# Patient Record
Sex: FEMALE | Race: WHITE | HISPANIC OR LATINO | Employment: FULL TIME | URBAN - METROPOLITAN AREA
[De-identification: names, ages, dates, MRNs, and addresses within clinical notes are randomized per-mention and may not be internally consistent; named-entity substitution may affect disease eponyms.]

---

## 2017-01-06 ENCOUNTER — HOSPITAL ENCOUNTER (EMERGENCY)
Facility: HOSPITAL | Age: 37
Discharge: HOME | End: 2017-01-06
Admitting: EMERGENCY MEDICINE
Payer: COMMERCIAL

## 2017-01-06 VITALS
WEIGHT: 129.41 LBS | DIASTOLIC BLOOD PRESSURE: 74 MMHG | SYSTOLIC BLOOD PRESSURE: 130 MMHG | TEMPERATURE: 98.5 F | OXYGEN SATURATION: 100 % | HEART RATE: 92 BPM | RESPIRATION RATE: 16 BRPM

## 2017-01-06 DIAGNOSIS — B34.9 VIRAL SYNDROME: Primary | ICD-10-CM

## 2017-01-06 LAB
FLUAV AG SPEC QL IA: NEGATIVE
FLUBV AG SPEC QL IA: NEGATIVE
S PYO AG THROAT QL: NEGATIVE

## 2017-01-06 PROCEDURE — 87147 CULTURE TYPE IMMUNOLOGIC: CPT | Performed by: PHYSICIAN ASSISTANT

## 2017-01-06 PROCEDURE — 87798 DETECT AGENT NOS DNA AMP: CPT | Performed by: PHYSICIAN ASSISTANT

## 2017-01-06 PROCEDURE — 99283 EMERGENCY DEPT VISIT LOW MDM: CPT

## 2017-01-06 PROCEDURE — 87430 STREP A AG IA: CPT | Performed by: PHYSICIAN ASSISTANT

## 2017-01-06 PROCEDURE — 87400 INFLUENZA A/B EACH AG IA: CPT | Performed by: PHYSICIAN ASSISTANT

## 2017-01-06 PROCEDURE — 87070 CULTURE OTHR SPECIMN AEROBIC: CPT | Performed by: PHYSICIAN ASSISTANT

## 2017-01-07 LAB
FLUAV AG SPEC QL: NORMAL
FLUBV AG SPEC QL: NORMAL
RSV B RNA SPEC QL NAA+PROBE: NORMAL

## 2017-01-09 LAB — BACTERIA THROAT CULT: NORMAL

## 2017-01-12 ENCOUNTER — HOSPITAL ENCOUNTER (EMERGENCY)
Facility: HOSPITAL | Age: 37
Discharge: HOME | End: 2017-01-12
Attending: EMERGENCY MEDICINE | Admitting: EMERGENCY MEDICINE
Payer: COMMERCIAL

## 2017-01-12 VITALS
DIASTOLIC BLOOD PRESSURE: 77 MMHG | OXYGEN SATURATION: 100 % | RESPIRATION RATE: 18 BRPM | TEMPERATURE: 97.7 F | WEIGHT: 133.2 LBS | HEART RATE: 79 BPM | SYSTOLIC BLOOD PRESSURE: 129 MMHG

## 2017-01-12 DIAGNOSIS — R22.0 SWELLING OF RIGHT SIDE OF FACE: Primary | ICD-10-CM

## 2017-01-12 PROCEDURE — 99283 EMERGENCY DEPT VISIT LOW MDM: CPT

## 2017-03-29 ENCOUNTER — ALLSCRIPTS OFFICE VISIT (OUTPATIENT)
Dept: OTHER | Facility: OTHER | Age: 37
End: 2017-03-29

## 2017-03-29 ENCOUNTER — TRANSCRIBE ORDERS (OUTPATIENT)
Dept: ADMINISTRATIVE | Facility: HOSPITAL | Age: 37
End: 2017-03-29

## 2017-03-29 DIAGNOSIS — M48.061 SPINAL STENOSIS OF LUMBAR REGION: ICD-10-CM

## 2017-03-29 DIAGNOSIS — M48.061 SPINAL STENOSIS, LUMBAR REGION, WITHOUT NEUROGENIC CLAUDICATION: Primary | ICD-10-CM

## 2017-03-30 ENCOUNTER — ALLSCRIPTS OFFICE VISIT (OUTPATIENT)
Dept: OTHER | Facility: OTHER | Age: 37
End: 2017-03-30

## 2017-04-08 ENCOUNTER — HOSPITAL ENCOUNTER (OUTPATIENT)
Dept: MRI IMAGING | Facility: HOSPITAL | Age: 37
Discharge: HOME | End: 2017-04-08
Payer: COMMERCIAL

## 2017-04-08 DIAGNOSIS — M48.061 SPINAL STENOSIS OF LUMBAR REGION: ICD-10-CM

## 2017-04-08 PROCEDURE — 72148 MRI LUMBAR SPINE W/O DYE: CPT

## 2017-04-12 ENCOUNTER — ALLSCRIPTS OFFICE VISIT (OUTPATIENT)
Dept: OTHER | Facility: OTHER | Age: 37
End: 2017-04-12

## 2017-04-30 ENCOUNTER — ANESTHESIA EVENT (OUTPATIENT)
Dept: GASTROENTEROLOGY | Facility: MEDICAL CENTER | Age: 37
End: 2017-04-30
Payer: COMMERCIAL

## 2017-05-01 ENCOUNTER — GENERIC CONVERSION - ENCOUNTER (OUTPATIENT)
Dept: GASTROENTEROLOGY | Facility: MEDICAL CENTER | Age: 37
End: 2017-05-01

## 2017-05-01 ENCOUNTER — ANESTHESIA (OUTPATIENT)
Dept: GASTROENTEROLOGY | Facility: MEDICAL CENTER | Age: 37
End: 2017-05-01
Payer: COMMERCIAL

## 2017-05-01 ENCOUNTER — HOSPITAL ENCOUNTER (OUTPATIENT)
Facility: MEDICAL CENTER | Age: 37
Setting detail: OUTPATIENT SURGERY
Discharge: HOME | End: 2017-05-01
Attending: INTERNAL MEDICINE | Admitting: INTERNAL MEDICINE
Payer: COMMERCIAL

## 2017-05-01 VITALS
HEART RATE: 81 BPM | BODY MASS INDEX: 25.91 KG/M2 | OXYGEN SATURATION: 99 % | SYSTOLIC BLOOD PRESSURE: 107 MMHG | WEIGHT: 132 LBS | RESPIRATION RATE: 16 BRPM | HEIGHT: 60 IN | TEMPERATURE: 97.4 F | DIASTOLIC BLOOD PRESSURE: 59 MMHG

## 2017-05-01 DIAGNOSIS — R10.9 ABDOMINAL PAIN: ICD-10-CM

## 2017-05-01 PROCEDURE — 88342 IMHCHEM/IMCYTCHM 1ST ANTB: CPT | Performed by: INTERNAL MEDICINE

## 2017-05-01 PROCEDURE — 88305 TISSUE EXAM BY PATHOLOGIST: CPT | Performed by: INTERNAL MEDICINE

## 2017-05-01 RX ORDER — SODIUM CHLORIDE 9 MG/ML
125 INJECTION, SOLUTION INTRAVENOUS CONTINUOUS
Status: DISCONTINUED | OUTPATIENT
Start: 2017-05-01 | End: 2017-05-01 | Stop reason: HOSPADM

## 2017-05-01 RX ORDER — TRAMADOL HYDROCHLORIDE 50 MG/1
50 TABLET ORAL EVERY 6 HOURS PRN
COMMUNITY
End: 2017-07-09 | Stop reason: ALTCHOICE

## 2017-05-01 RX ORDER — PROPOFOL 10 MG/ML
INJECTION, EMULSION INTRAVENOUS AS NEEDED
Status: DISCONTINUED | OUTPATIENT
Start: 2017-05-01 | End: 2017-05-01 | Stop reason: SURG

## 2017-05-01 RX ADMIN — PROPOFOL 50 MG: 10 INJECTION, EMULSION INTRAVENOUS at 08:14

## 2017-05-01 RX ADMIN — PROPOFOL 50 MG: 10 INJECTION, EMULSION INTRAVENOUS at 08:15

## 2017-05-01 RX ADMIN — PROPOFOL 50 MG: 10 INJECTION, EMULSION INTRAVENOUS at 08:11

## 2017-05-01 RX ADMIN — PROPOFOL 50 MG: 10 INJECTION, EMULSION INTRAVENOUS at 08:05

## 2017-05-01 RX ADMIN — SODIUM CHLORIDE 125 ML/HR: 0.9 INJECTION, SOLUTION INTRAVENOUS at 07:35

## 2017-05-11 ENCOUNTER — GENERIC CONVERSION - ENCOUNTER (OUTPATIENT)
Dept: OTHER | Facility: OTHER | Age: 37
End: 2017-05-11

## 2017-05-12 ENCOUNTER — ALLSCRIPTS OFFICE VISIT (OUTPATIENT)
Dept: OTHER | Facility: OTHER | Age: 37
End: 2017-05-12

## 2017-05-12 DIAGNOSIS — R10.11 RIGHT UPPER QUADRANT PAIN: ICD-10-CM

## 2017-05-12 DIAGNOSIS — R07.89 OTHER CHEST PAIN: ICD-10-CM

## 2017-05-18 ENCOUNTER — TRANSCRIBE ORDERS (OUTPATIENT)
Dept: ADMINISTRATIVE | Facility: HOSPITAL | Age: 37
End: 2017-05-18

## 2017-05-18 DIAGNOSIS — R10.9 ABDOMINAL PAIN, UNSPECIFIED SITE: Primary | ICD-10-CM

## 2017-05-24 ENCOUNTER — HOSPITAL ENCOUNTER (OUTPATIENT)
Dept: CT IMAGING | Facility: HOSPITAL | Age: 37
Discharge: HOME | End: 2017-05-24
Attending: INTERNAL MEDICINE
Payer: COMMERCIAL

## 2017-05-24 DIAGNOSIS — R10.9 ABDOMINAL PAIN, UNSPECIFIED SITE: ICD-10-CM

## 2017-05-24 PROCEDURE — 74177 CT ABD & PELVIS W/CONTRAST: CPT

## 2017-05-24 RX ADMIN — IOHEXOL 100 ML: 350 INJECTION, SOLUTION INTRAVENOUS at 17:19

## 2017-05-30 ENCOUNTER — GENERIC CONVERSION - ENCOUNTER (OUTPATIENT)
Dept: OTHER | Facility: OTHER | Age: 37
End: 2017-05-30

## 2017-06-06 ENCOUNTER — APPOINTMENT (OUTPATIENT)
Dept: LAB | Facility: HOSPITAL | Age: 37
End: 2017-06-06
Payer: COMMERCIAL

## 2017-06-06 ENCOUNTER — TRANSCRIBE ORDERS (OUTPATIENT)
Dept: ADMINISTRATIVE | Facility: HOSPITAL | Age: 37
End: 2017-06-06

## 2017-06-06 DIAGNOSIS — Z11.3 SCREENING EXAMINATION FOR VENEREAL DISEASE: Primary | ICD-10-CM

## 2017-06-06 DIAGNOSIS — Z11.3 SCREENING EXAMINATION FOR VENEREAL DISEASE: ICD-10-CM

## 2017-06-06 DIAGNOSIS — Z72.89 OTHER PROBLEMS RELATED TO LIFESTYLE: ICD-10-CM

## 2017-06-06 PROCEDURE — 86592 SYPHILIS TEST NON-TREP QUAL: CPT

## 2017-06-06 PROCEDURE — 36415 COLL VENOUS BLD VENIPUNCTURE: CPT

## 2017-06-06 PROCEDURE — 87389 HIV-1 AG W/HIV-1&-2 AB AG IA: CPT

## 2017-06-07 LAB
HIV 1+2 AB+HIV1 P24 AG SERPL QL IA: NORMAL
RPR SER QL: NORMAL

## 2017-07-09 ENCOUNTER — HOSPITAL ENCOUNTER (EMERGENCY)
Facility: HOSPITAL | Age: 37
Discharge: HOME | End: 2017-07-09
Attending: EMERGENCY MEDICINE | Admitting: EMERGENCY MEDICINE
Payer: COMMERCIAL

## 2017-07-09 ENCOUNTER — APPOINTMENT (EMERGENCY)
Dept: RADIOLOGY | Facility: HOSPITAL | Age: 37
End: 2017-07-09
Payer: COMMERCIAL

## 2017-07-09 VITALS
TEMPERATURE: 98.3 F | HEART RATE: 86 BPM | DIASTOLIC BLOOD PRESSURE: 80 MMHG | SYSTOLIC BLOOD PRESSURE: 140 MMHG | RESPIRATION RATE: 16 BRPM | OXYGEN SATURATION: 99 %

## 2017-07-09 DIAGNOSIS — M79.672 FOOT PAIN, LEFT: Primary | ICD-10-CM

## 2017-07-09 LAB
BILIRUB UR QL STRIP: NEGATIVE
CLARITY UR: CLEAR
CLARITY, POC: CLEAR
COLOR UR: YELLOW
COLOR, POC: YELLOW
GLUCOSE UR STRIP-MCNC: NEGATIVE MG/DL
HCG UR QL: NEGATIVE
HGB UR QL STRIP.AUTO: NEGATIVE
KETONES UR STRIP-MCNC: NEGATIVE MG/DL
LEUKOCYTE ESTERASE UR QL STRIP: NEGATIVE
NITRITE UR QL STRIP: NEGATIVE
PH UR STRIP.AUTO: 6.5 [PH] (ref 4.5–8)
PROT UR STRIP-MCNC: NEGATIVE MG/DL
SP GR UR STRIP.AUTO: 1.01 (ref 1–1.03)
UROBILINOGEN UR QL STRIP.AUTO: 0.2 E.U./DL

## 2017-07-09 PROCEDURE — 81025 URINE PREGNANCY TEST: CPT | Performed by: PHYSICIAN ASSISTANT

## 2017-07-09 PROCEDURE — 73630 X-RAY EXAM OF FOOT: CPT

## 2017-07-09 PROCEDURE — 81003 URINALYSIS AUTO W/O SCOPE: CPT

## 2017-07-09 PROCEDURE — 99283 EMERGENCY DEPT VISIT LOW MDM: CPT

## 2017-07-09 PROCEDURE — 81002 URINALYSIS NONAUTO W/O SCOPE: CPT | Performed by: PHYSICIAN ASSISTANT

## 2017-07-09 RX ORDER — SENNOSIDES 8.6 MG
650 CAPSULE ORAL EVERY 8 HOURS PRN
Qty: 30 TABLET | Refills: 0 | Status: SHIPPED | OUTPATIENT
Start: 2017-07-09 | End: 2017-10-06

## 2017-07-13 ENCOUNTER — ALLSCRIPTS OFFICE VISIT (OUTPATIENT)
Dept: OTHER | Facility: OTHER | Age: 37
End: 2017-07-13

## 2017-07-13 ENCOUNTER — APPOINTMENT (OUTPATIENT)
Dept: LAB | Facility: HOSPITAL | Age: 37
End: 2017-07-13
Attending: INTERNAL MEDICINE
Payer: COMMERCIAL

## 2017-07-13 DIAGNOSIS — R10.9 ABDOMINAL PAIN: ICD-10-CM

## 2017-07-13 LAB
ALBUMIN SERPL BCP-MCNC: 3.8 G/DL (ref 3.5–5)
ALP SERPL-CCNC: 60 U/L (ref 46–116)
ALT SERPL W P-5'-P-CCNC: 28 U/L (ref 12–78)
ANION GAP SERPL CALCULATED.3IONS-SCNC: 10 MMOL/L (ref 4–13)
AST SERPL W P-5'-P-CCNC: 16 U/L (ref 5–45)
BASOPHILS # BLD AUTO: 0.04 THOUSANDS/ΜL (ref 0–0.1)
BASOPHILS NFR BLD AUTO: 0 % (ref 0–1)
BILIRUB DIRECT SERPL-MCNC: 0.06 MG/DL (ref 0–0.2)
BILIRUB SERPL-MCNC: 0.24 MG/DL (ref 0.2–1)
BUN SERPL-MCNC: 6 MG/DL (ref 5–25)
CALCIUM SERPL-MCNC: 8.9 MG/DL (ref 8.3–10.1)
CHLORIDE SERPL-SCNC: 101 MMOL/L (ref 100–108)
CO2 SERPL-SCNC: 27 MMOL/L (ref 21–32)
CREAT SERPL-MCNC: 0.9 MG/DL (ref 0.6–1.3)
EOSINOPHIL # BLD AUTO: 0.1 THOUSAND/ΜL (ref 0–0.61)
EOSINOPHIL NFR BLD AUTO: 1 % (ref 0–6)
ERYTHROCYTE [DISTWIDTH] IN BLOOD BY AUTOMATED COUNT: 12.8 % (ref 11.6–15.1)
GFR SERPL CREATININE-BSD FRML MDRD: >60 ML/MIN/1.73SQ M
GLUCOSE SERPL-MCNC: 107 MG/DL (ref 65–140)
HCT VFR BLD AUTO: 36.5 % (ref 34.8–46.1)
HGB BLD-MCNC: 13 G/DL (ref 11.5–15.4)
LYMPHOCYTES # BLD AUTO: 1.96 THOUSANDS/ΜL (ref 0.6–4.47)
LYMPHOCYTES NFR BLD AUTO: 18 % (ref 14–44)
MCH RBC QN AUTO: 30 PG (ref 26.8–34.3)
MCHC RBC AUTO-ENTMCNC: 35.6 G/DL (ref 31.4–37.4)
MCV RBC AUTO: 84 FL (ref 82–98)
MONOCYTES # BLD AUTO: 0.44 THOUSAND/ΜL (ref 0.17–1.22)
MONOCYTES NFR BLD AUTO: 4 % (ref 4–12)
NEUTROPHILS # BLD AUTO: 8.66 THOUSANDS/ΜL (ref 1.85–7.62)
NEUTS SEG NFR BLD AUTO: 77 % (ref 43–75)
NRBC BLD AUTO-RTO: 0 /100 WBCS
PLATELET # BLD AUTO: 305 THOUSANDS/UL (ref 149–390)
PMV BLD AUTO: 10.4 FL (ref 8.9–12.7)
POTASSIUM SERPL-SCNC: 3.5 MMOL/L (ref 3.5–5.3)
PROT SERPL-MCNC: 7.5 G/DL (ref 6.4–8.2)
RBC # BLD AUTO: 4.34 MILLION/UL (ref 3.81–5.12)
SODIUM SERPL-SCNC: 138 MMOL/L (ref 136–145)
WBC # BLD AUTO: 11.2 THOUSAND/UL (ref 4.31–10.16)

## 2017-07-13 PROCEDURE — 80048 BASIC METABOLIC PNL TOTAL CA: CPT

## 2017-07-13 PROCEDURE — 36415 COLL VENOUS BLD VENIPUNCTURE: CPT

## 2017-07-13 PROCEDURE — 85025 COMPLETE CBC W/AUTO DIFF WBC: CPT

## 2017-07-13 PROCEDURE — 80076 HEPATIC FUNCTION PANEL: CPT

## 2017-07-17 ENCOUNTER — GENERIC CONVERSION - ENCOUNTER (OUTPATIENT)
Dept: OTHER | Facility: OTHER | Age: 37
End: 2017-07-17

## 2017-07-28 ENCOUNTER — HOSPITAL ENCOUNTER (OUTPATIENT)
Dept: NON INVASIVE DIAGNOSTICS | Facility: HOSPITAL | Age: 37
Discharge: HOME | End: 2017-07-28
Attending: INTERNAL MEDICINE
Payer: COMMERCIAL

## 2017-07-28 ENCOUNTER — TRANSCRIBE ORDERS (OUTPATIENT)
Dept: ADMINISTRATIVE | Facility: HOSPITAL | Age: 37
End: 2017-07-28

## 2017-07-28 DIAGNOSIS — R10.9 ABDOMINAL PAIN, UNSPECIFIED SITE: Primary | ICD-10-CM

## 2017-07-28 DIAGNOSIS — R10.9 ABDOMINAL PAIN, UNSPECIFIED SITE: ICD-10-CM

## 2017-07-28 PROCEDURE — 93005 ELECTROCARDIOGRAM TRACING: CPT

## 2017-07-31 LAB
ATRIAL RATE: 84 BPM
P AXIS: 3 DEGREES
PR INTERVAL: 124 MS
QRS AXIS: 26 DEGREES
QRSD INTERVAL: 86 MS
QT INTERVAL: 370 MS
QTC INTERVAL: 437 MS
T WAVE AXIS: 62 DEGREES
VENTRICULAR RATE: 84 BPM

## 2017-08-03 ENCOUNTER — GENERIC CONVERSION - ENCOUNTER (OUTPATIENT)
Dept: OTHER | Facility: OTHER | Age: 37
End: 2017-08-03

## 2017-08-23 ENCOUNTER — GENERIC CONVERSION - ENCOUNTER (OUTPATIENT)
Dept: OTHER | Facility: OTHER | Age: 37
End: 2017-08-23

## 2017-08-27 ENCOUNTER — APPOINTMENT (EMERGENCY)
Dept: CT IMAGING | Facility: HOSPITAL | Age: 37
End: 2017-08-27
Payer: COMMERCIAL

## 2017-08-27 ENCOUNTER — HOSPITAL ENCOUNTER (EMERGENCY)
Facility: HOSPITAL | Age: 37
Discharge: HOME | End: 2017-08-27
Attending: EMERGENCY MEDICINE | Admitting: EMERGENCY MEDICINE
Payer: COMMERCIAL

## 2017-08-27 VITALS
DIASTOLIC BLOOD PRESSURE: 79 MMHG | HEART RATE: 79 BPM | OXYGEN SATURATION: 100 % | SYSTOLIC BLOOD PRESSURE: 128 MMHG | RESPIRATION RATE: 14 BRPM | TEMPERATURE: 98.5 F

## 2017-08-27 DIAGNOSIS — N83.11 CORPUS LUTEUM CYST OF RIGHT OVARY: Primary | ICD-10-CM

## 2017-08-27 LAB
ANION GAP SERPL CALCULATED.3IONS-SCNC: 7 MMOL/L (ref 4–13)
BASOPHILS # BLD AUTO: 0.03 THOUSANDS/ΜL (ref 0–0.1)
BASOPHILS NFR BLD AUTO: 0 % (ref 0–1)
BILIRUB UR QL STRIP: NEGATIVE
BUN SERPL-MCNC: 9 MG/DL (ref 5–25)
CALCIUM SERPL-MCNC: 9.1 MG/DL (ref 8.3–10.1)
CHLORIDE SERPL-SCNC: 104 MMOL/L (ref 100–108)
CLARITY UR: CLEAR
CO2 SERPL-SCNC: 28 MMOL/L (ref 21–32)
COLOR UR: YELLOW
CREAT SERPL-MCNC: 0.67 MG/DL (ref 0.6–1.3)
EOSINOPHIL # BLD AUTO: 0.09 THOUSAND/ΜL (ref 0–0.61)
EOSINOPHIL NFR BLD AUTO: 1 % (ref 0–6)
ERYTHROCYTE [DISTWIDTH] IN BLOOD BY AUTOMATED COUNT: 13 % (ref 11.6–15.1)
GFR SERPL CREATININE-BSD FRML MDRD: 115 ML/MIN/1.73SQ M
GLUCOSE SERPL-MCNC: 81 MG/DL (ref 65–140)
GLUCOSE UR STRIP-MCNC: NEGATIVE MG/DL
HCG UR QL: NEGATIVE
HCT VFR BLD AUTO: 37.1 % (ref 34.8–46.1)
HGB BLD-MCNC: 13.1 G/DL (ref 11.5–15.4)
HGB UR QL STRIP.AUTO: NEGATIVE
KETONES UR STRIP-MCNC: NEGATIVE MG/DL
LEUKOCYTE ESTERASE UR QL STRIP: NEGATIVE
LYMPHOCYTES # BLD AUTO: 2.06 THOUSANDS/ΜL (ref 0.6–4.47)
LYMPHOCYTES NFR BLD AUTO: 25 % (ref 14–44)
MCH RBC QN AUTO: 29.4 PG (ref 26.8–34.3)
MCHC RBC AUTO-ENTMCNC: 35.3 G/DL (ref 31.4–37.4)
MCV RBC AUTO: 83 FL (ref 82–98)
MONOCYTES # BLD AUTO: 0.48 THOUSAND/ΜL (ref 0.17–1.22)
MONOCYTES NFR BLD AUTO: 6 % (ref 4–12)
NEUTROPHILS # BLD AUTO: 5.49 THOUSANDS/ΜL (ref 1.85–7.62)
NEUTS SEG NFR BLD AUTO: 68 % (ref 43–75)
NITRITE UR QL STRIP: NEGATIVE
NRBC BLD AUTO-RTO: 0 /100 WBCS
PH UR STRIP.AUTO: 6.5 [PH] (ref 4.5–8)
PLATELET # BLD AUTO: 243 THOUSANDS/UL (ref 149–390)
PMV BLD AUTO: 9.8 FL (ref 8.9–12.7)
POTASSIUM SERPL-SCNC: 3.5 MMOL/L (ref 3.5–5.3)
PROT UR STRIP-MCNC: NEGATIVE MG/DL
RBC # BLD AUTO: 4.45 MILLION/UL (ref 3.81–5.12)
SODIUM SERPL-SCNC: 139 MMOL/L (ref 136–145)
SP GR UR STRIP.AUTO: 1.01 (ref 1–1.03)
UROBILINOGEN UR QL STRIP.AUTO: 1 E.U./DL
WBC # BLD AUTO: 8.15 THOUSAND/UL (ref 4.31–10.16)

## 2017-08-27 PROCEDURE — 81003 URINALYSIS AUTO W/O SCOPE: CPT

## 2017-08-27 PROCEDURE — 99284 EMERGENCY DEPT VISIT MOD MDM: CPT

## 2017-08-27 PROCEDURE — 80048 BASIC METABOLIC PNL TOTAL CA: CPT | Performed by: EMERGENCY MEDICINE

## 2017-08-27 PROCEDURE — 96374 THER/PROPH/DIAG INJ IV PUSH: CPT

## 2017-08-27 PROCEDURE — 85025 COMPLETE CBC W/AUTO DIFF WBC: CPT | Performed by: EMERGENCY MEDICINE

## 2017-08-27 PROCEDURE — 74177 CT ABD & PELVIS W/CONTRAST: CPT

## 2017-08-27 PROCEDURE — 36415 COLL VENOUS BLD VENIPUNCTURE: CPT | Performed by: EMERGENCY MEDICINE

## 2017-08-27 PROCEDURE — 81025 URINE PREGNANCY TEST: CPT | Performed by: EMERGENCY MEDICINE

## 2017-08-27 RX ORDER — TRAMADOL HYDROCHLORIDE 50 MG/1
50 TABLET ORAL EVERY 6 HOURS PRN
Qty: 8 TABLET | Refills: 0 | Status: SHIPPED | OUTPATIENT
Start: 2017-08-27 | End: 2017-09-06

## 2017-08-27 RX ORDER — IBUPROFEN 800 MG/1
800 TABLET ORAL EVERY 6 HOURS PRN
Qty: 30 TABLET | Refills: 0 | Status: SHIPPED | OUTPATIENT
Start: 2017-08-27 | End: 2017-10-06

## 2017-08-27 RX ORDER — KETOROLAC TROMETHAMINE 30 MG/ML
15 INJECTION, SOLUTION INTRAMUSCULAR; INTRAVENOUS ONCE
Status: COMPLETED | OUTPATIENT
Start: 2017-08-27 | End: 2017-08-27

## 2017-08-27 RX ADMIN — KETOROLAC TROMETHAMINE 15 MG: 30 INJECTION, SOLUTION INTRAMUSCULAR at 19:47

## 2017-08-27 RX ADMIN — IOHEXOL 100 ML: 350 INJECTION, SOLUTION INTRAVENOUS at 20:14

## 2017-10-06 ENCOUNTER — HOSPITAL ENCOUNTER (EMERGENCY)
Facility: HOSPITAL | Age: 37
Discharge: HOME | End: 2017-10-06
Attending: EMERGENCY MEDICINE | Admitting: EMERGENCY MEDICINE
Payer: COMMERCIAL

## 2017-10-06 VITALS
OXYGEN SATURATION: 99 % | BODY MASS INDEX: 25.97 KG/M2 | SYSTOLIC BLOOD PRESSURE: 115 MMHG | WEIGHT: 133 LBS | TEMPERATURE: 98.3 F | DIASTOLIC BLOOD PRESSURE: 72 MMHG | RESPIRATION RATE: 14 BRPM | HEART RATE: 84 BPM

## 2017-10-06 DIAGNOSIS — G43.909 MIGRAINE HEADACHE: Primary | ICD-10-CM

## 2017-10-06 LAB — EXT PREG TEST URINE: NEGATIVE

## 2017-10-06 PROCEDURE — 96375 TX/PRO/DX INJ NEW DRUG ADDON: CPT

## 2017-10-06 PROCEDURE — 81025 URINE PREGNANCY TEST: CPT | Performed by: EMERGENCY MEDICINE

## 2017-10-06 PROCEDURE — 96374 THER/PROPH/DIAG INJ IV PUSH: CPT

## 2017-10-06 PROCEDURE — 99283 EMERGENCY DEPT VISIT LOW MDM: CPT

## 2017-10-06 PROCEDURE — 96361 HYDRATE IV INFUSION ADD-ON: CPT

## 2017-10-06 RX ORDER — METOCLOPRAMIDE HYDROCHLORIDE 5 MG/ML
10 INJECTION INTRAMUSCULAR; INTRAVENOUS ONCE
Status: COMPLETED | OUTPATIENT
Start: 2017-10-06 | End: 2017-10-06

## 2017-10-06 RX ORDER — KETOROLAC TROMETHAMINE 30 MG/ML
15 INJECTION, SOLUTION INTRAMUSCULAR; INTRAVENOUS ONCE
Status: COMPLETED | OUTPATIENT
Start: 2017-10-06 | End: 2017-10-06

## 2017-10-06 RX ADMIN — KETOROLAC TROMETHAMINE 15 MG: 30 INJECTION, SOLUTION INTRAMUSCULAR at 19:07

## 2017-10-06 RX ADMIN — METOCLOPRAMIDE 10 MG: 5 INJECTION, SOLUTION INTRAMUSCULAR; INTRAVENOUS at 18:37

## 2017-10-06 RX ADMIN — SODIUM CHLORIDE 1000 ML: 0.9 INJECTION, SOLUTION INTRAVENOUS at 18:37

## 2017-10-06 NOTE — ED ATTENDING ATTESTATION
Jon Valentin DO, saw and evaluated the patient  I have discussed the patient with the resident/non-physician practitioner and agree with the resident's/non-physician practitioner's findings, Plan of Care, and MDM as documented in the resident's/non-physician practitioner's note, except where noted  All available labs and Radiology studies were reviewed  At this point I agree with the current assessment done in the Emergency Department  I have conducted an independent evaluation of this patient a history and physical is as follows:    26-year-old female presents for evaluation of migraine headache  The patient has a history of migraines states that this 1 is similar to previous ones in the stabbing type of intermittent headache that she has  The patient denies any new focal neurologic changes associated with this headache  On examination:  The patient is awake, alert and oriented  HEENT: Normocephalic/atraumatic  External examination of the ears is unremarkable  Pupils are equal round and reactive to light, there is no conjunctival injection or scleral icterus noted  Nares are patent without rhinorrhea  The oropharynx is moist without injection  The neck is supple  Lungs: Clear to auscultation bilaterally  Heart: Regular without murmurs rubs or gallops  Abdomen: Soft and nontender  There are positive bowel sounds  there is no rebound or guarding  Musculoskeletal: Normal range of motion with grossly normal strength  Neuro: Cranial nerves II through XII grossly intact  Nonfocal exam  Skin: No rash noted  Psych: Mood and affect normal      At the time my evaluation, after the administration of medications  The patient states that she has significant improvement of her pain is in no distress  The patient is agreeable with discharge at this time and outpatient follow-up  I advised the patient to increase her fluid intake and restrict her caffeine intake      The patient (and any family present) verbalized understanding of the discharge instructions and warnings that would necessitate return to the Emergency Department  All questions were answered prior to discharge      Diagnosis:  Migraine headache    Critical Care Time  CritCare Time

## 2017-10-06 NOTE — DISCHARGE INSTRUCTIONS
Migraine Headache   WHAT YOU NEED TO KNOW:   A migraine is a severe headache  The pain can be so severe that it interferes with your daily activities  A migraine can last a few hours up to several days  The exact cause of migraines is not known  DISCHARGE INSTRUCTIONS:   Return to the emergency department if:   · You have a headache that seems different or much worse than your usual migraine headache  · You have a severe headache with a fever or a stiff neck  · You have new problems with speech, vision, balance, or movement  · You feel like you are going to faint, you become confused, or you have a seizure  Contact your healthcare provider or neurologist if:   · Your migraines interfere with your daily activities  · Your medicines or treatments stop working  · You have questions or concerns about your condition or care  Medicines: You may need any of the following  Take medicine as soon as you feel a migraine begin  · Prescription pain medicine  may be given  Do not wait until the pain is severe before you take your medicine  · Migraine medicines  are used to help prevent a migraine or stop it once it starts  · Antinausea medicine  may be given to calm your stomach and to help prevent vomiting  This medicine can also help relieve pain  · Take your medicine as directed  Contact your healthcare provider if you think your medicine is not helping or if you have side effects  Tell him or her if you are allergic to any medicine  Keep a list of the medicines, vitamins, and herbs you take  Include the amounts, and when and why you take them  Bring the list or the pill bottles to follow-up visits  Carry your medicine list with you in case of an emergency  Manage your symptoms:   · Rest in a dark, quiet room  This will help decrease your pain  Sleep may also help relieve the pain  · Apply ice to decrease pain  Use an ice pack, or put crushed ice in a plastic bag   Cover the ice pack with a towel and place it on your head  Apply ice for 15 to 20 minutes every hour  · Apply heat to decrease pain and muscle spasms  Use a small towel dampened with warm water or a heating pad, or sit in a warm bath  Apply heat on the area for 20 to 30 minutes every 2 hours  You may alternate heat and ice  · Keep a migraine record  Write down when your migraines start and stop  Include your symptoms and what you were doing when a migraine began  Record what you ate or drank for 24 hours before the migraine started  Keep track of what you did to treat your migraine and if it worked  Bring the migraine record with you to visits with your healthcare provider  Follow up with your healthcare provider or neurologist as directed:  Bring your migraine record with you  Write down your questions so you remember to ask them during your visits  Prevent another migraine:   · Do not smoke  Nicotine and other chemicals in cigarettes and cigars can trigger a migraine or make it worse  Ask your healthcare provider for information if you currently smoke and need help to quit  E-cigarettes or smokeless tobacco still contain nicotine  Talk to your healthcare provider before you use these products  · Do not drink alcohol  Alcohol can trigger a migraine  It can also keep medicines used to treat your migraines from working  · Get regular exercise  Exercise may help prevent migraines  Talk to your healthcare provider about the best exercise plan for you  Try to get at least 30 minutes of exercise on most days  · Manage stress  Stress may trigger a migraine  Learn new ways to relax, such as deep breathing  · Create a sleep schedule  Go to bed and get up at the same times each day  Do not watch television before bed  · Eat regular meals  Include healthy foods such as include fruit, vegetables, whole-grain breads, low-fat dairy products, beans, lean meat, and fish   Do not have food or drinks that trigger your migraines  © 2017 [facility] Information is for End User's use only and may not be sold, redistributed or otherwise used for commercial purposes  All illustrations and images included in CareNotes® are the copyrighted property of A D A M [employer] Inc  or [employer]  The above information is an  only  It is not intended as medical advice for individual conditions or treatments  Talk to your doctor, nurse or pharmacist before following any medical regimen to see if it is safe and effective for you

## 2017-10-06 NOTE — ED PROVIDER NOTES
History  Chief Complaint   Patient presents with    Migraine     Patient has had left side headache for past week  She states the pain comes and goes  Has hx of migraines but describes this headeache being different from previous  HPI  79-year-old female past history migraines presents with complaint of right-sided frontal headache that has been ongoing for last week  Patient says she has had intermittent right-sided headache that is sharp, stabbing pain that waxes and wanes in intensity and is exacerbated by loud noise  Patient says at its worst pain is a 7/10 does not radiate  Associated with intermittent photophobia as well as blurring of vision in the right eye  Patient has tried taking Motrin at home with only mild relief of her symptoms  Says headache presents every 6 several hours or so  She is concerned because she has never had a headache that has lasted for a week before  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 and has been affecting her ability to work  She otherwise denies weakness, numbness, tingling, speech difficulties, chest pain, shortness breath, nausea, vomiting, diarrhea, fever, chills  Patient is having normal periods her most recent 1 was in the beginning of September  Impression plan 79-year-old female presents with headache has been ongoing for the last week  It is right-sided and intermittent in nature waxes and wanes in intensity  She has with overall well-appearing likely migraine headache  We will treat with IV fluids, Reglan, Toradol  Will also check a pregnancy test, reassess  None       Past Medical History:   Diagnosis Date    Acid reflux     Frequent headaches     Liver cyst     Migraine     Stomach ulcer        Past Surgical History:   Procedure Laterality Date    [place] ESOPHAGOGASTRODUODENOSCOPY TRANSORAL DIAGNOSTIC N/A 5/1/2017    Procedure: ESOPHAGOGASTRODUODENOSCOPY (EGD); Surgeon: Gita Trammell MD;  Location: [facility] GI LAB;   Service: Gastroenterology    TUBAL LIGATION         History reviewed  No pertinent family history  I have reviewed and agree with the history as documented  Social History   Substance Use Topics    Smoking status: Never Smoker    Smokeless tobacco: Never Used    Alcohol use No        Review of Systems   Constitutional: Negative for activity change, appetite change, chills and fever  HENT: Negative for sore throat, trouble swallowing and voice change  Eyes: Negative for photophobia, discharge and visual disturbance  Respiratory: Negative for cough and shortness of breath  Cardiovascular: Negative for chest pain, palpitations and leg swelling  Gastrointestinal: Negative for abdominal pain, diarrhea, nausea and vomiting  Endocrine: Negative for polyuria  Genitourinary: Negative for dysuria, vaginal discharge and vaginal pain  Musculoskeletal: Negative for back pain and gait problem  Skin: Negative for rash  Allergic/Immunologic: Negative  Neurological: Positive for headaches  Negative for dizziness, tremors, seizures, syncope, facial asymmetry, speech difficulty, weakness, light-headedness and numbness  Hematological: Negative for adenopathy  Psychiatric/Behavioral: Negative for agitation  Physical Exam  ED Triage Vitals   Temperature Pulse Respirations Blood Pressure SpO2   10/06/17 1812 10/06/17 1812 10/06/17 1812 10/06/17 1812 10/06/17 1812   98 3 °F (36 8 °C) 100 16 134/80 100 %      Temp Source Heart Rate Source Patient Position - Orthostatic VS BP Location FiO2 (%)   10/06/17 1812 10/06/17 1812 10/06/17 1943 10/06/17 1812 --   Temporal Monitor Lying Right arm       Pain Score       10/06/17 1812       6           Physical Exam   Constitutional: She is oriented to person, place, and time  She appears well-developed and well-nourished  No distress  HENT:   Head: Normocephalic and atraumatic  Right Ear: No hemotympanum  Left Ear: No hemotympanum     Nose: Nose normal  No nasal septal hematoma  Mouth/Throat: Uvula is midline, oropharynx is clear and moist and mucous membranes are normal  She does not have dentures  No oropharyngeal exudate  Eyes: Conjunctivae are normal  Right eye exhibits no nystagmus  Left eye exhibits no nystagmus  Normal fundoscopic exam  Normal eom  carlos manuel 3 to 2 mm bl  No nystagmus   Neck: Trachea normal, normal range of motion, full passive range of motion without pain and phonation normal  Neck supple  No tracheal tenderness present  No tracheal deviation present  No c-spine tenderness   Cardiovascular: Normal rate, regular rhythm, normal heart sounds and intact distal pulses  Exam reveals no friction rub  No murmur heard  Pulmonary/Chest: Effort normal and breath sounds normal  She has no wheezes  She exhibits no tenderness  Abdominal: Soft  Bowel sounds are normal  There is no tenderness  There is no rebound and no guarding  Musculoskeletal: Normal range of motion  She exhibits no edema, tenderness or deformity  Neurological: She is alert and oriented to person, place, and time  She has normal strength  No cranial nerve deficit or sensory deficit  GCS eye subscore is 4  GCS verbal subscore is 5  GCS motor subscore is 6  Reflex Scores:       Patellar reflexes are 2+ on the right side and 2+ on the left side  Achilles reflexes are 2+ on the right side and 2+ on the left side  Cn 2-12 intact  Normal strength/sensation  Normal heel to shin, finger to nose  Normal gait   Skin: Skin is warm and dry  She is not diaphoretic  Psychiatric: She has a normal mood and affect  Nursing note and vitals reviewed        ED Medications  Medications   sodium chloride 0 9 % bolus 1,000 mL (0 mL Intravenous Stopped 10/6/17 2008)   metoclopramide (REGLAN) injection 10 mg (10 mg Intravenous Given 10/6/17 1837)   ketorolac (TORADOL) 30 mg/mL injection 15 mg (15 mg Intravenous Given 10/6/17 1907)       Diagnostic Studies  Labs Reviewed   POCT PREGNANCY, URINE - Normal       Result Value Ref Range Status    EXT PREG TEST UR (Ref: Negative) negative   Final       No orders to display       Procedures  Procedures      Phone Consults  ED Phone Contact    ED Course  ED Course    pt  Says she feels better  We will d/c  Return precuations discussed  MDM  CritCare Time    Disposition  Final diagnoses:   Migraine headache     ED Disposition     ED Disposition Condition Comment    Discharge  Jonelle Del Real discharge to home/self care  Condition at discharge: Good        Follow-up Information     Follow up With Specialties Details Why Contact Info    Edith Acosta PA-C Family Medicine Schedule an appointment as soon as possible for a visit As needed THE [facility] [address] [place] [address],[address]          There are no discharge medications for this patient  No discharge procedures on file  ED Provider  Attending physically available and evaluated Jonelle Del Real  I managed the patient along with the ED Attending      Electronically Signed by       Stefan Reyes MD  Resident  10/07/17 2802

## 2017-11-12 ENCOUNTER — HOSPITAL ENCOUNTER (EMERGENCY)
Facility: HOSPITAL | Age: 37
Discharge: HOME | End: 2017-11-12
Attending: EMERGENCY MEDICINE
Payer: COMMERCIAL

## 2017-11-12 ENCOUNTER — APPOINTMENT (EMERGENCY)
Dept: ULTRASOUND IMAGING | Facility: HOSPITAL | Age: 37
End: 2017-11-12
Payer: COMMERCIAL

## 2017-11-12 VITALS
SYSTOLIC BLOOD PRESSURE: 121 MMHG | TEMPERATURE: 97.4 F | OXYGEN SATURATION: 100 % | HEART RATE: 83 BPM | DIASTOLIC BLOOD PRESSURE: 76 MMHG | RESPIRATION RATE: 16 BRPM

## 2017-11-12 DIAGNOSIS — R10.2 PELVIC PAIN: Primary | ICD-10-CM

## 2017-11-12 LAB
BILIRUB UR QL STRIP: NEGATIVE
CLARITY UR: NORMAL
COLOR UR: YELLOW
COLOR, POC: NORMAL
EXT PREG TEST URINE: NORMAL
GLUCOSE UR STRIP-MCNC: NEGATIVE MG/DL
HGB UR QL STRIP.AUTO: NEGATIVE
KETONES UR STRIP-MCNC: NEGATIVE MG/DL
LEUKOCYTE ESTERASE UR QL STRIP: NEGATIVE
NITRITE UR QL STRIP: NEGATIVE
PH UR STRIP.AUTO: 5.5 [PH] (ref 4.5–8)
PROT UR STRIP-MCNC: NEGATIVE MG/DL
SP GR UR STRIP.AUTO: 1.02 (ref 1–1.03)
UROBILINOGEN UR QL STRIP.AUTO: 0.2 E.U./DL

## 2017-11-12 PROCEDURE — 76830 TRANSVAGINAL US NON-OB: CPT

## 2017-11-12 PROCEDURE — 81003 URINALYSIS AUTO W/O SCOPE: CPT

## 2017-11-12 PROCEDURE — 81025 URINE PREGNANCY TEST: CPT | Performed by: EMERGENCY MEDICINE

## 2017-11-12 PROCEDURE — 81002 URINALYSIS NONAUTO W/O SCOPE: CPT | Performed by: EMERGENCY MEDICINE

## 2017-11-12 PROCEDURE — 99284 EMERGENCY DEPT VISIT MOD MDM: CPT

## 2017-11-12 PROCEDURE — 93976 VASCULAR STUDY: CPT

## 2017-11-12 PROCEDURE — 76856 US EXAM PELVIC COMPLETE: CPT

## 2017-11-12 RX ORDER — TRAMADOL HYDROCHLORIDE 50 MG/1
50 TABLET ORAL
COMMUNITY
End: 2018-05-16

## 2017-11-12 NOTE — ED ATTENDING ATTESTATION
Juan Carlos Retana DO, saw and evaluated the patient  I have discussed the patient with the resident/non-physician practitioner and agree with the resident's/non-physician practitioner's findings, Plan of Care, and MDM as documented in the resident's/non-physician practitioner's note, except where noted  All available labs and Radiology studies were reviewed  At this point I agree with the current assessment done in the Emergency Department  I have conducted an independent evaluation of this patient a history and physical is as follows:      Critical Care Time  CritCare Time  62-year-old female presents to the emergency department with intermittent throbbing left lower quadrant pain for the last 6 days  No dysuria, vaginal bleeding or discharge  No fevers or chills, nausea or vomiting  No diarrhea  Patient was seen at [facility] 4 days ago and states she had a pelvic exam, cultures done along with a urinalysis  She was given a prescription for tramadol and told to follow up with her OBGYN this upcoming week when she has her appointment  Patient does have a history of ovarian cysts usually on the right  On exam she is awake and alert and in no distress  Heart is regular without murmur  Lungs are clear  Abdomen is soft with minimal tenderness to the left lower quadrant without rebound or guarding  No hernia or distension  Bowel sounds are normal   Skin is warm and dry, normal color no rash  Will obtain urine to rule out UTI/pregnancy    Will obtain ultrasound to rule out cyst   Highly doubt torsion given how comfortable the patient looks and benign abdominal exam   Doubt diverticulitis again given the very benign abdominal exam

## 2017-11-12 NOTE — ED PROVIDER NOTES
History  Chief Complaint   Patient presents with    Abdominal Pain     pt c/o left lower abdominal "throbbing" since monday, seen [facility] on Tues and given tramadol after urine dip was negative, reports appt Weds with her OB/GYN  pt is eating a subway sub in triage room  A 45-year-old female with past history of acid reflux, herniated discs, liver cyst and right-sided ovarian cyst; presents with left pelvic pain for the past 6 days  Pain was initially throbbing, however now has become sharp in nature  Pain is intermittent  Pain does seem to be worse with movement  Pain does radiate toward the left flank  Patient denies associated fever, chills, chest pain, shortness of breath, abdominal pain, nausea, vomiting, diarrhea, dysuria, urinary frequency/urgency, vaginal bleeding, vaginal discharge, peripheral edema and rashes  Patient states symptoms are similar to when she had the right-sided ovarian cyst   Patient was initially at [facility] 5 days ago, where she underwent a pelvic exam and a urine test   Patient was told everything was normal, and to follow with her OB  Patient was given tramadol at that time, which she has been taking however states does not control her symptoms  A/P: Left-sided pelvic pain, benign exam   Patient overall well-appearing  Benign exam   Will check urine for pregnancy and infection  Will obtain pelvic ultrasound to evaluate for ovarian cyst versus torsion  History provided by:  Patient and medical records      Prior to Admission Medications   Prescriptions Last Dose Informant Patient Reported?  Taking?   traMADol (ULTRAM) 50 mg tablet   Yes No   Sig: Take 50 mg by mouth      Facility-Administered Medications: None       Past Medical History:   Diagnosis Date    Acid reflux     Frequent headaches     Herniated disc, cervical     Liver cyst     Migraine     Stomach ulcer        Past Surgical History:   Procedure Laterality Date    [place] ESOPHAGOGASTRODUODENOSCOPY TRANSORAL DIAGNOSTIC N/A 5/1/2017    Procedure: ESOPHAGOGASTRODUODENOSCOPY (EGD); Surgeon: Aurora Rodriguez MD;  Location: [facility] GI LAB; Service: Gastroenterology    TUBAL LIGATION         No family history on file  I have reviewed and agree with the history as documented  Social History   Substance Use Topics    Smoking status: Never Smoker    Smokeless tobacco: Never Used    Alcohol use No        Review of Systems   Genitourinary: Positive for pelvic pain ( left sided)  All other systems reviewed and are negative        Physical Exam  ED Triage Vitals   Temperature Pulse Respirations Blood Pressure SpO2   11/12/17 1618 11/12/17 1618 11/12/17 1618 11/12/17 1618 11/12/17 1841   (!) 97 4 °F (36 3 °C) 105 18 124/83 100 %      Temp Source Heart Rate Source Patient Position - Orthostatic VS BP Location FiO2 (%)   11/12/17 1618 11/12/17 1618 11/12/17 1618 11/12/17 1618 --   Temporal Monitor Sitting Right arm       Pain Score       11/12/17 1618       7           Orthostatic Vital Signs  Vitals:    11/12/17 1618 11/12/17 1841 11/12/17 1944   BP: 124/83 122/79 121/76   Pulse: 105 85 83   Patient Position - Orthostatic VS: Sitting Sitting Sitting       Physical Exam  General Appearance: alert and oriented, nad, non toxic appearing  Skin:  Warm, dry, intact  HEENT: atraumatic, normocephalic  Neck: Supple, trachea midline  Cardiac: RRR; no murmurs, rub, gallops  Pulmonary: lungs CTAB; no wheezes, rales, rhonchi  Gastrointestinal: abdomen soft, nontender, nondistended; no guarding or rebound tenderness; good bowel sounds, no mass or bruits; no CVA tenderness  Extremities:  no pedal edema, 2+ pulses; no calf tenderness, no clubbing, no cyanosis  Neuro:  no focal motor or sensory deficits, CN 2-12 grossly intact  Psych:  Normal mood and affect, normal judgement and insight      ED Medications  Medications - No data to display    Diagnostic Studies  Results Reviewed     Procedure Component Value Units Date/Time    POCT pregnancy, urine [[id number]]  (Normal) Resulted:  11/12/17 1658    Lab Status:  Final result Updated:  11/12/17 1658     EXT PREG TEST UR (Ref: Negative) (-) Negative    POCT urinalysis dipstick [[id number]]  (Normal) Resulted:  11/12/17 1658    Lab Status:  Final result Specimen:  Urine Updated:  11/12/17 1658     Color, UA yellow, cloudy    ED Urine Macroscopic [[id number]]  (Normal) Collected:  11/12/17 1805    Lab Status:  Final result Specimen:  Urine Updated:  11/12/17 1650     Color, UA Yellow     Clarity, UA Cloudy     pH, UA 5 5     Leukocytes, UA Negative     Nitrite, UA Negative     Protein, UA Negative mg/dl      Glucose, UA Negative mg/dl      Ketones, UA Negative mg/dl      Urobilinogen, UA 0 2 E U /dl      Bilirubin, UA Negative     Blood, UA Negative     Specific Gravity, UA 1 025    Narrative:       CLINITEK RESULT                 US pelvis complete w transvaginal   Final Result by Nati Stanton MD (11/12 1943)       No signs of torsion  No acute findings  Workstation performed: [id number]         US duplex ar/vn abd,pelvis limited    (Results Pending)         Procedures  Procedures      Phone Consults  ED Phone Contact    ED Course  ED Course as of Nov 12 2242   Sun Nov 12, 2017 1702 EXT PREG TEST UR (Ref: Negative): (-) Negative   1702 Urine not consistent with acute UTI Leukocytes, UA: Negative   1945 Negative for acute findings  Will discharge home with GYN follow up US pelvis complete w transvaginal                               MDM  CritCare Time    Disposition  Final diagnoses:   Pelvic pain     Time reflects when diagnosis was documented in both MDM as applicable and the Disposition within this note     Time User Action Codes Description Comment    11/12/2017  7:46 PM [address] [[address]] Pelvic pain       ED Disposition     ED Disposition Condition Comment    Discharge  Augie Friday discharge to home/self care      Condition at discharge: Good        Follow-up Information     Follow up With Specialties Details Why Contact Info    Robert Lopez PA-C Family Medicine Schedule an appointment as soon as possible for a visit in 2 days For re-evaluation THE [address] [place] [address] [address]  [phone]      OBGYN  Schedule an appointment as soon as possible for a visit in 2 days For re-evaluation         Discharge Medication List as of 11/12/2017  7:47 PM      CONTINUE these medications which have NOT CHANGED    Details   traMADol (ULTRAM) 50 mg tablet Take 50 mg by mouth, Historical Med           No discharge procedures on file  ED Provider  Attending physically available and evaluated Humbertohollie Ojeda  JESSI managed the patient along with the ED Attending      Electronically Signed by         Lily Polk DO  Resident  11/12/17 1582

## 2017-11-13 NOTE — DISCHARGE INSTRUCTIONS
Pelvic Pain in Women   WHAT YOU NEED TO KNOW:   You may have pain on one or both sides of your pelvis  Pelvic pain may occur with certain body positions or activities, such as when you have sex or a bowel movement  It may worsen during your monthly period or after you sit or stand for a long time  Chronic pelvic pain is pain that continues for longer than 6 months  DISCHARGE INSTRUCTIONS:   Call 911 for any of the following:   · You have severe chest pain and sudden trouble breathing  Return to the emergency department if:   · You have heavy or unusual vaginal bleeding, and you feel lightheaded or faint  Contact your healthcare provider if:   · You have pelvic pain that does not go away after you take pain medicine  · You develop new symptoms or your symptoms are worse than before  · You have questions or concerns about your condition or care  Medicines: You may need any of the following:  · Pain medicine  may be given in pills or creams to relieve your pain  · Hormones  may be given if your pain gets worse with your menstrual cycle  · Antibiotics  may be given if your pain is caused by infection  · Take your medicine as directed  Contact your healthcare provider if you think your medicine is not helping or if you have side effects  Tell him or her if you are allergic to any medicine  Keep a list of the medicines, vitamins, and herbs you take  Include the amounts, and when and why you take them  Bring the list or the pill bottles to follow-up visits  Carry your medicine list with you in case of an emergency  Self-care:   · Keep a pain diary  Write down when your pain happens, how severe it is, and any other symptoms you have with your pain  A diary will help you keep track of pain cycles  It may also help your healthcare provider find out what is causing your pain  · Learn ways to relax  Deep breathing, meditation, and relaxation techniques can help decrease your pain   When you are tense, your pain may increase  · Change the foods you eat if you have irritable bowel syndrome  Ask your healthcare provider about the best foods for you  Follow up with your healthcare provider as directed:  Write down your questions so you remember to ask them during your visits  © 2017 [address] [employer] Information is for End User's use only and may not be sold, redistributed or otherwise used for commercial purposes  All illustrations and images included in CareNotes® are the copyrighted property of [employer]  or [employer]  The above information is an  only  It is not intended as medical advice for individual conditions or treatments  Talk to your doctor, nurse or pharmacist before following any medical regimen to see if it is safe and effective for you

## 2018-01-09 NOTE — RESULT NOTES
Verified Results  (1) CBC/PLT/DIFF 77LVP5015 03:44PM Jorje Bui Order Number: [id number]     Test Name Result Flag Reference   WBC COUNT 11 20 Thousand/uL H 4 31-10 16   RBC COUNT 4 34 Million/uL  3 81-5 12   HEMOGLOBIN 13 0 g/dL  11 5-15 4   HEMATOCRIT 36 5 %  34 8-46  1   MCV 84 fL  82-98   MCH 30 0 pg  26 8-34 3   MCHC 35 6 g/dL  31 4-37 4   RDW 12 8 %  11 6-15 1   MPV 10 4 fL  8 9-12 7   PLATELET COUNT 421 Thousands/uL  149-390   nRBC AUTOMATED 0 /100 WBCs     NEUTROPHILS RELATIVE PERCENT 77 % H 43-75   LYMPHOCYTES RELATIVE PERCENT 18 %  14-44   MONOCYTES RELATIVE PERCENT 4 %  4-12   EOSINOPHILS RELATIVE PERCENT 1 %  0-6   BASOPHILS RELATIVE PERCENT 0 %  0-1   NEUTROPHILS ABSOLUTE COUNT 8 66 Thousands/? ??L H 1 85-7 62   LYMPHOCYTES ABSOLUTE COUNT 1 96 Thousands/? ??L  0 60-4 47   MONOCYTES ABSOLUTE COUNT 0 44 Thousand/? ??L  0 17-1 22   EOSINOPHILS ABSOLUTE COUNT 0 10 Thousand/? ??L  0 00-0 61   BASOPHILS ABSOLUTE COUNT 0 04 Thousands/? ??L  0 00-0 10     (1) BASIC METABOLIC PROFILE 23PFV1486 03:44PM Jenny Mace    Order Number: [id number]     Test Name Result Flag Reference   GLUCOSE,RANDM 107 mg/dL     If the patient is fasting, the ADA then defines impaired fasting glucose as > 100 mg/dL and diabetes as > or equal to 123 mg/dL  SODIUM 138 mmol/L  136-145   POTASSIUM 3 5 mmol/L  3 5-5 3   CHLORIDE 101 mmol/L  100-108   CARBON DIOXIDE 27 mmol/L  21-32   ANION GAP (CALC) 10 mmol/L  4-13   BLOOD UREA NITROGEN 6 mg/dL  5-25   CREATININE 0 90 mg/dL  0 60-1 30   Standardized to IDMS reference method   CALCIUM 8 9 mg/dL  8 3-10 1   eGFR Non-African American      >60 0 ml/min/1 73sq m   [facility] Disease Education Program recommendations are as follows:  GFR calculation is accurate only with a steady state creatinine  Chronic Kidney disease less than 60 ml/min/1 73 sq  meters  Kidney failure less than 15 ml/min/1 73 sq  meters       (1) HEPATIC FUNCTION PANEL 93Vdu2502 03:44PM Silas Kruger, Seda Nina Order Number: [id number]     Test Name Result Flag Reference   ALBUMIN 3 8 g/dL  3 5-5 0   ALK PHOSPHATAS 60 U/L     ALT (SGPT) 28 U/L  12-78   AST(SGOT) 16 U/L  5-45   BILI, DIRECT 0 06 mg/dL  0 00-0 20   BILI, TOTAL 0 24 mg/dL  0 20-1 00   TOTAL PROTEIN 7 5 g/dL  6 4-8 2

## 2018-01-10 NOTE — MISCELLANEOUS
Message  We attempted to get in contact with the patient regarding their most recent lab results and have been unsuccessful  We sent the patient a letter to contact us at their earliest convenience  [id number]      Active Problems    1  Abdominal bloating (787 3) (R14 0)   2  Abdominal pain (789 00) (R10 9)   3  Bursitis of left hip (726 5) (M70 72)   4  Costochondritis, acute (733 6) (M94 0)   5  Fatigue (780 79) (R53 83)   6  Flatus (787 3) (R14 3)   7  Knee pain (719 46) (M25 569)   8  Liver cyst (573 8) (K76 89)   9  Lower back pain (724 2) (M54 5)   10  Pes anserinus bursitis of left knee (726 61) (M70 52)   11  Soreness Or Pain In A Salivary Gland (527 2)   12  Umbilical hernia (104 1) (K42 9)    Current Meds   1  Clindamycin HCl - 300 MG Oral Capsule; TAKE 1 CAPSULE Every 6 hours; Therapy: 61ZZK5700 to ([facility])  Requested for: 15UMF9036; Last   Rx:18Nov2013 Ordered   2  [employer] Peroxide Sore Mouth Cleans 1 5 % Mouth/Throat Solution; USE AS DIRECTED; Therapy: 87DJA7077 to ([facility])  Requested for: 09BJZ5362; Last   Rx:18Nov2013 Ordered   3  DrRx Medrol Dose Pack 4 MG #21; take as directed; Therapy: 63YSR4594 to (Last Rx:23Jun2014) Ordered   4  Meloxicam 15 MG Oral Tablet; TAKE 1 TABLET DAILY; Therapy: 13MIS9092 to (Evaluate:93Vii0029); Last Rx:12Mlf3990 Ordered   5  Omeprazole 40 MG Oral Capsule Delayed Release; take one capsule by mouth daily; Therapy: 84RBV3940 to (Evaluate:20Vsq1942)  Requested for: 23XIT4510; Last   Rx:65Two9769 Ordered   6  PredniSONE 20 MG Oral Tablet; TAKE 3 TABLETS DAILY FOR 3 DAYS, THEN 2   TABLETS DAILY FOR 3 DAYS, THEN 1 TABLET DAILY FOR 3 DAYS; Therapy: 18Rgl5408 to (Last Rx:31Tco4693)  Requested for: 29Swt3526 Ordered   7  Simethicone 125 MG Oral Tablet Chewable; CHEW AND SWALLOW 1 TABLET 3 TIMES   DAILY AFTER MEALS AND AT BEDTIME  MDD:480 mg/da; Therapy: 10FEZ2969 to (Last Rx:25Mar2014) Ordered   8  Sucralfate 1 GM Oral Tablet;  Take 1 tablet twice daily; Therapy: 01IBU8617 to (Evaluate:30Oct2016)  Requested for: 40VJC0756; Last   Rx:70Yha8492 Ordered   9  TraMADol HCl - 50 MG Oral Tablet; TAKE 1 TABLET 3 TIMES DAILY AS NEEDED; Therapy: 46RIV6090 to (Micheal Dover)  Requested for: 86EYV6099; Last   Rx:19Zma6527 Ordered   10  TraMADol HCl - 50 MG Oral Tablet; TAKE 1 TABLET 3 TIMES DAILY AS NEEDED; Therapy: 84YZO4821 to (Anjum Logan)  Requested for: 44QTP4275; Last    Rx:28Mar2014 Ordered   11  TraMADol HCl - 50 MG Oral Tablet; TAKE 1 TABLET EVERY 6 HOURS AS NEEDED; Therapy: 70LLI1724 to (Evaluate:79Tva9438); Last Rx:25Syo5512 Ordered   12  TraMADol HCl - 50 MG Oral Tablet; take 1 tablet every 8 hours as needed for pain; Therapy: 42ZWI9886 to (Last Rx:75Lyr6793) Ordered    Allergies    1  Penicillin V Potassium TABS   2  Aspir-81 TBEC   3   Meloxicam TABS    Signatures   Electronically signed by : LETY Levine; Oct 13 2016  2:39PM EST                       (Author)

## 2018-01-10 NOTE — RESULT NOTES
Discussion/Summary   Normal EKG      Verified Results  ECG 12-LEAD 03Rak8688 01:45PM Roman Wiggins     Test Name Result Flag Reference   ECG 12-LEAD      Normal sinus rhythm   Low voltage QRS   Otherwise normal ECG   No previous ECGs available   Confirmed by Masha Cespedes ([id number]) on 7/31/2017 8:43:57 AM

## 2018-01-11 NOTE — RESULT NOTES
Verified Results  (1) COMPREHENSIVE METABOLIC PANEL 53SCI5841 33:71AD [employer] Order Number: [id number]     Test Name Result Flag Reference   GLUCOSE,RANDM 105 mg/dL     If the patient is fasting, the ADA then defines impaired fasting glucose as > 100 mg/dL and diabetes as > or equal to 123 mg/dL  SODIUM 142 mmol/L  136-145   POTASSIUM 3 2 mmol/L L 3 5-5 3   CHLORIDE 103 mmol/L  100-108   CARBON DIOXIDE 31 mmol/L  21-32   ANION GAP (CALC) 8 mmol/L  4-13   BLOOD UREA NITROGEN 5 mg/dL  5-25   CREATININE 0 84 mg/dL  0 60-1 30   Standardized to IDMS reference method   CALCIUM 8 4 mg/dL  8 3-10 1   BILI, TOTAL 0 20 mg/dL  0 20-1 00   ALK PHOSPHATAS 47 U/L     ALT (SGPT) 22 U/L  12-78   AST(SGOT) 11 U/L  5-45   ALBUMIN 3 6 g/dL  3 5-5 0   TOTAL PROTEIN 7 0 g/dL  6 4-8 2   eGFR Non-African American      >60 0 ml/min/1 73sq m   - Patient Instructions: This bloodwork is non-fasting  Please drink two glasses of water morning of bloodwork  National Kidney Disease Education Program recommendations are as follows:  GFR calculation is accurate only with a steady state creatinine  Chronic Kidney disease less than 60 ml/min/1 73 sq  meters  Kidney failure less than 15 ml/min/1 73 sq  meters  (1) CBC/ PLT (NO DIFF) 09ZKA2615 11:58AM [employer] Order Number: [id number]     Test Name Result Flag Reference   HEMATOCRIT 39 7 %  34 8-46 1   HEMOGLOBIN 13 4 g/dL  11 5-15 4   MCHC 33 8 g/dL  31 4-37 4   MCH 28 4 pg  26 8-34 3   MCV 84 fL  82-98   PLATELET COUNT 347 Thousands/uL  149-390   RBC COUNT 4 72 Million/uL  3 81-5 12   RDW 13 1 %  11 6-15 1   WBC COUNT 11 42 Thousand/uL H 4 31-10 16   MPV 10 2 fL  8 9-12 7     (1) TSH 37FAC8526 11:58AM [employer] Order Number: [id number]     Test Name Result Flag Reference   TSH 1 472 uIU/mL  0 358-3 740   - Patient Instructions: This bloodwork is non-fasting    Please drink two glasses of water morning of bloodwork  - Patient Instructions: This bloodwork is non-fasting  Please drink two glasses of water morning of bloodwork  Patients undergoing fluorescein dye angiography may retain small amounts of fluorescein in the body for 48-72 hours post procedure  Samples containing fluorescein can produce falsely depressed TSH values  If the patient had this procedure,a specimen should be resubmitted post fluorescein clearance            The recommended reference ranges for TSH during pregnancy are as follows:  First trimester 0 1 to 2 5 uIU/mL  Second trimester  0 2 to 3 0 uIU/mL  Third trimester 0 3 to 3 0 uIU/m     (1) T4, FREE 40Ahe8770 11:58AM Mindy Hurry Order Number: [id number]     Test Name Result Flag Reference   T4,FREE 1 06 ng/dL  0 76-1 46

## 2018-01-11 NOTE — PROGRESS NOTES
Assessment    1  Abdominal pain (789 00) (R10 9)    Plan  Abdominal pain    · Famotidine 40 MG Oral Tablet; TAKE 1 TABLET AT BEDTIME   Rx By: Fady Sahu; Dispense: 30 Days ; #:30 Tablet; Refill: 2; For: Abdominal pain; NORMA = N; Verified Transmission to [facility] [place]; Last Updated By: [employer]; 5/12/2017 10:53:11 AM   · Follow-up visit in 2 months Evaluation and Treatment  Follow-up  Status: Hold For -  Scheduling  Requested for: 79IYF6373   Ordered; For: Abdominal pain; Ordered By: Fady Sahu Performed:  Due: 31ISP6060   · CT CHEST AND ABDOMEN W CONTRAST; Status:Need Information - Financial  Authorization; Requested for:80Zre0644;    Perform:[facility]; Order Comments:persistent right rib pain, not responding to any treatment ; Due:57Hia2587; Ordered; For:Abdominal pain; Ordered By:Jim Patterson;  Continuous RUQ abdominal pain    · (1) BASIC METABOLIC PROFILE; Status:Active; Requested for:12May2017;    Perform:[facility]; IGZ:09IUI5738; Ordered; For:Continuous RUQ abdominal pain; Ordered By:Jim Patterson;    Discussion/Summary  Discussion Summary:   Tenderness of the right rib: patient has no pain when I palpated on the RUQ of abdomen but pain on the rib  I would recommend a CT with iv contrast of chest to evaluate for the etiology of her pain  Acid reflux: will start PPI +H2 blocker for better acid control  follow up in 2 months      Counseling Documentation With Imm: The patient was counseled regarding  Chief Complaint  Chief Complaint Free Text Note Form: pt follow up abdominal pain      History of Present Illness  HPI: 31 you F presented for follow up on right rib tenderness  patient continue to have pain  She has tried steroids and muscle relaxant and tramadol and vicodin with no relief of the pain  Pain is intermittent  NOt associated with food intake  Her abdominal U/S, EGD were all negative  She reported acid reflux, taking PPI daily along with food  Review of Systems  Complete-Female GI Adult:   Constitutional: No fever, no chills, feels well, no tiredness, no recent weight gain or weight loss  Eyes: No complaints of eye pain, no red eyes, no eyesight problems, no discharge, no dry eyes, no itching of eyes  ENT: no complaints of earache, no loss of hearing, no nose bleeds, no nasal discharge, no sore throat, no hoarseness  Cardiovascular: No complaints of slow heart rate, no fast heart rate, no chest pain, no palpitations, no leg claudication, no lower extremity edema  Respiratory: No complaints of shortness of breath, no wheezing, no cough, no SOB on exertion, no orthopnea, no PND  Gastrointestinal: abdominal pain, heartburn and bloating, but no nausea, no vomiting, no constipation, no diarrhea, no blood in stools, no abdominal swelling, no dysphagia, no pain on swallowing and no belching  Genitourinary: No complaints of dysuria, no incontinence, no pelvic pain, no dysmenorrhea, no vaginal discharge or bleeding  Musculoskeletal: No complaints of arthralgias, no myalgias, no joint swelling or stiffness, no limb pain or swelling  Integumentary: No complaints of skin rash or lesions, no itching, no skin wounds, no breast pain or lump  Neurological: No complaints of headache, no confusion, no convulsions, no numbness, no dizziness or fainting, no tingling, no limb weakness, no difficulty walking  Psychiatric: Not suicidal, no sleep disturbance, no anxiety or depression, no change in personality, no emotional problems  Endocrine: No complaints of proptosis, no hot flashes, no muscle weakness, no deepening of the voice, no feelings of weakness  Hematologic/Lymphatic: No complaints of swollen glands, no swollen glands in the neck, does not bleed easily, does not bruise easily  Active Problems    1  Abdominal bloating (787 3) (R14 0)   2  Abdominal pain (789 00) (R10 9)   3  Bilateral hip pain (719 45) (M25 551,M25 552)   4   Bilateral low back pain with left-sided sciatica, unspecified chronicity (724 3) (M54 42)   5  Bursitis of left hip (726 5) (M70 72)   6  Continuous RUQ abdominal pain (789 01) (R10 11)   7  Costochondritis, acute (733 6) (M94 0)   8  Fatigue (780 79) (R53 83)   9  Flatus (787 3) (R14 3)   10  Knee pain (719 46) (M25 569)   11  Left hip pain (719 45) (M25 552)   12  Left knee pain (719 46) (M25 562)   13  Liver cyst (573 8) (K76 89)   14  Lower back pain (724 2) (M54 5)   15  Lumbar disc herniation (722 10) (M51 26)   16  Lumbar spinal stenosis (724 02) (M48 06)   17  Pes anserinus bursitis of left knee (726 61) (M70 52)   18  Right hip pain (719 45) (M25 551)   19  Soreness Or Pain In A Salivary Gland (527 2)   20  Umbilical hernia (989 3) (K42 9)    Past Medical History    1  History of Gastric ulcer (531 90) (K25 9)   2  History of constipation (V12 79) (Z87 19)   3  Denied: History of drug abuse   4  Denied: History of mental disorder   5  History of Pap smear for cervical cancer screening (V76 2) (Z12 4)    Surgical History    1  History of Tubal Ligation    Family History  Mother    1  Family history of Depression   2  Family history of Hyperlipidemia   3  Family history of Hypertension (V17 49)  Father    4  Family history of Coronary Artery Disease (V17 49)  Maternal Grandmother    5  Family history of Osteoporosis (V17 81)  Paternal Grandfather    10  Family history of Coronary Artery Disease (V17 49)   7  Family history of Stroke Syndrome (V17 1)  Family History    8  Family history of Coronary Artery Disease (V17 49)    Social History    · Denied: History of Alcohol Use (History)   · Denied: History of Drug Use   · Never A Smoker   · Anabaptist   · Primary language is Dutch    Current Meds   1  Omeprazole 40 MG Oral Capsule Delayed Release; take one capsule by mouth daily; Therapy: 92MII9039 to (Evaluate:28Jun2017)  Requested for: 54DWD7489; Last   Rx:30Mar2017 Ordered   2   PredniSONE 20 MG Oral Tablet; TAKE 3 TABLETS DAILY FOR 3 DAYS, THEN 2 TABLETS   DAILY FOR 3 DAYS, THEN 1 TABLET DAILY FOR 3 DAYS; Therapy: 27Xiz4582 to (Last Rx:56Tux4110)  Requested for: 41Vqk4022 Ordered    Allergies    1  Penicillin V Potassium TABS   2  Aspir-81 TBEC   3  Meloxicam TABS    Vitals  Vital Signs    Recorded: 24WQW5009 10:04AM   Temperature 98 F, Tympanic   Heart Rate 82   Systolic 96, LUE, Sitting   Diastolic 60, LUE, Sitting   Height 4 ft 11 in   Weight 133 lb    BMI Calculated 26 86   BSA Calculated 1 55   O2 Saturation 98, RA     Physical Exam    Eyes   Conjunctiva and lids: No swelling, erythema or discharge  Ears, Nose, Mouth, and Throat   Oropharynx: Normal with no erythema, edema, exudate or lesions  Pulmonary   Respiratory effort: No increased work of breathing or signs of respiratory distress  Cardiovascular   Examination of extremities for edema and/or varicosities: Normal     Abdomen   Abdomen: Non-tender, no masses  Musculoskeletal   Gait and station: Normal     Psychiatric   Orientation to person, place, and time: Normal     Additional Exam:  right lower rib tenderness          Future Appointments    Date/Time Provider Specialty Site   07/13/2017 10:40 AM Homer Wiley MD Gastroenterology Adult [facility] 9     Signatures   Electronically signed by : Devin Quintero MD; May 12 2017 10:58AM EST                       (Author)

## 2018-01-12 VITALS — SYSTOLIC BLOOD PRESSURE: 128 MMHG | DIASTOLIC BLOOD PRESSURE: 82 MMHG | HEART RATE: 82 BPM | WEIGHT: 130.25 LBS

## 2018-01-14 VITALS
WEIGHT: 130.25 LBS | OXYGEN SATURATION: 98 % | TEMPERATURE: 98.1 F | BODY MASS INDEX: 26.26 KG/M2 | DIASTOLIC BLOOD PRESSURE: 66 MMHG | HEIGHT: 59 IN | HEART RATE: 76 BPM | SYSTOLIC BLOOD PRESSURE: 112 MMHG

## 2018-01-14 VITALS
HEART RATE: 82 BPM | BODY MASS INDEX: 26.81 KG/M2 | WEIGHT: 133 LBS | OXYGEN SATURATION: 98 % | DIASTOLIC BLOOD PRESSURE: 60 MMHG | TEMPERATURE: 98 F | HEIGHT: 59 IN | SYSTOLIC BLOOD PRESSURE: 96 MMHG

## 2018-01-14 VITALS
HEART RATE: 96 BPM | HEIGHT: 59 IN | OXYGEN SATURATION: 98 % | BODY MASS INDEX: 26.28 KG/M2 | TEMPERATURE: 99.1 F | DIASTOLIC BLOOD PRESSURE: 74 MMHG | WEIGHT: 130.38 LBS | SYSTOLIC BLOOD PRESSURE: 108 MMHG

## 2018-01-14 VITALS — SYSTOLIC BLOOD PRESSURE: 121 MMHG | HEART RATE: 83 BPM | DIASTOLIC BLOOD PRESSURE: 71 MMHG

## 2018-01-15 NOTE — RESULT NOTES
Verified Results  * CT ABDOMEN PELVIS W CONTRAST 22BQU7410 04:36PM Vince Stevens     Test Name Result Flag Reference   CT ABDOMEN PELVIS W CONTRAST (Report)     CT ABDOMEN AND PELVIS WITH IV CONTRAST     INDICATION: Right-sided flank pain     COMPARISON: 10/25/2016     TECHNIQUE: CT examination of the abdomen and pelvis was performed  Reformatted images were created in axial, sagittal, and coronal planes  Radiation dose length product (DLP) for this visit: 290 mGy-cm   This examination, like all CT scans performed in the [facility], was performed utilizing techniques to minimize radiation dose exposure, including the use of iterative    reconstruction and automated exposure control  IV Contrast: 100 mL of iohexol (OMNIPAQUE) 350   Enteric Contrast: Enteric contrast was administered  FINDINGS:     ABDOMEN     LOWER CHEST: No significant abnormalities identified in the lower chest      LIVER/BILIARY TREE: Stable hepatic cysts  No acute finding  GALLBLADDER: No calcified gallstones  No pericholecystic inflammatory change  SPLEEN: Unremarkable  PANCREAS: Unremarkable  ADRENAL GLANDS: Unremarkable  KIDNEYS/URETERS: Right kidney lower pole cyst  No hydronephrosis, no acute inflammation  STOMACH AND BOWEL: Unremarkable  APPENDIX: No findings to suggest appendicitis  ABDOMINOPELVIC CAVITY: No ascites or free intraperitoneal air  No lymphadenopathy  VESSELS: Unremarkable for patient's age  PELVIS     REPRODUCTIVE ORGANS: Multiple small follicles within each ovary  Bulky appearance of the uterus but unchanged from prior studies  URINARY BLADDER: Unremarkable  ABDOMINAL WALL/INGUINAL REGIONS: There is a small umbilical adipose-containing hernia without bowel involvement or inflammation  OSSEOUS STRUCTURES: No acute fracture or destructive osseous lesion  IMPRESSION:       1   No acute inflammatory changes are seen within the abdomen or pelvis  2  Small adipose containing umbilical hernia   3  Stable hepatic and right renal cysts  4  No bowel obstruction, no hydronephrosis  Although IV contrast was given, within the limits of renal parenchymal opacification, no obvious calculi are seen         Workstation performed: [id number]     Signed by:   Cory Penn MD   5/28/17

## 2018-01-15 NOTE — MISCELLANEOUS
Message  GI Reminder Recall Carmella Bundy:   Date: 08/23/2017   Dear Jamie Haque:     Review of our records shows you are due for the following: Follow Up Visit  Please call the following office to schedule your appointment:   [address], [address], [place], [place], [address] [facility] [phone]  We look forward to hearing from you!      Sincerely,     [facility]      Signatures   Electronically signed by : Dale Hedrick, ; Aug 23 2017  4:00PM EST                       (Author)

## 2018-01-16 NOTE — RESULT NOTES
Verified Results  * US ABDOMEN COMPLETE [id number] [address] Order Number: [id number]    - Patient Instructions: To schedule this appointment, please contact Central Scheduling at [phone]  Test Name Result Flag Reference   US ABDOMEN COMPLETE (Report)     ABDOMEN ULTRASOUND, COMPLETE     INDICATION: Right upper quadrant pain for 3 weeks with vomiting  COMPARISON: Ultrasound 6/9/2011 and CT scan 3/17/2014     TECHNIQUE:  Real-time ultrasound of the abdomen was performed with a curvilinear transducer with both volumetric sweeps and still imaging techniques  FINDINGS:     PANCREAS: Visualized portions of the pancreas are within normal limits  AORTA AND IVC: Visualized portions are normal for patient age  LIVER:   Size: Within normal range  The liver measures 14 1 cm in the midclavicular line  Contour: Surface contour is smooth  Parenchyma: Echogenicity and echotexture are within normal limits  No evidence of suspicious mass  Simple right lobe cyst measuring 1 4 cm unchanged  The main portal vein is patent and hepatopetal       BILIARY:   The gallbladder is normal in caliber  No wall thickening or pericholecystic fluid  No stones or sludge identified  Sonographic Rulon Cheek sign is negative  No intrahepatic biliary dilatation  CBD measures 2 mm  No choledocholithiasis  KIDNEY:    Right kidney measures 10 1 x 3 6 cm  Within normal limits  Left kidney measures 10 5 x 5 0 cm  Within normal limits  SPLEEN:    Measures 10 6 cm  Within normal limits  ASCITES: None  IMPRESSION:     Stable right lobe simple hepatic cyst        Workstation performed: [id number]     Signed by:    Ray Carlton MD   10/5/16

## 2018-01-16 NOTE — MISCELLANEOUS
Message  If you have any questions please call our office  Thank You   Return to work or school:   Bismark Yousufreza is under my professional care   She was seen in my office on 05/12/2017             Signatures   Electronically signed by : Niki Johnson, ; May 12 2017 10:48AM EST                       (Author)

## 2018-01-16 NOTE — RESULT NOTES
Verified Results  (1) TISSUE EXAM [id number] 08:14AM Marsha Baumgarten     Test Name Result Flag Reference   LAB AP CASE REPORT (Report)     Surgical Pathology Report             Case: [id number]                   Authorizing Provider: Mikael New MD       Collected:      05/01/2017 5918        Ordering Location:   [facility]    Received:      05/02/2017 [facility]  Endoscopy                            Pathologist:      Tori Hodgson MD                             Specimens:  A) - Duodenum, Duodenum biopsy r/o duodenitis                             B) - Stomach, Gastric biopsy r/o H pylori   LAB AP FINAL DIAGNOSIS (Report)     A  Duodenum, biopsy:  - Benign duodenal mucosa  - No villous atrophy, intraepithelial lymphocytosis or crypt hyperplasia;   negative for features of malabsorptive enteropathy   - Negative for chronic or active duodenitis, dysplasia or malignancy  B  Stomach, biopsy:  - Chronic inactive oxyntic and antral gastritis  - Negative for Helicobacter pylori, confirmed by immunohistochemical   stain, evaluated with appropriate positive controls  - Negative for atrophy, intestinal metaplasia, dysplasia or carcinoma  Electronically signed by Tori Hodgson MD on 5/3/2017 at 2:27 PM   LAB AP SURGICAL ADDITIONAL INFORMATION (Report)     These tests were developed and their performance characteristics   determined by [facility] or [employer]  They may not be cleared or approved by the [place]  Food and   Drug Administration  The FDA has determined that such clearance or   approval is not necessary  These tests are used for clinical purposes  They should not be regarded as investigational or for research  This   laboratory has been approved by CLIA 88, designated as a high-complexity   laboratory and is qualified to perform these tests    Interpretation performed at [facility], [place] [address]   [id number]   LAB AP GROSS DESCRIPTION (Report)     A  The specimen is received in formalin, labeled with the patient's name   and hospital number, and is designated duodenum biopsy rule out   duodenitis  The specimen consists of a tan soft tissue fragments   measuring 0 2 cm  Entirely submitted  One cassette  B  The specimen is received in formalin, labeled with the patient's name   and hospital number, and is designated gastric biopsy rule out H    pylori  The specimen consists of 2 tan soft tissue fragments each   measuring 0 3 cm  Entirely submitted  One cassette  Note: The estimated total formalin fixation time based upon information   provided by the submitting clinician and the standard processing schedule   is 29 75 hours      [facility] [place] AP CLINICAL INFORMATION duodenitis     duodenitis

## 2018-02-25 ENCOUNTER — APPOINTMENT (EMERGENCY)
Dept: RADIOLOGY | Facility: HOSPITAL | Age: 38
End: 2018-02-25
Payer: COMMERCIAL

## 2018-02-25 ENCOUNTER — HOSPITAL ENCOUNTER (EMERGENCY)
Facility: HOSPITAL | Age: 38
Discharge: HOME | End: 2018-02-25
Attending: EMERGENCY MEDICINE | Admitting: EMERGENCY MEDICINE
Payer: COMMERCIAL

## 2018-02-25 VITALS
OXYGEN SATURATION: 98 % | TEMPERATURE: 97.6 F | SYSTOLIC BLOOD PRESSURE: 124 MMHG | HEART RATE: 85 BPM | WEIGHT: 138 LBS | BODY MASS INDEX: 27.87 KG/M2 | DIASTOLIC BLOOD PRESSURE: 79 MMHG | RESPIRATION RATE: 16 BRPM

## 2018-02-25 DIAGNOSIS — M25.512 LEFT SHOULDER PAIN: Primary | ICD-10-CM

## 2018-02-25 DIAGNOSIS — T14.8XXA MUSCLE STRAIN: ICD-10-CM

## 2018-02-25 LAB — EXT PREG TEST URINE: NEGATIVE

## 2018-02-25 PROCEDURE — 71046 X-RAY EXAM CHEST 2 VIEWS: CPT

## 2018-02-25 PROCEDURE — 73030 X-RAY EXAM OF SHOULDER: CPT

## 2018-02-25 PROCEDURE — 99283 EMERGENCY DEPT VISIT LOW MDM: CPT

## 2018-02-25 PROCEDURE — 96372 THER/PROPH/DIAG INJ SC/IM: CPT

## 2018-02-25 PROCEDURE — 81025 URINE PREGNANCY TEST: CPT | Performed by: EMERGENCY MEDICINE

## 2018-02-25 RX ORDER — OMEPRAZOLE 40 MG/1
40 CAPSULE, DELAYED RELEASE ORAL DAILY
COMMUNITY
End: 2018-09-25 | Stop reason: SDUPTHER

## 2018-02-25 RX ORDER — METHOCARBAMOL 750 MG/1
TABLET, FILM COATED ORAL
COMMUNITY
Start: 2017-05-02 | End: 2018-05-16

## 2018-02-25 RX ORDER — TRAMADOL HYDROCHLORIDE 50 MG/1
50 TABLET ORAL EVERY 6 HOURS PRN
Qty: 12 TABLET | Refills: 0 | Status: SHIPPED | OUTPATIENT
Start: 2018-02-25 | End: 2018-05-16 | Stop reason: SDUPTHER

## 2018-02-25 RX ORDER — KETOROLAC TROMETHAMINE 30 MG/ML
15 INJECTION, SOLUTION INTRAMUSCULAR; INTRAVENOUS ONCE
Status: COMPLETED | OUTPATIENT
Start: 2018-02-25 | End: 2018-02-25

## 2018-02-25 RX ORDER — NAPROXEN 500 MG/1
500 TABLET ORAL 2 TIMES DAILY WITH MEALS
Qty: 12 TABLET | Refills: 0 | Status: SHIPPED | OUTPATIENT
Start: 2018-02-25 | End: 2018-03-01

## 2018-02-25 RX ORDER — IBUPROFEN 800 MG/1
800 TABLET ORAL EVERY 8 HOURS
COMMUNITY
End: 2018-04-12

## 2018-02-25 RX ADMIN — KETOROLAC TROMETHAMINE 15 MG: 30 INJECTION, SOLUTION INTRAMUSCULAR at 14:43

## 2018-02-25 NOTE — DISCHARGE INSTRUCTIONS
Arm Pain   WHAT YOU NEED TO KNOW:   Your arm pain may be caused by a number of conditions  Examples include arthritis, nerve problems, or an awkward position while you sleep  X-rays did not show a broken bone in your arm or wrist  Arm pain may be a sign of a serious condition that needs immediate care, such as a heart attack  DISCHARGE INSTRUCTIONS:   Call 911 for any of the following: You have any of the following signs of a heart attack:   · Squeezing, pressure, or pain in your chest that lasts longer than 5 minutes or returns    · Discomfort or pain in your back, neck, jaw, stomach, or arm     · Trouble breathing or a fast, fluttery heartbeat    · Nausea or vomiting    · Lightheadedness or a sudden cold sweat, especially with chest pain or trouble breathing  Return to the emergency department if:   · You have severe pain, or pain that spreads from your arm to other areas  · You have swelling, tingling, or numbness in your hand or fingers, or the skin turns blue  · You cannot move your arm  Contact your healthcare provider if:   · You have questions or concerns about your condition or care  Medicines: You may need any of the following:  · Prescription pain medicine  may be given  Ask how to take this medicine safely  · NSAIDs , such as ibuprofen, help decrease swelling, pain, and fever  This medicine is available with or without a doctor's order  NSAIDs can cause stomach bleeding or kidney problems in certain people  If you take blood thinner medicine, always ask your healthcare provider if NSAIDs are safe for you  Always read the medicine label and follow directions  · Take your medicine as directed  Contact your healthcare provider if you think your medicine is not helping or if you have side effects  Tell him or her if you are allergic to any medicine  Keep a list of the medicines, vitamins, and herbs you take  Include the amounts, and when and why you take them   Bring the list or the pill bottles to follow-up visits  Carry your medicine list with you in case of an emergency  Self-care:   · Rest your arm as directed  A sling may be used to keep your arm from moving while it heals  · Apply ice as directed  Ice helps decrease pain and swelling  Ice may also help prevent tissue damage  Use an ice pack, or put crushed ice in a plastic bag  Cover it with a towel  Apply it to your arm for 20 minutes every few hours, or as directed  Ask how many times to apply ice each day, and for how many days  · Elevate your arm above the level of your heart as often as you can  This will help decrease swelling and pain  Prop your arm on pillows or blankets to keep the area elevated comfortably  · Adjust your position if you work in front of a computer  You may need arm or wrist supports or change the height of your chair  · Keep a pain record  Write down when your pain happens and how severe it is  Include any other symptoms you have with your pain  A record will help you keep track of pain cycles  Bring the record with you to your follow-up visits  It may also help your healthcare provider find out what is causing your pain  Follow up with your healthcare provider as directed: You may need physical therapy  You may need to see an orthopedic specialist  Write down your questions so you remember to ask them during your visits  © 2017 [address] [employer] Information is for End User's use only and may not be sold, redistributed or otherwise used for commercial purposes  All illustrations and images included in CareNotes® are the copyrighted property of A D A M [employer] Inc  or [employer]  The above information is an  only  It is not intended as medical advice for individual conditions or treatments  Talk to your doctor, nurse or pharmacist before following any medical regimen to see if it is safe and effective for you        Shoulder Pain   WHAT YOU NEED TO KNOW:   Shoulder pain is a common problem and can affect your daily activities  Pain can be caused by a problem within your shoulder  Shoulder pain may also be caused by pain that spreads to your shoulder from another part of your body  DISCHARGE INSTRUCTIONS:   Return to the emergency department if:   · You have severe pain  · You cannot move your arm or shoulder  · You have numbness or tingling in your shoulder or arm  Contact your healthcare provider if:   · Your pain gets worse or does not go away with treatment  · You have trouble moving your arm or shoulder  · You have questions or concerns about your condition or care  Medicines: You may need any of the following:  · Acetaminophen  decreases pain and fever  It is available without a doctor's order  Ask how much to take and how often to take it  Follow directions  Acetaminophen can cause liver damage if not taken correctly  · NSAIDs , such as ibuprofen, help decrease swelling, pain, and fever  This medicine is available with or without a doctor's order  NSAIDs can cause stomach bleeding or kidney problems in certain people  If you take blood thinner medicine, always ask your healthcare provider if NSAIDs are safe for you  Always read the medicine label and follow directions  · Take your medicine as directed  Contact your healthcare provider if you think your medicine is not helping or if you have side effects  Tell him of her if you are allergic to any medicine  Keep a list of the medicines, vitamins, and herbs you take  Include the amounts, and when and why you take them  Bring the list or the pill bottles to follow-up visits  Carry your medicine list with you in case of an emergency  Follow up with your healthcare provider or orthopedist as directed:  Write down your questions so you remember to ask them during your visits  Manage your symptoms:   · Apply ice  on your shoulder for 20 to 30 minutes every 2 hours or as directed   Use an ice pack, or put crushed ice in a plastic bag  Cover it with a towel  Ice helps prevent tissue damage and decreases swelling and pain  · Apply heat if ice does not help your symptoms  Apply heat on your shoulder for 20 to 30 minutes every 2 hours for as many days as directed  Heat helps decrease pain and muscle spasms  · Go to physical or occupational therapy as directed  A physical therapist teaches you exercises to help improve movement and strength, and to decrease pain  An occupational therapist teaches you skills to help with your daily activities  Prevent shoulder pain:   · Stretch and strengthen your shoulder  Use proper technique during exercises and sports  · Limit activities as directed  Try to avoid repeated overhead movements  © 2017 [facility] [facility] Information is for End User's use only and may not be sold, redistributed or otherwise used for commercial purposes  All illustrations and images included in CareNotes® are the copyrighted property of A D A [employer] , [employer]  or [employer]  The above information is an  only  It is not intended as medical advice for individual conditions or treatments  Talk to your doctor, nurse or pharmacist before following any medical regimen to see if it is safe and effective for you

## 2018-02-25 NOTE — ED PROVIDER NOTES
History  Chief Complaint   Patient presents with    Arm Pain     x2 months L side  Pt reports pain originates in L chest and radiates into L shoulder  Pt reports was evaluated for this previously and prescribed Prednisone and cyclobenazaprine which helped her relax and sleep  She recently fell and now pain has increased  Patient is a 37-year-old female past medical history of bursitis, GERD, herniated discs, liver cyst who presents for evaluation of left shoulder pain  She states she has had left shoulder pain for 2 months  Pain has been constant  She states she was evaluated for this initially in the ER and was given a prescription for muscle relaxer  She states she was seen again in the ER 01/18/2018 was given a prescription for prednisone as well as Flexeril  She states while she was taking this this did help her sleep  She states that in the beginning of February she slipped and tried to catch herself  She states she fell and since then she feels the pain has been worse  Pain can radiate into her neck and proximal left chest   She states that she is left handed and does a lot of repetitive motions at work  She states she works with [employer]  She states pain is worse with laying down or movement or palpation of the shoulder  She tried ibuprofen 800 mg, warm compresses, bend it without significant relief  She has not followed up with her family doctor orthopedics  She denies fever chills, nausea vomiting diarrhea, chest pain, shortness breath, abdominal pain, numbness, weakness, dizziness, syncope, diaphoresis, leg swelling or calf pain, birth control usage, DVT or PE history, clotting disorders, recent surgeries or immobilizations  No history of cancer or IV drug use  Prior to Admission Medications   Prescriptions Last Dose Informant Patient Reported?  Taking?   ibuprofen (MOTRIN) 800 mg tablet   Yes No   Sig: Take 800 mg by mouth every 8 (eight) hours   methocarbamol (ROBAXIN) 750 mg tablet   Yes Yes   Sig: TK 1 T PO TID   omeprazole (PriLOSEC) 40 MG capsule   Yes No   Sig: Take 40 mg by mouth   traMADol (ULTRAM) 50 mg tablet   Yes No   Sig: Take 50 mg by mouth      Facility-Administered Medications: None       Past Medical History:   Diagnosis Date    Acid reflux     Bursitis     Frequent headaches     Herniated disc, cervical     Liver cyst     Migraine     Stomach ulcer        Past Surgical History:   Procedure Laterality Date    VA ESOPHAGOGASTRODUODENOSCOPY TRANSORAL DIAGNOSTIC N/A 5/1/2017    Procedure: ESOPHAGOGASTRODUODENOSCOPY (EGD); Surgeon: Lisa Michelle MD;  Location: [facility] GI LAB; Service: Gastroenterology    TUBAL LIGATION         History reviewed  No pertinent family history  I have reviewed and agree with the history as documented  Social History   Substance Use Topics    Smoking status: Never Smoker    Smokeless tobacco: Never Used    Alcohol use No        Review of Systems   Constitutional: Negative for chills, diaphoresis and fever  Eyes: Negative for visual disturbance  Respiratory: Negative for shortness of breath  Cardiovascular: Negative for chest pain  Gastrointestinal: Negative for abdominal pain, diarrhea, nausea and vomiting  Musculoskeletal: Positive for arthralgias, myalgias and neck pain  Skin: Negative for rash  Neurological: Negative for dizziness, syncope, weakness, light-headedness and numbness  All other systems reviewed and are negative        Physical Exam  ED Triage Vitals [02/25/18 1333]   Temperature Pulse Respirations Blood Pressure SpO2   97 6 °F (36 4 °C) 85 16 124/79 98 %      Temp Source Heart Rate Source Patient Position - Orthostatic VS BP Location FiO2 (%)   Temporal Monitor Sitting Right arm --      Pain Score       8           Orthostatic Vital Signs  Vitals:    02/25/18 1333   BP: 124/79   Pulse: 85   Patient Position - Orthostatic VS: Sitting       Physical Exam   Constitutional: She is oriented to person, place, and time  Vital signs are normal  She appears well-developed and well-nourished  She is active  Non-toxic appearance  No distress  HENT:   Head: Normocephalic and atraumatic  Right Ear: External ear normal    Left Ear: External ear normal    Nose: Nose normal    Eyes: Conjunctivae and EOM are normal    Neck: Normal range of motion  Neck supple  No spinous process tenderness present  No neck rigidity  No edema, no erythema and normal range of motion present  Mild left trapezius muscle tenderness to palpation  No cervical spinous process tenderness to palpation  No deformity or step off  Cardiovascular: Normal rate, regular rhythm and normal heart sounds  Exam reveals no gallop and no friction rub  No murmur heard  Pulmonary/Chest: Effort normal and breath sounds normal  No respiratory distress  She has no wheezes  She has no rales  Abdominal: Soft  Bowel sounds are normal  She exhibits no distension  There is no tenderness  There is no guarding  Musculoskeletal:        Left shoulder: She exhibits decreased range of motion, tenderness, bony tenderness and pain  She exhibits no swelling, no effusion, no crepitus, no deformity, no laceration, normal pulse and normal strength  Left anterior shoulder with tenderness to palpation over the [facility] joint  Decreased ROM due to pain  No deformity, swelling, erythema, warmth, crepitus, abrasion or ecchymosis  Mild reproducible tenderness into the left anterior upper chest wall  No deformity, abnormal chest wall movement, erythema, swelling, crepitus, ecchymosis, or abrasion  NVI distally  Radial pulse intact  Lymphadenopathy:     She has no cervical adenopathy  Neurological: She is alert and oriented to person, place, and time  Skin: Skin is warm and dry  Capillary refill takes less than 2 seconds  She is not diaphoretic  Psychiatric: She has a normal mood and affect  Her behavior is normal    Nursing note and vitals reviewed        ED Medications  Medications   ketorolac (TORADOL) injection 15 mg (15 mg Intramuscular Given 2/25/18 1443)       Diagnostic Studies  Results Reviewed     Procedure Component Value Units Date/Time    POCT pregnancy, urine [[id number]]  (Normal) Resulted:  02/25/18 1433    Lab Status:  Final result Updated:  02/25/18 1434     EXT PREG TEST UR (Ref: Negative) NEGATIVE                 XR shoulder 2+ views LEFT   Final Result by Bolivar Zayas MD (02/25 1557)      No acute osseous abnormality  Workstation performed: [id number]         XR chest 2 views   Final Result by Bolivar Zayas MD (02/25 1557)      No acute cardiopulmonary disease  Workstation performed: [id number]                    Procedures  Procedures       Phone Contacts  ED Phone Contact    ED Course  ED Course                                MDM  Number of Diagnoses or Management Options  Left shoulder pain:   Muscle strain:   Diagnosis management comments: Patient with constant left shoulder pain x 2 months that has been gradually worsening  She has been seen and evaluated for this in the past  Admits she is left handed and does a lot of repetitive movements for work  Plan- will xray left shoulder and cxr  Xrays reviewed by me and interpreted as no active/acute disease  Discussed results with patient  Explained that xrays will be further read by radiologist and if any discrepancies arise she will be contacted  Will give shoulder sling- advised on proper use and to remove multiple times a day to perform shoulder ROM exercises to prevent frozen shoulder  Will give short course of naproxen and tramadol for pain  Discussed follow up with PCP and orthopedics  Return to the ED if symptoms worsen or new symptoms arise as discussed  Patient states understanding and agrees with plan          Amount and/or Complexity of Data Reviewed  Tests in the radiology section of CPT®: ordered and reviewed  Independent visualization of images, tracings, or specimens: yes    Risk of Complications, Morbidity, and/or Mortality  Presenting problems: low  Diagnostic procedures: low  Management options: low    Patient Progress  Patient progress: stable    CritCare Time    Disposition  Final diagnoses:   Left shoulder pain   Muscle strain     Time reflects when diagnosis was documented in both MDM as applicable and the Disposition within this note     Time User Action Codes Description Comment    2/25/2018  3:14 PM Daniel Joyce Add [Q79 [id number]] Left shoulder pain     2/25/2018  3:14 PM Pau Gregory  8XXA] Muscle strain       ED Disposition     ED Disposition Condition Comment    Discharge  Tiffany Draft discharge to home/self care  Condition at discharge: Good        Follow-up Information     Follow up With Specialties Details Why Contact Info Additional Information    Micah Baca PA-C Family Medicine Schedule an appointment as soon as possible for a visit in 1 day  THE [place] [address] [address] [place] [address],[address] [facility]  [address] [place] [address],[address] Orthopedic Surgery Schedule an appointment as soon as possible for a visit  [facility] [id number]  [address] [place] [address] Emergency Department Emergency Medicine  If symptoms worsen or new symptoms arise as discussed  [address] [facility]  [phone] [address] [place] [address] ED, [address] [place], [place], [address]        Discharge Medication List as of 2/25/2018  3:29 PM      START taking these medications    Details   naproxen (EC NAPROSYN) 500 MG EC tablet Take 1 tablet (500 mg total) by mouth 2 (two) times a day with meals, Starting Sun 2/25/2018, Normal      !! traMADol (ULTRAM) 50 mg tablet Take 1 tablet (50 mg total) by mouth every 6 (six) hours as needed for moderate pain, Starting Sun 2/25/2018, Print       !! - Potential duplicate medications found   Please discuss with provider  CONTINUE these medications which have NOT CHANGED    Details   methocarbamol (ROBAXIN) 750 mg tablet TK 1 T PO TID, Historical Med      ibuprofen (MOTRIN) 800 mg tablet Take 800 mg by mouth every 8 (eight) hours, Historical Med      omeprazole (PriLOSEC) 40 MG capsule Take 40 mg by mouth, Historical Med      !! traMADol (ULTRAM) 50 mg tablet Take 50 mg by mouth, Historical Med       !! - Potential duplicate medications found  Please discuss with provider  No discharge procedures on file      ED Provider  Electronically Signed by           Roslyn Goyal PA-C  02/25/18 2010

## 2018-02-25 NOTE — ED NOTES
Patient states that she has had Toradol in the past and did not have issues from this medication   OK to Nelly Contreras, RN  02/25/18 1604

## 2018-03-01 ENCOUNTER — APPOINTMENT (OUTPATIENT)
Dept: RADIOLOGY | Facility: CLINIC | Age: 38
End: 2018-03-01
Payer: COMMERCIAL

## 2018-03-01 ENCOUNTER — OFFICE VISIT (OUTPATIENT)
Dept: OBGYN CLINIC | Facility: MEDICAL CENTER | Age: 38
End: 2018-03-01
Payer: COMMERCIAL

## 2018-03-01 VITALS
SYSTOLIC BLOOD PRESSURE: 130 MMHG | DIASTOLIC BLOOD PRESSURE: 78 MMHG | BODY MASS INDEX: 26.54 KG/M2 | WEIGHT: 131.4 LBS | HEART RATE: 84 BPM

## 2018-03-01 DIAGNOSIS — M89.8X1 COLLAR BONE PAIN: ICD-10-CM

## 2018-03-01 DIAGNOSIS — M89.8X1 PAIN OF LEFT CLAVICLE: ICD-10-CM

## 2018-03-01 DIAGNOSIS — M89.8X1 PAIN OF LEFT CLAVICLE: Primary | ICD-10-CM

## 2018-03-01 PROCEDURE — 99204 OFFICE O/P NEW MOD 45 MIN: CPT | Performed by: FAMILY MEDICINE

## 2018-03-01 PROCEDURE — 73000 X-RAY EXAM OF COLLAR BONE: CPT

## 2018-03-01 RX ORDER — NAPROXEN 500 MG/1
500 TABLET ORAL 2 TIMES DAILY WITH MEALS
Qty: 40 TABLET | Refills: 0 | Status: SHIPPED | OUTPATIENT
Start: 2018-03-01 | End: 2018-04-12

## 2018-03-01 NOTE — PATIENT INSTRUCTIONS
Patient have CT scan performed for her left clavicle    Educated risks of mixing NSAIDS (also known as non-steroidal anti-inflammatory pills) including advil, ibuprofen, motrin, aleve, naproxen, aspirin, and ibuprofen containing products with each other or with steroids (such as prednisone, medrol)  Explained risks of mixing these medications including stomach ulcer, severe internal bleeding, and kidney failure  Instructed not to take NSAIDS if have history of stomach ulcers, kidney issues, or hypertension  Instructed patient to use only one brand as prescribed  For naproxen, a maximum of 500 mg per dose every 12 hours and no more than two doses or 1,000mg per day  For Ibuprofen, a maximum of 800 mg per dose every 6 hours but no more than 3 doses or 2,400 mg per day  Never take these medications together  Never take these medications the same day  For severe pain and only if you have no liver problems, you may add Tylenol (also known as acetaminophen) maximum of 1,000  Mg per dose every 6 hours but no more 3 doses or 3,000 mg per day  Patient expressed understanding and agreed to plan

## 2018-03-01 NOTE — PROGRESS NOTES
1  Pain of left clavicle  XR chest 1 view   2  Collar bone pain  XR clavicle left     Orders Placed This Encounter   Procedures    XR clavicle left    XR chest 1 view        Imaging Studies (I personally reviewed results in PACS):  X-ray left clavicle: Multiple views 03/01/2018 including serendipity, Zanca, clavicle:  No acute osseous abnormality  No sign of subluxation    IMPRESSION:   Fall left shoulder point injury  Clavicle tenderness proximal and mid  X-rays negative for fracture    Differential diagnosis:  SC subluxation  Clavicle Contusion      Return for Follow-up after CT scan         Patient Instructions   Patient have CT scan performed for her left clavicle    Educated risks of mixing NSAIDS (also known as non-steroidal anti-inflammatory pills) including advil, ibuprofen, motrin, aleve, naproxen, aspirin, and ibuprofen containing products with each other or with steroids (such as prednisone, medrol)  Explained risks of mixing these medications including stomach ulcer, severe internal bleeding, and kidney failure  Instructed not to take NSAIDS if have history of stomach ulcers, kidney issues, or hypertension  Instructed patient to use only one brand as prescribed  For naproxen, a maximum of 500 mg per dose every 12 hours and no more than two doses or 1,000mg per day  For Ibuprofen, a maximum of 800 mg per dose every 6 hours but no more than 3 doses or 2,400 mg per day  Never take these medications together  Never take these medications the same day  For severe pain and only if you have no liver problems, you may add Tylenol (also known as acetaminophen) maximum of 1,000  Mg per dose every 6 hours but no more 3 doses or 3,000 mg per day  Patient expressed understanding and agreed to plan  CHIEF COMPLAINT:  Left shoulder pain    HPI:  Malik Sweeney is a 30 y o  female  who presents for Evaluation of left shoulder pain status post falling injury on ice that occurred 02/02/2018 4 weeks ago  Patient points to anterior and lateral aspect the shoulder source of pain  And a clarification and examination she points to the clavicle in the [facility] joint as source of greatest pain  She describes the pain as constant ache worse with movement as well as wearing sling  She denies any numbness or tingling  She has been seen in the emergency department on 02/25/2018 due to persistent pain  She had an x-ray which showed no acute osseous abnormality at that time  She also was given naproxen as well as tramadol for pain  She has not performed any physical therapy yet  She has past social history significant for repetitive work at her job  She demonstrates that she raised her elbows to 90° and pushes down repetitively throughout the day  Review of Systems   Constitutional: Negative for chills, fever and unexpected weight change  HENT: Negative for hearing loss, nosebleeds and sore throat  Eyes: Negative for pain, redness and visual disturbance  Respiratory: Negative for cough, shortness of breath and wheezing  Cardiovascular: Negative for chest pain, palpitations and leg swelling  Gastrointestinal: Negative for abdominal distention, nausea and vomiting  Endocrine: Negative for polydipsia and polyuria  Genitourinary: Negative for dysuria and hematuria  Skin: Negative for rash and wound  Neurological: Negative for dizziness, numbness and headaches  Psychiatric/Behavioral: Negative for decreased concentration and suicidal ideas  Denies chest pain other than mentioned in HPI for clavicle pain      Following history reviewed and update:    Past Medical History:   Diagnosis Date    Acid reflux     Bursitis     Frequent headaches     Herniated disc, cervical     Liver cyst     Migraine     Stomach ulcer      Past Surgical History:   Procedure Laterality Date    MA ESOPHAGOGASTRODUODENOSCOPY TRANSORAL DIAGNOSTIC N/A 5/1/2017    Procedure: ESOPHAGOGASTRODUODENOSCOPY (EGD);   Surgeon: Mavis Crockett Benji Sood MD;  Location: [facility] GI LAB; Service: Gastroenterology    TUBAL LIGATION       Social History   History   Alcohol Use No     History   Drug Use No     History   Smoking Status    Never Smoker   Smokeless Tobacco    Never Used     No family history on file  Allergies   Allergen Reactions    Asa [Aspirin] Other (See Comments)    Latex     Meloxicam     Penicillin V Hives    Penicillins Other (See Comments)     rash,hives          Physical Exam  Constitutional: She is oriented to person, place, and time  She appears well-developed and well-nourished  HENT:   Head: Normocephalic and atraumatic  Right Ear: External ear normal    Left Ear: External ear normal    Nose: Nose normal    Eyes: Conjunctivae are normal  Right eye exhibits no discharge  Left eye exhibits no discharge  No scleral icterus  Neck: Neck supple  Pulmonary/Chest: Effort normal  No respiratory distress  Neurological: She is alert and oriented to person, place, and time  Psychiatric: She has a normal mood and affect   Her behavior is normal  Judgment and thought content normal      Ortho Exam  LEFT SHOULDER:  Erythema: no  Swelling: no  Increased Warmth: no    Tenderness:  Proximal clavicle and sternoclavicular joint; mild pain lateral shoulder    ROM  Touchdown sign: intact  Appley Scratch Test: symmetric  Passive: symmetric    Strength  Abduction: 5/5  ER: 5/5  IR: 5/5    Drop-Arm: negative  Emptycan:  Mild pain  Belly Press: negative  Lift-off Test:    Eller: negative; positive adduction pain at clavicle  Neer: negative  Cross-Arm:  Positive reproduction of pain  Speeds:     Internal Impingement:  (crank position pain)    Labral Crank Test :  (abducted 90, axial load, guided IR & Er)    Modified Labral Shift:  (seated, ER, abduction, axial load, guided abd/add)    Aberdeen's Test:   (FF 90, abd 15, resist thumbs up-, resist thumbs down+)    Apprehension:  Nasra's Relocation Maneuver:    RIGHT SHOULDER:  Strength  Abduction: 5/5  ER: 5/5  IR: 5/5    ROM  Touchdown sign: intact  Appley Scratch Test: symmetric  Passive: symmetric    Empty can: negative       Procedures

## 2018-03-01 NOTE — LETTER
March 1, 2018     Patient: Radha Bellamy   YOB: 1980   Date of Visit: 3/1/2018       To Whom it May Concern: Radha Bellamy is under my professional care  She was seen in my office on 3/1/2018  She return to work 03/02/2018  I recommend light duty work restrictions to include limited activity left upper extremity  She should not lift anything with the left upper extremity over 5 lb  If you have any questions or concerns, please don't hesitate to call           Sincerely,          Mindi Gann III, DO        CC: Radha Bellamy

## 2018-03-09 ENCOUNTER — HOSPITAL ENCOUNTER (OUTPATIENT)
Dept: CT IMAGING | Facility: HOSPITAL | Age: 38
Discharge: HOME | End: 2018-03-09
Attending: FAMILY MEDICINE
Payer: COMMERCIAL

## 2018-03-09 DIAGNOSIS — M89.8X1 PAIN OF LEFT CLAVICLE: ICD-10-CM

## 2018-03-09 PROCEDURE — 73200 CT UPPER EXTREMITY W/O DYE: CPT

## 2018-03-19 ENCOUNTER — OFFICE VISIT (OUTPATIENT)
Dept: OBGYN CLINIC | Facility: MEDICAL CENTER | Age: 38
End: 2018-03-19
Payer: COMMERCIAL

## 2018-03-19 VITALS
SYSTOLIC BLOOD PRESSURE: 132 MMHG | DIASTOLIC BLOOD PRESSURE: 81 MMHG | WEIGHT: 131 LBS | BODY MASS INDEX: 25.72 KG/M2 | HEIGHT: 60 IN | HEART RATE: 81 BPM

## 2018-03-19 DIAGNOSIS — M25.462 EFFUSION OF LEFT KNEE: ICD-10-CM

## 2018-03-19 DIAGNOSIS — S89.92XA KNEE INJURY, LEFT, INITIAL ENCOUNTER: Primary | ICD-10-CM

## 2018-03-19 PROCEDURE — 99214 OFFICE O/P EST MOD 30 MIN: CPT | Performed by: FAMILY MEDICINE

## 2018-03-19 RX ORDER — PREDNISONE 20 MG/1
TABLET ORAL
Refills: 0 | COMMUNITY
Start: 2018-01-18 | End: 2018-05-16

## 2018-03-19 NOTE — LETTER
March 19, 2018     Patient: Mahogany Womack   YOB: 1980   Date of Visit: 3/19/2018       To Whom it May Concern: Mahogany Womack is under my professional care  She was seen in my office on 3/19/2018  She may return to work on 03/20/2018  I recommend restrictions to include seated only work due to significant right knee injury       If you have any questions or concerns, please don't hesitate to call           Sincerely,          Dalton Figueroa III, DO        CC: Mahogany Womack

## 2018-03-19 NOTE — PATIENT INSTRUCTIONS
Patient have MRI performed to evaluate for evidence of patellar dislocation as well as other internal derangement the knee due to traumatic effusion  Start patellar stabilizing brace

## 2018-03-19 NOTE — PROGRESS NOTES
1  Knee injury, left, initial encounter     2  Effusion of left knee       No orders of the defined types were placed in this encounter  Imaging Studies (I personally reviewed results in PACS):    CT scan left shoulder and clavicle 03/09/2018:  No acute osseous abnormality  Normal       The past diagnostics:  X-ray left clavicle: Multiple views 03/01/2018 including serendipity, Zanca, clavicle:  No acute osseous abnormality  No sign of subluxation    IMPRESSION:   Left shoulder pain resolved  New complaint of left knee pain with effusion status post traumatic injury    Differential diagnosis:  Patellar dislocation  Mcl sprain      Return for Follow-up after MRI right knee  Patient Instructions   Patient have MRI performed to evaluate for evidence of patellar dislocation as well as other internal derangement the knee due to traumatic effusion  Start patellar stabilizing brace            CHIEF COMPLAINT:  Left shoulder pain  Follow-up and also complains of right knee pain    HPI:  Bridgett Lowe is a 30 y o  female  who presents for Evaluation of left shoulder pain status post falling injury on ice that occurred 02/02/2018 6 weeks ago  Today, patient states that her shoulder pain and collarbone pain is significantly improved  She states that she now only rarely has pain in that area  She states she had great response naproxen therapy  She did have CT scan ordered last visit which was normal     patient today complains of new complaint right knee pain status post fall approximately 1 week ago last Monday  Patient did present to the emergency room and was told that she had a sprain of her knee  She was at [facility] states she had an x-ray which was normal except for fluid her knee  She points to diffuse anterior knee as source of pain  She states she had immediate swelling after her fall as well as now has stiffness and has difficulty completely bending her knee    Her pain is intermittent moderate achy to sharp exacerbated when walking  Patient states she does have difficulty ambulating with a limp now  Patient states she was rushing down steps as she was locked out of her room in her building and fell down 3 steps landing on the front of her knee  She presents to the ER the next day after symptoms did not improve  She was given pain medication in the ER which she has almost finished  She is currently out of her naproxen  Patient states she had popping sensation when she injured her knee and feels that it pops occasionally since then  Review of Systems   Constitutional: Negative for chills, fever and unexpected weight change  HENT: Negative for hearing loss, nosebleeds and sore throat  Eyes: Negative for pain, redness and visual disturbance  Respiratory: Negative for cough, shortness of breath and wheezing  Cardiovascular: Negative for chest pain, palpitations and leg swelling  Gastrointestinal: Negative for abdominal distention, nausea and vomiting  Endocrine: Negative for polydipsia and polyuria  Genitourinary: Negative for dysuria and hematuria  Skin: Negative for rash and wound  Neurological: Negative for dizziness, numbness and headaches  Psychiatric/Behavioral: Negative for decreased concentration and suicidal ideas  Denies chest pain other than mentioned in HPI for clavicle pain      Following history reviewed and update:    Past Medical History:   Diagnosis Date    Acid reflux     Bursitis     Frequent headaches     Herniated disc, cervical     Liver cyst     Migraine     Stomach ulcer      Past Surgical History:   Procedure Laterality Date    ME ESOPHAGOGASTRODUODENOSCOPY TRANSORAL DIAGNOSTIC N/A 5/1/2017    Procedure: ESOPHAGOGASTRODUODENOSCOPY (EGD); Surgeon: Benito Suggs MD;  Location: [facility] GI [facility];   Service: Gastroenterology    TUBAL LIGATION       Social History   History   Alcohol Use No     History   Drug Use No     History   Smoking Status    Never Smoker   Smokeless Tobacco    Never Used     History reviewed  No pertinent family history  Allergies   Allergen Reactions    Asa [Aspirin] Other (See Comments)    Latex     Meloxicam     Penicillin V Hives    Penicillins Other (See Comments)     rash,hives          Physical Exam  Constitutional: She is oriented to person, place, and time  She appears well-developed and well-nourished  HENT:   Head: Normocephalic and atraumatic  Right Ear: External ear normal    Left Ear: External ear normal    Nose: Nose normal    Eyes: Conjunctivae are normal  Right eye exhibits no discharge  Left eye exhibits no discharge  No scleral icterus  Neck: Neck supple  Pulmonary/Chest: Effort normal  No respiratory distress  Neurological: She is alert and oriented to person, place, and time  Psychiatric: She has a normal mood and affect  Her behavior is normal  Judgment and thought content normal      Ortho Exam  RIGHT KNEE:  Antalgic right gait  Erythema: no  Swellin+  Increased Warmth: no  Tenderness: + medial joint line or MCL  Flexion:  Active 90; flexion 100 with severe significant pain  Extension:  -5  Patellar Displacement:  Patellar Tilt:  Patellar Apprehension: +  Patellar Grind Chen's: negative  Lachman's: negative  Drawer: negative  Varus laxity: negative  Valgus laxity: + with pain at [facility]  Jerzy:  Unable to perform due to severe pain with flexion  Thessaly Test:  Dial Test:     LEFT SHOULDER:  Erythema: no  Swelling: no  Increased Warmth: no    Tenderness:  None  No clavicle tenderness      ROM  Touchdown sign: intact  Appley Scratch Test: symmetric  Passive: symmetric    Strength  Abduction: 5/5  ER: 5/5  IR: 5/5    Drop-Arm: negative  Emptycan: negative    Eller: negative  Neer: negative  Speeds: negative              Procedures

## 2018-03-25 ENCOUNTER — HOSPITAL ENCOUNTER (OUTPATIENT)
Dept: MRI IMAGING | Facility: HOSPITAL | Age: 38
Discharge: HOME | End: 2018-03-25
Attending: FAMILY MEDICINE
Payer: COMMERCIAL

## 2018-03-25 DIAGNOSIS — S89.92XA KNEE INJURY, LEFT, INITIAL ENCOUNTER: ICD-10-CM

## 2018-03-25 DIAGNOSIS — M25.462 EFFUSION OF LEFT KNEE: ICD-10-CM

## 2018-03-25 PROCEDURE — 73721 MRI JNT OF LWR EXTRE W/O DYE: CPT

## 2018-03-30 ENCOUNTER — APPOINTMENT (OUTPATIENT)
Dept: RADIOLOGY | Facility: CLINIC | Age: 38
End: 2018-03-30
Payer: COMMERCIAL

## 2018-03-30 ENCOUNTER — OFFICE VISIT (OUTPATIENT)
Dept: OBGYN CLINIC | Facility: MEDICAL CENTER | Age: 38
End: 2018-03-30
Payer: COMMERCIAL

## 2018-03-30 VITALS
SYSTOLIC BLOOD PRESSURE: 130 MMHG | DIASTOLIC BLOOD PRESSURE: 88 MMHG | BODY MASS INDEX: 25.91 KG/M2 | WEIGHT: 132 LBS | HEIGHT: 60 IN | HEART RATE: 84 BPM

## 2018-03-30 DIAGNOSIS — T14.8XXA CONTUSION OF BONE: ICD-10-CM

## 2018-03-30 DIAGNOSIS — M25.561 ACUTE PAIN OF RIGHT KNEE: ICD-10-CM

## 2018-03-30 DIAGNOSIS — M25.561 ACUTE PAIN OF RIGHT KNEE: Primary | ICD-10-CM

## 2018-03-30 DIAGNOSIS — S83.511A RUPTURE OF ANTERIOR CRUCIATE LIGAMENT OF RIGHT KNEE, INITIAL ENCOUNTER: ICD-10-CM

## 2018-03-30 DIAGNOSIS — M89.8X1 COLLAR BONE PAIN: ICD-10-CM

## 2018-03-30 DIAGNOSIS — S82.141A CLOSED FRACTURE OF RIGHT TIBIAL PLATEAU, INITIAL ENCOUNTER: ICD-10-CM

## 2018-03-30 PROCEDURE — 99214 OFFICE O/P EST MOD 30 MIN: CPT | Performed by: FAMILY MEDICINE

## 2018-03-30 PROCEDURE — 73562 X-RAY EXAM OF KNEE 3: CPT

## 2018-03-30 NOTE — PROGRESS NOTES
1  Acute pain of right knee  XR knee 3 vw right non injury   2  Closed fracture of right tibial plateau, initial encounter     3  Rupture of anterior cruciate ligament of right knee, initial encounter     4  Contusion of bone       Orders Placed This Encounter   Procedures    XR knee 3 vw right non injury        Imaging Studies (I personally reviewed results in PACS):      MRI 03/25/2018:  Right knee:  Nondisplaced laterall tibial plateau fracture, medial tibial plateau contusion, ruptured ACL mid substance  X-ray right knee 03/30/2018:  Maintenance of alignment  No visible fracture line  Past diagnostics:  CT scan left shoulder and clavicle 03/09/2018:  No acute osseous abnormality  Normal   X-ray left clavicle: Multiple views 03/01/2018 including serendipity, Zanca, clavicle:  No acute osseous abnormality  No sign of subluxation      IMPRESSION:   Nondisplaced lateral tibial plateau fracture  Medial tibial plateau contusion  ACL rupture  Clavicle pain resolved       Return for follow-up with Dr Katherine Wang  Patient Instructions   Please start hinged knee brace for stability of knee joint  Start nonweightbearing for lateral nondisplaced tibial plateau fracture and medial tibial plateau contusion of bone  He will see surgeon to discuss operative  Intervention for your ACL          CHIEF COMPLAINT:  Left shoulder pain clavicle bone pain Follow-up and also follow-up right knee pain  HPI:  Jordy Redmond is a 30 y o  female  who presents for Evaluation of left shoulder pain status post falling injury on ice that occurred 02/02/2018 6 weeks ago  Visit 03/19/2018: Today, patient states that her shoulder pain and collarbone pain is significantly improved  She states that she now only rarely has pain in that area  She states she had great response naproxen therapy    She did have CT scan ordered last visit which was normal     patient today complains of new complaint right knee pain status post fall approximately 1 week ago last Monday  Patient did present to the emergency room and was told that she had a sprain of her knee  She was at [facility] states she had an x-ray which was normal except for fluid her knee  She points to diffuse anterior knee as source of pain  She states she had immediate swelling after her fall as well as now has stiffness and has difficulty completely bending her knee  Her pain is intermittent moderate achy to sharp exacerbated when walking  Patient states she does have difficulty ambulating with a limp now  Patient states she was rushing down steps as she was locked out of her room in her building and fell down 3 steps landing on the front of her knee  She presents to the ER the next day after symptoms did not improve  She was given pain medication in the ER which she has almost finished  She is currently out of her naproxen  Patient states she had popping sensation when she injured her knee and feels that it pops occasionally since then  Visit 03/30/2018: Today, patient states that her clavicle pain is completely resolved  For right knee pain:  Patient still has persistent intermittent achy to sharp moderate pain exacerbated by walking  She is having difficulty walking due to pain  Patient does not drive  Patient is mildly active  She did attempt to return to running with her children recently before injury  Review of Systems   Constitutional: Negative for chills and fever  Musculoskeletal: Positive for joint swelling  Neurological: Negative for weakness  Following history reviewed and update:    Past Medical History:   Diagnosis Date    Acid reflux     Bursitis     Frequent headaches     Herniated disc, cervical     Liver cyst     Migraine     Stomach ulcer      Past Surgical History:   Procedure Laterality Date    MD ESOPHAGOGASTRODUODENOSCOPY TRANSORAL DIAGNOSTIC N/A 5/1/2017    Procedure: ESOPHAGOGASTRODUODENOSCOPY (EGD);   Surgeon: Errol Holder MD;  Location: [facility] GI LAB; Service: Gastroenterology    TUBAL LIGATION       Social History   History   Alcohol Use No     History   Drug Use No     History   Smoking Status    Never Smoker   Smokeless Tobacco    Never Used     No family history on file  Allergies   Allergen Reactions    Asa [Aspirin] Other (See Comments)    Latex     Meloxicam     Penicillin V Hives    Penicillins Other (See Comments)     rash,hives          Physical Exam  Constitutional: She is oriented to person, place, and time  She appears well-developed and well-nourished  HENT:   Head: Normocephalic and atraumatic  Right Ear: External ear normal    Left Ear: External ear normal    Nose: Nose normal    Eyes: Conjunctivae are normal  Right eye exhibits no discharge  Left eye exhibits no discharge  No scleral icterus  Neck: Neck supple  Pulmonary/Chest: Effort normal  No respiratory distress  Neurological: She is alert and oriented to person, place, and time  Psychiatric: She has a normal mood and affect   Her behavior is normal  Judgment and thought content normal      Ortho Exam  RIGHT KNEE:  Antalgic right gait  Erythema: no  Swellin+  Increased Warmth: no  Tenderness: + diffuse tenderness;++ medial joint line  Flexion:  Active 90; flexion 100 with  pain but improved from last visit for pain  Extension:  -5  Patellar Displacement:  Patellar Tilt:  Patellar Apprehension:  Negative  Patellar Grind Chen's: negative  Lachman's: negative  Drawer: negative  Varus laxity: negative  Valgus laxity: + with pain at [facility]  Jerzy:  Limited exam due to pain  Thessaly Test:  Dial Test:              Procedures

## 2018-03-30 NOTE — PATIENT INSTRUCTIONS
Please start hinged knee brace for stability of knee joint     Start nonweightbearing for lateral nondisplaced tibial plateau fracture and medial tibial plateau contusion of bone  He will see surgeon to discuss operative  Intervention for your ACL

## 2018-04-10 ENCOUNTER — OFFICE VISIT (OUTPATIENT)
Dept: OBGYN CLINIC | Facility: MEDICAL CENTER | Age: 38
End: 2018-04-10
Payer: COMMERCIAL

## 2018-04-10 ENCOUNTER — TELEPHONE (OUTPATIENT)
Dept: OBGYN CLINIC | Facility: HOSPITAL | Age: 38
End: 2018-04-10

## 2018-04-10 VITALS
SYSTOLIC BLOOD PRESSURE: 130 MMHG | WEIGHT: 130 LBS | HEART RATE: 76 BPM | DIASTOLIC BLOOD PRESSURE: 86 MMHG | BODY MASS INDEX: 25.52 KG/M2 | HEIGHT: 60 IN

## 2018-04-10 DIAGNOSIS — S83.511A RUPTURE OF ANTERIOR CRUCIATE LIGAMENT OF RIGHT KNEE, INITIAL ENCOUNTER: Primary | ICD-10-CM

## 2018-04-10 PROCEDURE — 99214 OFFICE O/P EST MOD 30 MIN: CPT | Performed by: ORTHOPAEDIC SURGERY

## 2018-04-10 RX ORDER — FAMOTIDINE 40 MG/1
TABLET, FILM COATED ORAL
Refills: 2 | COMMUNITY
Start: 2018-04-07 | End: 2018-09-25 | Stop reason: SDUPTHER

## 2018-04-10 NOTE — TELEPHONE ENCOUNTER
Caller: isaias from [facility] pharmacy  Ph: [phone]  Caller reports that the naproxen is not available at their pharmacy and want to know if they can give her the delay release form or the regular form of it?

## 2018-04-10 NOTE — PROGRESS NOTES
Orthopaedic Surgery - Office Note  Nighat Foy (29 y o  female)   : 1980   MRN: [id number]  Encounter Date: 4/10/2018    Chief Complaint   Patient presents with    Right Knee - Pain       Assessment / Plan  R knee ACL tear     · The diagnosis and treatment options were reviewed  · The patient wishes to proceed with R knee arthroscopy with ACL reconstruction   · The risks, benefits, and alternatives were discussed  · Written consent was obtained  · Operative and non operative options were discussed with the patient today   · Physical therapy for pre-op therapy to increase R knee ROM and strengthening exercises  · Patient will be fitted with a long hinged knee brace today which she was instructed to bring with her to the hospital the day of therapy  · If the patient is having issues after sx due to her herniated disc in her back we will refer her to pain management   Return in about 3 weeks (around 2018) for Recheck  History of Present Illness  Nighat Foy is a 30 y o  female who presents as a referral from Dr Booker Monsalve regarding her R knee pain that started in the middle of March after falling down the steps at her house  Pt states that her pain is a mild constant achy pain however when she puts pressure on her knee she will experience a sharp shooting pain in her knee  Pt states that she is not taking any pain medication at this point in time  Pt states that the pain is waking her from sleep at night  She is currently on crutches and wearing a short hinged knee brace without relief  Pt denies numbness or tingling  Pt does have a history of a herniated disc which will on occasion radiate down her R leg  Review of Systems  Pertinent items are noted in HPI  All other systems were reviewed and are negative  Physical Exam  /86   Pulse 76   Ht 5' (1 524 m)   Wt 59 kg (130 lb)   BMI 25 39 kg/m²   Cons: Appears well  No apparent distress  Psych: Alert  Oriented x3    Mood and affect normal   Eyes: PERRLA, EOMI  Resp: Normal effort  No audible wheezing or stridor  CV: Palpable pulse  No discernable arrhythmia  No LE edema  Lymph:  No palpable cervical, axillary, or inguinal lymphadenopathy  Skin: Warm  No palpable masses  No visible lesions  Neuro: Normal muscle tone  Normal and symmetric DTR's  Right Knee Exam  Alignment:  Normal knee alignment  Inspection:  mild anterior knee swelling  No ecchymosis  No deformity  Palpation:  moderate tenderness at medial jt line, proximal midial tibia plateu  ROM:  Knee Extension 0  Knee Flexion 90  Strength:  Not tested  Stability:  (+) Lachman (Grade 1B)  Tests:  No pertinent positive or negative tests  Patella:  Patella tracks centrally with crepitus  Neurovascular:  Sensation intact in DP/SP/Moore/Sa/T nerve distributions  2+ DP & PT pulses  Gait:  steady with crutches    Studies Reviewed  MRI of right knee - ACL tear    Procedures  No procedures today  Medical, Surgical, Family, and Social History  The patient's medical history, family history, and social history, were reviewed and updated as appropriate  Past Medical History:   Diagnosis Date    Acid reflux     Bursitis     Frequent headaches     Herniated disc, cervical     Liver cyst     Migraine     Stomach ulcer        Past Surgical History:   Procedure Laterality Date    [place] ESOPHAGOGASTRODUODENOSCOPY TRANSORAL DIAGNOSTIC N/A 5/1/2017    Procedure: ESOPHAGOGASTRODUODENOSCOPY (EGD); Surgeon: Orlando Cole MD;  Location: [facility];   Service: Gastroenterology    TUBAL LIGATION         Family History   Problem Relation Age of Onset    Depression Mother     Anxiety disorder Mother     Sleep disorder Mother     Hyperlipidemia Father     Hypertension Father     Heart disease Father     Heart disease Paternal Aunt     Heart disease Paternal Uncle     Osteoporosis Maternal Grandmother     Alzheimer's disease Paternal Grandmother     Heart disease Paternal Grandmother     Heart disease Paternal Grandfather        Social History     Occupational History    Not on file       Social History Main Topics    Smoking status: Never Smoker    Smokeless tobacco: Never Used    Alcohol use No    Drug use: No    Sexual activity: Not on file       Allergies   Allergen Reactions    Asa [Aspirin] Other (See Comments)    Latex     Meloxicam     Penicillin V Hives    Penicillins Other (See Comments)     rash,hives         Current Outpatient Prescriptions:     famotidine (PEPCID) 40 MG tablet, TK 1 T PO HS, Disp: , Rfl: 2    omeprazole (PriLOSEC) 40 MG capsule, Take 40 mg by mouth, Disp: , Rfl:     ibuprofen (MOTRIN) 800 mg tablet, Take 800 mg by mouth every 8 (eight) hours, Disp: , Rfl:     methocarbamol (ROBAXIN) 750 mg tablet, TK 1 T PO TID, Disp: , Rfl:     naproxen (EC NAPROSYN) 500 MG EC tablet, Take 1 tablet (500 mg total) by mouth 2 (two) times a day with meals, Disp: 40 tablet, Rfl: 0    predniSONE 20 mg tablet, TAKE 3 TABLETS EVERY DAY BY MOUTH WITH FOOD, Disp: , Rfl: 0    traMADol (ULTRAM) 50 mg tablet, Take 50 mg by mouth, Disp: , Rfl:     traMADol (ULTRAM) 50 mg tablet, Take 1 tablet (50 mg total) by mouth every 6 (six) hours as needed for moderate pain, Disp: 12 tablet, Rfl: 0      Anderson Armendariz    Scribkiara Attestation    I,:   Anderson Armendariz am acting as a scribe while in the presence of the attending physician :        I,:   Jhonny Perez MD personally performed the services described in this documentation    as scribed in my presence :

## 2018-04-10 NOTE — LETTER
April 10, 2018     Patient: Christopher Salazarday   YOB: 1980   Date of Visit: 4/10/2018       To Whom it May Concern: Christopher Friday is under my professional care  She was seen in my office on 4/10/2018  She may return to work tomorrow 4/11/18  If you have any questions or concerns, please don't hesitate to call           Sincerely,          Barney De Leon MD        CC: No Recipients

## 2018-04-12 ENCOUNTER — TELEPHONE (OUTPATIENT)
Dept: OBGYN CLINIC | Facility: MEDICAL CENTER | Age: 38
End: 2018-04-12

## 2018-04-12 DIAGNOSIS — M25.569 KNEE PAIN, UNSPECIFIED CHRONICITY, UNSPECIFIED LATERALITY: Primary | ICD-10-CM

## 2018-04-12 RX ORDER — IBUPROFEN 800 MG/1
800 TABLET ORAL EVERY 8 HOURS PRN
Qty: 40 TABLET | Refills: 0 | Status: SHIPPED | OUTPATIENT
Start: 2018-04-12 | End: 2018-05-24 | Stop reason: HOSPADM

## 2018-04-17 ENCOUNTER — EVALUATION (OUTPATIENT)
Dept: PHYSICAL THERAPY | Facility: MEDICAL CENTER | Age: 38
End: 2018-04-17
Payer: COMMERCIAL

## 2018-04-17 DIAGNOSIS — S83.511A RUPTURE OF ANTERIOR CRUCIATE LIGAMENT OF RIGHT KNEE, INITIAL ENCOUNTER: Primary | ICD-10-CM

## 2018-04-17 DIAGNOSIS — M25.561 RIGHT KNEE PAIN, UNSPECIFIED CHRONICITY: ICD-10-CM

## 2018-04-17 PROCEDURE — 97110 THERAPEUTIC EXERCISES: CPT | Performed by: PHYSICAL THERAPIST

## 2018-04-17 PROCEDURE — G8990 OTHER PT/OT CURRENT STATUS: HCPCS | Performed by: PHYSICAL THERAPIST

## 2018-04-17 PROCEDURE — 97161 PT EVAL LOW COMPLEX 20 MIN: CPT | Performed by: PHYSICAL THERAPIST

## 2018-04-17 PROCEDURE — G8991 OTHER PT/OT GOAL STATUS: HCPCS | Performed by: PHYSICAL THERAPIST

## 2018-04-17 NOTE — PROGRESS NOTES
PT Evaluation     Today's date: 2018  Patient name: Tre Wylie  : 1980  MRN: [id number]  Referring provider: Josephine Hinson MD  Dx:   Encounter Diagnosis     ICD-10-CM    1  Right knee pain, unspecified chronicity M25 561    2  Rupture of anterior cruciate ligament of right knee, initial encounter S83 511A Ambulatory referral to Physical Therapy                  Assessment  Impairments: abnormal muscle firing, abnormal or restricted ROM, activity intolerance, lacks appropriate home exercise program, pain with function and weight-bearing intolerance    Assessment details: Patient is a very pleasant 30 y o  Female who presents to physical therapy for pre-operative assessment for right ACL rupture with pending surgery for ACL reconstruction 18  Patient presents with decreased ROM, poor quadriceps contraction, strength deficits, and decreased tolerance to activity  Patient will benefit from outpatient physical therapy services to maximize right knee ROM and strength prior to ACL reconstruction surgery to limit adverse outcomes following surgery  Patient instructed in HEP this session including quad sets, hamstring stretch, calf, stretch, and knee flexion ROM  Will progress as appropriate  Patient advised to use CP for swelling as needed  Understanding of Dx/Px/POC: good   Prognosis: good  Prognosis details: Prognosis is good given compliance with HEP and PT 2x/week for 4 weeks  Positive prognostic factors: positive attitude towards recovery, age, active lifestyle  Negative prognostic factors: anxiety    Goals  1  Patient will be independent in pre-operative HEP to maximize right knee ROM and strength for upcoming surgery  2  Patient will demonstrate good quad set comparable to contralateral side in 1 weeks  3 Patient will demonstrate ability to perform 10 SLR without quad lag in 4 weeks  4  Patient will demonstrate right knee AROM 0-120 degrees in 4 weeks       Plan  Planned modality interventions: cryotherapy  Planned therapy interventions: neuromuscular re-education, patient education, therapeutic exercise, home exercise program, functional ROM exercises and flexibility  Frequency: 2x week  Duration in weeks: 4  Treatment plan discussed with: patient  Plan details: Please contact me with any questions  Thank you for the referral          Subjective Evaluation    History of Present Illness  Mechanism of injury: Patient is a 30 y o  Female who presents to physical with right knee pain secondary to an ACL rupture following a fall down stairs when she was rushing in March  She heard a pop in the knee and had immediate swelling  When she followed up with Dr Petra Nichols she received and MRI revealing a rupture of the right ACL with a contusion of the medial plateau  Patient is scheduled to receive ACL reconstruction surgery with an autograft on May 24th  She is currently ambulating in a soft knee brace with medial and lateral supports  Patient was referred to physical therapy at this time for pre-operative treatment for right knee ROM and strength  Patient expresses concern for restoring her ability to walk and play with her kids following surgery       Occupation: garment   Pain  No pain reported  Current pain ratin  At best pain ratin (rest, epsom salt bath, ibuprofen)  At worst pain rating: 10 (walking, stairs, bending)  Location: anterior knee, posterior medial knee  Quality: throbbing    Patient Goals  Patient goals for therapy: increased motion, decreased pain, decreased edema, increased strength, independence with ADLs/IADLs, return to sport/leisure activities and return to work          Objective     Tenderness     Additional Tenderness Details  Anterior knee joint and posterior medial knee     Active Range of Motion   Left Knee   Flexion: 125 degrees   Extension: 0 degrees     Right Knee   Flexion: 95 degrees with pain  Extension: -2 degrees with pain    Strength/Myotome Testing     Left Knee   Flexion: 5  Prone flexion: 5  Quadriceps contraction: good    Right Knee   Quadriceps contraction: poor    Additional Strength Details  Right knee strength NT secondary to pain  Unable to complete SLR without quad lag    Hip abduction left = 3+/5, right = 4/5            Precautions: Anxiety, latex allergy, neck pain, GERD    Daily Treatment Diary     Manual  4/17                                                                                 Exercise Diary              Heel slides 10" x 15            Hamstring stretch 20" x 4            Standing calf stretch 20" x 4            Quad sets (towel at knee) 5" x 10            Quad sets (towel at ankle)             SLR:  4 way             bridges             Clam shells             Prone hamstring curls                                                                                                                                                                Modalities              CP prn

## 2018-04-24 ENCOUNTER — APPOINTMENT (OUTPATIENT)
Dept: PHYSICAL THERAPY | Facility: MEDICAL CENTER | Age: 38
End: 2018-04-24
Payer: COMMERCIAL

## 2018-05-01 ENCOUNTER — OFFICE VISIT (OUTPATIENT)
Dept: PHYSICAL THERAPY | Facility: MEDICAL CENTER | Age: 38
End: 2018-05-01
Payer: COMMERCIAL

## 2018-05-01 DIAGNOSIS — M25.561 RIGHT KNEE PAIN, UNSPECIFIED CHRONICITY: ICD-10-CM

## 2018-05-01 DIAGNOSIS — S83.511A RUPTURE OF ANTERIOR CRUCIATE LIGAMENT OF RIGHT KNEE, INITIAL ENCOUNTER: Primary | ICD-10-CM

## 2018-05-01 PROCEDURE — 97110 THERAPEUTIC EXERCISES: CPT | Performed by: PHYSICAL THERAPIST

## 2018-05-01 PROCEDURE — 97010 HOT OR COLD PACKS THERAPY: CPT | Performed by: PHYSICAL THERAPIST

## 2018-05-01 NOTE — PROGRESS NOTES
Daily Note     Today's date: 2018  Patient name: Kishore Wagner  : 1980  MRN: [id number]  Referring provider: Issa De La Vega MD  Dx:   Encounter Diagnosis     ICD-10-CM    1  Rupture of anterior cruciate ligament of right knee, initial encounter S83 511A    2  Right knee pain, unspecified chronicity M25 561                   Subjective: Patient states she has been tolerating walking much better at work  She continues to note swelling in the knee at the end of the day and has pain with bending it  Objective: See treatment diary below      Precautions: Anxiety, latex allergy, neck pain, GERD    Daily Treatment Diary     Manual                                                                                  Exercise Diary              Heel slides 10" x 15 10" x 15           Hamstring stretch 20" x 4 20" x 4           Standing calf stretch 20" x 4 20" x 4           Quad sets (towel at knee) 5" x 10 5" x 30           Quad sets (towel at ankle)  5" x 30           SLR:  4 way  2 x 10 ea           bridges  X 30           Clam shells             Prone hamstring curls  3 x 10           bike  X 10'           Leg press             Step ups                                                                                                                         Modalities              CP prn  X 10'                                       Right knee:  AROM  = 0-105 degrees  PROM = 0-110 degrees  Fair/good quad set      Assessment: Tolerated treatment well  Improved quad set and knee flexion noted this session  She was able to perform 2 sets of 10 reps of SLR without quad lag  Plan: Continue per plan of care  Progress strengthening and ROM as tolerated  DOS pending 18

## 2018-05-03 ENCOUNTER — APPOINTMENT (OUTPATIENT)
Dept: PHYSICAL THERAPY | Facility: MEDICAL CENTER | Age: 38
End: 2018-05-03
Payer: COMMERCIAL

## 2018-05-08 ENCOUNTER — APPOINTMENT (OUTPATIENT)
Dept: PHYSICAL THERAPY | Facility: MEDICAL CENTER | Age: 38
End: 2018-05-08
Payer: COMMERCIAL

## 2018-05-10 ENCOUNTER — APPOINTMENT (OUTPATIENT)
Dept: PHYSICAL THERAPY | Facility: MEDICAL CENTER | Age: 38
End: 2018-05-10
Payer: COMMERCIAL

## 2018-05-15 ENCOUNTER — OFFICE VISIT (OUTPATIENT)
Dept: PHYSICAL THERAPY | Facility: MEDICAL CENTER | Age: 38
End: 2018-05-15
Payer: COMMERCIAL

## 2018-05-15 ENCOUNTER — ANESTHESIA EVENT (OUTPATIENT)
Dept: PERIOP | Facility: HOSPITAL | Age: 38
End: 2018-05-15
Payer: COMMERCIAL

## 2018-05-15 DIAGNOSIS — S83.511A RUPTURE OF ANTERIOR CRUCIATE LIGAMENT OF RIGHT KNEE, INITIAL ENCOUNTER: Primary | ICD-10-CM

## 2018-05-15 DIAGNOSIS — M25.561 RIGHT KNEE PAIN, UNSPECIFIED CHRONICITY: ICD-10-CM

## 2018-05-15 PROCEDURE — 97110 THERAPEUTIC EXERCISES: CPT | Performed by: PHYSICAL THERAPIST

## 2018-05-15 PROCEDURE — 97010 HOT OR COLD PACKS THERAPY: CPT | Performed by: PHYSICAL THERAPIST

## 2018-05-15 NOTE — PROGRESS NOTES
Daily Note     Today's date: 5/15/2018  Patient name: Julieth Ann  : 1980  MRN: [id number]  Referring provider: Lul Ford MD  Dx:   Encounter Diagnosis     ICD-10-CM    1  Rupture of anterior cruciate ligament of right knee, initial encounter S83 511A    2  Right knee pain, unspecified chronicity M25 561                   Subjective: Patient states her right knee is feeling better  She has been having difficulty getting to her scheduled appointments here due to car issues and family schedules  Objective: See treatment diary below      Precautions: Anxiety, latex allergy, neck pain, GERD    Daily Treatment Diary     Manual                                                                                 Exercise Diary              Heel slides 10" x 15 10" x 15 10" x 15          Hamstring stretch 20" x 4 20" x 4 20" x 4          Standing calf stretch 20" x 4 20" x 4 20" x 4          Quad sets (towel at knee) 5" x 10 5" x 30 5" X 30          Quad sets (towel at ankle)  5" x 30 5" X 30          SLR:  4 way  2 x 10 ea 3 X 10 ea          bridges  X 30 X 30          Clam shells   3 x 10          Prone hamstring curls  3 x 10 3 x 10          bike  X 10' X 10'          Single leg RDL   X 10          Step ups                                                                                                                         Modalities              CP prn  X 10' X 10'                                      Right knee:  AROM  = 0-125 degrees  Fair/good quad set      Assessment: Improved quad set noted this session  Patient completed 3 x 10 of SLR without quad lag  Reviewed SLR x 4 and provided patient with instructions to include in HEP  Full ROM of the right knee achieved, minimal discomfort at end range  Plan: Progress strengthening and ROM as tolerated  DOS pending 18  Patient will transfer to [facility] starting  secondary to accessibility from home

## 2018-05-16 ENCOUNTER — APPOINTMENT (OUTPATIENT)
Dept: LAB | Facility: HOSPITAL | Age: 38
End: 2018-05-16
Attending: ORTHOPAEDIC SURGERY
Payer: COMMERCIAL

## 2018-05-16 ENCOUNTER — OFFICE VISIT (OUTPATIENT)
Dept: OBGYN CLINIC | Facility: OTHER | Age: 38
End: 2018-05-16
Payer: COMMERCIAL

## 2018-05-16 VITALS
HEIGHT: 60 IN | WEIGHT: 130 LBS | SYSTOLIC BLOOD PRESSURE: 121 MMHG | BODY MASS INDEX: 25.52 KG/M2 | DIASTOLIC BLOOD PRESSURE: 82 MMHG | HEART RATE: 68 BPM

## 2018-05-16 DIAGNOSIS — S83.511D RUPTURE OF ANTERIOR CRUCIATE LIGAMENT OF RIGHT KNEE, SUBSEQUENT ENCOUNTER: Primary | ICD-10-CM

## 2018-05-16 DIAGNOSIS — Z01.818 PRE-OP TESTING: ICD-10-CM

## 2018-05-16 LAB
ALBUMIN SERPL BCP-MCNC: 4 G/DL (ref 3.5–5)
ALP SERPL-CCNC: 57 U/L (ref 46–116)
ALT SERPL W P-5'-P-CCNC: 16 U/L (ref 12–78)
ANION GAP SERPL CALCULATED.3IONS-SCNC: 7 MMOL/L (ref 4–13)
AST SERPL W P-5'-P-CCNC: 14 U/L (ref 5–45)
BASOPHILS # BLD AUTO: 0.02 THOUSANDS/ΜL (ref 0–0.1)
BASOPHILS NFR BLD AUTO: 0 % (ref 0–1)
BILIRUB SERPL-MCNC: 0.26 MG/DL (ref 0.2–1)
BUN SERPL-MCNC: 6 MG/DL (ref 5–25)
CALCIUM SERPL-MCNC: 9.3 MG/DL (ref 8.3–10.1)
CHLORIDE SERPL-SCNC: 104 MMOL/L (ref 100–108)
CO2 SERPL-SCNC: 30 MMOL/L (ref 21–32)
CREAT SERPL-MCNC: 0.67 MG/DL (ref 0.6–1.3)
EOSINOPHIL # BLD AUTO: 0.11 THOUSAND/ΜL (ref 0–0.61)
EOSINOPHIL NFR BLD AUTO: 2 % (ref 0–6)
ERYTHROCYTE [DISTWIDTH] IN BLOOD BY AUTOMATED COUNT: 12.9 % (ref 11.6–15.1)
GFR SERPL CREATININE-BSD FRML MDRD: 114 ML/MIN/1.73SQ M
GLUCOSE SERPL-MCNC: 86 MG/DL (ref 65–140)
HCT VFR BLD AUTO: 37.8 % (ref 34.8–46.1)
HGB BLD-MCNC: 12.9 G/DL (ref 11.5–15.4)
IMM GRANULOCYTES # BLD AUTO: 0.02 THOUSAND/UL (ref 0–0.2)
IMM GRANULOCYTES NFR BLD AUTO: 0 % (ref 0–2)
LYMPHOCYTES # BLD AUTO: 1.85 THOUSANDS/ΜL (ref 0.6–4.47)
LYMPHOCYTES NFR BLD AUTO: 25 % (ref 14–44)
MCH RBC QN AUTO: 29.1 PG (ref 26.8–34.3)
MCHC RBC AUTO-ENTMCNC: 34.1 G/DL (ref 31.4–37.4)
MCV RBC AUTO: 85 FL (ref 82–98)
MONOCYTES # BLD AUTO: 0.4 THOUSAND/ΜL (ref 0.17–1.22)
MONOCYTES NFR BLD AUTO: 5 % (ref 4–12)
NEUTROPHILS # BLD AUTO: 5.01 THOUSANDS/ΜL (ref 1.85–7.62)
NEUTS SEG NFR BLD AUTO: 68 % (ref 43–75)
NRBC BLD AUTO-RTO: 0 /100 WBCS
PLATELET # BLD AUTO: 250 THOUSANDS/UL (ref 149–390)
PMV BLD AUTO: 10.4 FL (ref 8.9–12.7)
POTASSIUM SERPL-SCNC: 3.6 MMOL/L (ref 3.5–5.3)
PROT SERPL-MCNC: 7.6 G/DL (ref 6.4–8.2)
RBC # BLD AUTO: 4.44 MILLION/UL (ref 3.81–5.12)
SODIUM SERPL-SCNC: 141 MMOL/L (ref 136–145)
WBC # BLD AUTO: 7.39 THOUSAND/UL (ref 4.31–10.16)

## 2018-05-16 PROCEDURE — 80053 COMPREHEN METABOLIC PANEL: CPT

## 2018-05-16 PROCEDURE — 36415 COLL VENOUS BLD VENIPUNCTURE: CPT

## 2018-05-16 PROCEDURE — 99213 OFFICE O/P EST LOW 20 MIN: CPT | Performed by: ORTHOPAEDIC SURGERY

## 2018-05-16 PROCEDURE — 85025 COMPLETE CBC W/AUTO DIFF WBC: CPT

## 2018-05-16 NOTE — ANESTHESIA PREPROCEDURE EVALUATION
Review of Systems/Medical History  Patient summary reviewed  Chart reviewed  No history of anesthetic complications     Cardiovascular  Negative cardio ROS    Pulmonary  Negative pulmonary ROS        GI/Hepatic    GERD well controlled, Liver disease ,   Comment: Hepatic cyst  Stable      Negative  ROS        Endo/Other  Negative endo/other ROS      GYN       Hematology  Negative hematology ROS      Musculoskeletal  Sciatica,   Comment: Torn right ACL       Neurology    Headaches,    Psychology           Physical Exam    Airway    Mallampati score: II  TM Distance: >3 FB  Neck ROM: full     Dental   No notable dental hx     Cardiovascular  Comment: Negative ROS, Rhythm: regular, Rate: normal, Cardiovascular exam normal    Pulmonary  Pulmonary exam normal Breath sounds clear to auscultation,     Other Findings        Anesthesia Plan  ASA Score- 2     Anesthesia Type- regional and general with ASA Monitors  Additional Monitors:   Airway Plan: LMA  Comment: Fem/sciatic block   Plan Factors- Patient instructed to abstain from smoking on day of procedure  Patient did not smoke on day of surgery  Induction- intravenous  Postoperative Plan-   Planned trial extubation    Informed Consent- Anesthetic plan and risks discussed with patient

## 2018-05-16 NOTE — PROGRESS NOTES
Orthopaedic Surgery - Office Note  Madelyn Mason (22 y o  female)   : 1980   MRN: [id number]  Encounter Date: 2018    Chief Complaint   Patient presents with    Right Knee - Follow-up       Assessment / Plan  R knee ACL tear     · The diagnosis and treatment options were again reviewed  · The patient wishes to proceed with R knee arthroscopy with ACL reconstruction which was scheduled previously  · Written consent was obtained previously  · Patient was provided with a physical therapy script set up postoperative therapy following her 1st postop appointment at today's appointment  · Patient does have a postoperative knee brace which again she was reminded to bring to surgery with her  Return for Follow-up 4- 5 days postop as scheduled  History of Present Illness  Madelyn Mason is a 30 y o  female who presents as a referral from Dr Bam Baker regarding her R knee pain that started in the middle of March after falling down the steps at her house  Pt states that her pain is a mild constant achy pain however when she puts pressure on her knee she will experience a sharp shooting pain in her knee  Pt states that she is not taking any pain medication at this point in time  Pt states that the pain is waking her from sleep at night  She is currently on crutches and wearing a short hinged knee brace without relief  Pt denies numbness or tingling  Pt does have a history of a herniated disc which will on occasion radiate down her R leg  The patient is here today for a preop appointment and states there is no changes in her health since last visit  Review of Systems  Pertinent items are noted in HPI  All other systems were reviewed and are negative  Physical Exam  /82   Pulse 68   Ht 5' (1 524 m)   Wt 59 kg (130 lb)   BMI 25 39 kg/m²   Cons: Appears well  No apparent distress  Psych: Alert  Oriented x3  Mood and affect normal   Eyes: PERRLA, EOMI  Resp: Normal effort    No audible wheezing or stridor  CV: Palpable pulse  No discernable arrhythmia  No LE edema  Lymph:  No palpable cervical, axillary, or inguinal lymphadenopathy  Skin: Warm  No palpable masses  No visible lesions  Neuro: Normal muscle tone  Normal and symmetric DTR's  Right Knee Exam  Alignment:  Normal knee alignment  Inspection:  mild anterior knee swelling  No ecchymosis  No deformity  Palpation:  moderate tenderness at medial jt line, proximal midial tibia plateu  ROM:  Knee Extension 0  Knee Flexion 90  Strength:  Not tested  Stability:  (+) Lachman (Grade 1B)  Tests:  No pertinent positive or negative tests  Patella:  Patella tracks centrally with crepitus  Neurovascular:  Sensation intact in DP/SP/Moore/Sa/T nerve distributions  2+ DP & PT pulses  Gait:  steady with crutches    Studies Reviewed  MRI of right knee - ACL tear    Procedures  No procedures today  Medical, Surgical, Family, and Social History  The patient's medical history, family history, and social history, were reviewed and updated as appropriate  Past Medical History:   Diagnosis Date    Acid reflux     Bursitis     Frequent headaches     Herniated disc, cervical     Liver cyst     Migraine     Stomach ulcer        Past Surgical History:   Procedure Laterality Date    [place] ESOPHAGOGASTRODUODENOSCOPY TRANSORAL DIAGNOSTIC N/A 5/1/2017    Procedure: ESOPHAGOGASTRODUODENOSCOPY (EGD); Surgeon: Angus Taylor MD;  Location: [facility];   Service: Gastroenterology    TUBAL LIGATION         Family History   Problem Relation Age of Onset    Depression Mother     Anxiety disorder Mother     Sleep disorder Mother     Hyperlipidemia Father     Hypertension Father     Heart disease Father     Heart disease Paternal Aunt     Heart disease Paternal Uncle     Osteoporosis Maternal Grandmother     Alzheimer's disease Paternal Grandmother     Heart disease Paternal Grandmother     Heart disease Paternal Grandfather        Social History     Occupational History    Not on file       Social History Main Topics    Smoking status: Never Smoker    Smokeless tobacco: Never Used    Alcohol use No    Drug use: No    Sexual activity: Not on file       Allergies   Allergen Reactions    Asa [Aspirin] Other (See Comments)    Latex     Meloxicam     Penicillin V Hives    Penicillins Other (See Comments)     rash,hives         Current Outpatient Prescriptions:     ibuprofen (MOTRIN) 800 mg tablet, Take 1 tablet (800 mg total) by mouth every 8 (eight) hours as needed for mild pain, Disp: 40 tablet, Rfl: 0    omeprazole (PriLOSEC) 40 MG capsule, Take 40 mg by mouth, Disp: , Rfl:     predniSONE 20 mg tablet, TAKE 3 TABLETS EVERY DAY BY MOUTH WITH FOOD, Disp: , Rfl: 0    famotidine (PEPCID) 40 MG tablet, TK 1 T PO HS, Disp: , Rfl: 2    methocarbamol (ROBAXIN) 750 mg tablet, TK 1 T PO TID, Disp: , Rfl:     traMADol (ULTRAM) 50 mg tablet, Take 50 mg by mouth, Disp: , Rfl:       Miguel Angel Walter PA-C    Scribe Attestation    I,:    am acting as a scribe while in the presence of the attending physician :        I,:    personally performed the services described in this documentation    as scribed in my presence :

## 2018-05-16 NOTE — PRE-PROCEDURE INSTRUCTIONS
Pre-Surgery Instructions:   Medication Instructions    famotidine (PEPCID) 40 MG tablet Patient was instructed by Physician and understands   ibuprofen (MOTRIN) 800 mg tablet Patient was instructed by Physician and understands   omeprazole (PriLOSEC) 40 MG capsule Patient was instructed by Physician and understands  Patient was seen by Dr Vera Grider and was instructed to take omeprazole am of surgery with a sip of water  Patient was instructed to avoid NSAID, Aspirin, Vitamins, and supplements 7 days before surgery  [facility] pre-op instructions reviewed  Pre-op bathing reviewed with patient and was given chlorhexidine soap

## 2018-05-17 ENCOUNTER — OFFICE VISIT (OUTPATIENT)
Dept: PHYSICAL THERAPY | Facility: CLINIC | Age: 38
End: 2018-05-17
Payer: COMMERCIAL

## 2018-05-17 ENCOUNTER — APPOINTMENT (OUTPATIENT)
Dept: PHYSICAL THERAPY | Facility: MEDICAL CENTER | Age: 38
End: 2018-05-17
Payer: COMMERCIAL

## 2018-05-17 DIAGNOSIS — M25.561 RIGHT KNEE PAIN, UNSPECIFIED CHRONICITY: ICD-10-CM

## 2018-05-17 DIAGNOSIS — S83.511A RUPTURE OF ANTERIOR CRUCIATE LIGAMENT OF RIGHT KNEE, INITIAL ENCOUNTER: Primary | ICD-10-CM

## 2018-05-17 PROCEDURE — 97112 NEUROMUSCULAR REEDUCATION: CPT

## 2018-05-17 PROCEDURE — G8991 OTHER PT/OT GOAL STATUS: HCPCS | Performed by: PHYSICAL THERAPIST

## 2018-05-17 PROCEDURE — G8990 OTHER PT/OT CURRENT STATUS: HCPCS | Performed by: PHYSICAL THERAPIST

## 2018-05-17 PROCEDURE — 97110 THERAPEUTIC EXERCISES: CPT

## 2018-05-17 NOTE — PROGRESS NOTES
Daily Note     Today's date: 2018  Patient name: Amanda Rogers  : 1980  MRN: [id number]  Referring provider: Keyana Chandler MD  Dx:   Encounter Diagnosis     ICD-10-CM    1  Rupture of anterior cruciate ligament of right knee, initial encounter S83 511A    2  Right knee pain, unspecified chronicity M25 561                   Subjective: Patient reports walking 15 blocks to therapy c/o a 3/10 pain level in the posterior area of her right knee  Objective: See treatment diary below  Manual                                                                                 Exercise Diary              Heel slides 10" x 15 10" x 15 10" x 15 10"x  15         Hamstring stretch 20" x 4 20" x 4 20" x 4 20"x4         Standing calf stretch 20" x 4 20" x 4 20" x 4 20"x4         Quad sets (towel at knee) 5" x 10 5" x 30 5" X 30 5""x30         Quad sets (towel at ankle)  5" x 30 5" X 30 5"x30         SLR:  4 way  2 x 10 ea 3 X 10 ea 3x  10 ea         bridges  X 30 X 30 x30         Clam shells   3 x 10 3x10         Prone hamstring curls  3 x 10 3 x 10 3x10         bike  X 10' X 10' 10'         Single leg RDL   X 10 PAIN         Step ups                                                                                                                         Modalities              CP prn  X 10' X 10' x10'                                       Assessment: Tolerated treatment well  Unable to perform the single leg RDL due to increased knee pain,    Plan: Continue therapy as tolerated per plan of care

## 2018-05-22 ENCOUNTER — APPOINTMENT (OUTPATIENT)
Dept: PHYSICAL THERAPY | Facility: MEDICAL CENTER | Age: 38
End: 2018-05-22
Payer: COMMERCIAL

## 2018-05-24 ENCOUNTER — ANESTHESIA (OUTPATIENT)
Dept: PERIOP | Facility: HOSPITAL | Age: 38
End: 2018-05-24
Payer: COMMERCIAL

## 2018-05-24 ENCOUNTER — HOSPITAL ENCOUNTER (OUTPATIENT)
Facility: HOSPITAL | Age: 38
Setting detail: OUTPATIENT SURGERY
Discharge: HOME | End: 2018-05-24
Attending: ORTHOPAEDIC SURGERY | Admitting: ORTHOPAEDIC SURGERY
Payer: COMMERCIAL

## 2018-05-24 ENCOUNTER — TELEPHONE (OUTPATIENT)
Dept: OBGYN CLINIC | Facility: HOSPITAL | Age: 38
End: 2018-05-24

## 2018-05-24 VITALS
HEIGHT: 60 IN | HEART RATE: 91 BPM | SYSTOLIC BLOOD PRESSURE: 106 MMHG | WEIGHT: 133 LBS | OXYGEN SATURATION: 100 % | TEMPERATURE: 97.5 F | BODY MASS INDEX: 26.11 KG/M2 | DIASTOLIC BLOOD PRESSURE: 59 MMHG | RESPIRATION RATE: 18 BRPM

## 2018-05-24 DIAGNOSIS — S83.511D RUPTURE OF ANTERIOR CRUCIATE LIGAMENT OF RIGHT KNEE, SUBSEQUENT ENCOUNTER: Primary | ICD-10-CM

## 2018-05-24 LAB — EXT PREGNANCY TEST URINE: NEGATIVE

## 2018-05-24 PROCEDURE — 29888 ARTHRS AID ACL RPR/AGMNTJ: CPT | Performed by: PHYSICIAN ASSISTANT

## 2018-05-24 PROCEDURE — C1713 ANCHOR/SCREW BN/BN,TIS/BN: HCPCS | Performed by: ORTHOPAEDIC SURGERY

## 2018-05-24 PROCEDURE — 29888 ARTHRS AID ACL RPR/AGMNTJ: CPT | Performed by: ORTHOPAEDIC SURGERY

## 2018-05-24 PROCEDURE — 81025 URINE PREGNANCY TEST: CPT | Performed by: ANESTHESIOLOGY

## 2018-05-24 PROCEDURE — C1762 CONN TISS, HUMAN(INC FASCIA): HCPCS | Performed by: ORTHOPAEDIC SURGERY

## 2018-05-24 DEVICE — IMPLANT FIXATION TIGHTROPE ABS W/ UHMWPE: Type: IMPLANTABLE DEVICE | Site: KNEE | Status: FUNCTIONAL

## 2018-05-24 DEVICE — GRAFT FIXATION ACL TIGHTROPE RT W/TI UHMWPE: Type: IMPLANTABLE DEVICE | Site: KNEE | Status: FUNCTIONAL

## 2018-05-24 DEVICE — TIGHTROPE ABS BUTTON RND CONCAVE 11MM: Type: IMPLANTABLE DEVICE | Site: KNEE | Status: FUNCTIONAL

## 2018-05-24 DEVICE — GRAFT SEMITENDINOSIS TENDON 26CM>: Type: IMPLANTABLE DEVICE | Site: KNEE | Status: FUNCTIONAL

## 2018-05-24 RX ORDER — ROPIVACAINE HYDROCHLORIDE 5 MG/ML
INJECTION, SOLUTION EPIDURAL; INFILTRATION; PERINEURAL AS NEEDED
Status: DISCONTINUED | OUTPATIENT
Start: 2018-05-24 | End: 2018-05-24 | Stop reason: SURG

## 2018-05-24 RX ORDER — MEPERIDINE HYDROCHLORIDE 50 MG/ML
12.5 INJECTION INTRAMUSCULAR; INTRAVENOUS; SUBCUTANEOUS AS NEEDED
Status: DISCONTINUED | OUTPATIENT
Start: 2018-05-24 | End: 2018-05-24 | Stop reason: HOSPADM

## 2018-05-24 RX ORDER — MORPHINE SULFATE 10 MG/ML
2 INJECTION, SOLUTION INTRAMUSCULAR; INTRAVENOUS EVERY 4 HOURS PRN
Status: DISCONTINUED | OUTPATIENT
Start: 2018-05-24 | End: 2018-05-24 | Stop reason: HOSPADM

## 2018-05-24 RX ORDER — ONDANSETRON 2 MG/ML
INJECTION INTRAMUSCULAR; INTRAVENOUS AS NEEDED
Status: DISCONTINUED | OUTPATIENT
Start: 2018-05-24 | End: 2018-05-24 | Stop reason: SURG

## 2018-05-24 RX ORDER — OXYCODONE HYDROCHLORIDE AND ACETAMINOPHEN 5; 325 MG/1; MG/1
2 TABLET ORAL EVERY 4 HOURS PRN
Status: DISCONTINUED | OUTPATIENT
Start: 2018-05-24 | End: 2018-05-24 | Stop reason: HOSPADM

## 2018-05-24 RX ORDER — DEXAMETHASONE SODIUM PHOSPHATE 4 MG/ML
INJECTION, SOLUTION INTRA-ARTICULAR; INTRALESIONAL; INTRAMUSCULAR; INTRAVENOUS; SOFT TISSUE AS NEEDED
Status: DISCONTINUED | OUTPATIENT
Start: 2018-05-24 | End: 2018-05-24 | Stop reason: SURG

## 2018-05-24 RX ORDER — PROPOFOL 10 MG/ML
INJECTION, EMULSION INTRAVENOUS AS NEEDED
Status: DISCONTINUED | OUTPATIENT
Start: 2018-05-24 | End: 2018-05-24

## 2018-05-24 RX ORDER — PROPOFOL 10 MG/ML
INJECTION, EMULSION INTRAVENOUS AS NEEDED
Status: DISCONTINUED | OUTPATIENT
Start: 2018-05-24 | End: 2018-05-24 | Stop reason: SURG

## 2018-05-24 RX ORDER — OXYCODONE HYDROCHLORIDE AND ACETAMINOPHEN 5; 325 MG/1; MG/1
1 TABLET ORAL EVERY 4 HOURS PRN
Status: DISCONTINUED | OUTPATIENT
Start: 2018-05-24 | End: 2018-05-24 | Stop reason: HOSPADM

## 2018-05-24 RX ORDER — SODIUM CHLORIDE 9 MG/ML
125 INJECTION, SOLUTION INTRAVENOUS CONTINUOUS
Status: DISCONTINUED | OUTPATIENT
Start: 2018-05-24 | End: 2018-05-24 | Stop reason: HOSPADM

## 2018-05-24 RX ORDER — FENTANYL CITRATE/PF 50 MCG/ML
50 SYRINGE (ML) INJECTION
Status: COMPLETED | OUTPATIENT
Start: 2018-05-24 | End: 2018-05-24

## 2018-05-24 RX ORDER — PROMETHAZINE HYDROCHLORIDE 12.5 MG/1
12.5 TABLET ORAL EVERY 6 HOURS PRN
Qty: 30 TABLET | Refills: 0 | Status: SHIPPED | OUTPATIENT
Start: 2018-05-24 | End: 2018-10-26

## 2018-05-24 RX ORDER — EPHEDRINE SULFATE 50 MG/ML
INJECTION, SOLUTION INTRAVENOUS AS NEEDED
Status: DISCONTINUED | OUTPATIENT
Start: 2018-05-24 | End: 2018-05-24 | Stop reason: SURG

## 2018-05-24 RX ORDER — MIDAZOLAM HYDROCHLORIDE 1 MG/ML
INJECTION INTRAMUSCULAR; INTRAVENOUS AS NEEDED
Status: DISCONTINUED | OUTPATIENT
Start: 2018-05-24 | End: 2018-05-24 | Stop reason: SURG

## 2018-05-24 RX ORDER — ALBUTEROL SULFATE 2.5 MG/3ML
2.5 SOLUTION RESPIRATORY (INHALATION) ONCE AS NEEDED
Status: DISCONTINUED | OUTPATIENT
Start: 2018-05-24 | End: 2018-05-24 | Stop reason: HOSPADM

## 2018-05-24 RX ORDER — FENTANYL CITRATE 50 UG/ML
INJECTION, SOLUTION INTRAMUSCULAR; INTRAVENOUS AS NEEDED
Status: DISCONTINUED | OUTPATIENT
Start: 2018-05-24 | End: 2018-05-24 | Stop reason: SURG

## 2018-05-24 RX ORDER — NAPROXEN 500 MG/1
500 TABLET ORAL 2 TIMES DAILY WITH MEALS
Qty: 60 TABLET | Refills: 0 | Status: SHIPPED | OUTPATIENT
Start: 2018-05-24 | End: 2018-10-26

## 2018-05-24 RX ORDER — OXYCODONE HYDROCHLORIDE 5 MG/1
5 TABLET ORAL EVERY 4 HOURS PRN
Qty: 45 TABLET | Refills: 0 | Status: SHIPPED | OUTPATIENT
Start: 2018-05-24 | End: 2018-06-03

## 2018-05-24 RX ADMIN — MIDAZOLAM 4 MG: 1 INJECTION INTRAMUSCULAR; INTRAVENOUS at 06:59

## 2018-05-24 RX ADMIN — FENTANYL CITRATE 50 MCG: 50 INJECTION INTRAMUSCULAR; INTRAVENOUS at 09:42

## 2018-05-24 RX ADMIN — CEFAZOLIN SODIUM 100 MG: 1 SOLUTION INTRAVENOUS at 07:21

## 2018-05-24 RX ADMIN — LIDOCAINE HYDROCHLORIDE 70 MG: 20 INJECTION, SOLUTION INTRAVENOUS at 07:29

## 2018-05-24 RX ADMIN — FENTANYL CITRATE 50 MCG: 50 INJECTION INTRAMUSCULAR; INTRAVENOUS at 09:30

## 2018-05-24 RX ADMIN — FENTANYL CITRATE 50 MCG: 50 INJECTION, SOLUTION INTRAMUSCULAR; INTRAVENOUS at 08:07

## 2018-05-24 RX ADMIN — FENTANYL CITRATE 100 MCG: 50 INJECTION, SOLUTION INTRAMUSCULAR; INTRAVENOUS at 06:59

## 2018-05-24 RX ADMIN — PROPOFOL 200 MG: 10 INJECTION, EMULSION INTRAVENOUS at 07:29

## 2018-05-24 RX ADMIN — SODIUM CHLORIDE 125 ML/HR: 0.9 INJECTION, SOLUTION INTRAVENOUS at 06:08

## 2018-05-24 RX ADMIN — ROPIVACAINE HYDROCHLORIDE 20 ML: 5 INJECTION, SOLUTION EPIDURAL; INFILTRATION; PERINEURAL at 06:59

## 2018-05-24 RX ADMIN — ROPIVACAINE HYDROCHLORIDE 20 ML: 5 INJECTION, SOLUTION EPIDURAL; INFILTRATION; PERINEURAL at 07:04

## 2018-05-24 RX ADMIN — DEXAMETHASONE SODIUM PHOSPHATE 6 MG: 4 INJECTION, SOLUTION INTRAMUSCULAR; INTRAVENOUS at 07:40

## 2018-05-24 RX ADMIN — CEFAZOLIN SODIUM 900 MG: 1 SOLUTION INTRAVENOUS at 07:37

## 2018-05-24 RX ADMIN — FENTANYL CITRATE 50 MCG: 50 INJECTION INTRAMUSCULAR; INTRAVENOUS at 09:47

## 2018-05-24 RX ADMIN — ONDANSETRON HYDROCHLORIDE 4 MG: 2 INJECTION, SOLUTION INTRAVENOUS at 08:51

## 2018-05-24 RX ADMIN — SODIUM CHLORIDE: 0.9 INJECTION, SOLUTION INTRAVENOUS at 08:16

## 2018-05-24 RX ADMIN — FENTANYL CITRATE 50 MCG: 50 INJECTION INTRAMUSCULAR; INTRAVENOUS at 09:24

## 2018-05-24 RX ADMIN — EPHEDRINE SULFATE 10 MG: 50 INJECTION, SOLUTION INTRAMUSCULAR; INTRAVENOUS; SUBCUTANEOUS at 08:59

## 2018-05-24 RX ADMIN — FENTANYL CITRATE 50 MCG: 50 INJECTION, SOLUTION INTRAMUSCULAR; INTRAVENOUS at 08:51

## 2018-05-24 RX ADMIN — OXYCODONE HYDROCHLORIDE AND ACETAMINOPHEN 1 TABLET: 5; 325 TABLET ORAL at 11:00

## 2018-05-24 NOTE — TELEPHONE ENCOUNTER
Dr [facility] patient - ACL surgery today    Nurse Jabari Ford calling from [address] to get a verbal order for a Rolling Walker for this patient as patient prefers walker over the crutches  Verbal order given for [employer]

## 2018-05-24 NOTE — H&P (VIEW-ONLY)
Orthopaedic Surgery - Office Note  Madelyn Mason (37 y o  female)   : 1980   MRN: [id number]  Encounter Date: 2018    Chief Complaint   Patient presents with    Right Knee - Follow-up       Assessment / Plan  R knee ACL tear     · The diagnosis and treatment options were again reviewed  · The patient wishes to proceed with R knee arthroscopy with ACL reconstruction which was scheduled previously  · Written consent was obtained previously  · Patient was provided with a physical therapy script set up postoperative therapy following her 1st postop appointment at today's appointment  · Patient does have a postoperative knee brace which again she was reminded to bring to surgery with her  Return for Follow-up 4- 5 days postop as scheduled  History of Present Illness  Madelyn Mason is a 30 y o  female who presents as a referral from Dr Bam Baker regarding her R knee pain that started in the middle of March after falling down the steps at her house  Pt states that her pain is a mild constant achy pain however when she puts pressure on her knee she will experience a sharp shooting pain in her knee  Pt states that she is not taking any pain medication at this point in time  Pt states that the pain is waking her from sleep at night  She is currently on crutches and wearing a short hinged knee brace without relief  Pt denies numbness or tingling  Pt does have a history of a herniated disc which will on occasion radiate down her R leg  The patient is here today for a preop appointment and states there is no changes in her health since last visit  Review of Systems  Pertinent items are noted in HPI  All other systems were reviewed and are negative  Physical Exam  /82   Pulse 68   Ht 5' (1 524 m)   Wt 59 kg (130 lb)   BMI 25 39 kg/m²   Cons: Appears well  No apparent distress  Psych: Alert  Oriented x3  Mood and affect normal   Eyes: PERRLA, EOMI  Resp: Normal effort    No audible wheezing or stridor  CV: Palpable pulse  No discernable arrhythmia  No LE edema  Lymph:  No palpable cervical, axillary, or inguinal lymphadenopathy  Skin: Warm  No palpable masses  No visible lesions  Neuro: Normal muscle tone  Normal and symmetric DTR's  Right Knee Exam  Alignment:  Normal knee alignment  Inspection:  mild anterior knee swelling  No ecchymosis  No deformity  Palpation:  moderate tenderness at medial jt line, proximal midial tibia plateu  ROM:  Knee Extension 0  Knee Flexion 90  Strength:  Not tested  Stability:  (+) Lachman (Grade 1B)  Tests:  No pertinent positive or negative tests  Patella:  Patella tracks centrally with crepitus  Neurovascular:  Sensation intact in DP/SP/Moore/Sa/T nerve distributions  2+ DP & PT pulses  Gait:  steady with crutches    Studies Reviewed  MRI of right knee - ACL tear    Procedures  No procedures today  Medical, Surgical, Family, and Social History  The patient's medical history, family history, and social history, were reviewed and updated as appropriate  Past Medical History:   Diagnosis Date    Acid reflux     Bursitis     Frequent headaches     Herniated disc, cervical     Liver cyst     Migraine     Stomach ulcer        Past Surgical History:   Procedure Laterality Date    UT ESOPHAGOGASTRODUODENOSCOPY TRANSORAL DIAGNOSTIC N/A 5/1/2017    Procedure: ESOPHAGOGASTRODUODENOSCOPY (EGD); Surgeon: Mikael New MD;  Location[facility];   Service: Gastroenterology    TUBAL LIGATION         Family History   Problem Relation Age of Onset    Depression Mother     Anxiety disorder Mother     Sleep disorder Mother     Hyperlipidemia Father     Hypertension Father     Heart disease Father     Heart disease Paternal Aunt     Heart disease Paternal Uncle     Osteoporosis Maternal Grandmother     Alzheimer's disease Paternal Grandmother     Heart disease Paternal Grandmother     Heart disease Paternal Grandfather        Social History     Occupational History    Not on file       Social History Main Topics    Smoking status: Never Smoker    Smokeless tobacco: Never Used    Alcohol use No    Drug use: No    Sexual activity: Not on file       Allergies   Allergen Reactions    Asa [Aspirin] Other (See Comments)    Latex     Meloxicam     Penicillin V Hives    Penicillins Other (See Comments)     rash,hives         Current Outpatient Prescriptions:     ibuprofen (MOTRIN) 800 mg tablet, Take 1 tablet (800 mg total) by mouth every 8 (eight) hours as needed for mild pain, Disp: 40 tablet, Rfl: 0    omeprazole (PriLOSEC) 40 MG capsule, Take 40 mg by mouth, Disp: , Rfl:     predniSONE 20 mg tablet, TAKE 3 TABLETS EVERY DAY BY MOUTH WITH FOOD, Disp: , Rfl: 0    famotidine (PEPCID) 40 MG tablet, TK 1 T PO HS, Disp: , Rfl: 2    methocarbamol (ROBAXIN) 750 mg tablet, TK 1 T PO TID, Disp: , Rfl:     traMADol (ULTRAM) 50 mg tablet, Take 50 mg by mouth, Disp: , Rfl:       Jonnathan Rao PA-C    Scribe Attestation    I,:    am acting as a scribe while in the presence of the attending physician :        I,:    personally performed the services described in this documentation    as scribed in my presence :

## 2018-05-24 NOTE — DISCHARGE INSTRUCTIONS
POSTOPERATIVE INSTRUCTIONS following KNEE SURGERY    MEDICATIONS:  · Resume all home medications unless otherwise instructed by your surgeon  · Pain Medication:  Oxycodone 5 mg, 1-3 tablets every 3 hours as needed  · If you were given a regional anesthetic (nerve block), please begin taking the pain medication as soon as you get home, even if you have minimal or no pain  DO NOT WAIT FOR THE NERVE BLOCK TO WEAR OFF  · Possible side effects include nausea, constipation, and urinary retention  If you experience these side effects, please call our office for assistance  · Pain med refills are authorized only during office hours (8am-4pm, Mon-Fri)  · Anti-Inflammatory:  Naproxen 500 mg, 1 tablet every 12 hours for 4 weeks  · Take with food  Stop if you experience nausea, reflux, or stomach pain  · Nausea Medication:  Promethazine 12 5 mg, 1 tablet every 6 hours as needed  · Fill prescription ONLY if you expericnce severe nausea  · Blood Clot Prevention:  Not Necessary  · Pump your foot up and down 20 times per hour while you are less mobile  WOUND CARE:  · Keep the dressing clean and dry  Light drainage may occur the first 2 days postop  · You may remove the dressings and get the incision wet in the shower 72 hours after surgery  DO NOT remove steri-strips or sutures  DO NOT immerse the incision under water  Carefully pat the incision dry  If there is wound drainage, re-apply a fresh dry gauze dressing  · Please call our office ([phone]) if you experience either of the following:  · Sudden increase in swelling, redness, or warmth at the surgical site  · Excessive incisional drainage that persists beyond the 3rd day after surgery  · Oral temperature greater than 101 degrees, not relieved with Tylenol  · Shortness of breath, chest pain, nausea, or any other concerning symptoms    SWELLING CONTROL:  · Cold Therapy:   The cold therapy device may be used either continuously or only as needed, according to your preference  Do not let the pad directly touch your skin  Alternatively, apply ice (20 min on, 20 min off) as often as you feel is necessary  · Elevation:  Elevate the entire leg above heart level  Place pillows under your ankle to keep your knee straight  · Compression:  Apply ACE wraps or a thigh-length compression stocking as needed  RANGE OF MOTION:  · You are NOT ALLOWED RANGE OF MOTION until you are given permission from your surgeon  IMMOBILIZATION:  · Your knee brace should be WORN AND LOCKED AT ALL TIMES, including sleep  You may remove the brace only for showering  ACTIVITY:   · BEAR 50% PARTIAL WEIGHT on your operative leg  Always use crutches to assist   · Using Crutches on Stairs:  Going up, lead with your "good" (nonoperative) leg  Going down, lead with your "bad" (operative) leg  Use a hand rail when available  · Knee Extension:  Place a rolled towel or pillow under your ankle for 20-30 minutes 3-5 times per day  This will help to maintain full knee extension  · Quad Sets:  Sit or lie with your knee straight  Tighten your quadriceps (front thigh) muscle  Hold for 3 seconds, then relax  Repeat 20 times per hour while awake  PHYSICAL THERAPY:  · Begin therapy 5 TO 7 DAYS AFTER SURGERY  You were given a prescription for therapy at your preoperative office visit  If you do not have physical therapy scheduled yet, please call our office for assistance  FOLLOW-UP APPOINTMENT:  · 4-5 days after surgery with:    Dr Jessica Gallegos, [address] [facility]  [address] [place] [address], [address] [place] [address], [address] [facility]  [phone] ([facility])                                                                                                            [phone] (After-Hours)

## 2018-05-24 NOTE — OP NOTE
OPERATIVE REPORT    PATIENT NAME: Erick Barrow   :  1980  MRN: [id number]  Pt Location: [facility] OR ROOM 01    SURGERY DATE: 2018    SURGEON(S) and ROLE:  Primary: Torey Urena MD  Assisting: Rubi Rankin PA-C    NOTE:  The presence of a physician assistant was necessary to help with patient positioning, surgical exposure, wound retraction, wound closure, and other key portions of the procedure  No qualified resident was available for this case  PREOPERATIVE DIAGNOSES:  Right Knee  ACL Tear  Medial Meniscus Tear    POSTOPERATIVE DIAGNOSES:  Right Knee  ACL Tear    PROCEDURES:  Right Knee Arthroscopy with:  ACL Reconstruction      ANESTHESIA TYPE:  General LMA, Femoral block and Sciatic block    ANESTHESIA STAFF:   Anesthesiologist: Katerin Silva DO  CRNA: Maria Esther Grullon CRNA; Yesica Stockton CRNA    ESTIMATED BLOOD LOSS:  9 mL    TOURNIQUET TIME:  Not used    PERIOPERATIVE ANTIBIOTICS:  cefazolin, 1 gram    IMPLANTS: ACL RECONSTRUCTION:    Graft:  four-strand semitendinosis allograft; 9 5 mm diameter    Femoral tunnel:  9 5 mm    Tibial tunnel:  9 5 mm    Femoral fixation: Arthrex ACL Tightrope RT    Tibial fixation: Arthrex ACL Tightrope ABS      Implant Name Type Inv  Item Serial No   Lot No  LRB No  Used Action   IMPLANT FIXATION NO BUTTON ACL TIGHTROPE - [id number]  IMPLANT FIXATION NO BUTTON ACL TIGHTROPE  [employer] [id number] Right 1 Implanted   BUTTON SUT ACL TIGHTROPE RT - [id number]  BUTTON SUT ACL TIGHTROPE RT  [employer] [id number] Right 1 Implanted   [id number]  GRAFT SEMITENDINOSIS TENDON 26CM> [id number] MUSCULOSKELETAL TRANSPLAN  Right 1 Implanted   BUTTON SUT TIGHTROPE 11MM RND CONCAVE - [id number]   BUTTON SUT TIGHTROPE 11MM RND CONCAVE   [employer] [id number] Right 1 Implanted       SPECIMENS:  * No specimens in log *    DRAINS:  None    OPERATIVE INDICATIONS:  The patient is a 30 y o  female with right knee pain and instability due to ACL tear    Surgical treatment was indicated due to persistent symptoms despite non-surgical treatment and instability  After a thorough discussion of the potential risks, benefits, and alternative treatments, the patient agreed to proceed with surgery  The patient understands that the risks of surgery include, but are not limited to: failure of repair, infection, neurovascular injury, wound healing complications, venous thromboembolism, persistent pain, stiffness, instability, recurrence of symptoms, potential need for additional surgeries, and loss of limb or life  Oral and written consent for surgery was obtained from the patient preoperatively  EXAM UNDER ANESTHESIA:  Neutral alignment  ROM:  0-135 degrees  Grade 2A Lachman  Grade 2 pivot-shift  Stable to varus stress, valgus stress, and posterior drawer  Patella tracking normal  without crepitus  PROCEDURE AND TECHNIQUE:  On the day of surgery, the patient was identified in the preoperative holding area  The operative site was marked by the surgeon  The patient was taken into the operating room  A time-out was conducted to confirm the patient's identity, the operative site, and the proposed procedure  The patient was anesthetized, and perioperative antibiotics were administered  The patient was positioned supine on the OR table  All bony prominences were padded  A tourniquet was not used  The operative site was prepped and draped using standard sterile technique  Hamstring tendon autograft (semitendinosis) was harvested through an anteromedial incision  Muscle tissue was removed  Grasping sutures and fixation devices were placed  The graft measured 6 5 mm diameter, which was felt to be insufficient  The gracillis tendon was not of sufficient quality to used to augment the graft, so we proceeded with allograft reconstruction  Semitendinosis tendon allograft was thawed in warm sterile saline  Grasping sutures and fixation devices were placed    Of note the composite graft measured >12 mm in diameter, while the allograft alone measured 9 5 mm diameter, so we decided to proceed with a purely allograft reconstruction  The graft was placed in moist gauze on the back table  An anterolateral portal was established, and the arthroscope was inserted into the knee joint  An anteromedial portal was established under direct visualization, and diagnostic arthroscopy was performed  The joint demonstrated mild synovitis  In the patellofemoral compartment, the trochlea demonstrated pristine articular cartilage  The patella demonstrated pristine articular cartilage of the entire patella  The patella tracking was normal        In the medial compartment, the medial femoral condyle demonstrated pristine articular cartilage  The medial tibial plateau demonstrated pristine articular cartilage  The medial meniscus was intact       In the lateral compartment, the lateral femoral condyle demonstrated pristine articular cartilage  The lateral tibial plateau demonstrated pristine articular cartilage  The lateral meniscus was intact       In the intercondylar notch, the PCL was intact  The ACL was completely torn  The ACL remnant was removed and the femoral and tibial ACL footprints were identified  The femoral tunnel was created retrograde using the Arthrex Flip Cutter  The tibial tunnel was created retrograde using the Arthrex Flip Cutter  The graft was passed into the tunnels using shuttle sutures  The graft was fixed on the femoral side and cycled to remove creep  The knee was placed at 20-30 degrees of flexion, a posterior drawer was applied, and the graft was fixed on the tibial side  After graft fixation, knee exam demonstrated a Grade 1A Lachman and a negative pivot-shift  At the conclusion of the procedure, the instruments were withdrawn  The portals and incisions were closed with absorbable sutures and steri-strips  A sterile dressing was applied  The surgical drapes were removed  A locked knee brace was applied to the operative knee  The patient was awakened from anesthesia and transported to the PACU in stable condition        COMPLICATIONS:  None    PATIENT DISPOSITION:  PACU       SIGNATURE:  Osito Weston MD  DATE:  May 24, 2018  TIME:  8:58 AM

## 2018-05-24 NOTE — ANESTHESIA POSTPROCEDURE EVALUATION
Post-Op Assessment Note      CV Status:  Stable    Mental Status:  Alert and awake    Hydration Status:  Euvolemic    PONV Controlled:  Controlled    Airway Patency:  Patent    Post Op Vitals Reviewed: Yes          Staff: Anesthesiologist           BP      Temp     Pulse 94 (05/24/18 0924)   Resp 12 (05/24/18 0924)    SpO2 98 % (05/24/18 0924)

## 2018-05-24 NOTE — ANESTHESIA PROCEDURE NOTES
Peripheral Block    Patient location during procedure: holding area  Start time: 5/24/2018 6:59 AM  Staffing  Anesthesiologist: Reji Pabon  Performed: anesthesiologist   Preanesthetic Checklist  Completed: patient identified, site marked, surgical consent, pre-op evaluation, timeout performed, IV checked, risks and benefits discussed and monitors and equipment checked  Peripheral Block  Patient position: supine  Prep: ChloraPrep  Patient monitoring: heart rate and frequent blood pressure checks  Block type: femoral and sciatic  Laterality: right  Injection technique: single-shot  Procedures: ultrasound guided and nerve stimulator  Ultrasound permanent image saved  Local infiltration: ropivacaine  Infiltration strength: 0 5 %  Dose: 40 mL  Needle  Needle type: Stimuplex   Needle gauge: 21 G  Needle length: 10 cm  Needle localization: anatomical landmarks, nerve stimulator and ultrasound guidance  Assessment  Injection assessment: incremental injection  Paresthesia pain: none  Heart rate change: no  Slow fractionated injection: yes  Post-procedure:  site cleaned  patient tolerated the procedure well with no immediate complications

## 2018-05-29 ENCOUNTER — OFFICE VISIT (OUTPATIENT)
Dept: OBGYN CLINIC | Facility: MEDICAL CENTER | Age: 38
End: 2018-05-29

## 2018-05-29 ENCOUNTER — TRANSCRIBE ORDERS (OUTPATIENT)
Dept: OBGYN CLINIC | Facility: MEDICAL CENTER | Age: 38
End: 2018-05-29

## 2018-05-29 VITALS — SYSTOLIC BLOOD PRESSURE: 122 MMHG | HEART RATE: 76 BPM | DIASTOLIC BLOOD PRESSURE: 87 MMHG

## 2018-05-29 DIAGNOSIS — S83.511D RUPTURE OF ANTERIOR CRUCIATE LIGAMENT OF RIGHT KNEE, SUBSEQUENT ENCOUNTER: Primary | ICD-10-CM

## 2018-05-29 PROCEDURE — 99024 POSTOP FOLLOW-UP VISIT: CPT | Performed by: ORTHOPAEDIC SURGERY

## 2018-05-29 RX ORDER — HYDROCODONE BITARTRATE AND ACETAMINOPHEN 5; 325 MG/1; MG/1
1 TABLET ORAL EVERY 6 HOURS PRN
Qty: 45 TABLET | Refills: 0 | Status: SHIPPED | OUTPATIENT
Start: 2018-05-29 | End: 2018-09-25 | Stop reason: ALTCHOICE

## 2018-05-29 NOTE — PROGRESS NOTES
Orthopaedic Surgery - Office Note  Humberto Ojeda (55 y o  female)   : 1980   MRN: [id number]  Encounter Date: 2018    Chief Complaint   Patient presents with    Right Knee - Post-op       Assessment / Plan  Status post right knee arthroscopy with ACL reconstruction using semitendinosus allograft on 2018    · Patient is to start physical therapy following ACL reconstruction protocol which was provided to the patient today  · Continue with brace and walker as instructed  · Continue with ice and analgesics as needed  Patient was given a prescription for Norco 5-325 today  Return in about 4 weeks (around 2018)  History of Present Illness  Humberto Ojeda is a 30 y o  female who presents status post right knee arthroscopy with ACL reconstruction using semitendinosus allograft on 2018  Patient is doing well at this time has been compliant with her brace and using the walker up to this point  She feels that her pain is controlled with oxycodone and she has been icing regularly  Review of Systems  Pertinent items are noted in HPI  All other systems were reviewed and are negative  Physical Exam  /87   Pulse 76   LMP  (LMP Unknown)   Cons: Appears well  No apparent distress  Psych: Alert  Oriented x3  Mood and affect normal   Eyes: PERRLA, EOMI  Resp: Normal effort  No audible wheezing or stridor  CV: Palpable pulse  No discernable arrhythmia  No LE edema  Lymph:  No palpable cervical, axillary, or inguinal lymphadenopathy  Skin: Warm  No palpable masses  No visible lesions  Neuro: Normal muscle tone  Normal and symmetric DTR's  Right Knee Exam  Alignment:  Normal knee alignment  Inspection:  Mild as expected swelling  No erythema  No deformity  Incisions clean and dry  Palpation:  Mild tenderness at Incision sites  ROM:  Knee Extension 2°  Knee Flexion 60°  Strength:  Not tested  Stability:  Not tested    Tests:  No pertinent positive or negative tests  Patella:  Patella tracks centrally without crepitus  Neurovascular:  Sensation intact in DP/SP/Moore/Sa/T nerve distributions  Sensation intact in all digital nerve distributions  Toes warm and perfused  Gait:  Not tested  Studies Reviewed  No studies to review    Procedures  No procedures today  Medical, Surgical, Family, and Social History  The patient's medical history, family history, and social history, were reviewed and updated as appropriate  Past Medical History:   Diagnosis Date    Acid reflux     Anxiety     Bursitis     Frequent headaches     Gastroparesis     Liver cyst     Lumbar herniated disc     Migraine     Seasonal allergies     Torn ACL     Wears glasses        Past Surgical History:   Procedure Laterality Date    [place] ESOPHAGOGASTRODUODENOSCOPY TRANSORAL DIAGNOSTIC N/A 5/1/2017    Procedure: ESOPHAGOGASTRODUODENOSCOPY (EGD); Surgeon: Angus Taylor MD;  Location: [facility] GI LAB; Service: Gastroenterology    NH KNEE SCOPE,AID ANT CRUCIATE REPAIR Right 5/24/2018    Procedure: ARTHROSCOPIC RECONSTRUCTION ANTERIOR CRUCIATE LIGAMENT (ACL) WITH AUTOGRAFT AND ALLOGRAFT;  Surgeon: Batool Anthony MD;  Location: [facility] OR;  Service: Orthopedics    TUBAL LIGATION      WISDOM TOOTH EXTRACTION         Family History   Problem Relation Age of Onset    Depression Mother     Anxiety disorder Mother     Sleep disorder Mother     Hyperlipidemia Father     Hypertension Father     Heart disease Father     Heart disease Paternal Aunt     Heart disease Paternal Uncle     Osteoporosis Maternal Grandmother     Alzheimer's disease Paternal Grandmother     Heart disease Paternal Grandmother     Heart disease Paternal Grandfather        Social History     Occupational History    Not on file       Social History Main Topics    Smoking status: Never Smoker    Smokeless tobacco: Never Used    Alcohol use No    Drug use: No    Sexual activity: Not on file       Allergies Allergen Reactions    Asa [Aspirin] Shortness Of Breath            Latex Hives    Meloxicam Other (See Comments)     bruising    Penicillin V Hives and Rash    Penicillins Hives and Rash         Current Outpatient Prescriptions:     famotidine (PEPCID) 40 MG tablet, TK 1 T PO HS, Disp: , Rfl: 2    naproxen (NAPROSYN) 500 mg tablet, Take 1 tablet (500 mg total) by mouth 2 (two) times a day with meals, Disp: 60 tablet, Rfl: 0    omeprazole (PriLOSEC) 40 MG capsule, Take 40 mg by mouth daily  , Disp: , Rfl:     oxyCODONE (ROXICODONE) 5 mg immediate release tablet, Take 1 tablet (5 mg total) by mouth every 4 (four) hours as needed for moderate pain for up to 10 days Max Daily Amount: 30 mg, Disp: 45 tablet, Rfl: 0    HYDROcodone-acetaminophen (NORCO) 5-325 mg per tablet, Take 1 tablet by mouth every 6 (six) hours as needed for pain Max Daily Amount: 4 tablets, Disp: 45 tablet, Rfl: 0    promethazine (PHENERGAN) 12 5 MG tablet, Take 1 tablet (12 5 mg total) by mouth every 6 (six) hours as needed for nausea or vomiting, Disp: 30 tablet, Rfl: 0      Carroll Alexander PA-C    Scribe Attestation    I,:    am acting as a scribe while in the presence of the attending physician :        I,:    personally performed the services described in this documentation    as scribed in my presence :

## 2018-05-29 NOTE — LETTER
May 29, 2018     Patient: Christopher Friday   YOB: 1980   Date of Visit: 5/29/2018       To Whom it May Concern: Christopher Friday is under my professional care  She was seen in my office on 5/29/2018  She may return to work on 05/30/2018 with the restrictions of having to use the brace at all times in the walker as needed  She should also be allowed to elevate her leg and ice as needed  She will be given updated restrictions at her next appointment in 1 month  If you have any questions or concerns, please don't hesitate to call           Sincerely,          Barney De Leon MD        CC: Augie Friday

## 2018-06-04 ENCOUNTER — APPOINTMENT (OUTPATIENT)
Dept: PHYSICAL THERAPY | Facility: CLINIC | Age: 38
End: 2018-06-04
Payer: COMMERCIAL

## 2018-06-07 ENCOUNTER — OFFICE VISIT (OUTPATIENT)
Dept: PHYSICAL THERAPY | Facility: CLINIC | Age: 38
End: 2018-06-07
Payer: COMMERCIAL

## 2018-06-07 DIAGNOSIS — M25.561 RIGHT KNEE PAIN, UNSPECIFIED CHRONICITY: ICD-10-CM

## 2018-06-07 DIAGNOSIS — S83.511A RUPTURE OF ANTERIOR CRUCIATE LIGAMENT OF RIGHT KNEE, INITIAL ENCOUNTER: Primary | ICD-10-CM

## 2018-06-07 PROCEDURE — G8982 BODY POS GOAL STATUS: HCPCS | Performed by: PHYSICAL MEDICINE & REHABILITATION

## 2018-06-07 PROCEDURE — 97140 MANUAL THERAPY 1/> REGIONS: CPT | Performed by: PHYSICAL MEDICINE & REHABILITATION

## 2018-06-07 PROCEDURE — G8981 BODY POS CURRENT STATUS: HCPCS | Performed by: PHYSICAL MEDICINE & REHABILITATION

## 2018-06-07 PROCEDURE — 97161 PT EVAL LOW COMPLEX 20 MIN: CPT | Performed by: PHYSICAL MEDICINE & REHABILITATION

## 2018-06-07 NOTE — PROGRESS NOTES
PT Evaluation     Today's date: 2018  Patient name: Arnav Mendez  : 1980  MRN: [id number]  Referring provider: Shirley Suarez MD  Dx:   Encounter Diagnosis     ICD-10-CM    1  Rupture of anterior cruciate ligament of right knee, initial encounter S83 511A PT plan of care cert/re-cert   2  Right knee pain, unspecified chronicity M25 561 PT plan of care cert/re-cert       Start Time: 6859  Stop Time: 1830  Total time in clinic (min): 60 minutes    Assessment  Impairments: abnormal muscle firing, abnormal or restricted ROM, activity intolerance, lacks appropriate home exercise program, pain with function and weight-bearing intolerance    Assessment details: Pt is a 30 y o  female presenting to outpatient physical therapy s/p R ACL reconstruction, utilizing allograft  Pt presents with pain, decreased range of motion, decreased strength, and decreased tolerance to activity  Pt would benefit from skilled physical therapy to address limitations and to achieve goals  Thank you for this referral      Patient instructed in HEP this session   Will progress as appropriate  Patient advised to use CP for swelling as needed  Understanding of Dx/Px/POC: good   Prognosis: good  Prognosis details: Prognosis is good given compliance with HEP and PT 2x/week for 4 weeks  Positive prognostic factors: positive attitude towards recovery, age, active lifestyle  Negative prognostic factors: anxiety    Goals  ST  Patient will report 25% decrease in pain in 4 weeks  2  Patient will demonstrate 25% improvement in ROM in 4 weeks  3  Patient will demonstrate 1/2 grade improvement in strength in 4 weeks  LT  Patient will demonstrate good quad set comparable to contralateral side in 1 weeks  2 Patient will demonstrate ability to perform 10 SLR without quad lag in 4 weeks  3  Patient will demonstrate right knee AROM 0-120 degrees in 4 weeks       Plan  Patient would benefit from: PT eval and skilled physical therapy  Planned modality interventions: cryotherapy and TENS  Planned therapy interventions: neuromuscular re-education, patient education, therapeutic exercise, home exercise program, functional ROM exercises, flexibility, stretching, strengthening, manual therapy, joint mobilization, balance and gait training  Frequency: 2x week  Duration in weeks: 4  Treatment plan discussed with: patient        Subjective Evaluation    History of Present Illness  Date of surgery: 2018  Mechanism of injury: Patient is a 30 y o  Female who presents to physical with right knee pain secondary to an ACL rupture following a fall down stairs when she was rushing in March  She heard a pop in the knee and had immediate swelling  When she followed up with Dr Breana Cazares she received and MRI revealing a rupture of the right ACL with a contusion of the medial plateau  Patient is scheduled to receive ACL reconstruction surgery with an autograft on May 24th  She is currently ambulating in a soft knee brace with medial and lateral supports  Patient was referred to physical therapy at this time for pre-operative treatment for right knee ROM and strength  Patient expresses concern for restoring her ability to walk and play with her kids following surgery    (18) Pt reports to therapy wearing brace unlocked while seated, as we went back to the evaluation I noticed she was ambulating with the brace unlocked for approximately 3 steps  Pt educated on importance of compliance with HEP to protect repair  Pts chief complaint is pain with sleeping, she reports sleeping for approximately 3 hours with the use of medication  Attempt to use hamstring allograft was unsuccessful resulting in allograft repair       Occupation: garment   Pain  No pain reported  Current pain rating: 3  At best pain rating: 3 (rest, epsom salt bath, ibuprofen)  At worst pain ratin (walking, stairs, bending)  Location: anterior knee, posterior medial knee  Quality: throbbing    Patient Goals  Patient goals for therapy: increased motion, decreased pain, decreased edema, increased strength, independence with ADLs/IADLs, return to sport/leisure activities and return to work          Objective     Observations     Additional Observation Details  Incision is clean, dry, and negative for redness, drainage, or s/sx's of infection  Steristrips intact  Active Range of Motion   Left Knee   Flexion: 125 degrees   Extension: 0 degrees     Right Knee   Flexion: 90 degrees with pain  Extension: -12 degrees with pain    Strength/Myotome Testing     Left Knee   Flexion: 5  Prone flexion: 5  Quadriceps contraction: good    Right Knee   Quadriceps contraction: poor    Additional Strength Details  Right knee strength NT s      Hip abduction left = 3+/5, right = 4/5      Ambulation   Weight-Bearing Status   Weight-Bearing Status (Right): weight-bearing as tolerated    Assistive device used: front-wheeled walker    Additional Weight-Bearing Status Details  Ambulation with brace locked until able to perform SLR without lag        Flowsheet Rows      Most Recent Value   PT/OT G-Codes   Current Score  33   Projected Score  59   FOTO information reviewed  Yes   Assessment Type  Re-evaluation   G code set  Changing & Maintaining Body Position   Changing and Maintaining Body Position Current Status ()  CL   Changing and Maintaining Body Position Goal Status ()  CK          Precautions: Anxiety, latex allergy, neck pain, GERD    Daily Treatment Diary         Manual  4/17            Patellar mobs             PROM                                                        Exercise Diary              Heel slides 10" x 15            Hamstring stretch 20" x 4            Quad sets (towel at knee) 5" x 10            Quad sets (towel at ankle)             SLR:  4 way c brace             bridges             Clam shells             Prone hamstring curls Modalities              CP prn

## 2018-06-11 ENCOUNTER — OFFICE VISIT (OUTPATIENT)
Dept: PHYSICAL THERAPY | Facility: CLINIC | Age: 38
End: 2018-06-11
Payer: COMMERCIAL

## 2018-06-11 DIAGNOSIS — M25.561 RIGHT KNEE PAIN, UNSPECIFIED CHRONICITY: ICD-10-CM

## 2018-06-11 DIAGNOSIS — S83.511A RUPTURE OF ANTERIOR CRUCIATE LIGAMENT OF RIGHT KNEE, INITIAL ENCOUNTER: Primary | ICD-10-CM

## 2018-06-11 PROCEDURE — 97140 MANUAL THERAPY 1/> REGIONS: CPT

## 2018-06-11 PROCEDURE — 97110 THERAPEUTIC EXERCISES: CPT

## 2018-06-11 PROCEDURE — 97010 HOT OR COLD PACKS THERAPY: CPT

## 2018-06-14 ENCOUNTER — APPOINTMENT (OUTPATIENT)
Dept: PHYSICAL THERAPY | Facility: CLINIC | Age: 38
End: 2018-06-14
Payer: COMMERCIAL

## 2018-06-18 ENCOUNTER — OFFICE VISIT (OUTPATIENT)
Dept: PHYSICAL THERAPY | Facility: CLINIC | Age: 38
End: 2018-06-18
Payer: COMMERCIAL

## 2018-06-18 DIAGNOSIS — M25.561 RIGHT KNEE PAIN, UNSPECIFIED CHRONICITY: ICD-10-CM

## 2018-06-18 DIAGNOSIS — S83.511A RUPTURE OF ANTERIOR CRUCIATE LIGAMENT OF RIGHT KNEE, INITIAL ENCOUNTER: Primary | ICD-10-CM

## 2018-06-18 PROCEDURE — 97010 HOT OR COLD PACKS THERAPY: CPT

## 2018-06-18 PROCEDURE — 97110 THERAPEUTIC EXERCISES: CPT

## 2018-06-18 PROCEDURE — 97140 MANUAL THERAPY 1/> REGIONS: CPT

## 2018-06-18 NOTE — PROGRESS NOTES
Daily Note     Today's date: 2018  Patient name: Raymundo Mack  : 1980  MRN: [id number]  Referring provider: Mary Figueroa MD  Dx:   Encounter Diagnosis     ICD-10-CM    1  Rupture of anterior cruciate ligament of right knee, initial encounter S83 511A    2  Right knee pain, unspecified chronicity M25 561        Start Time: 1730  Stop Time: 1830  Total time in clinic (min): 60 minutes    Subjective: Pt reports she is feeling ok today, states she is having trouble with her brace fitting properly  Pt presents with poorly fitting brace with towel stuffed in back of brace  Objective: See treatment diary below    Precautions: Anxiety, latex allergy, neck pain, GERD    Daily Treatment Diary         Manual             Patellar mobs HY HY           PROM HY HY                                                      Exercise Diary             Heel slides 10" x 15 15x10"           Hamstring stretch 20" x 4 4x20"           Quad sets (towel at knee) 10x5" 10x10"           Quad sets (towel at ankle) 10x5" 10x10"           SLR:  4 way c brace 2x10 ea 2x10 ea           Clam shells 10x2" 10x3"           Prone hamstring curls 15x 15x                                                                                                                                                              Modalities             CP prn 10' 10'                                         Assessment: Tolerated treatment well  Patient demonstrated fatigue post treatment  Pt properly fitted with brace, and educated on proper fit  Pt continues to work towards goals of increased LE strength and flexibility  Pt shows good quad activations and no quad lag in brace  Pt will benefit from further skilled PT to increase strength, flexibility and function  Continue to progress as able  Plan: Continue per plan of care

## 2018-06-21 ENCOUNTER — OFFICE VISIT (OUTPATIENT)
Dept: PHYSICAL THERAPY | Facility: CLINIC | Age: 38
End: 2018-06-21
Payer: COMMERCIAL

## 2018-06-21 DIAGNOSIS — M25.561 RIGHT KNEE PAIN, UNSPECIFIED CHRONICITY: ICD-10-CM

## 2018-06-21 DIAGNOSIS — S83.511A RUPTURE OF ANTERIOR CRUCIATE LIGAMENT OF RIGHT KNEE, INITIAL ENCOUNTER: Primary | ICD-10-CM

## 2018-06-21 PROCEDURE — 97140 MANUAL THERAPY 1/> REGIONS: CPT

## 2018-06-21 PROCEDURE — 97110 THERAPEUTIC EXERCISES: CPT

## 2018-06-21 PROCEDURE — 97010 HOT OR COLD PACKS THERAPY: CPT

## 2018-06-21 NOTE — PROGRESS NOTES
Daily Note     Today's date: 2018  Patient name: Royer Smith  : 1980  MRN: [id number]  Referring provider: Mine Osman MD  Dx:   Encounter Diagnosis     ICD-10-CM    1  Rupture of anterior cruciate ligament of right knee, initial encounter S83 511A    2  Right knee pain, unspecified chronicity M25 561        Start Time: 1610  Stop Time: 1710  Total time in clinic (min): 60 minutes    Subjective: Pt reports she is feeling ok today, no reports of increased pain or symptoms  Pt presents with poorly fitting brace  Objective: See treatment diary below    Precautions: Anxiety, latex allergy, neck pain, GERD    Daily Treatment Diary         Manual            Patellar mobs HY HY HY          PROM HY HY HY                                    FOTO   45              Exercise Diary            Heel slides 10" x 15 15x10" 15x10"          Hamstring stretch 20" x 4 4x20" 4x20"          Quad sets (towel at knee) 10x5" 10x10" 10x10"          Quad sets (towel at ankle) 10x5" 10x10" 10x10"          SLR:  4 way c brace 2x10 ea 2x10 ea 2x10 ea          Clam shells 10x2" 10x3" 10x3"          Prone hamstring curls 15x 15x 15x          Bike with brace   4'                                                                                                                                                Modalities            CP prn 10' 10' 10'                                        Assessment: Tolerated treatment well  Patient demonstrated fatigue post treatment  Pt properly fitted with brace, and educated on proper fit  Pt shows minimal quad lag with SLR, continue use of brace until no signs of lag  Pt shows 103* of flexion  Pt educated on importance of HEP and improving quad strength  Plan: Continue per plan of care

## 2018-06-25 ENCOUNTER — OFFICE VISIT (OUTPATIENT)
Dept: PHYSICAL THERAPY | Facility: CLINIC | Age: 38
End: 2018-06-25
Payer: COMMERCIAL

## 2018-06-25 DIAGNOSIS — S83.511A RUPTURE OF ANTERIOR CRUCIATE LIGAMENT OF RIGHT KNEE, INITIAL ENCOUNTER: Primary | ICD-10-CM

## 2018-06-25 DIAGNOSIS — M25.561 RIGHT KNEE PAIN, UNSPECIFIED CHRONICITY: ICD-10-CM

## 2018-06-25 PROCEDURE — 97010 HOT OR COLD PACKS THERAPY: CPT

## 2018-06-25 PROCEDURE — 97140 MANUAL THERAPY 1/> REGIONS: CPT

## 2018-06-25 PROCEDURE — 97110 THERAPEUTIC EXERCISES: CPT

## 2018-06-25 NOTE — PROGRESS NOTES
Daily Note     Today's date: 2018  Patient name: Jordy Redmond  : 1980  MRN: [id number]  Referring provider: Subha Clemens MD  Dx:   Encounter Diagnosis     ICD-10-CM    1  Rupture of anterior cruciate ligament of right knee, initial encounter S83 511A    2  Right knee pain, unspecified chronicity M25 561        Start Time: 1650  Stop Time: 1750  Total time in clinic (min): 60 minutes    Subjective: Pt presents to PT with reports of increased pain today, states the medial distal aspect of her incision is sore and aggravated  Objective: See treatment diary below    Precautions: Anxiety, latex allergy, neck pain, GERD    Daily Treatment Diary         Manual           Patellar mobs HY HY HY NC         PROM HY HY HY NC                                   FOTO   45              Exercise Diary           Heel slides 10" x 15 15x10" 15x10" 15x10"         Hamstring stretch 20" x 4 4x20" 4x20" 3x30"         Quad sets (towel at knee) 10x5" 10x10" 10x10" 10x10"         Quad sets (towel at ankle) 10x5" 10x10" 10x10" 10x10"         SLR:  4 way c brace 2x10 ea 2x10 ea 2x10 ea Supine 10x         Clam shells 10x2" 10x3" 10x3" np         Prone hamstring curls 15x 15x 15x np         Bike with brace   4' 4'                                                                                                                                               Modalities           CP prn 10' 10' 10' 10'                                     18: Pt supervised by NC DPT from [id number]    Assessment: Tolerated treatment well  Patient demonstrated fatigue post treatment  Pt shows decreased pain and symptoms post PT performed manual therapy  Pt continues to work towards goals of increased quad strength and no quad lag  Pt will benefit from further skilled PT to increase strength, flexibility and function  Continue to progress as able           Plan: Continue per plan of care

## 2018-06-28 ENCOUNTER — OFFICE VISIT (OUTPATIENT)
Dept: PHYSICAL THERAPY | Facility: CLINIC | Age: 38
End: 2018-06-28
Payer: COMMERCIAL

## 2018-06-28 DIAGNOSIS — M25.561 RIGHT KNEE PAIN, UNSPECIFIED CHRONICITY: ICD-10-CM

## 2018-06-28 DIAGNOSIS — S83.511A RUPTURE OF ANTERIOR CRUCIATE LIGAMENT OF RIGHT KNEE, INITIAL ENCOUNTER: Primary | ICD-10-CM

## 2018-06-28 PROCEDURE — 97140 MANUAL THERAPY 1/> REGIONS: CPT

## 2018-06-28 PROCEDURE — 97010 HOT OR COLD PACKS THERAPY: CPT

## 2018-06-28 PROCEDURE — 97110 THERAPEUTIC EXERCISES: CPT

## 2018-06-28 NOTE — PROGRESS NOTES
Daily Note     Today's date: 2018  Patient name: Presley Barksdale  : 1980  MRN: [id number]  Referring provider: Dominick Greenfield MD  Dx:   Encounter Diagnosis     ICD-10-CM    1  Rupture of anterior cruciate ligament of right knee, initial encounter S83 511A    2  Right knee pain, unspecified chronicity M25 561        Start Time: 1700  Stop Time: 1755  Total time in clinic (min): 55 minutes    Subjective: Pt presents to PT with reports of decreased overall pain  Objective: See treatment diary below    Precautions: Anxiety, latex allergy, neck pain, GERD    Daily Treatment Diary       Manual          Patellar mobs HY HY HY NC HY        PROM HY HY HY NC HY                                  FOTO   45              Exercise Diary          Heel slides 10" x 15 15x10" 15x10" 15x10" 15x10"        Hamstring stretch 20" x 4 4x20" 4x20" 3x30" 3x30"        Quad sets (towel at knee) 10x5" 10x10" 10x10" 10x10" 10x10"        Quad sets (towel at ankle) 10x5" 10x10" 10x10" 10x10" 10x10"        SLR:  4 way c brace 2x10 ea 2x10 ea 2x10 ea Supine 10x 2x10 ea        Clam shells 10x2" 10x3" 10x3" np 20x3"        Prone hamstring curls 15x 15x 15x np np        Bike with brace   4' 4' 5'                                                                                                                                              Modalities          CP prn 10' 10' 10' 10' 10'                                      Assessment: Tolerated treatment well  Patient demonstrated fatigue post treatment  Pt continues to work towards goals of increased LE strength and flexibility  Pt continues to show antalgic gait with ambulation utilizing L sided unilateral Axillary crutch  Pt will benefit from further skilled PT to increase strength, flexibility and function  Continue to progress as able  Plan: Continue per plan of care

## 2018-07-02 ENCOUNTER — OFFICE VISIT (OUTPATIENT)
Dept: PHYSICAL THERAPY | Facility: CLINIC | Age: 38
End: 2018-07-02
Payer: COMMERCIAL

## 2018-07-02 DIAGNOSIS — S83.511A RUPTURE OF ANTERIOR CRUCIATE LIGAMENT OF RIGHT KNEE, INITIAL ENCOUNTER: Primary | ICD-10-CM

## 2018-07-02 DIAGNOSIS — M25.561 RIGHT KNEE PAIN, UNSPECIFIED CHRONICITY: ICD-10-CM

## 2018-07-02 PROCEDURE — 97116 GAIT TRAINING THERAPY: CPT

## 2018-07-02 PROCEDURE — 97110 THERAPEUTIC EXERCISES: CPT

## 2018-07-02 PROCEDURE — 97140 MANUAL THERAPY 1/> REGIONS: CPT

## 2018-07-02 NOTE — PROGRESS NOTES
Daily Note     Today's date: 2018  Patient name: Sarai Machado  : 1980  MRN: [id number]  Referring provider: Cedric Landry MD  Dx:   Encounter Diagnosis     ICD-10-CM    1  Rupture of anterior cruciate ligament of right knee, initial encounter S83 511A    2  Right knee pain, unspecified chronicity M25 561        Start Time: 1600  Stop Time: 1700  Total time in clinic (min): 60 minutes    Subjective: Pt presents to PT with no reports of pain today  Objective: See treatment diary below    Precautions: Anxiety, latex allergy, neck pain, GERD    Daily Treatment Diary       Manual  2       Patellar mobs HY HY HY NC HY HY       PROM HY HY HY NC HY HY                                 FOTO   45              Exercise Diary   7       Heel slides 10" x 15 15x10" 15x10" 15x10" 15x10" 15x10"       Hamstring stretch 20" x 4 4x20" 4x20" 3x30" 3x30" 3x30"       Quad sets (towel at knee) 10x5" 10x10" 10x10" 10x10" 10x10" 10x10"       Quad sets (towel at ankle) 10x5" 10x10" 10x10" 10x10" 10x10" 10x10"       SLR:  4 way c brace 2x10 ea 2x10 ea 2x10 ea Supine 10x 2x10 ea 2x10       Clam shells 10x2" 10x3" 10x3" np 20x3" 20x3"       Prone hamstring curls 15x 15x 15x np np 15x       Bike with brace   4' 4' 5' 5'                    Mini Squats      2x10       Step ups      1R 2x10                                                                                                      Modalities   7/2       CP prn 10' 10' 10' 10' 10' 10'                                     Assessment: Tolerated treatment well  Patient demonstrated fatigue post treatment  Pt performed new exercises with no signs of increased pain or adverse symptoms  Pt continues to work towards limitations with knee strength and flexibility  Pt will benefit from further skilled PT to increase strength, flexibility and function  Continue to progress as able         Plan: Continue per plan of care

## 2018-07-03 ENCOUNTER — OFFICE VISIT (OUTPATIENT)
Dept: OBGYN CLINIC | Facility: MEDICAL CENTER | Age: 38
End: 2018-07-03

## 2018-07-03 VITALS — HEART RATE: 90 BPM | SYSTOLIC BLOOD PRESSURE: 123 MMHG | DIASTOLIC BLOOD PRESSURE: 81 MMHG

## 2018-07-03 DIAGNOSIS — S83.511D RUPTURE OF ANTERIOR CRUCIATE LIGAMENT OF RIGHT KNEE, SUBSEQUENT ENCOUNTER: Primary | ICD-10-CM

## 2018-07-03 PROCEDURE — 99024 POSTOP FOLLOW-UP VISIT: CPT | Performed by: ORTHOPAEDIC SURGERY

## 2018-07-03 NOTE — PROGRESS NOTES
Orthopaedic Surgery - Office Note  Ramone Pelaez (30 y o  female)   : 1980   MRN: [id number]  Encounter Date: 7/3/2018    Chief Complaint   Patient presents with    Right Knee - Follow-up       Assessment / Plan  S/p right knee arthroscopy with ACL reconstruction using semitendinosus allograft on 2018    · patient was instructed to discontinue use of brace and crutches at this time  · Continue outpatient PT per protocol   Return in about 3 weeks (around 2018) for ROM recheck  History of Present Illness  Ramone Pelaez is a 30 y o  female who presents s/p right knee arthroscopy with ACL reconstruction using semitendinosus allograft on 2018  Pt is going to formal physical therapy 2x per week and is tolerating her exercises well  Pt states that her knee and ankle will swell on her when she is walking  Pt ambulates with one crutches and her straight leg brace at this point in time  Pt denies numbness and tingling  Review of Systems  Pertinent items are noted in HPI  All other systems were reviewed and are negative  Physical Exam  /81   Pulse 90   Cons: Appears well  No apparent distress  Psych: Alert  Oriented x3  Mood and affect normal   Eyes: PERRLA, EOMI  Resp: Normal effort  No audible wheezing or stridor  CV: Palpable pulse  No discernable arrhythmia  No LE edema  Lymph:  No palpable cervical, axillary, or inguinal lymphadenopathy  Skin: Warm  No palpable masses  No visible lesions  Neuro: Normal muscle tone  Normal and symmetric DTR's  Right Knee Exam  Alignment:  Normal knee alignment  Inspection:  mild knee swelling  Palpation:  No tenderness  ROM:  Knee Extension 12  Knee Flexion 115  Strength:  Not tested  Stability:  No objective knee instability  Stable Varus / Valgus stress, Lachman, and Posterior drawer  1a lachman  Tests:  No pertinent positive or negative tests  Patella:  Patella tracks centrally without crepitus    Neurovascular: Sensation intact in DP/SP/Moore/Sa/T nerve distributions  2+ DP & PT pulses  Gait:  Antalgic  Studies Reviewed  No studies to review    Procedures  No procedures today  Medical, Surgical, Family, and Social History  The patient's medical history, family history, and social history, were reviewed and updated as appropriate  Past Medical History:   Diagnosis Date    Acid reflux     Anxiety     Bursitis     Frequent headaches     Gastroparesis     Liver cyst     Lumbar herniated disc     Migraine     Seasonal allergies     Torn ACL     Wears glasses        Past Surgical History:   Procedure Laterality Date    HI ESOPHAGOGASTRODUODENOSCOPY TRANSORAL DIAGNOSTIC N/A 5/1/2017    Procedure: ESOPHAGOGASTRODUODENOSCOPY (EGD); Surgeon: Errol Holder MD;  Location: [facility] GI LAB; Service: Gastroenterology    HI KNEE SCOPE,AID ANT CRUCIATE REPAIR Right 5/24/2018    Procedure: ARTHROSCOPIC RECONSTRUCTION ANTERIOR CRUCIATE LIGAMENT (ACL) WITH AUTOGRAFT AND ALLOGRAFT;  Surgeon: Rocio Caban MD;  Location: [facility] OR;  Service: Orthopedics    TUBAL LIGATION      WISDOM TOOTH EXTRACTION         Family History   Problem Relation Age of Onset    Depression Mother     Anxiety disorder Mother     Sleep disorder Mother     Hyperlipidemia Father     Hypertension Father     Heart disease Father     Heart disease Paternal Aunt     Heart disease Paternal Uncle     Osteoporosis Maternal Grandmother     Alzheimer's disease Paternal Grandmother     Heart disease Paternal Grandmother     Heart disease Paternal Grandfather        Social History     Occupational History    Not on file       Social History Main Topics    Smoking status: Never Smoker    Smokeless tobacco: Never Used    Alcohol use No    Drug use: No    Sexual activity: Not on file       Allergies   Allergen Reactions    Asa [Aspirin] Shortness Of Breath            Latex Hives    Meloxicam Other (See Comments)     bruising    Penicillin V Hives and Rash    Penicillins Hives and Rash         Current Outpatient Prescriptions:     famotidine (PEPCID) 40 MG tablet, TK 1 T PO HS, Disp: , Rfl: 2    HYDROcodone-acetaminophen (NORCO) 5-325 mg per tablet, Take 1 tablet by mouth every 6 (six) hours as needed for pain Max Daily Amount: 4 tablets, Disp: 45 tablet, Rfl: 0    naproxen (NAPROSYN) 500 mg tablet, Take 1 tablet (500 mg total) by mouth 2 (two) times a day with meals, Disp: 60 tablet, Rfl: 0    omeprazole (PriLOSEC) 40 MG capsule, Take 40 mg by mouth daily  , Disp: , Rfl:     promethazine (PHENERGAN) 12 5 MG tablet, Take 1 tablet (12 5 mg total) by mouth every 6 (six) hours as needed for nausea or vomiting, Disp: 30 tablet, Rfl: 0      Gem Quick    Scribe Attestation    I,:   Diandra Ford am acting as a scribe while in the presence of the attending physician :        I,:   Flor Hatfield MD personally performed the services described in this documentation    as scribed in my presence :

## 2018-07-05 ENCOUNTER — OFFICE VISIT (OUTPATIENT)
Dept: PHYSICAL THERAPY | Facility: CLINIC | Age: 38
End: 2018-07-05
Payer: COMMERCIAL

## 2018-07-05 DIAGNOSIS — S83.511D RUPTURE OF ANTERIOR CRUCIATE LIGAMENT OF RIGHT KNEE, SUBSEQUENT ENCOUNTER: ICD-10-CM

## 2018-07-05 DIAGNOSIS — S83.511A RUPTURE OF ANTERIOR CRUCIATE LIGAMENT OF RIGHT KNEE, INITIAL ENCOUNTER: ICD-10-CM

## 2018-07-05 DIAGNOSIS — M25.561 RIGHT KNEE PAIN, UNSPECIFIED CHRONICITY: Primary | ICD-10-CM

## 2018-07-05 PROCEDURE — 97150 GROUP THERAPEUTIC PROCEDURES: CPT | Performed by: PHYSICAL THERAPIST

## 2018-07-05 PROCEDURE — 97110 THERAPEUTIC EXERCISES: CPT | Performed by: PHYSICAL THERAPIST

## 2018-07-05 PROCEDURE — 97140 MANUAL THERAPY 1/> REGIONS: CPT | Performed by: PHYSICAL THERAPIST

## 2018-07-05 NOTE — PROGRESS NOTES
Daily Note     Today's date: 2018  Patient name: Roger Peters  : 1980  MRN: [id number]  Referring provider: Julito Jaime MD  Dx:   Encounter Diagnosis     ICD-10-CM    1  Right knee pain, unspecified chronicity M25 561    2  Rupture of anterior cruciate ligament of right knee, initial encounter S83 510A                   Subjective: Pt presents to PT with no reports of pain today  Objective: See treatment diary below    Precautions: Anxiety, latex allergy, neck pain, GERD    Daily Treatment Diary       Manual        Patellar mobs HY HY HY NC HY HY MARIA L      PROM HY HY HY NC HY HY MARIA L                                FOTO   45              Exercise Diary        Heel slides 10" x 15 15x10" 15x10" 15x10" 15x10" 15x10" 15x10"      Hamstring stretch 20" x 4 4x20" 4x20" 3x30" 3x30" 3x30" 3x30"      Quad sets (towel at knee) 10x5" 10x10" 10x10" 10x10" 10x10" 10x10" 10x10"      Quad sets (towel at ankle) 10x5" 10x10" 10x10" 10x10" 10x10" 10x10" 10x10"      SLR:  4 way c brace 2x10 ea 2x10 ea 2x10 ea Supine 10x 2x10 ea 2x10 2x10      Clam shells 10x2" 10x3" 10x3" np 20x3" 20x3" 20x3"      Prone hamstring curls 15x 15x 15x np np 15x 2x      Prone hangs             Bike with brace   4' 4' 5' 5' 5'                   Mini Squats      2x10 2x10      Step ups      1R 2x10 1R 2x10                                                                                                     Modalities        CP prn 10' 10' 10' 10' 10' 10' Declin                                    Assessment: Tolerated treatment well  Patient demonstrated fatigue post treatment  Reports good challenge with SLR exercises  Tolerated newly added prone hangs without complaints  Reports she has tried self-prone hangs since seeing physician yesterday, with fair tolerance   Reported discomfort in posterior knee with passive knee flex in supine, however, appropriate stretch in anterior thigh with passive knee flex in prone  Pt declines ice today  Plan: Continue per plan of care

## 2018-07-09 ENCOUNTER — OFFICE VISIT (OUTPATIENT)
Dept: PHYSICAL THERAPY | Facility: CLINIC | Age: 38
End: 2018-07-09
Payer: COMMERCIAL

## 2018-07-09 DIAGNOSIS — S83.511D RUPTURE OF ANTERIOR CRUCIATE LIGAMENT OF RIGHT KNEE, SUBSEQUENT ENCOUNTER: ICD-10-CM

## 2018-07-09 DIAGNOSIS — M25.561 RIGHT KNEE PAIN, UNSPECIFIED CHRONICITY: Primary | ICD-10-CM

## 2018-07-09 PROCEDURE — 97110 THERAPEUTIC EXERCISES: CPT

## 2018-07-09 PROCEDURE — 97140 MANUAL THERAPY 1/> REGIONS: CPT

## 2018-07-09 NOTE — PROGRESS NOTES
Daily Note     Today's date: 2018  Patient name: Timoteo Douglas  : 1980  MRN: [id number]  Referring provider: Cari Garcia MD  Dx:   Encounter Diagnosis     ICD-10-CM    1  Right knee pain, unspecified chronicity M25 561    2  Rupture of anterior cruciate ligament of right knee, subsequent encounter S83 511D        Start Time:   Stop Time: 1830  Total time in clinic (min): 60 minutes    Subjective: Pt presents to PT with reports of increased pain, states she walked approx 2 miles yesterday, states she felt 2 "clicks" in her knee  Objective: See treatment diary below    Precautions: Anxiety, latex allergy, neck pain, GERD    Daily Treatment Diary       Manual       Patellar mobs HY HY HY NC HY HY MARIA L HY     PROM HY HY HY NC HY HY MARIA L HY                               FOTO   45              Exercise Diary       Heel slides 10" x 15 15x10" 15x10" 15x10" 15x10" 15x10" 15x10" 15x10"     Hamstring stretch 20" x 4 4x20" 4x20" 3x30" 3x30" 3x30" 3x30" 3x30"     Quad sets (towel at knee) 10x5" 10x10" 10x10" 10x10" 10x10" 10x10" 10x10" 10x10"     Quad sets (towel at ankle) 10x5" 10x10" 10x10" 10x10" 10x10" 10x10" 10x10" 10x10"     SLR:  4 way c brace 2x10 ea 2x10 ea 2x10 ea Supine 10x 2x10 ea 2x10 2x10 2x10     Clam shells 10x2" 10x3" 10x3" np 20x3" 20x3" 20x3" 20x3"     Prone hamstring curls 15x 15x 15x np np 15x 2x 2x10     Prone hangs             Bike with brace   4' 4' 5' 5' 5' 5'                  Mini Squats      2x10 2x10 2x10     Step ups      1R 2x10 1R 2x10 1R 2x10                                                                                                    Modalities       CP prn 10' 10' 10' 10' 10' 10' Declin 10'                                   Assessment: Tolerated treatment well  Patient demonstrated fatigue post treatment   Pt continues to work towards goals of increased knee ROM  Pt demonstrates good form and technique with current exercise program  Pt will benefit from further skilled PT to increase strength, flexibility and function  Continue to progress as able  Plan: Continue per plan of care

## 2018-07-12 ENCOUNTER — OFFICE VISIT (OUTPATIENT)
Dept: PHYSICAL THERAPY | Facility: CLINIC | Age: 38
End: 2018-07-12
Payer: COMMERCIAL

## 2018-07-12 DIAGNOSIS — S83.511D RUPTURE OF ANTERIOR CRUCIATE LIGAMENT OF RIGHT KNEE, SUBSEQUENT ENCOUNTER: ICD-10-CM

## 2018-07-12 DIAGNOSIS — M25.561 RIGHT KNEE PAIN, UNSPECIFIED CHRONICITY: ICD-10-CM

## 2018-07-12 DIAGNOSIS — S83.511A RUPTURE OF ANTERIOR CRUCIATE LIGAMENT OF RIGHT KNEE, INITIAL ENCOUNTER: Primary | ICD-10-CM

## 2018-07-12 PROCEDURE — 97110 THERAPEUTIC EXERCISES: CPT

## 2018-07-12 PROCEDURE — 97140 MANUAL THERAPY 1/> REGIONS: CPT

## 2018-07-12 NOTE — PROGRESS NOTES
Daily Note     Today's date: 2018  Patient name: Denae Argueta  : 1980  MRN: [id number]  Referring provider: Jose Enrique Connolly MD  Dx:   Encounter Diagnosis     ICD-10-CM    1  Rupture of anterior cruciate ligament of right knee, initial encounter S83 511A    2  Rupture of anterior cruciate ligament of right knee, subsequent encounter S83 511D    3  Right knee pain, unspecified chronicity M25 561             Subjective: Pt reports minimal R knee pain upon arrival as "3/10" and notes that it is still very painful when she walks, especially when she walks quickly  Objective: See treatment diary below    Precautions: Anxiety, latex allergy, neck pain, GERD    Daily Treatment Diary     Manual      Patellar mobs HY HY HY NC HY HY MARIA L HY SH    PROM HY HY HY NC HY HY MARIA L HY [facility]                 FOTO   45            Exercise Diary      Heel slides 10" x 15 15x10" 15x10" 15x10" 15x10" 15x10" 15x10" 15x10" 10"x15    Hamstring stretch 20" x 4 4x20" 4x20" 3x30" 3x30" 3x30" 3x30" 3x30" Longsit  30"x3    Quad sets (towel at knee) 10x5" 10x10" 10x10" 10x10" 10x10" 10x10" 10x10" 10x10" 10"x10    Quad sets (towel at ankle) 10x5" 10x10" 10x10" 10x10" 10x10" 10x10" 10x10" 10x10" 10"x10    SLR:  4 way c brace 2x10 ea 2x10 ea 2x10 ea Supine 10x 2x10 ea 2x10 2x10 2x10 2x10 ea d/c brace    Clam shells 10x2" 10x3" 10x3" np 20x3" 20x3" 20x3" 20x3" 3"x20    Prone hamstring curls 15x 15x 15x np np 15x 2x 2x10 2x10    Prone hangs         -    Bike with brace   4' 4' 5' 5' 5' 5' 6' d/c brace                 Mini Squats      2x10 2x10 2x10 2x10    Step ups      1R 2x10 1R 2x10 1R 2x10 1R  2x10                 Gait training         10 mins                   Modalities      CP prn 10' 10' 10' 10' 10' 10' Declin 10' 10'                   Assessment: Tolerated treatment well   Patient demonstrated fatigue post treatment, exhibited good technique with therapeutic exercises and would benefit from continued PT to improve gait dysfunction to normal  Quad lag noted with SLR despite cuing and initiating with quad set  Plan: Progress treament per protocol       Husam Heller, PTA

## 2018-07-14 ENCOUNTER — HOSPITAL ENCOUNTER (EMERGENCY)
Facility: HOSPITAL | Age: 38
Discharge: HOME | End: 2018-07-14
Attending: EMERGENCY MEDICINE
Payer: COMMERCIAL

## 2018-07-14 VITALS
BODY MASS INDEX: 25.58 KG/M2 | WEIGHT: 131 LBS | HEART RATE: 99 BPM | RESPIRATION RATE: 16 BRPM | DIASTOLIC BLOOD PRESSURE: 81 MMHG | TEMPERATURE: 97.2 F | SYSTOLIC BLOOD PRESSURE: 115 MMHG | OXYGEN SATURATION: 99 %

## 2018-07-14 DIAGNOSIS — L50.9 URTICARIA: ICD-10-CM

## 2018-07-14 DIAGNOSIS — R19.7 DIARRHEA, UNSPECIFIED TYPE: Primary | ICD-10-CM

## 2018-07-14 PROCEDURE — 99283 EMERGENCY DEPT VISIT LOW MDM: CPT

## 2018-07-14 RX ORDER — CETIRIZINE HYDROCHLORIDE 10 MG/1
10 TABLET ORAL 2 TIMES DAILY
Qty: 14 TABLET | Refills: 0 | Status: SHIPPED | OUTPATIENT
Start: 2018-07-14 | End: 2021-06-07 | Stop reason: ALTCHOICE

## 2018-07-15 NOTE — DISCHARGE INSTRUCTIONS
Acute Diarrhea   WHAT YOU NEED TO KNOW:   Acute diarrhea starts quickly and lasts a short time, usually 1 to 3 days  It can last up to 2 weeks  You may not be able to control your diarrhea  Acute diarrhea usually stops on its own  DISCHARGE INSTRUCTIONS:   Return to the emergency department if:   · You feel confused  · Your heartbeat is faster than normal      · Your eyes look deeply sunken, or you have no tears when you cry  · You urinate less than usual, or your urine is dark yellow  · You have blood or mucus in your stools  · You have severe abdominal pain  · You are unable to drink any liquids  Contact your healthcare provider if:   · Your symptoms do not get better with treatment  · You have a fever higher than 101 3°F (38 5°C)  · You have trouble eating and drinking because you are vomiting  · You are thirsty or have a dry mouth  · Your diarrhea does not get better in 7 days  · You have questions or concerns about your condition or care  Follow up with your healthcare provider as directed:  Write down your questions so you remember to ask them during your visits  Medicines:  · Diarrhea medicine  is an over-the-counter medicine that helps slow or stop your diarrhea  If you take other medicines, talk to your healthcare provider before you take diarrhea medicine  · Antibiotics  may be given to help treat an infection caused by bacteria  · Antiparasitics  may be given to treat an infection caused by parasites  · Take your medicine as directed  Contact your healthcare provider if you think your medicine is not helping or if you have side effects  Tell him of her if you are allergic to any medicine  Keep a list of the medicines, vitamins, and herbs you take  Include the amounts, and when and why you take them  Bring the list or the pill bottles to follow-up visits  Carry your medicine list with you in case of an emergency    Self-care:   · Drink liquids as directed  Liquids will help prevent dehydration caused by diarrhea  Ask your healthcare provider how much liquid to drink each day and which liquids are best for you  You may need to drink an oral rehydration solution (ORS)  An ORS has the right amounts of water, salts, and sugar you need to replace body fluids  You can buy an ORS at most grocery stores and pharmacies  · Eat foods that are easy to digest   Examples include rice, lentils, cereal, bananas, potatoes, and bread  It also includes some fruits (bananas, melon), well-cooked vegetables, and lean meats  Avoid foods high in fiber, fat, and sugar  Also avoid caffeine, alcohol, dairy, and red meat until your diarrhea is gone  Prevent acute diarrhea:   · Wash your hands often  Use soap and water  Wash your hands before you eat or prepare food  Also wash your hands after you use the bathroom  Use an alcohol-based hand gel when soap and water are not available  · Keep bathroom surfaces clean  This helps prevent the spread of germs that cause acute diarrhea  · Wash fruits and vegetables well before you eat them  This can help remove germs that cause diarrhea  If possible, remove the skin from fruits and vegetables, or cook them well before you eat them  · Cook meat as directed  ¨ Cook ground meat  to 160°F      ¨ Cook ground poultry, whole poultry, or cuts of poultry  to at least 165°F  Remove the meat from heat  Let it stand for 3 minutes before you eat it  ¨ Cook whole cuts of meat other than poultry  to at least 145°F  Remove the meat from heat  Let it stand for 3 minutes before you eat it  · Wash dishes that have touched raw meat with hot water and soap  This includes cutting boards, utensils, dishes, and serving containers  · Place raw or cooked meat in the refrigerator as soon as possible  Bacteria can grow in meat that is left at room temperature too long  · Do not eat raw or undercooked oysters, clams, or mussels  These foods may be contaminated and cause infection  · Drink filtered or treated water only when you travel  Do not put ice in your drinks  Drink bottled water whenever possible  © 2017 [address] Information is for End User's use only and may not be sold, redistributed or otherwise used for commercial purposes  All illustrations and images included in CareNotes® are the copyrighted property of A [employer]  or [employer]  The above information is an  only  It is not intended as medical advice for individual conditions or treatments  Talk to your doctor, nurse or pharmacist before following any medical regimen to see if it is safe and effective for you

## 2018-07-15 NOTE — ED PROVIDER NOTES
History  Chief Complaint   Patient presents with    Diarrhea     diarrhea for 3 days , today x 2   rash to legs, does not itch  47-year-old previously healthy female presents emergency department for evaluation of nonbloody diarrhea x3 days  Patient states she has had numerous episodes of watery diarrhea, decreasing in frequency since onset  Denies associated fever, chills, sweats nausea, vomiting, abdominal pain, lower urinary tract symptoms  She also states that she developed a non itchy rash to bilateral thighs today  Denies suspicious food intake, ill contacts, or fresh water exposure  Prior to Admission Medications   Prescriptions Last Dose Informant Patient Reported? Taking? HYDROcodone-acetaminophen (NORCO) 5-325 mg per tablet   No No   Sig: Take 1 tablet by mouth every 6 (six) hours as needed for pain Max Daily Amount: 4 tablets   famotidine (PEPCID) 40 MG tablet   Yes No   Sig: TK 1 T PO HS   naproxen (NAPROSYN) 500 mg tablet   No No   Sig: Take 1 tablet (500 mg total) by mouth 2 (two) times a day with meals   omeprazole (PriLOSEC) 40 MG capsule  Self Yes No   Sig: Take 40 mg by mouth daily     promethazine (PHENERGAN) 12 5 MG tablet   No No   Sig: Take 1 tablet (12 5 mg total) by mouth every 6 (six) hours as needed for nausea or vomiting      Facility-Administered Medications: None       Past Medical History:   Diagnosis Date    Acid reflux     Anxiety     Bursitis     Frequent headaches     Gastroparesis     Liver cyst     Lumbar herniated disc     Migraine     Seasonal allergies     Torn ACL     Wears glasses        Past Surgical History:   Procedure Laterality Date    KNEE SURGERY Right     AR ESOPHAGOGASTRODUODENOSCOPY TRANSORAL DIAGNOSTIC N/A 5/1/2017    Procedure: ESOPHAGOGASTRODUODENOSCOPY (EGD); Surgeon: Tamara Parson MD;  Location: [facility];   Service: Gastroenterology    AR KNEE SCOPE,AID ANT CRUCIATE REPAIR Right 5/24/2018    Procedure: ARTHROSCOPIC RECONSTRUCTION ANTERIOR CRUCIATE LIGAMENT (ACL) WITH AUTOGRAFT AND ALLOGRAFT;  Surgeon: Adolfo Zaragoza MD;  Location: [facility];  Service: Orthopedics    TUBAL LIGATION      WISDOM TOOTH EXTRACTION         Family History   Problem Relation Age of Onset    Depression Mother     Anxiety disorder Mother     Sleep disorder Mother     Hyperlipidemia Father     Hypertension Father     Heart disease Father     Heart disease Paternal Aunt     Heart disease Paternal Uncle     Osteoporosis Maternal Grandmother     Alzheimer's disease Paternal Grandmother     Heart disease Paternal Grandmother     Heart disease Paternal Grandfather      I have reviewed and agree with the history as documented  Social History   Substance Use Topics    Smoking status: Never Smoker    Smokeless tobacco: Never Used    Alcohol use No        Review of Systems   Constitutional: Negative for chills, diaphoresis and fever  Respiratory: Negative for chest tightness and shortness of breath  Cardiovascular: Negative for chest pain and palpitations  Gastrointestinal: Positive for diarrhea  Negative for abdominal pain, blood in stool, constipation, nausea and vomiting  Genitourinary: Negative for dysuria, flank pain, frequency and hematuria  Musculoskeletal: Negative for arthralgias, back pain and myalgias  Skin: Positive for rash  Negative for color change  Allergic/Immunologic: Negative for immunocompromised state  Neurological: Negative for dizziness and light-headedness  Hematological: Does not bruise/bleed easily  Psychiatric/Behavioral: Negative for confusion  Physical Exam  Physical Exam   Constitutional: She is oriented to person, place, and time  She appears well-developed and well-nourished  No distress  HENT:   Head: Normocephalic and atraumatic  Eyes: Pupils are equal, round, and reactive to light  No scleral icterus  Cardiovascular: Normal rate and regular rhythm    Exam reveals no gallop and no friction rub  No murmur heard  Pulmonary/Chest: No respiratory distress  She has no wheezes  She has no rales  Abdominal: Soft  Bowel sounds are normal  She exhibits no distension and no mass  There is no tenderness  There is no guarding  Neurological: She is alert and oriented to person, place, and time  Skin: Skin is dry  Capillary refill takes less than 2 seconds  She is not diaphoretic  Urticarial, raised, blanchable lesions to bilat medial thighs  No petechiae, purpura, vesicles or pustules   Psychiatric: She has a normal mood and affect  Her behavior is normal    Vitals reviewed  Vital Signs  ED Triage Vitals   Temperature Pulse Respirations Blood Pressure SpO2   07/14/18 2015 07/14/18 2015 07/14/18 2015 07/14/18 2015 07/14/18 2015   (!) 97 2 °F (36 2 °C) 99 16 115/81 99 %      Temp Source Heart Rate Source Patient Position - Orthostatic VS BP Location FiO2 (%)   07/14/18 2015 -- 07/14/18 2015 07/14/18 2015 --   Temporal  Sitting Left arm       Pain Score       07/14/18 2012       No Pain           Vitals:    07/14/18 2015   BP: 115/81   Pulse: 99   Patient Position - Orthostatic VS: Sitting       Visual Acuity      ED Medications  Medications - No data to display    Diagnostic Studies  Results Reviewed     None                 No orders to display              Procedures  Procedures       Phone Contacts  ED Phone Contact    ED Course                               MDM  Number of Diagnoses or Management Options  Diarrhea, unspecified type: new and does not require workup  Urticaria: new and does not require workup  Diagnosis management comments: 31 yo healthy female presents w/ acute self limited diarrhea  No red flag symptoms concerning for dehydration, electrolyte derangement, acute surgical abdomen, or significant infectious diarrhea  Rash is not petechial and I feel is unrelated to the diarrhea  She is advised to seek PCP f/u for diarrhea lasting >7 days          Amount and/or Complexity of Data Reviewed  Review and summarize past medical records: yes      CritCare Time    Disposition  Final diagnoses:   Diarrhea, unspecified type   Urticaria     Time reflects when diagnosis was documented in both MDM as applicable and the Disposition within this note     Time User Action Codes Description Comment    7/14/2018  8:28 PM Denae Hy Add [R19 7] Diarrhea, unspecified type     7/14/2018  8:29 PM Denae Hy Add [L50 9] Urticaria       ED Disposition     ED Disposition Condition Comment    Discharge  Amanda Rogers discharge to home/self care  Condition at discharge: Good        Follow-up Information     Follow up With Specialties Details Why Contact Info    Reema Bentley PA-C Family Medicine, Physician Assistant   [address] [place] [address],[address] [facility] [address] [address]  [phone]            Discharge Medication List as of 7/14/2018  8:30 PM      START taking these medications    Details   cetirizine (ZyrTEC) 10 mg tablet Take 1 tablet (10 mg total) by mouth 2 (two) times a day for 7 days, Starting Sat 7/14/2018, Until Sat 7/21/2018, Print         CONTINUE these medications which have NOT CHANGED    Details   famotidine (PEPCID) 40 MG tablet TK 1 T PO HS, Historical Med      HYDROcodone-acetaminophen (NORCO) 5-325 mg per tablet Take 1 tablet by mouth every 6 (six) hours as needed for pain Max Daily Amount: 4 tablets, Starting Tue 5/29/2018, Print      naproxen (NAPROSYN) 500 mg tablet Take 1 tablet (500 mg total) by mouth 2 (two) times a day with meals, Starting Thu 5/24/2018, Print      omeprazole (PriLOSEC) 40 MG capsule Take 40 mg by mouth daily  , Historical Med      promethazine (PHENERGAN) 12 5 MG tablet Take 1 tablet (12 5 mg total) by mouth every 6 (six) hours as needed for nausea or vomiting, Starting Thu 5/24/2018, Print           No discharge procedures on file      ED Provider  Electronically Signed by           Kelly Perez PA-C  07/14/18 4906

## 2018-07-16 ENCOUNTER — OFFICE VISIT (OUTPATIENT)
Dept: PHYSICAL THERAPY | Facility: CLINIC | Age: 38
End: 2018-07-16
Payer: COMMERCIAL

## 2018-07-16 DIAGNOSIS — S83.511A RUPTURE OF ANTERIOR CRUCIATE LIGAMENT OF RIGHT KNEE, INITIAL ENCOUNTER: Primary | ICD-10-CM

## 2018-07-16 PROCEDURE — 97140 MANUAL THERAPY 1/> REGIONS: CPT

## 2018-07-16 PROCEDURE — 97110 THERAPEUTIC EXERCISES: CPT

## 2018-07-16 NOTE — PROGRESS NOTES
Daily Note     Today's date: 2018  Patient name: Kishore Wagner  : 1980  MRN: [id number]  Referring provider: Issa De La Vega MD  Dx:   Encounter Diagnosis     ICD-10-CM    1  Rupture of anterior cruciate ligament of right knee, initial encounter S83 511A      Start Time: 1097  Stop Time: 1810  Total time in clinic (min): 40 minutes     Subjective: Pt presents to PT with no new c/o pain or symptoms  Objective: See treatment diary below    Precautions: Anxiety, latex allergy, neck pain, GERD    Daily Treatment Diary     Manual     Patellar mobs HY HY HY NC HY HY MARIA L HY SH HY   PROM HY HY HY NC HY HY MARIA L HY [facility] HY                FOTO   45            Exercise Diary     Heel slides 10" x 15 15x10" 15x10" 15x10" 15x10" 15x10" 15x10" 15x10" 10"x15 15x10"   Hamstring stretch 20" x 4 4x20" 4x20" 3x30" 3x30" 3x30" 3x30" 3x30" Longsit  30"x3 Longsit  30"x3   Quad sets (towel at knee) 10x5" 10x10" 10x10" 10x10" 10x10" 10x10" 10x10" 10x10" 10"x10 10x10"   Quad sets (towel at ankle) 10x5" 10x10" 10x10" 10x10" 10x10" 10x10" 10x10" 10x10" 10"x10 10x10"   SLR:  4 way c brace 2x10 ea 2x10 ea 2x10 ea Supine 10x 2x10 ea 2x10 2x10 2x10 2x10 ea d/c brace 2x10   Clam shells 10x2" 10x3" 10x3" np 20x3" 20x3" 20x3" 20x3" 3"x20 20x3"   Prone hamstring curls 15x 15x 15x np np 15x 2x 2x10 2x10 2x10   Prone hangs         -    Bike with brace   4' 4' 5' 5' 5' 5' 6' d/c brace 10'                Mini Squats      2x10 2x10 2x10 2x10 2x10   Step ups      1R 2x10 1R 2x10 1R 2x10 1R  2x10 1R 2x10                Gait training         10 mins                   Modalities     CP prn 10' 10' 10' 10' 10' 10' Declin 10' 10' np                  Assessment: Tolerated treatment well   Patient demonstrated fatigue post treatment, exhibited good technique with therapeutic exercises and would benefit from continued PT to improve gait dysfunction to normal  Pt continues to show quad lag with SLR  Pt continues to work towards goals of increased ROM and LE strength  Pt will benefit from further skilled PT to increase strength, flexibility and function  Continue to progress as able  Plan: Progress treament per protocol       Amanda Cole

## 2018-07-19 ENCOUNTER — OFFICE VISIT (OUTPATIENT)
Dept: PHYSICAL THERAPY | Facility: CLINIC | Age: 38
End: 2018-07-19
Payer: COMMERCIAL

## 2018-07-19 DIAGNOSIS — S83.511A RUPTURE OF ANTERIOR CRUCIATE LIGAMENT OF RIGHT KNEE, INITIAL ENCOUNTER: Primary | ICD-10-CM

## 2018-07-19 PROCEDURE — 97140 MANUAL THERAPY 1/> REGIONS: CPT

## 2018-07-19 PROCEDURE — 97110 THERAPEUTIC EXERCISES: CPT

## 2018-07-19 NOTE — PROGRESS NOTES
Daily Note     Today's date: 2018  Patient name: Anju Eubanks  : 1980  MRN: [id number]  Referring provider: Jennifer Sprague MD  Dx:   Encounter Diagnosis     ICD-10-CM    1  Rupture of anterior cruciate ligament of right knee, initial encounter S83 511A      Start Time:   Stop Time: 1700  Total time in clinic (min): 45 minutes     Subjective: Pt presents to PT with reports of increased quad pain, states she was at work and attempted to sit in a chair and lost her balance and fell into the chair  Pt states the pain comes when she turns in bed  Objective: See treatment diary below    Precautions: Anxiety, latex allergy, neck pain, GERD    Daily Treatment Diary     Manual     Patellar mobs HY        SH HY   PROM HY        SH HY                FOTO               Exercise Diary     Heel slides 15x10"        10"x15 15x10"   Hamstring stretch Longsit  30"x3        Longsit  30"x3 Longsit  30"x3   Quad sets (towel at knee) 10x10"        10"x10 10x10"   Quad sets (towel at ankle) 10x10"        10"x10 10x10"   SLR:  4 way  2x10 ea        2x10 ea d/c brace 2x10   Clam shells 20x3"        3"x20 20x3"   Prone hamstring curls 2x10        2x10 2x10   Bike with brace 10'        6' d/c brace 10'   Prone Quad stretch 3x30"            Mini Squats 2x10        2x10 2x10   Step ups 1R 2x10        1R  2x10 1R 2x10                Gait training np        10 mins                   Modalities     CP prn         10' np                  18: Pt supervised by JAIME JOHNSON, from 16:15-16:30, treated 1:1 remainder of session  Assessment: Tolerated treatment well  Patient demonstrated fatigue post treatment, exhibited good technique with therapeutic exercises and would benefit from continued PT to improve gait dysfunction to normal  Pt continues to show mild quad lag without VCing   Pt will benefit from further skilled PT to increase strength, flexibility and function  Continue to progress as able  Plan: Progress treament per protocol       Jean-Paul George

## 2018-07-23 ENCOUNTER — OFFICE VISIT (OUTPATIENT)
Dept: PHYSICAL THERAPY | Facility: CLINIC | Age: 38
End: 2018-07-23
Payer: COMMERCIAL

## 2018-07-23 DIAGNOSIS — M25.561 RIGHT KNEE PAIN, UNSPECIFIED CHRONICITY: ICD-10-CM

## 2018-07-23 DIAGNOSIS — S83.511D RUPTURE OF ANTERIOR CRUCIATE LIGAMENT OF RIGHT KNEE, SUBSEQUENT ENCOUNTER: ICD-10-CM

## 2018-07-23 DIAGNOSIS — S83.511A RUPTURE OF ANTERIOR CRUCIATE LIGAMENT OF RIGHT KNEE, INITIAL ENCOUNTER: Primary | ICD-10-CM

## 2018-07-23 PROCEDURE — 97150 GROUP THERAPEUTIC PROCEDURES: CPT | Performed by: PHYSICAL MEDICINE & REHABILITATION

## 2018-07-23 PROCEDURE — 97112 NEUROMUSCULAR REEDUCATION: CPT | Performed by: PHYSICAL MEDICINE & REHABILITATION

## 2018-07-23 PROCEDURE — 97110 THERAPEUTIC EXERCISES: CPT | Performed by: PHYSICAL MEDICINE & REHABILITATION

## 2018-07-23 NOTE — PROGRESS NOTES
Daily Note     Today's date: 2018  Patient name: Shyam Martinez  : 1980  MRN: [id number]  Referring provider: Starlin Sicard, MD  Dx:   Encounter Diagnosis     ICD-10-CM    1  Rupture of anterior cruciate ligament of right knee, initial encounter S83 511A    2  Rupture of anterior cruciate ligament of right knee, subsequent encounter S83 511D    3  Right knee pain, unspecified chronicity M25 561        Start Time: 1635  Stop Time: 1720  Total time in clinic (min): 45 minutes    Subjective: Pt reports increased sxs, specifically in the lateral hamstring  She states that she walked a lot over the weekend  Objective: See treatment diary below  Precautions: Anxiety, latex allergy, neck pain, GERD     Daily Treatment Diary      Manual     Patellar mobs HY               SH HY   PROM HY               SH HY                           FOTO                          Exercise Diary     Heel slides 15x10"  15 x 10"             10"x15 15x10"   Hamstring stretch Longsit  30"x3  longsit  3 x 30"             Longsit  30"x3 Longsit  30"x3   Quad sets (towel at knee) 10x10"               10"x10 10x10"   Quad sets (towel at ankle) 10x10"               10"x10 10x10"   SLR:  4 way  2x10 ea  20 ea             2x10 ea d/c brace 2x10   Clam shells 20x3"               3"x20 20x3"   Prone hamstring curls 2x10  30             2x10 2x10   Bike with brace 10' 10'              6' d/c brace 10'   Prone Quad stretch 3x30"                     Mini Squats 2x10  2 x 10             2x10 2x10   Step ups 1R 2x10               1R  2x10 1R 2x10    tandem on foam    3 x 30" ea                   Gait training np               10 mins                                Modalities     CP prn                 10' np                                    Assessment: Tolerated treatment well   Patient demonstrated fatigue post treatment and reports pain in the lateral hamstring  Plan: Progress treament per protocol

## 2018-07-26 ENCOUNTER — OFFICE VISIT (OUTPATIENT)
Dept: PHYSICAL THERAPY | Facility: CLINIC | Age: 38
End: 2018-07-26
Payer: COMMERCIAL

## 2018-07-26 DIAGNOSIS — S83.511D RUPTURE OF ANTERIOR CRUCIATE LIGAMENT OF RIGHT KNEE, SUBSEQUENT ENCOUNTER: ICD-10-CM

## 2018-07-26 DIAGNOSIS — M25.561 RIGHT KNEE PAIN, UNSPECIFIED CHRONICITY: ICD-10-CM

## 2018-07-26 DIAGNOSIS — S83.511A RUPTURE OF ANTERIOR CRUCIATE LIGAMENT OF RIGHT KNEE, INITIAL ENCOUNTER: Primary | ICD-10-CM

## 2018-07-26 PROCEDURE — G8978 MOBILITY CURRENT STATUS: HCPCS | Performed by: PHYSICAL MEDICINE & REHABILITATION

## 2018-07-26 PROCEDURE — G8979 MOBILITY GOAL STATUS: HCPCS | Performed by: PHYSICAL MEDICINE & REHABILITATION

## 2018-07-26 PROCEDURE — 97140 MANUAL THERAPY 1/> REGIONS: CPT | Performed by: PHYSICAL MEDICINE & REHABILITATION

## 2018-07-26 PROCEDURE — 97112 NEUROMUSCULAR REEDUCATION: CPT | Performed by: PHYSICAL MEDICINE & REHABILITATION

## 2018-07-26 PROCEDURE — 97110 THERAPEUTIC EXERCISES: CPT | Performed by: PHYSICAL MEDICINE & REHABILITATION

## 2018-07-26 NOTE — PROGRESS NOTES
Daily Note     Today's date: 2018  Patient name: Parminder Pabon  : 1980  MRN: [id number]  Referring provider: Pepito Almaguer MD  Dx:   Encounter Diagnosis     ICD-10-CM    1  Rupture of anterior cruciate ligament of right knee, initial encounter S83 511A    2  Rupture of anterior cruciate ligament of right knee, subsequent encounter S83 511D    3  Right knee pain, unspecified chronicity M25 561        Start Time: 1730  Stop Time: 1830  Total time in clinic (min): 60 minutes    Subjective: Pt reports 4/10 pain, and that she was busy at work and walked more than usual        Objective: See treatment diary below  Precautions: Anxiety, latex allergy, neck pain, GERD     Daily Treatment Diary      Manual     Patellar mobs HY    NC           SH HY   PROM HY    NC           SH HY                           FOTO      60                    Exercise Diary     Heel slides 15x10"  15 x 10"  15 x 10"           10"x15 15x10"   Hamstring stretch Longsit  30"x3  longsit  3 x 30"  3 x 30"           Longsit  30"x3 Longsit  30"x3   Quad sets (towel at knee) 10x10"    10 x 10"           10"x10 10x10"   Quad sets (towel at ankle) 10x10"    10 x 10"           10"x10 10x10"   SLR:  4 way  2x10 ea  20 ea  20 ea           2x10 ea d/c brace 2x10   Clam shells 20x3"   NV            3"x20 20x3"   Prone hamstring curls 2x10  30  30           2x10 2x10   Bike with brace 10' 10'   10'           6' d/c brace 10'   Prone Quad stretch 3x30"    3 x 30"                 Mini Squats 2x10  2 x 10  NV           2x10 2x10   Step ups 1R 2x10    NV           1R  2x10 1R 2x10    tandem on foam    3 x 30" ea  NV                 Gait training np               10 mins      TM      5' , 1 4 max                    Modalities     CP prn   10'              10' np                                    Assessment: Tolerated treatment well   Patient demonstrated fatigue post treatment, exhibited good technique with therapeutic exercises and continues to have increased sxs with hamstring strengthening  Plan: Progress treatment as tolerated

## 2018-07-27 ENCOUNTER — HOSPITAL ENCOUNTER (EMERGENCY)
Facility: HOSPITAL | Age: 38
Discharge: HOME | End: 2018-07-27
Attending: EMERGENCY MEDICINE | Admitting: EMERGENCY MEDICINE
Payer: COMMERCIAL

## 2018-07-27 VITALS
HEART RATE: 77 BPM | SYSTOLIC BLOOD PRESSURE: 119 MMHG | OXYGEN SATURATION: 100 % | TEMPERATURE: 97.7 F | RESPIRATION RATE: 18 BRPM | BODY MASS INDEX: 25.3 KG/M2 | DIASTOLIC BLOOD PRESSURE: 75 MMHG | WEIGHT: 129.56 LBS

## 2018-07-27 DIAGNOSIS — R19.7 DIARRHEA: ICD-10-CM

## 2018-07-27 DIAGNOSIS — F41.9 ANXIETY: Primary | ICD-10-CM

## 2018-07-27 PROCEDURE — 99284 EMERGENCY DEPT VISIT MOD MDM: CPT

## 2018-07-27 RX ORDER — HYDROXYZINE HYDROCHLORIDE 25 MG/1
25-50 TABLET, FILM COATED ORAL
Qty: 20 TABLET | Refills: 0 | Status: SHIPPED | OUTPATIENT
Start: 2018-07-27 | End: 2018-09-25 | Stop reason: ALTCHOICE

## 2018-07-27 NOTE — ED PROVIDER NOTES
History  Chief Complaint   Patient presents with    Diarrhea     I have had diarrhea off and on now for 3 weeks and periods of crying for no reason and then having trouble sleeping; I had my R ACL surgery 5/24 and it seems like the stress from not getting around and doing what I usually do has been building; I don't know it the stress is creating anxiety that then gives me diarrhea and trouble sleeping   Anxiety     see above comment     29 yo female presents to the ED with anxiety and diarrhea  Anxiety- she had ACL repair 2 mos prior and is currently in therapy  Feels that her mobility is limited and she is overwhelmed trying to care for her children  Having breakdowns and crying at nighttime daily for 1 week  Denies SI/HI, AH/VH  She denies illicit drug use/self medicating  She has a strong support system with her family  Diarrhea- treated here 2 weeks ago for brief onset of diarrhea  It has been intermittent since then, non bloody, 3-4 episodes per day maximum  Does have normal BMs occasionally  No abd pain or fever  Denies suspicious food intake  Prior to Admission Medications   Prescriptions Last Dose Informant Patient Reported? Taking?    HYDROcodone-acetaminophen (NORCO) 5-325 mg per tablet   No No   Sig: Take 1 tablet by mouth every 6 (six) hours as needed for pain Max Daily Amount: 4 tablets   cetirizine (ZyrTEC) 10 mg tablet   No No   Sig: Take 1 tablet (10 mg total) by mouth 2 (two) times a day for 7 days   famotidine (PEPCID) 40 MG tablet   Yes No   Sig: TK 1 T PO HS   naproxen (NAPROSYN) 500 mg tablet   No No   Sig: Take 1 tablet (500 mg total) by mouth 2 (two) times a day with meals   omeprazole (PriLOSEC) 40 MG capsule  Self Yes No   Sig: Take 40 mg by mouth daily     promethazine (PHENERGAN) 12 5 MG tablet   No No   Sig: Take 1 tablet (12 5 mg total) by mouth every 6 (six) hours as needed for nausea or vomiting      Facility-Administered Medications: None       Past Medical History:   Diagnosis Date    Acid reflux     Anxiety     Bursitis     Frequent headaches     Gastroparesis     Liver cyst     Lumbar herniated disc     Migraine     Seasonal allergies     Torn ACL     Wears glasses        Past Surgical History:   Procedure Laterality Date    KNEE SURGERY Right     ID ESOPHAGOGASTRODUODENOSCOPY TRANSORAL DIAGNOSTIC N/A 5/1/2017    Procedure: ESOPHAGOGASTRODUODENOSCOPY (EGD); Surgeon: Evelena Phalen, MD;  Location: [facility] GI LAB; Service: Gastroenterology    ID KNEE SCOPE,AID ANT CRUCIATE REPAIR Right 5/24/2018    Procedure: ARTHROSCOPIC RECONSTRUCTION ANTERIOR CRUCIATE LIGAMENT (ACL) WITH AUTOGRAFT AND ALLOGRAFT;  Surgeon: Flor Hatfield MD;  Location: [facility] OR;  Service: Orthopedics    TUBAL LIGATION      WISDOM TOOTH EXTRACTION         Family History   Problem Relation Age of Onset    Depression Mother     Anxiety disorder Mother     Sleep disorder Mother     Hyperlipidemia Father     Hypertension Father     Heart disease Father     Heart disease Paternal Aunt     Heart disease Paternal Uncle     Osteoporosis Maternal Grandmother     Alzheimer's disease Paternal Grandmother     Heart disease Paternal Grandmother     Heart disease Paternal Grandfather      I have reviewed and agree with the history as documented  Social History   Substance Use Topics    Smoking status: Never Smoker    Smokeless tobacco: Never Used    Alcohol use No        Review of Systems   Constitutional: Negative for chills, diaphoresis and fever  Respiratory: Negative for chest tightness and shortness of breath  Cardiovascular: Negative for chest pain and palpitations  Gastrointestinal: Positive for diarrhea  Negative for abdominal pain, blood in stool, constipation, nausea and vomiting  Genitourinary: Negative for dysuria, flank pain, frequency and hematuria  Musculoskeletal: Negative for arthralgias, back pain and myalgias     Skin: Negative for color change and rash    Hematological: Does not bruise/bleed easily  Psychiatric/Behavioral: Positive for sleep disturbance  Negative for confusion  The patient is nervous/anxious  Physical Exam  Physical Exam   Constitutional: She appears well-developed and well-nourished  No distress  HENT:   Head: Normocephalic and atraumatic  Eyes: Pupils are equal, round, and reactive to light  No scleral icterus  Cardiovascular: Normal rate and regular rhythm  Exam reveals no gallop and no friction rub  No murmur heard  Pulmonary/Chest: No respiratory distress  She has no wheezes  She has no rales  Abdominal: Soft  Bowel sounds are normal  She exhibits no distension  There is no tenderness  Skin: Skin is dry  Capillary refill takes less than 2 seconds  She is not diaphoretic  Psychiatric: She has a normal mood and affect  Her behavior is normal  Judgment and thought content normal    Vitals reviewed  Vital Signs  ED Triage Vitals [07/27/18 1242]   Temperature Pulse Respirations Blood Pressure SpO2   97 7 °F (36 5 °C) 77 18 119/75 100 %      Temp Source Heart Rate Source Patient Position - Orthostatic VS BP Location FiO2 (%)   Temporal Monitor Sitting Left arm --      Pain Score       No Pain           Vitals:    07/27/18 1242   BP: 119/75   Pulse: 77   Patient Position - Orthostatic VS: Sitting       Visual Acuity      ED Medications  Medications - No data to display    Diagnostic Studies  Results Reviewed     None                 No orders to display              Procedures  Procedures       Phone Contacts  ED Phone Contact    ED Course                               MDM  Number of Diagnoses or Management Options  Anxiety: new and does not require workup  Diarrhea: established and worsening  Diagnosis management comments: 31 yo female presents w/ anxiety related to recent surgical procedure and current rehab  She displays no suicidality or homicidality, and is open to discussion   I have provided her with local support groups and behavioral clinics for follow up  She is also provided Rx for hydroxyzine for qhs use  Diarrhea- given duration, will obtain stool culture to r/o enteric pathogen  I suspect it may be stress mediated       Amount and/or Complexity of Data Reviewed  Clinical lab tests: ordered  Tests in the medicine section of CPT®: ordered  Review and summarize past medical records: yes      CritCare Time    Disposition  Final diagnoses:   Anxiety   Diarrhea     Time reflects when diagnosis was documented in both MDM as applicable and the Disposition within this note     Time User Action Codes Description Comment    7/27/2018  1:12 PM Evcarlose Pipes [F41 9] Anxiety     7/27/2018  1:12 PM Jordan Close Add [R19 7] Diarrhea       ED Disposition     ED Disposition Condition Comment    Discharge  Bernarda Pickett discharge to home/self care      Condition at discharge: Good        Follow-up Information     Follow up With Specialties Details Why Contact Info    Irish Lang PA-C Family Medicine, Physician Assistant Schedule an appointment as soon as possible for a visit  [address]  [phone]            Discharge Medication List as of 7/27/2018  1:15 PM      START taking these medications    Details   hydrOXYzine HCL (ATARAX) 25 mg tablet Take 1-2 tablets (25-50 mg total) by mouth daily at bedtime as needed for itching, Starting Fri 7/27/2018, Print         CONTINUE these medications which have NOT CHANGED    Details   cetirizine (ZyrTEC) 10 mg tablet Take 1 tablet (10 mg total) by mouth 2 (two) times a day for 7 days, Starting Sat 7/14/2018, Until Sat 7/21/2018, Print      famotidine (PEPCID) 40 MG tablet TK 1 T PO HS, Historical Med      HYDROcodone-acetaminophen (NORCO) 5-325 mg per tablet Take 1 tablet by mouth every 6 (six) hours as needed for pain Max Daily Amount: 4 tablets, Starting Tue 5/29/2018, Print      naproxen (NAPROSYN) 500 mg tablet Take 1 tablet (500 mg total) by mouth 2 (two) times a day with meals, Starting u 5/24/2018, Print      omeprazole (PriLOSEC) 40 MG capsule Take 40 mg by mouth daily  , Historical Med      promethazine (PHENERGAN) 12 5 MG tablet Take 1 tablet (12 5 mg total) by mouth every 6 (six) hours as needed for nausea or vomiting, Starting u 5/24/2018, Print             Outpatient Discharge Orders  Stool Enteric Bacterial Panel by PCR   Standing Status: Future  Standing Exp   Date: 07/27/19         ED Provider  Electronically Signed by           Christofer Seay PA-C  07/27/18 7375

## 2018-07-27 NOTE — DISCHARGE INSTRUCTIONS
Please return to the [address] [place] [address] Laboratory as soon as possible with your stool sample  Anxiety   WHAT YOU SHOULD KNOW:   Anxiety is a condition that causes you to feel excessive worry, uneasiness, or fear  Family or work stress, smoking, caffeine, and alcohol can increase your risk for anxiety  Certain medicines or health conditions can also increase your risk  Anxiety may begin gradually, and can become a long-term condition if it is not managed or treated  AFTER YOU LEAVE:   Medicines:   · Medicines  can help you feel more calm and relaxed, and decrease your symptoms  · Take your medicine as directed  Contact your healthcare provider if you think your medicine is not helping or if you have side effects  Tell him if you are allergic to any medicine  Keep a list of the medicines, vitamins, and herbs you take  Include the amounts, and when and why you take them  Bring the list or the pill bottles to follow-up visits  Carry your medicine list with you in case of an emergency  Follow up with your healthcare provider within 2 weeks or as directed:  Write down your questions so you remember to ask them during your visits  Manage anxiety:   · Go to counseling as directed  Cognitive behavioral therapy can help you understand and change how you react to events that trigger your symptoms  · Find ways to manage your symptoms  Activities such as exercise, meditation, or listening to music can help you relax  · Practice deep breathing  Breathing can change how your body reacts to stress  Focus on taking slow, deep breaths several times a day, or during an anxiety attack  Breathe in through your nose, and out through your mouth  · Avoid caffeine  Caffeine can make your symptoms worse  Avoid foods or drinks that are meant to increase your energy level  · Limit or avoid alcohol  Ask your healthcare provider if alcohol is safe for you   You may not be able to drink alcohol if you take certain anxiety or depression medicines  Limit alcohol to 1 drink per day if you are a woman  Limit alcohol to 2 drinks per day if you are a man  A drink of alcohol is 12 ounces of beer, 5 ounces of wine, or 1½ ounces of liquor  Contact your healthcare provider if:   · Your symptoms get worse or do not get better with treatment  · You think your medicine may be causing side effects  · Your anxiety keeps you from doing your regular daily activities  · You have new symptoms since your last visit  · You have questions or concerns about your condition or care  Seek care immediately or call 911 if:   · You have chest pain, tightness, or heaviness that may spread to your shoulders, arms, jaw, neck, or back  · You feel like hurting yourself or someone else  · You feel dizzy, lightheaded, or faint  © 2014 [employer] is for End User's use only and may not be sold, redistributed or otherwise used for commercial purposes  All illustrations and images included in CareNotes® are the copyrighted property of [employer]  or [employer]  The above information is an  only  It is not intended as medical advice for individual conditions or treatments  Talk to your doctor, nurse or pharmacist before following any medical regimen to see if it is safe and effective for you

## 2018-07-30 ENCOUNTER — OFFICE VISIT (OUTPATIENT)
Dept: PHYSICAL THERAPY | Facility: CLINIC | Age: 38
End: 2018-07-30
Payer: COMMERCIAL

## 2018-07-30 DIAGNOSIS — S83.511A RUPTURE OF ANTERIOR CRUCIATE LIGAMENT OF RIGHT KNEE, INITIAL ENCOUNTER: Primary | ICD-10-CM

## 2018-07-30 PROCEDURE — 97110 THERAPEUTIC EXERCISES: CPT

## 2018-07-30 PROCEDURE — 97112 NEUROMUSCULAR REEDUCATION: CPT

## 2018-07-30 NOTE — PROGRESS NOTES
Daily Note     Today's date: 2018  Patient name: Khang Preston  : 1980  MRN: [id number]  Referring provider: Joesph Gong MD  Dx:   Encounter Diagnosis     ICD-10-CM    1  Rupture of anterior cruciate ligament of right knee, initial encounter S83 511A        Start Time: 1700  Stop Time: 1750  Total time in clinic (min): 50 minutes    Subjective: Pt reports she is feeling ok today, no C/O pain or symptoms today  Objective: See treatment diary below  Precautions: Anxiety, latex allergy, neck pain, GERD     Daily Treatment Diary      Manual               Patellar mobs HY    NC               PROM HY    NC HY                                    FOTO      60                  Exercise Diary               Heel slides 15x10"  15 x 10"  15 x 10" 15x10"             Hamstring stretch Longsit  30"x3  longsit  3 x 30"  3 x 30" 3x30"              Quad sets (towel at knee) 10x10"    10 x 10" 10x10"             Quad sets (towel at ankle) 10x10"    10 x 10" 10x10"             SLR:  4 way  2x10 ea  20 ea  20 ea 20x ea             Clam shells 20x3"   NV   20x3"             Prone hamstring curls 2x10  30  30  30x             Bike 10' 10'   10' 10'              Prone Quad stretch 3x30"    3 x 30" 3x30"              Mini Squats 2x10  2 x 10  NV  2x10             Step ups 1R 2x10    NV  2R 2x10              tandem on foam    3 x 30" ea  NV  3x30"              TM      5' , 1 4 max  7' 1 5 mph                Modalities                  CP prn   10'  declined                                          Assessment: Tolerated treatment well  Patient demonstrated fatigue post treatment, exhibited good technique with therapeutic exercises and continues to have increased sxs with hamstring strengthening  Pt will benefit from further skilled PT to increase strength, flexibility and function  Continue to progress as able  Plan: Progress treatment as tolerated

## 2018-07-31 ENCOUNTER — OFFICE VISIT (OUTPATIENT)
Dept: OBGYN CLINIC | Facility: MEDICAL CENTER | Age: 38
End: 2018-07-31

## 2018-07-31 VITALS
HEART RATE: 77 BPM | BODY MASS INDEX: 25.52 KG/M2 | DIASTOLIC BLOOD PRESSURE: 76 MMHG | HEIGHT: 60 IN | SYSTOLIC BLOOD PRESSURE: 120 MMHG | WEIGHT: 130 LBS

## 2018-07-31 DIAGNOSIS — S83.511D RUPTURE OF ANTERIOR CRUCIATE LIGAMENT OF RIGHT KNEE, SUBSEQUENT ENCOUNTER: Primary | ICD-10-CM

## 2018-07-31 PROCEDURE — 99024 POSTOP FOLLOW-UP VISIT: CPT | Performed by: ORTHOPAEDIC SURGERY

## 2018-07-31 NOTE — PROGRESS NOTES
Orthopaedic Surgery - Office Note  Giovanny Segura (66 y o  female)   : 1980   MRN: [id number]  Encounter Date: 2018    Chief Complaint   Patient presents with    Right Knee - Follow-up       Assessment / Plan  S/p right knee arthroscopy with ACL reconstruction using semitendinosus allograft on 2018    · Continue to progress activity at this time per ACL reconstruction protocol  · Continue outpatient PT per protocol with continued emphasis on range of motion and strengthening  · Continue with ice and analgesics as needed  Return in about 6 weeks (around 2018)  History of Present Illness  Giovanny Segura is a 30 y o  female who presents s/p right knee arthroscopy with ACL reconstruction using semitendinosus allograft on 2018  Pt has continued with physical therapy on a outpatient basis at [facility] and is tolerating her exercises well  She feels she is progressing well in since last visit has completely discontinued her crutch and brace  She still has days where she has pain and takes Tylenol for the pain when needed  She has not been icing frequently  Pt denies numbness and tingling  Review of Systems  Pertinent items are noted in HPI  All other systems were reviewed and are negative  Physical Exam  /76   Pulse 77   Ht 5' (1 524 m)   Wt 59 kg (130 lb)   LMP 2018 (Exact Date)   BMI 25 39 kg/m²   Cons: Appears well  No apparent distress  Psych: Alert  Oriented x3  Mood and affect normal   Eyes: PERRLA, EOMI  Resp: Normal effort  No audible wheezing or stridor  CV: Palpable pulse  No discernable arrhythmia  No LE edema  Lymph:  No palpable cervical, axillary, or inguinal lymphadenopathy  Skin: Warm  No palpable masses  No visible lesions  Neuro: Normal muscle tone  Normal and symmetric DTR's  Right Knee Exam  Alignment:  Normal knee alignment  Inspection:  mild knee swelling  Palpation:  No tenderness  ROM:  Knee Extension 5°   Knee Flexion 120°  Strength:  Not tested  Stability:  No objective knee instability  Stable Varus / Valgus stress, Lachman, and Posterior drawer  1a lachman  Tests:  No pertinent positive or negative tests  Patella:  Patella tracks centrally without crepitus  Neurovascular:  Sensation intact in DP/SP/Moore/Sa/T nerve distributions  2+ DP & PT pulses  Gait:  Antalgic  Studies Reviewed  No studies to review    Procedures  No procedures today  Medical, Surgical, Family, and Social History  The patient's medical history, family history, and social history, were reviewed and updated as appropriate  Past Medical History:   Diagnosis Date    Acid reflux     Anxiety     Bursitis     Frequent headaches     Gastroparesis     Liver cyst     Lumbar herniated disc     Migraine     Seasonal allergies     Torn ACL     Wears glasses        Past Surgical History:   Procedure Laterality Date    KNEE SURGERY Right     MD ESOPHAGOGASTRODUODENOSCOPY TRANSORAL DIAGNOSTIC N/A 5/1/2017    Procedure: ESOPHAGOGASTRODUODENOSCOPY (EGD); Surgeon: Diandra Puentes MD;  Location: [facility]; Service: Gastroenterology    MD KNEE SCOPE,AID ANT CRUCIATE REPAIR Right 5/24/2018    Procedure: ARTHROSCOPIC RECONSTRUCTION ANTERIOR CRUCIATE LIGAMENT (ACL) WITH AUTOGRAFT AND ALLOGRAFT;  Surgeon: Jesica Ortiz MD;  Location: [facility] OR;  Service: Orthopedics    TUBAL LIGATION      WISDOM TOOTH EXTRACTION         Family History   Problem Relation Age of Onset    Depression Mother     Anxiety disorder Mother     Sleep disorder Mother     Hyperlipidemia Father     Hypertension Father     Heart disease Father     Heart disease Paternal Aunt     Heart disease Paternal Uncle     Osteoporosis Maternal Grandmother     Alzheimer's disease Paternal Grandmother     Heart disease Paternal Grandmother     Heart disease Paternal Grandfather        Social History     Occupational History    Not on file       Social History Main Topics  Smoking status: Never Smoker    Smokeless tobacco: Never Used    Alcohol use No    Drug use: No    Sexual activity: Not on file       Allergies   Allergen Reactions    Asa [Aspirin] Shortness Of Breath            Latex Hives    Meloxicam Other (See Comments)     bruising    Penicillin V Hives and Rash    Penicillins Hives and Rash         Current Outpatient Prescriptions:     famotidine (PEPCID) 40 MG tablet, TK 1 T PO HS, Disp: , Rfl: 2    HYDROcodone-acetaminophen (NORCO) 5-325 mg per tablet, Take 1 tablet by mouth every 6 (six) hours as needed for pain Max Daily Amount: 4 tablets, Disp: 45 tablet, Rfl: 0    hydrOXYzine HCL (ATARAX) 25 mg tablet, Take 1-2 tablets (25-50 mg total) by mouth daily at bedtime as needed for itching, Disp: 20 tablet, Rfl: 0    naproxen (NAPROSYN) 500 mg tablet, Take 1 tablet (500 mg total) by mouth 2 (two) times a day with meals, Disp: 60 tablet, Rfl: 0    omeprazole (PriLOSEC) 40 MG capsule, Take 40 mg by mouth daily  , Disp: , Rfl:     cetirizine (ZyrTEC) 10 mg tablet, Take 1 tablet (10 mg total) by mouth 2 (two) times a day for 7 days, Disp: 14 tablet, Rfl: 0    promethazine (PHENERGAN) 12 5 MG tablet, Take 1 tablet (12 5 mg total) by mouth every 6 (six) hours as needed for nausea or vomiting, Disp: 30 tablet, Rfl: 0      Miguel Angel Walter PA-C    Scribe Attestation    I,:    am acting as a scribe while in the presence of the attending physician :        I,:    personally performed the services described in this documentation    as scribed in my presence :

## 2018-08-02 ENCOUNTER — OFFICE VISIT (OUTPATIENT)
Dept: PHYSICAL THERAPY | Facility: CLINIC | Age: 38
End: 2018-08-02
Payer: COMMERCIAL

## 2018-08-02 DIAGNOSIS — M25.561 RIGHT KNEE PAIN, UNSPECIFIED CHRONICITY: ICD-10-CM

## 2018-08-02 DIAGNOSIS — S83.511D RUPTURE OF ANTERIOR CRUCIATE LIGAMENT OF RIGHT KNEE, SUBSEQUENT ENCOUNTER: Primary | ICD-10-CM

## 2018-08-02 PROCEDURE — 97150 GROUP THERAPEUTIC PROCEDURES: CPT | Performed by: PHYSICAL MEDICINE & REHABILITATION

## 2018-08-02 PROCEDURE — 97110 THERAPEUTIC EXERCISES: CPT | Performed by: PHYSICAL MEDICINE & REHABILITATION

## 2018-08-02 NOTE — PROGRESS NOTES
PT Evaluation     Today's date: 2018  Patient name: Timoteo Douglas  : 1980  MRN: [id number]  Referring provider: Cari Garcia MD  Dx:   Encounter Diagnosis     ICD-10-CM    1  Rupture of anterior cruciate ligament of right knee, subsequent encounter S83 511D    2  Right knee pain, unspecified chronicity M25 561        Start Time: 1630  Stop Time: 1730  Total time in clinic (min): 60 minutes    Assessment  Impairments: abnormal muscle firing, abnormal or restricted ROM, activity intolerance, lacks appropriate home exercise program, pain with function and weight-bearing intolerance    Assessment details: Mindy Elizondo has been attending outpatient physical therapy post op ACL repair   Since the last assessment, patient demonstrates decreased pain levels, improved range of motion, improved strength, and increased tolerance to activity  Pt continues to present decreased strength, minimal quad lag and decreased tolerance to activity  Pt would benefit from continued skilled physical therapy to address limitations and to achieve goals  Thank you for this referral    Understanding of Dx/Px/POC: good   Prognosis: good    Goals  1  Patient will be independent in pre-operative HEP to maximize right knee ROM and strength for upcoming surgery  MET  2  Patient will demonstrate good quad set comparable to contralateral side in 1 weeks  MET  3  Patient will demonstrate ability to perform 10 SLR without quad lag in 4 weeks  Partially met  4  Patient will demonstrate right knee AROM 0-120 degrees in 4 weeks   MET    Plan  Patient would benefit from: skilled physical therapy  Planned modality interventions: cryotherapy  Planned therapy interventions: neuromuscular re-education, patient education, therapeutic exercise, home exercise program, functional ROM exercises, flexibility, balance, strengthening and stretching  Frequency: 2x week  Duration in weeks: 4  Treatment plan discussed with: patient  Plan details: Please contact me with any questions  Thank you for the referral          Subjective Evaluation    History of Present Illness  Mechanism of injury: Pt reports that she is not currently experiencing pain  She continues to demonstrate LE fatigue when she is done with her work day  Navigating stairs is difficult and requires UE support       Occupation: garment   Pain  No pain reported  Current pain ratin  At best pain ratin (rest, epsom salt bath, ibuprofen)  At worst pain ratin (walking, stairs, bending)  Location: anterior knee, posterior medial knee  Quality: throbbing  Aggravating factors: nothing    Patient Goals  Patient goals for therapy: increased motion, decreased pain, decreased edema, increased strength, independence with ADLs/IADLs, return to sport/leisure activities and return to work  Patient goal: Partially met        Objective     Tenderness     Additional Tenderness Details  Anterior knee joint and posterior medial knee     Active Range of Motion   Left Knee   Flexion: 125 degrees   Extension: 0 degrees     Right Knee   Flexion: 120 degrees   Extension: 0 degrees     Strength/Myotome Testing     Left Knee   Flexion: 5  Prone flexion: 5  Quadriceps contraction: good    Right Knee   Quadriceps contraction: fair    Additional Strength Details  Right knee strength NT secondary to pain  Unable to complete SLR without quad lag    Hip abduction left = 4/5, right = 4/5            Precautions: Anxiety, latex allergy, neck pain, GERD    Daily Treatment Diary     Precautions: Anxiety, latex allergy, neck pain, GERD     Daily Treatment Diary      Manual                 Patellar mobs HY    NC                 PROM HY    NC HY                                        FOTO      60                    Exercise Diary    8/2             Heel slides 15x10"  15 x 10"  15 x 10" 15x10"               Hamstring stretch Longsit  30"x3  longsit  3 x 30"  3 x 30" 3x30"  3 x 30"              Quad sets (towel at knee) 10x10"    10 x 10" 10x10" 10 x 10"              Quad sets (towel at ankle) 10x10"    10 x 10" 10x10"  10 x 10"             SLR:  4 way  2x10 ea  20 ea  20 ea 20x ea  30 ea             Clam shells 20x3"   NV   20x3"  20             Prone hamstring curls 2x10  30  30  30x               Bike 10' 10'   10' 10'  10'              Prone Quad stretch 3x30"    3 x 30" 3x30"                Mini Squats 2x10  2 x 10  NV  2x10  2 x 20             Step ups 1R 2x10    NV  2R 2x10  2R  20              tandem on foam    3 x 30" ea  NV  3x30"  3 x 30"              TM      5' , 1 4 max  7' 1 5 mph  8', 2 0 max                Modalities  7/19 7/26 7/30                  CP prn   10'  declined

## 2018-08-06 ENCOUNTER — OFFICE VISIT (OUTPATIENT)
Dept: PHYSICAL THERAPY | Facility: CLINIC | Age: 38
End: 2018-08-06
Payer: COMMERCIAL

## 2018-08-06 DIAGNOSIS — S83.511D RUPTURE OF ANTERIOR CRUCIATE LIGAMENT OF RIGHT KNEE, SUBSEQUENT ENCOUNTER: Primary | ICD-10-CM

## 2018-08-06 DIAGNOSIS — M25.561 RIGHT KNEE PAIN, UNSPECIFIED CHRONICITY: ICD-10-CM

## 2018-08-06 DIAGNOSIS — S83.511A RUPTURE OF ANTERIOR CRUCIATE LIGAMENT OF RIGHT KNEE, INITIAL ENCOUNTER: ICD-10-CM

## 2018-08-06 PROCEDURE — 97112 NEUROMUSCULAR REEDUCATION: CPT

## 2018-08-06 PROCEDURE — 97110 THERAPEUTIC EXERCISES: CPT

## 2018-08-06 NOTE — PROGRESS NOTES
Daily Note     Today's date: 2018  Patient name: Sinai Greenberg  : 1980  MRN: [id number]  Referring provider: Tomás Richard MD  Dx:   Encounter Diagnosis     ICD-10-CM    1  Rupture of anterior cruciate ligament of right knee, subsequent encounter S83 511D    2  Right knee pain, unspecified chronicity M25 561    3  Rupture of anterior cruciate ligament of right knee, initial encounter S83 511A        Start Time: 1710  Stop Time: 1800  Total time in clinic (min): 50 minutes    Subjective: Pt reports she is feeling ok today, no C/O pain or symptoms today         Objective: See treatment diary below  Precautions: Anxiety, latex allergy, neck pain, GERD     Daily Treatment Diary      Manual              Patellar mobs HY    NC                 PROM HY    NC HY                                        FOTO      60                    Exercise Diary              Heel slides 15x10"  15 x 10"  15 x 10" 15x10"    -           Hamstring stretch Longsit  30"x3  longsit  3 x 30"  3 x 30" 3x30"  3 x 30"  3x30"           Quad sets (towel at knee) 10x10"    10 x 10" 10x10" 10 x 10"  10x10"            Quad sets (towel at ankle) 10x10"    10 x 10" 10x10"  10 x 10" 10x10"           SLR:  4 way  2x10 ea  20 ea  20 ea 20x ea  30 ea 3x10            Clam shells 20x3"   NV   20x3"  20 20            Prone hamstring curls 2x10  30  30  30x    20           Bike 10' 10'   10' 10'  10'   10'           Prone Quad stretch 3x30"    3 x 30" 3x30"     -           Mini Squats 2x10  2 x 10  NV  2x10  2 x 20 2x20            Step ups 1R 2x10    NV  2R 2x10  2R  20 2R 20x             tandem on foam    3 x 30" ea  NV  3x30"  3 x 30"  3x30"  SLS            TM      5' , 1 4 max  7' 1 5 mph  8', 2 0 max 8' 2 0 mph               Modalities                    CP prn   10'  declined                                           18: Pt supervised by Kacey Wheeler DPT from 17:20-17:50, treated 1:1 remainder of session  Assessment: Tolerated treatment well  Patient demonstrated fatigue post treatment, exhibited good technique with therapeutic exercises and continues to have increased sxs with hamstring strengthening  Pt will benefit from further skilled PT to increase strength, flexibility and function  Continue to progress as able  Plan: Progress treatment as tolerated

## 2018-08-09 ENCOUNTER — OFFICE VISIT (OUTPATIENT)
Dept: PHYSICAL THERAPY | Facility: CLINIC | Age: 38
End: 2018-08-09
Payer: COMMERCIAL

## 2018-08-09 DIAGNOSIS — S83.511D RUPTURE OF ANTERIOR CRUCIATE LIGAMENT OF RIGHT KNEE, SUBSEQUENT ENCOUNTER: Primary | ICD-10-CM

## 2018-08-09 DIAGNOSIS — M25.561 RIGHT KNEE PAIN, UNSPECIFIED CHRONICITY: ICD-10-CM

## 2018-08-09 PROCEDURE — 97110 THERAPEUTIC EXERCISES: CPT

## 2018-08-09 PROCEDURE — 97112 NEUROMUSCULAR REEDUCATION: CPT

## 2018-08-09 NOTE — PROGRESS NOTES
Daily Note     Today's date: 2018  Patient name: Hanny Bridges  : 1980  MRN: [id number]  Referring provider: Amadeo Baum MD  Dx:   Encounter Diagnosis     ICD-10-CM    1  Rupture of anterior cruciate ligament of right knee, subsequent encounter S83 511D    2  Right knee pain, unspecified chronicity M25 561        Start Time: 1700  Stop Time: 1800  Total time in clinic (min): 60 minutes    Subjective: Pt reports she is feeling sore today, states she was walking up her stairs and felt a click/pop in her knee  Pt states she is just sore and tight now          Objective: See treatment diary below  Precautions: Anxiety, latex allergy, neck pain, GERD     Daily Treatment Diary      Manual              Patellar mobs HY    NC                 PROM HY    NC HY                                        FOTO      60                    Exercise Diary    8 8  8          Heel slides 15x10"  15 x 10"  15 x 10" 15x10"    -  -         Hamstring stretch Longsit  30"x3  longsit  3 x 30"  3 x 30" 3x30"  3 x 30"  3x30" 3x30"          Quad sets (towel at knee) 10x10"    10 x 10" 10x10" 10 x 10"  10x10"  10x10"         Quad sets (towel at ankle) 10x10"    10 x 10" 10x10"  10 x 10" 10x10" 10x10"         SLR:  4 way  2x10 ea  20 ea  20 ea 20x ea  30 ea 3x10   3x10         Clam shells 20x3"   NV   20x3"  20 20  20          Prone hamstring curls 2x10  30  30  30x    20  20         Bike 10' 10'   10' 10'  10'   10'  10'         Prone Quad stretch 3x30"    3 x 30" 3x30"     -  -         Mini Squats 2x10  2 x 10  NV  2x10  2 x 20 2x20  2x20          Step ups 1R 2x10    NV  2R 2x10  2R  20 2R 20x   2R 20x          tandem on foam    3 x 30" ea  NV  3x30"  3 x 30"  3x30"  SLS  SLS 3x30          TM      5' , 1 4 max  7' 1 5 mph  8', 2 0 max 8' 2 0 mph   8' 2 0 mph          Rocker board       20x      TKE @ tband       PTB 20x         Modalities                    CP prn   10'  declined                                           8/9/18: Pt supervised by [facility] PTA from 17:30-17:50, treated 1:1 remainder of session  Assessment: Tolerated treatment well  Patient demonstrated fatigue post treatment, exhibited good technique with therapeutic exercises and continues to have increased sxs with hamstring strengthening  Pt will benefit from further skilled PT to increase strength, flexibility and function  Continue to progress as able  Plan: Progress treatment as tolerated

## 2018-08-13 ENCOUNTER — OFFICE VISIT (OUTPATIENT)
Dept: PHYSICAL THERAPY | Facility: CLINIC | Age: 38
End: 2018-08-13
Payer: COMMERCIAL

## 2018-08-13 DIAGNOSIS — M25.561 RIGHT KNEE PAIN, UNSPECIFIED CHRONICITY: ICD-10-CM

## 2018-08-13 DIAGNOSIS — S83.511D RUPTURE OF ANTERIOR CRUCIATE LIGAMENT OF RIGHT KNEE, SUBSEQUENT ENCOUNTER: Primary | ICD-10-CM

## 2018-08-13 PROCEDURE — 97110 THERAPEUTIC EXERCISES: CPT

## 2018-08-13 PROCEDURE — 97112 NEUROMUSCULAR REEDUCATION: CPT

## 2018-08-13 NOTE — PROGRESS NOTES
Daily Note     Today's date: 2018  Patient name: Erick Barrow  : 1980  MRN: [id number]  Referring provider: Eliza Amos MD  Dx:   Encounter Diagnosis     ICD-10-CM    1  Rupture of anterior cruciate ligament of right knee, subsequent encounter S83 511D    2  Right knee pain, unspecified chronicity M25 561        Start Time: 1630  Stop Time: 1730  Total time in clinic (min): 60 minutes    Subjective: Pt reports she is feeling sore today, states the rain does not help her symptoms  Pt states her knee is swollen from the pop she felt/heard last week           Objective: See treatment diary below  Precautions: Anxiety, latex allergy, neck pain, GERD     Daily Treatment Diary      Manual              Patellar mobs HY    NC                 PROM HY    NC HY                                        FOTO      60                    Exercise Diary         Heel slides 15x10"  15 x 10"  15 x 10" 15x10"    -  -  -       Hamstring stretch Longsit  30"x3  longsit  3 x 30"  3 x 30" 3x30"  3 x 30"  3x30" 3x30"  3x30"        Quad sets (towel at knee) 10x10"    10 x 10" 10x10" 10 x 10"  10x10"  10x10" -        Quad sets (towel at ankle) 10x10"    10 x 10" 10x10"  10 x 10" 10x10" 10x10" -        SLR:  4 way  2x10 ea  20 ea  20 ea 20x ea  30 ea 3x10   3x10  3x10       Clam shells 20x3"   NV   20x3"  20 20  20   20x       Prone hamstring curls 2x10  30  30  30x    20  20 -        Bike 10' 10'   10' 10'  10'   10'  10'  10'       Prone Quad stretch 3x30"    3 x 30" 3x30"     -  -  -       Mini Squats 2x10  2 x 10  NV  2x10  2 x 20 2x20  2x20  2x20        Step ups 1R 2x10    NV  2R 2x10  2R  20 2R 20x   2R 20x 2R 20x         tandem on foam    3 x 30" ea  NV  3x30"  3 x 30"  3x30"  SLS  SLS 3x30 -         TM      5' , 1 4 max  7' 1 5 mph  8', 2 0 max 8' 2 0 mph   8' 2 0 mph   10' 2 0 mph       Rocker board       20x 20x gladys MOTT @ and       PTB 20x PTB 20x     SLS on foam 0* and 20*        3x30" ea     Ecc step downs        2" 2x10     Plate lunges        x5     Wall sit SLR         NV     S/l Squat wall support        10x                     Modalities  7/19 7/26 7/30                  CP prn   10'  declined                                            8/13/18: Pt supervised by [facility] DPT from 5:00-5:30, treated 1:1 remainder of session  Assessment: Tolerated treatment well  Patient demonstrated fatigue post treatment, exhibited good technique with therapeutic exercises and continues to have increased sxs with hamstring strengthening  Pt performed new exercises with minimal reports of increased pain in quad and patellar tendon  Pt will benefit from further skilled PT to increase strength, flexibility and function  Continue to progress as able  Plan: Progress treatment as tolerated

## 2018-08-16 ENCOUNTER — OFFICE VISIT (OUTPATIENT)
Dept: PHYSICAL THERAPY | Facility: CLINIC | Age: 38
End: 2018-08-16
Payer: COMMERCIAL

## 2018-08-16 DIAGNOSIS — S83.511A RUPTURE OF ANTERIOR CRUCIATE LIGAMENT OF RIGHT KNEE, INITIAL ENCOUNTER: ICD-10-CM

## 2018-08-16 DIAGNOSIS — M25.561 RIGHT KNEE PAIN, UNSPECIFIED CHRONICITY: ICD-10-CM

## 2018-08-16 DIAGNOSIS — S83.511D RUPTURE OF ANTERIOR CRUCIATE LIGAMENT OF RIGHT KNEE, SUBSEQUENT ENCOUNTER: Primary | ICD-10-CM

## 2018-08-16 PROCEDURE — 97110 THERAPEUTIC EXERCISES: CPT

## 2018-08-16 PROCEDURE — G8978 MOBILITY CURRENT STATUS: HCPCS

## 2018-08-16 PROCEDURE — G8979 MOBILITY GOAL STATUS: HCPCS

## 2018-08-16 PROCEDURE — 97140 MANUAL THERAPY 1/> REGIONS: CPT

## 2018-08-16 NOTE — PROGRESS NOTES
Daily Note     Today's date: 2018  Patient name: Parminder Pabon  : 1980  MRN: [id number]  Referring provider: Pepito Almaguer MD  Dx:   Encounter Diagnosis     ICD-10-CM    1  Rupture of anterior cruciate ligament of right knee, subsequent encounter S83 511D    2  Right knee pain, unspecified chronicity M25 561    3  Rupture of anterior cruciate ligament of right knee, initial encounter S83 511A        Start Time: 1620  Stop Time: 1720  Total time in clinic (min): 60 minutes    Subjective: Pt reports she is feeling sore today, states the new exercises caused her a lot of pain  Pt reports increased knee swelling and pain           Objective: See treatment diary below  Precautions: Anxiety, latex allergy, neck pain, GERD     Daily Treatment Diary      Manual        Patellar mobs HY    NC                 PROM HY    NC HY          HY                              FOTO      60            41        Exercise Diary    8      Heel slides 15x10"  15 x 10"  15 x 10" 15x10"    -  -  -  -     Hamstring stretch Longsit  30"x3  longsit  3 x 30"  3 x 30" 3x30"  3 x 30"  3x30" 3x30"  3x30"  3x30"      Quad sets (towel at knee) 10x10"    10 x 10" 10x10" 10 x 10"  10x10"  10x10" -   -     Quad sets (towel at ankle) 10x10"    10 x 10" 10x10"  10 x 10" 10x10" 10x10" -   -     SLR:  4 way  2x10 ea  20 ea  20 ea 20x ea  30 ea 3x10   3x10  3x10 3x10      Clam shells 20x3"   NV   20x3"  20 20  20   20x  20x     Prone hamstring curls 2x10  30  30  30x    20  20 -   -     Bike 10' 10'   10' 10'  10'   10'  10'  10'  10'     Prone Quad stretch 3x30"    3 x 30" 3x30"     -  -  -  -     Mini Squats 2x10  2 x 10  NV  2x10  2 x 20 2x20  2x20  2x20   np     Step ups 1R 2x10    NV  2R 2x10  2R  20 2R 20x   2R 20x 2R 20x   np      tandem on foam    3 x 30" ea  NV  3x30"  3 x 30"  3x30"  SLS  SLS 3x30 -   -      TM      5' , 1 4 max  7' 1 5 mph  8', 2 0 max 8' 2 0 mph   8' 2 0 mph   10' 2 0 mph 10' 2 0 mph      Rocker board       20x 20x ea np    TKE @ tband       PTB 20x PTB 20x np    SLS on foam 0* and 20*        3x30" ea np    Ecc step downs        2" 2x10 np    Plate lunges        x5 np    Wall sit SLR         NV np    S/l Squat wall support        10x np                    Modalities  7/19 7/26 7/30                  CP prn   10'  declined                                                Assessment: Tolerated treatment fair  Patient demonstrated fatigue post treatment, exhibited good technique with therapeutic exercises and continues to have increased sxs with hamstring strengthening  Held on newly initiated exercises due to increased pain and reports of increased fatigue and knee "buckling"  Pt shows moderate soft tissue restrictions with PROM  Pt will benefit from further skilled PT to increase strength, flexibility and function  Continue to progress as able  Plan: Progress treatment as tolerated

## 2018-08-20 ENCOUNTER — OFFICE VISIT (OUTPATIENT)
Dept: PHYSICAL THERAPY | Facility: CLINIC | Age: 38
End: 2018-08-20
Payer: COMMERCIAL

## 2018-08-20 DIAGNOSIS — S83.511D RUPTURE OF ANTERIOR CRUCIATE LIGAMENT OF RIGHT KNEE, SUBSEQUENT ENCOUNTER: Primary | ICD-10-CM

## 2018-08-20 DIAGNOSIS — M25.561 RIGHT KNEE PAIN, UNSPECIFIED CHRONICITY: ICD-10-CM

## 2018-08-20 PROCEDURE — 97110 THERAPEUTIC EXERCISES: CPT

## 2018-08-20 NOTE — PROGRESS NOTES
Daily Note     Today's date: 2018  Patient name: Bernarda Pickett  : 1980  MRN: [id number]  Referring provider: Juan Hendrix MD  Dx:   Encounter Diagnosis     ICD-10-CM    1  Rupture of anterior cruciate ligament of right knee, subsequent encounter S83 511D    2  Right knee pain, unspecified chronicity M25 561        Start Time: 1600  Stop Time: 1655  Total time in clinic (min): 55 minutes    Subjective: Pt reports she is feeling better today, states she still has pain but not as bad as last week           Objective: See treatment diary below  Precautions: Anxiety, latex allergy, neck pain, GERD     Daily Treatment Diary      Manual     Patellar mobs HY    NC                 PROM HY    NC HY          HY                              FOTO      60            41        Exercise Diary    8   Heel slides 15x10"  15 x 10"  15 x 10" 15x10"    -  -  -  -  -   Hamstring stretch Longsit  30"x3  longsit  3 x 30"  3 x 30" 3x30"  3 x 30"  3x30" 3x30"  3x30"  3x30"  3x30"    Quad sets (towel at knee) 10x10"    10 x 10" 10x10" 10 x 10"  10x10"  10x10" -   -  -   Quad sets (towel at ankle) 10x10"    10 x 10" 10x10"  10 x 10" 10x10" 10x10" -   -  -   SLR:  4 way  2x10 ea  20 ea  20 ea 20x ea  30 ea 3x10   3x10  3x10 3x10  3x10    Clam shells 20x3"   NV   20x3"  20 20  20   20x  20x  20x   Prone hamstring curls 2x10  30  30  30x    20  20 -   -  -   Bike 10' 10'   10' 10'  10'   10'  10'  10'  10' 10'    Prone Quad stretch 3x30"    3 x 30" 3x30"     -  -  -  -  -   Mini Squats 2x10  2 x 10  NV  2x10  2 x 20 2x20  2x20  2x20   np np    Step ups 1R 2x10    NV  2R 2x10  2R  20 2R 20x   2R 20x 2R 20x   np 2R 20x     tandem on foam    3 x 30" ea  NV  3x30"  3 x 30"  3x30"  SLS  SLS 3x30 -   -  -    TM      5' , 1 4 max  7' 1 5 mph  8', 2 0 max 8' 2 0 mph   8' 2 0 mph   10' 2 0 mph 10' 2 0 mph   10' 2 0 mph   Rocker board 20x 20x ea np np   TKE @ tband       PTB 20x PTB 20x np np   SLS on foam 0* and 20*        3x30" ea np np   Ecc step downs        2" 2x10 np np   Plate lunges        x5 np np   Wall sit SLR         NV np np   S/l Squat wall support        10x np np                   Modalities  7/19 7/26 7/30                  CP prn   10'  declined                                                Assessment: Tolerated treatment fair  Patient demonstrated fatigue post treatment, exhibited good technique with therapeutic exercises and continues to have increased sxs with hamstring strengthening  Continued to hold on newly initiated exercises due to increased pain and reports of increased fatigue  Pt will benefit from further skilled PT to increase strength, flexibility and function  Continue to progress as able  Plan: Progress treatment as tolerated

## 2018-08-23 ENCOUNTER — OFFICE VISIT (OUTPATIENT)
Dept: PHYSICAL THERAPY | Facility: CLINIC | Age: 38
End: 2018-08-23
Payer: COMMERCIAL

## 2018-08-23 DIAGNOSIS — M25.561 RIGHT KNEE PAIN, UNSPECIFIED CHRONICITY: ICD-10-CM

## 2018-08-23 DIAGNOSIS — S83.511D RUPTURE OF ANTERIOR CRUCIATE LIGAMENT OF RIGHT KNEE, SUBSEQUENT ENCOUNTER: Primary | ICD-10-CM

## 2018-08-23 DIAGNOSIS — S83.511A RUPTURE OF ANTERIOR CRUCIATE LIGAMENT OF RIGHT KNEE, INITIAL ENCOUNTER: ICD-10-CM

## 2018-08-23 PROCEDURE — 97112 NEUROMUSCULAR REEDUCATION: CPT

## 2018-08-23 PROCEDURE — 97110 THERAPEUTIC EXERCISES: CPT

## 2018-08-23 NOTE — PROGRESS NOTES
Daily Note     Today's date: 2018  Patient name: Karon Woodson  : 1980  MRN: [id number]  Referring provider: Maryann Ling MD  Dx:   Encounter Diagnosis     ICD-10-CM    1  Rupture of anterior cruciate ligament of right knee, subsequent encounter S83 511D    2  Right knee pain, unspecified chronicity M25 561    3  Rupture of anterior cruciate ligament of right knee, initial encounter S83 511A                   Subjective: Patient reports nothing new this visit  Objective: See treatment diary below      Assessment: Tolerated treatment well  Patient exhibited good technique with therapeutic exercises  Continued to hold with more challenging exercises per patient as she needs to be on her feet to work  Otherwise no complaints with TE today  Plan: Continue per plan of care       Precautions: Anxiety, latex allergy, neck pain, GERD     Daily Treatment Diary      Manual     Patellar mobs HY    NC                 PROM HY    NC HY          HY                              FOTO      60            41        Exercise Diary     Heel slides   15 x 10"  15 x 10" 15x10"    -  -  -  -  -   Hamstring stretch 3x30"  longsit  3 x 30"  3 x 30" 3x30"  3 x 30"  3x30" 3x30"  3x30"  3x30"  3x30"    Quad sets (towel at knee)     10 x 10" 10x10" 10 x 10"  10x10"  10x10" -   -  -   Quad sets (towel at ankle)     10 x 10" 10x10"  10 x 10" 10x10" 10x10" -   -  -   SLR:  4 way  3x10  20 ea  20 ea 20x ea  30 ea 3x10   3x10  3x10 3x10  3x10    Clam shells 20x   NV   20x3"  20 20  20   20x  20x  20x   Prone hamstring curls 30x  30  30  30x    20  20 -   -  -   Bike 10' 10'   10' 10'  10'   10'  10'  10'  10' 10'    Prone Quad stretch     3 x 30" 3x30"     -  -  -  -  -   Mini Squats 2x15  2 x 10  NV  2x10  2 x 20 2x20  2x20  2x20   np np    Step ups 2R 20x    NV  2R 2x10  2R  20 2R 20x   2R 20x 2R 20x   np 2R 20x   tandem on foam   3 x 30" ea  NV  3x30"  3 x 30"  3x30"  SLS  SLS 3x30 -   -  -    TM 10' 2 0    5' , 1 4 max  7' 1 5 mph  8', 2 0 max 8' 2 0 mph   8' 2 0 mph   10' 2 0 mph 10' 2 0 mph   10' 2 0 mph   Rocker board            20x 20x ea np np   TKE @ tband            PTB 20x PTB 20x np np   SLS on foam 0* and 20*              3x30" ea np np   Ecc step downs              2" 2x10 np np   Plate lunges              x5 np np   Wall sit SLR               NV np np   S/l Squat wall support              10x np np                              Modalities  7/19 7/26 7/30                  CP prn   10'  declined                                           Supervised by LIZ, PT 4:40-5:10pm

## 2018-08-24 ENCOUNTER — HOSPITAL ENCOUNTER (EMERGENCY)
Facility: HOSPITAL | Age: 38
Discharge: HOME | End: 2018-08-24
Attending: EMERGENCY MEDICINE | Admitting: EMERGENCY MEDICINE
Payer: COMMERCIAL

## 2018-08-24 VITALS
WEIGHT: 129 LBS | SYSTOLIC BLOOD PRESSURE: 123 MMHG | OXYGEN SATURATION: 99 % | HEART RATE: 76 BPM | RESPIRATION RATE: 21 BRPM | TEMPERATURE: 97.2 F | BODY MASS INDEX: 25.19 KG/M2 | DIASTOLIC BLOOD PRESSURE: 83 MMHG

## 2018-08-24 DIAGNOSIS — J06.9 UPPER RESPIRATORY TRACT INFECTION, UNSPECIFIED TYPE: Primary | ICD-10-CM
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
## 2018-08-24 PROCEDURE — 99283 EMERGENCY DEPT VISIT LOW MDM: CPT

## 2018-08-24 RX ORDER — FLUTICASONE PROPIONATE 50 MCG
2 SPRAY, SUSPENSION (ML) NASAL DAILY
Qty: 16 G | Refills: 0 | Status: SHIPPED | OUTPATIENT
Start: 2018-08-24 | End: 2022-02-15

## 2018-08-24 RX ORDER — ALBUTEROL SULFATE 90 UG/1
2 AEROSOL, METERED RESPIRATORY (INHALATION) EVERY 6 HOURS PRN
Qty: 1 INHALER | Refills: 0 | Status: SHIPPED | OUTPATIENT
Start: 2018-08-24 | End: 2021-06-07 | Stop reason: ALTCHOICE

## 2018-08-24 RX ORDER — PREDNISONE 50 MG/1
50 TABLET ORAL DAILY
Qty: 5 TABLET | Refills: 0 | Status: SHIPPED | OUTPATIENT
Start: 2018-08-24 | End: 2018-08-29

## 2018-08-24 RX ORDER — BENZONATATE 100 MG/1
100 CAPSULE ORAL 3 TIMES DAILY PRN
Qty: 15 CAPSULE | Refills: 0 | Status: SHIPPED | OUTPATIENT
Start: 2018-08-24 | End: 2018-09-25 | Stop reason: ALTCHOICE

## 2018-08-25 NOTE — DISCHARGE INSTRUCTIONS
Continue to use Zyrtec  Use medications as prescribed  Motrin and/or Tylenol as needed for pain or fever  Follow-up with family doctor in 3-5 days if not feeling better  Return to the ER if symptoms worsen     Upper Respiratory Infection   WHAT YOU NEED TO KNOW:   An upper respiratory infection is also called the common cold  It is an infection that can affect your nose, throat, ears, and sinuses  For healthy people, the common cold is usually not serious and does not need special treatment  Cold symptoms are usually worst for the first 3 to 5 days  Most people get better in 7 to 14 days  You may continue to cough for 2 to 3 weeks  Colds are caused by viruses and do not get better with antibiotics  DISCHARGE INSTRUCTIONS:   Return to the emergency department if:   · You have chest pain or trouble breathing  Contact your healthcare provider if:   · You have a fever over 102ºF (39°C)  · Your sore throat gets worse or you see white or yellow spots in your throat  · Your symptoms get worse after 3 to 5 days or your cold is not better in 14 days  · You have a rash anywhere on your skin  · You have large, tender lumps in your neck  · You have thick, green or yellow drainage from your nose  · You cough up thick yellow, green, or bloody mucus  · You have vomiting for more than 24 hours and cannot keep fluids down  · You have a bad earache  · You have questions or concerns about your condition or care  Medicines: You may need any of the following:  · Decongestants  help reduce nasal congestion and help you breathe more easily  If you take decongestant pills, they may make you feel restless or cause problems with your sleep  Do not use decongestant sprays for more than a few days  · Cough suppressants  help reduce coughing  Ask your healthcare provider which type of cough medicine is best for you  · NSAIDs , such as ibuprofen, help decrease swelling, pain, and fever   NSAIDs can cause stomach bleeding or kidney problems in certain people  If you take blood thinner medicine, always ask your healthcare provider if NSAIDs are safe for you  Always read the medicine label and follow directions  · Acetaminophen  decreases pain and fever  It is available without a doctor's order  Ask how much to take and how often to take it  Follow directions  Read the labels of all other medicines you are using to see if they also contain acetaminophen, or ask your doctor or pharmacist  Acetaminophen can cause liver damage if not taken correctly  Do not use more than 4 grams (4,000 milligrams) total of acetaminophen in one day  · Take your medicine as directed  Contact your healthcare provider if you think your medicine is not helping or if you have side effects  Tell him or her if you are allergic to any medicine  Keep a list of the medicines, vitamins, and herbs you take  Include the amounts, and when and why you take them  Bring the list or the pill bottles to follow-up visits  Carry your medicine list with you in case of an emergency  Follow up with your healthcare provider as directed:  Write down your questions so you remember to ask them during your visits  Self-care:   · Rest as much as possible  Slowly start to do more each day  · Drink more liquids as directed  Liquids will help thin and loosen mucus so you can cough it up  Liquids will also help prevent dehydration  Liquids that help prevent dehydration include water, fruit juice, and broth  Do not drink liquids that contain caffeine  Caffeine can increase your risk for dehydration  Ask your healthcare provider how much liquid to drink each day  · Soothe a sore throat  Gargle with warm salt water  This helps your sore throat feel better  Make salt water by dissolving ¼ teaspoon salt in 1 cup warm water  You may also suck on hard candy or throat lozenges  You may use a sore throat spray  · Use a humidifier or vaporizer    Use a cool mist humidifier or a vaporizer to increase air moisture in your home  This may make it easier for you to breathe and help decrease your cough  · Use saline nasal drops as directed  These help relieve congestion  · Apply petroleum-based jelly around the outside of your nostrils  This can decrease irritation from blowing your nose  · Do not smoke  Nicotine and other chemicals in cigarettes and cigars can make your symptoms worse  They can also cause infections such as bronchitis or pneumonia  Ask your healthcare provider for information if you currently smoke and need help to quit  E-cigarettes or smokeless tobacco still contain nicotine  Talk to your healthcare provider before you use these products  Prevent spreading your cold to others:   · Try to stay away from other people during the first 2 to 3 days of your cold when it is more easily spread  · Do not share food or drinks  · Do not share hand towels with household members  · Wash your hands often, especially after you blow your nose  Turn away from other people and cover your mouth and nose with a tissue when you sneeze or cough  © 2017 [facility] Information is for End User's use only and may not be sold, redistributed or otherwise used for commercial purposes  All illustrations and images included in CareNotes® are the copyrighted property of [employer]  or [employer]  The above information is an  only  It is not intended as medical advice for individual conditions or treatments  Talk to your doctor, nurse or pharmacist before following any medical regimen to see if it is safe and effective for you  Insect Bite or Sting   WHAT YOU NEED TO KNOW:   Most insect bites and stings are not dangerous and go away without treatment  Your symptoms may be mild, or you may develop anaphylaxis  Anaphylaxis is a sudden, life-threatening reaction that needs immediate treatment   Common examples of insects that bite or sting are bees, ticks, mosquitoes, spiders, and ants  Insect bites or stings can lead to diseases such as malaria, West Nile virus, Lyme disease, or [place] Spotted Fever  DISCHARGE INSTRUCTIONS:   Call 911 for signs or symptoms of anaphylaxis,  such as trouble breathing, swelling in your mouth or throat, or wheezing  You may also have itching, a rash, hives, or feel like you are going to faint  Return to the emergency department if:   · You are stung on your tongue or in your throat  · A white area forms around the bite  · You are sweating badly or have body pain  · You think you were bitten or stung by a poisonous insect  Contact your healthcare provider if:   · You have a fever  · The area becomes red, warm, tender, and swollen beyond the area of the bite or sting  · You have questions or concerns about your condition or care  Medicines:   · Antihistamines  decrease itching and rash  · Epinephrine  is used to treat severe allergic reactions such as anaphylaxis  · Take your medicine as directed  Contact your healthcare provider if you think your medicine is not helping or if you have side effects  Tell him of her if you are allergic to any medicine  Keep a list of the medicines, vitamins, and herbs you take  Include the amounts, and when and why you take them  Bring the list or the pill bottles to follow-up visits  Carry your medicine list with you in case of an emergency  Steps to take for signs or symptoms of anaphylaxis:   · Immediately  give 1 shot of epinephrine only into the outer thigh muscle  · Leave the shot in place  as directed  Your healthcare provider may recommend you leave it in place for up to 10 seconds before you remove it  This helps make sure all of the epinephrine is delivered  · Call 911 and go to the emergency department,  even if the shot improved symptoms  Do not drive yourself   Bring the used epinephrine shot with you   Safety precautions to take if you are at risk for anaphylaxis:   · Keep 2 shots of epinephrine with you at all times  You may need a second shot, because epinephrine only works for about 20 minutes and symptoms may return  Your healthcare provider can show you and family members how to give the shot  Check the expiration date every month and replace it before it expires  · Create an action plan  Your healthcare provider can help you create a written plan that explains the allergy and an emergency plan to treat a reaction  The plan explains when to give a second epinephrine shot if symptoms return or do not improve after the first  Give copies of the action plan and emergency instructions to family members, work and school staff, and  providers  Show them how to give a shot of epinephrine  · Carry medical alert identification  Wear medical alert jewelry or carry a card that says you have an insect allergy  Ask your healthcare provider where to get these items  If an insect bites or stings you:   · Remove the stinger  Scrape the stinger out with your fingernail, edge of a credit card, or a knife blade  Do not squeeze the wound  Gently wash the area with soap and water  · Remove the tick  Ticks must be removed as soon as possible so you do not get diseases passed through tick bites  Ask your healthcare provider for more information on tick bites and how to remove ticks  Care for a bite or sting wound:   · Elevate the affected area  Prop the wound above the level of your heart, if possible  Elevate the area for 10 to 20 minutes each hour or as directed by your healthcare provider  · Use compresses  Soak a clean washcloth in cold water, wring it out, and put it on the bite or sting  Use the compress for 10 to 20 minutes each hour or as directed by your healthcare provider  After 24 to 48 hours, change to warm compresses  · Apply a paste    Add water to baking soda to make a thick paste  Put the paste on the area for 5 minutes  Rinse gently to remove the paste  Prevent another insect bite or sting:   · Do not wear bright-colored or flower-print clothing when you plan to spend time outdoors  Do not use hairspray, perfumes, or aftershave  · Do not leave food out  · Empty any standing water and wash container with soap and water every 2 days  · Put screens on all open windows and doors  · Put insect repellent that contains DEET on skin that is showing when you go outside  Put insect repellent at the top of your boots, bottom of pant legs, and sleeve cuffs  Wear long sleeves, pants, and shoes  · Use citronella candles outdoors to help keep mosquitoes away  Put a tick and flea collar on pets  Follow up with your healthcare provider as directed:  Write down your questions so you remember to ask them during your visits  © 2017 [employer] [employer] Information is for End User's use only and may not be sold, redistributed or otherwise used for commercial purposes  All illustrations and images included in CareNotes® are the copyrighted property of [employer]  or [employer]  The above information is an  only  It is not intended as medical advice for individual conditions or treatments  Talk to your doctor, nurse or pharmacist before following any medical regimen to see if it is safe and effective for you

## 2018-08-27 ENCOUNTER — OFFICE VISIT (OUTPATIENT)
Dept: PHYSICAL THERAPY | Facility: CLINIC | Age: 38
End: 2018-08-27
Payer: COMMERCIAL

## 2018-08-27 DIAGNOSIS — S83.511D RUPTURE OF ANTERIOR CRUCIATE LIGAMENT OF RIGHT KNEE, SUBSEQUENT ENCOUNTER: Primary | ICD-10-CM

## 2018-08-27 DIAGNOSIS — M25.561 RIGHT KNEE PAIN, UNSPECIFIED CHRONICITY: ICD-10-CM

## 2018-08-27 DIAGNOSIS — S83.511A RUPTURE OF ANTERIOR CRUCIATE LIGAMENT OF RIGHT KNEE, INITIAL ENCOUNTER: ICD-10-CM

## 2018-08-27 PROCEDURE — 97110 THERAPEUTIC EXERCISES: CPT | Performed by: PHYSICAL MEDICINE & REHABILITATION

## 2018-08-27 PROCEDURE — 97112 NEUROMUSCULAR REEDUCATION: CPT | Performed by: PHYSICAL MEDICINE & REHABILITATION

## 2018-08-27 NOTE — PROGRESS NOTES
Daily Note     Today's date: 2018  Patient name: Hanny Bridges  : 1980  MRN: [id number]  Referring provider: Amadeo Baum MD  Dx:   Encounter Diagnosis     ICD-10-CM    1  Rupture of anterior cruciate ligament of right knee, subsequent encounter S83 511D    2  Right knee pain, unspecified chronicity M25 561    3  Rupture of anterior cruciate ligament of right knee, initial encounter S83 511A        Start Time:   Stop Time:   Total time in clinic (min): 50 minutes    Subjective: Pt states she feels good but states she has to repeatedly go into heel raises at work in which she gets fatigued during her 8 hour work shift         Objective: See treatment diary below  Precautions: Anxiety, latex allergy, neck pain, GERD     Daily Treatment Diary      Manual     Patellar mobs HY    NC                 PROM HY    NC HY          HY                              FOTO      60            41        Exercise Diary     Heel slides     15 x 10" 15x10"    -  -  -  -  -   Hamstring stretch 3x30" 3x30"  3 x 30" 3x30"  3 x 30"  3x30" 3x30"  3x30"  3x30"  3x30"    Quad sets (towel at knee)     10 x 10" 10x10" 10 x 10"  10x10"  10x10" -   -  -   Quad sets (towel at ankle)     10 x 10" 10x10"  10 x 10" 10x10" 10x10" -   -  -   SLR:  4 way  3x10 20 ea  20 ea 20x ea  30 ea 3x10   3x10  3x10 3x10  3x10    Clam shells 20x peach TB 20x NV   20x3"  20 20  20   20x  20x  20x   Prone hamstring curls 30x 30x  30  30x    20  20 -   -  -   Bike 10' np  10' 10'  10'   10'  10'  10'  10 10'    Prone Quad stretch     3 x 30" 3x30"     -  -  -  -  -   Mini Squats 2x15 2x15 with abd band  NV  2x10  2 x 20 2x20  2x20  2x20   np np    Step ups   2R 20x Lat/A-P  10x ea  NV  2R 2x10  2R  20 2R 20x   2R 20x 2R 20x   np 2R 20x     tandem on foam     NV  3x30"  3 x 30"  3x30"  SLS  SLS 3x30 -   -  -    TM 10' 2 0 10' 2 6  5' , 1 4 max  7' 1 5 mph  8', 2 0 max 8' 2 0 mph   8' 2 0 mph   10' 2 0 mph 10' 2 0 mph   10' 2 0 mph   Rocker board   30" A/P- Lateral  1x10 ea          20x 20x ea np np   TKE @ tband            PTB 20x PTB 20x np np   SLS on foam 0* and 20*              3x30" ea np np   Ecc step downs   Lateral  2x10           2" 2x10 np np   Plate lunges              x5 np np   Wall sit SLR               NV np np   S/l Squat wall support               10x np np    SAQ    2x15                      Modalities  7/19 7/26 7/30                  CP prn   10'  declined                                                  Assessment: Tolerated treatment well  Patient demonstrated fatigue post treatment, exhibited good technique with therapeutic exercises and would benefit from continued PT  Pt cued to put more weight on heels during mini squat to prevent anterior tibial translation  Pt also cued to keep feet facing forward during lateral step up  Pt states she felt more challenged today and feels quadriceps fatigue  Plan: Continue per plan of care  Progress treament per protocol

## 2018-08-30 ENCOUNTER — EVALUATION (OUTPATIENT)
Dept: PHYSICAL THERAPY | Facility: CLINIC | Age: 38
End: 2018-08-30
Payer: COMMERCIAL

## 2018-08-30 DIAGNOSIS — M25.561 RIGHT KNEE PAIN, UNSPECIFIED CHRONICITY: ICD-10-CM

## 2018-08-30 DIAGNOSIS — S83.511D RUPTURE OF ANTERIOR CRUCIATE LIGAMENT OF RIGHT KNEE, SUBSEQUENT ENCOUNTER: Primary | ICD-10-CM

## 2018-08-30 DIAGNOSIS — S83.511A RUPTURE OF ANTERIOR CRUCIATE LIGAMENT OF RIGHT KNEE, INITIAL ENCOUNTER: ICD-10-CM

## 2018-08-30 PROCEDURE — 97110 THERAPEUTIC EXERCISES: CPT | Performed by: PHYSICAL MEDICINE & REHABILITATION

## 2018-08-30 PROCEDURE — G8981 BODY POS CURRENT STATUS: HCPCS | Performed by: PHYSICAL MEDICINE & REHABILITATION

## 2018-08-30 PROCEDURE — 97140 MANUAL THERAPY 1/> REGIONS: CPT | Performed by: PHYSICAL MEDICINE & REHABILITATION

## 2018-08-30 PROCEDURE — 97112 NEUROMUSCULAR REEDUCATION: CPT | Performed by: PHYSICAL MEDICINE & REHABILITATION

## 2018-08-30 PROCEDURE — G8982 BODY POS GOAL STATUS: HCPCS | Performed by: PHYSICAL MEDICINE & REHABILITATION

## 2018-08-30 NOTE — PROGRESS NOTES
PT Re-Evaluation     Today's date: 2018  Patient name: Roger Peters  : 1980  MRN: [id number]  Referring provider: Julito aJime MD  Dx:   Encounter Diagnosis     ICD-10-CM    1  Rupture of anterior cruciate ligament of right knee, subsequent encounter S83 511D    2  Right knee pain, unspecified chronicity M25 561    3  Rupture of anterior cruciate ligament of right knee, initial encounter S83 511A                   Assessment  Impairments: abnormal muscle firing, abnormal or restricted ROM, activity intolerance, lacks appropriate home exercise program, pain with function and weight-bearing intolerance    Assessment details: Lelo Mcdonald has been attending outpatient physical therapy s/p R ACL repair  Since the last assessment, patient demonstrates decreased pain levels, improved range of motion, improved strength, and increased tolerance to activity  Pt continues to present with pain,decreased strength, and decreased tolerance to activity  Pt would benefit from continued skilled physical therapy to address limitations and to achieve goals  Thank you for this referral    Understanding of Dx/Px/POC: good   Prognosis: good    Goals  1  Patient will be independent in pre-operative HEP to maximize right knee ROM and strength for upcoming surgery  MET  2  Patient will demonstrate good quad set comparable to contralateral side in 1 weeks  MET  3  Patient will demonstrate ability to perform 10 SLR without quad lag in 4 weeks  MET  4  Patient will demonstrate right knee AROM 0-120 degrees in 4 weeks   MET    Plan  Patient would benefit from: skilled physical therapy  Planned modality interventions: cryotherapy, biofeedback, TENS and thermotherapy: hydrocollator packs  Planned therapy interventions: neuromuscular re-education, patient education, therapeutic exercise, home exercise program, functional ROM exercises, flexibility, balance, strengthening, stretching, abdominal trunk stabilization, joint mobilization, manual therapy, gait training and therapeutic activities  Frequency: 2x week  Duration in weeks: 6  Treatment plan discussed with: patient        Subjective Evaluation    History of Present Illness  Mechanism of injury: Pt reports that she is not currently experiencing pain in her knee, she does occasionally experience tightness  Her chief complaint is low back pain for approximately 3 days, c insidious onset  She states that her sxs change with the weather, some days are worse than others   Occupation: garment   Pain  No pain reported  Current pain ratin  At best pain ratin (rest, epsom salt bath, ibuprofen)  At worst pain ratin (walking, stairs, bending)  Quality: tight  Aggravating factors: nothing  Progression: improved    Patient Goals  Patient goals for therapy: increased motion, decreased pain, decreased edema, increased strength, independence with ADLs/IADLs, return to sport/leisure activities and return to work  Patient goal: Partially met        Objective     Tenderness     Additional Tenderness Details  Anterior knee joint and posterior medial knee     Active Range of Motion   Left Knee   Flexion: 125 degrees   Extension: 0 degrees     Right Knee   Flexion: 125 degrees   Extension: 0 degrees   Extensor lag: WFL    Strength/Myotome Testing     Left Knee   Flexion: 5  Prone flexion: 5  Quadriceps contraction: good    Right Knee   Flexion: 4-  Prone flexion: 3+  Extension: 3+  Quadriceps contraction: fair    Additional Strength Details  Graded MMT for extension, pain with minimal force    Hip abduction left = 4+/5, right = 4/5, compensation with hip flexor dominance  Functional Assessment   Squat   Left valgus, left tibial anterior translation beyond toes, right valgus and right tibial anterior translation beyond toes  Comments  Pt educated on proper squat mechanics, improvement with verbal, visual, and tactile cueing         Flowsheet Rows      Most Recent Value   PT/OT G-Codes   Current Score  39   Projected Score  59          Precautions: Anxiety, latex allergy, neck pain, GERD    Daily Treatment Diary     Precautions: Anxiety, latex allergy, neck pain, GERD  Daily Treatment Diary      Manual  7/19 7/23 7/26 7/30 8/6 8/9 8/13 8/16 8/20   Patellar mobs HY    NC                 PROM HY    NC HY          HY                              FOTO      60            41        Exercise Diary  8/23 8/27 8/30 7/30  8/2 8/6  8/9 8/13 8/16 8/20   Heel slides      DC 15x10"    -  -  -  -  -   Hamstring stretch 3x30" 3x30"  3 x 30" 3x30"  3 x 30"  3x30" 3x30"  3x30"  3x30"  3x30"    Quad sets (towel at knee)      DC 10x10" 10 x 10"  10x10"  10x10" -   -  -   Quad sets (towel at ankle)      DC 10x10"  10 x 10" 10x10" 10x10" -   -  -   SLR:  4 way  3x10 20 ea DC 20x ea  30 ea 3x10   3x10  3x10 3x10  3x10    Clam shells 20x peach TB 20x DC  20x3"  20 20  20   20x  20x  20x   Prone hamstring curls 30x 30x  DC  30x    20  20 -   -  -   Bike 10' np np' 10'  10'   10'  10'  10'  10' 10'    Prone Quad stretch      3 x 30" 3x30"     -  -  -  -  -   Mini Squats 2x15 2x15 with abd band  NV  2x10  2 x 20 2x20  2x20  2x20   np np    Step ups    2R 20x Lat/A-P  10x ea  NV  2R 2x10  2R  20 2R 20x   2R 20x 2R 20x   np 2R 20x     tandem on foam      NV  3x30"  3 x 30"  3x30"  SLS  SLS 3x30 -   -  -    TM 10' 2 0 10' 2 6 10', 2 6 max  7' 1 5 mph  8', 2 0 max 8' 2 0 mph   8' 2 0 mph   10' 2 0 mph 10' 2 0 mph   10' 2 0 mph   Rocker board   30" A/P- Lateral  1x10 ea          20x 20x ea np np   SLS    2 x 30" level surface                       TKE @ tband             PTB 20x PTB 20x np np   SLS on foam 0* and 20*     3 x 20"          3x30" ea np np   Ecc step downs   Lateral  2x10           2" 2x10 np np   Plate lunges               x5 np np   Wall squat     3 x 10"          NV np np   S/lSLR  Squat wall support               10x np np    SAQ    2x15 2x 10 c 5" hold                     Modalities 7/19 7/26 7/30                  CP prn   10'  declined

## 2018-09-10 ENCOUNTER — OFFICE VISIT (OUTPATIENT)
Dept: PHYSICAL THERAPY | Facility: CLINIC | Age: 38
End: 2018-09-10
Payer: COMMERCIAL

## 2018-09-10 DIAGNOSIS — S83.511A RUPTURE OF ANTERIOR CRUCIATE LIGAMENT OF RIGHT KNEE, INITIAL ENCOUNTER: ICD-10-CM

## 2018-09-10 DIAGNOSIS — S83.511D RUPTURE OF ANTERIOR CRUCIATE LIGAMENT OF RIGHT KNEE, SUBSEQUENT ENCOUNTER: Primary | ICD-10-CM

## 2018-09-10 DIAGNOSIS — M25.561 RIGHT KNEE PAIN, UNSPECIFIED CHRONICITY: ICD-10-CM

## 2018-09-10 PROCEDURE — 97112 NEUROMUSCULAR REEDUCATION: CPT

## 2018-09-10 PROCEDURE — 97110 THERAPEUTIC EXERCISES: CPT

## 2018-09-10 NOTE — PROGRESS NOTES
Daily Note     Today's date: 9/10/2018  Patient name: Timoteo Douglas  : 1980  MRN: [id number]  Referring provider: Cari Garcia MD  Dx:   Encounter Diagnosis     ICD-10-CM    1  Rupture of anterior cruciate ligament of right knee, subsequent encounter S83 511D    2  Right knee pain, unspecified chronicity M25 561    3  Rupture of anterior cruciate ligament of right knee, initial encounter S83 511A        Start Time: 8821  Stop Time: 4549  Total time in clinic (min): 45 minutes    Subjective: Pt states she feels ok today, no c/o pain or stiffness  Objective: See treatment diary below    Precautions: Anxiety, latex allergy, neck pain, GERD    Daily Treatment Diary        Manual              Patellar mobs             PROM                           FOTO                Exercise Diary  8/23  8/27  8/30 9/10         Hamstring stretch 3x30" 3x30"  3 x 30" 3x30"         Bike 10' np np' np         Prone Quad stretch      3 x 30" 3x30"         Mini Squats 2x15 2x15 with abd band  NV 2x15         Step ups    2R 20x Lat/A-P  10x ea  NV 2R 20x          tandem on foam      NV np          TM 10' 2 0 10' 2 6 10', 2 6 max 10', 2 6 max         Rocker board   30" A/P- Lateral  1x10 ea    30" A/P- Lateral  1x10 ea         SLS    2 x 30" level surface 2x30" level surface         TKE @ tband                SLS on foam 0* and 20*     3 x 20"  3x20"         Ecc step downs   Lateral  2x10            Plate lunges                Wall squat     3 x 10"  3x10"         S/lSLR  Squat wall support                 SAQ    2x15 2x 10 c 5" hold  2x10   5" hold            Modalities                     CP prn                                                    Assessment: Tolerated treatment well  Patient demonstrated fatigue post treatment, exhibited good technique with therapeutic exercises and would benefit from continued PT  Pt continues to show moderate limitations in R quad strength with lateral step downs   Pt continues to work towards goals of increased RLE strength  Pt will benefit from continued PT to increase strength, flexibility and function  Continue to progress as able  Plan: Continue per plan of care  Progress treament per protocol

## 2018-09-13 ENCOUNTER — OFFICE VISIT (OUTPATIENT)
Dept: PHYSICAL THERAPY | Facility: CLINIC | Age: 38
End: 2018-09-13
Payer: COMMERCIAL

## 2018-09-13 DIAGNOSIS — M25.561 RIGHT KNEE PAIN, UNSPECIFIED CHRONICITY: ICD-10-CM

## 2018-09-13 DIAGNOSIS — S83.511D RUPTURE OF ANTERIOR CRUCIATE LIGAMENT OF RIGHT KNEE, SUBSEQUENT ENCOUNTER: Primary | ICD-10-CM

## 2018-09-13 DIAGNOSIS — S83.511A RUPTURE OF ANTERIOR CRUCIATE LIGAMENT OF RIGHT KNEE, INITIAL ENCOUNTER: ICD-10-CM

## 2018-09-13 PROCEDURE — 97110 THERAPEUTIC EXERCISES: CPT | Performed by: PHYSICAL MEDICINE & REHABILITATION

## 2018-09-13 PROCEDURE — 97112 NEUROMUSCULAR REEDUCATION: CPT | Performed by: PHYSICAL MEDICINE & REHABILITATION

## 2018-09-13 NOTE — PROGRESS NOTES
Daily Note     Today's date: 2018  Patient name: Raymundo Mack  : 1980  MRN: [id number]  Referring provider: Mary Figueroa MD  Dx:   Encounter Diagnosis     ICD-10-CM    1  Rupture of anterior cruciate ligament of right knee, subsequent encounter S83 511D    2  Right knee pain, unspecified chronicity M25 561    3  Rupture of anterior cruciate ligament of right knee, initial encounter S83 511A        Start Time: 1700  Stop Time: 1750  Total time in clinic (min): 50 minutes    Subjective: Pt states that she is feeling fatigued, but overall her knee is feeling better  She is able to perform her work duties easier without onset of sxs         Objective: See treatment diary below  Precautions: Anxiety, latex allergy, neck pain, GERD     Daily Treatment Diary         Manual                        Patellar mobs                       PROM                                               FOTO                          Exercise Diary  8/23  8/27  8/30 9/10  9/13             Hamstring stretch 3x30" 3x30"  3 x 30" 3x30" 3 x 30"              Bike 10' np np' np  10'             Prone Quad stretch      3 x 30" 3x30"               Mini Squats 2x15 2x15 with abd band  NV 2x15               Step ups    2R 20x Lat/A-P  10x ea  NV 2R 20x  1R x 30 (increased speed)              tandem on foam      NV np                TM 10' 2 0 10' 2 6 10', 2 6 max 10', 2 6 max               Rocker board   30" A/P- Lateral  1x10 ea    30" A/P- Lateral  1x10 ea               SLS      2 x 30" level surface 2x30" level surface               TKE @ tband                       SLS on foam 0* and 20*     3 x 20"  3x20"  3 x 30"             Ecc step downs   Lateral  2x10     Fwd x 10, lat x 20              Plate lunges                       Wall squat     3 x 10"  3x10"  3 x 15"             S/lSLR  Squat wall support                        SAQ    2x15 2x 10 c 5" hold  2x10   5" hold  2 x 15  5" hold                Modalities                      CP prn                                             Assessment: Tolerated treatment well  Patient demonstrated fatigue post treatment and exhibited good technique with therapeutic exercises  Continues to demonstrate eccentric quad control deficits, but improved control of the knee during standing exercises with limited anterior translation and no valgus collapse  Plan: Progress treament per protocol

## 2018-09-17 ENCOUNTER — OFFICE VISIT (OUTPATIENT)
Dept: PHYSICAL THERAPY | Facility: CLINIC | Age: 38
End: 2018-09-17
Payer: COMMERCIAL

## 2018-09-17 DIAGNOSIS — S83.511D RUPTURE OF ANTERIOR CRUCIATE LIGAMENT OF RIGHT KNEE, SUBSEQUENT ENCOUNTER: Primary | ICD-10-CM

## 2018-09-17 DIAGNOSIS — M25.561 RIGHT KNEE PAIN, UNSPECIFIED CHRONICITY: ICD-10-CM

## 2018-09-17 DIAGNOSIS — S83.511A RUPTURE OF ANTERIOR CRUCIATE LIGAMENT OF RIGHT KNEE, INITIAL ENCOUNTER: ICD-10-CM

## 2018-09-17 PROCEDURE — 97112 NEUROMUSCULAR REEDUCATION: CPT

## 2018-09-17 PROCEDURE — 97110 THERAPEUTIC EXERCISES: CPT

## 2018-09-17 NOTE — PROGRESS NOTES
Daily Note     Today's date: 2018  Patient name: Roger Peters  : 1980  MRN: [id number]  Referring provider: Julito Jaime MD  Dx:   Encounter Diagnosis     ICD-10-CM    1  Rupture of anterior cruciate ligament of right knee, subsequent encounter S83 511D    2  Right knee pain, unspecified chronicity M25 561    3  Rupture of anterior cruciate ligament of right knee, initial encounter S83 511A        Start Time: 0512  Stop Time: 1630  Total time in clinic (min): 45 minutes    Subjective: Pt states that she is feeling some increased pain today          Objective: See treatment diary below  Precautions: Anxiety, latex allergy, neck pain, GERD     Daily Treatment Diary         Manual                        Patellar mobs                       PROM                                               FOTO                          Exercise Diary  8/23  8/27  8/30 9/10  9/13  9/17           Hamstring stretch 3x30" 3x30"  3 x 30" 3x30" 3 x 30"  3x30"            Bike 10' np np' np  10'  10'           Prone Quad stretch      3 x 30" 3x30"               Mini Squats 2x15 2x15 with abd band  NV 2x15   2x15            Step ups    2R 20x Lat/A-P  10x ea  NV 2R 20x  1R x 30 (increased speed)  1R x 30 (increased speed)            tandem on foam      NV np                TM 10' 2 0 10' 2 6 10', 2 6 max 10', 2 6 max    10', 2 6 max           Rocker board   30" A/P- Lateral  1x10 ea    30" A/P- Lateral  1x10 ea   30" A/P- Lateral  1x10 ea            SLS      2 x 30" level surface 2x30" level surface               TKE @ tband                       SLS on foam 0* and 20*     3 x 20"  3x20"  3 x 30" 3x30"           Ecc step downs   Lateral  2x10     Fwd x 10, lat x 20   Fwd x 10, lat x 20            Plate lunges                       Wall squat     3 x 10"  3x10"  3 x 15" 3x20"            S/lSLR  Squat wall support                        SAQ    2x15 2x 10 c 5" hold  2x10   5" hold  2 x 15  5" hold  2 x 15  5" hold             Modalities                        CP prn                                         Assessment: Tolerated treatment well  Patient demonstrated fatigue post treatment and exhibited good technique with therapeutic exercises  Pt continues to show reduced quad control with SLS with knee flexed  Pt will benefit from further skilled PT to increase strength, flexibility and function  Continue to progress as able  Plan: Progress treament per protocol

## 2018-09-18 ENCOUNTER — HOSPITAL ENCOUNTER (EMERGENCY)
Facility: HOSPITAL | Age: 38
Discharge: HOME | End: 2018-09-18
Attending: EMERGENCY MEDICINE | Admitting: EMERGENCY MEDICINE
Payer: COMMERCIAL

## 2018-09-18 VITALS
OXYGEN SATURATION: 100 % | BODY MASS INDEX: 25.66 KG/M2 | WEIGHT: 131.39 LBS | RESPIRATION RATE: 18 BRPM | SYSTOLIC BLOOD PRESSURE: 139 MMHG | TEMPERATURE: 97.4 F | DIASTOLIC BLOOD PRESSURE: 79 MMHG | HEART RATE: 82 BPM

## 2018-09-18 DIAGNOSIS — K29.70 GASTRITIS: Primary | ICD-10-CM

## 2018-09-18 PROCEDURE — 99283 EMERGENCY DEPT VISIT LOW MDM: CPT

## 2018-09-18 RX ORDER — OMEPRAZOLE 40 MG/1
40 CAPSULE, DELAYED RELEASE ORAL DAILY
Qty: 30 CAPSULE | Refills: 0 | Status: SHIPPED | OUTPATIENT
Start: 2018-09-18 | End: 2021-02-11 | Stop reason: SDUPTHER

## 2018-09-18 RX ORDER — MAGNESIUM HYDROXIDE/ALUMINUM HYDROXICE/SIMETHICONE 120; 1200; 1200 MG/30ML; MG/30ML; MG/30ML
30 SUSPENSION ORAL ONCE
Status: COMPLETED | OUTPATIENT
Start: 2018-09-18 | End: 2018-09-18

## 2018-09-18 RX ORDER — FAMOTIDINE 40 MG/1
40 TABLET, FILM COATED ORAL
Qty: 30 TABLET | Refills: 0 | Status: SHIPPED | OUTPATIENT
Start: 2018-09-18 | End: 2021-02-11 | Stop reason: SDUPTHER

## 2018-09-18 RX ORDER — MAGNESIUM HYDROXIDE/ALUMINUM HYDROXICE/SIMETHICONE 120; 1200; 1200 MG/30ML; MG/30ML; MG/30ML
SUSPENSION ORAL
Status: COMPLETED
Start: 2018-09-18 | End: 2018-09-18

## 2018-09-18 RX ADMIN — MAGNESIUM HYDROXIDE/ALUMINUM HYDROXICE/SIMETHICONE 30 ML: 120; 1200; 1200 SUSPENSION ORAL at 17:02

## 2018-09-18 RX ADMIN — ALUMINUM HYDROXIDE, MAGNESIUM HYDROXIDE, AND SIMETHICONE 30 ML: 200; 200; 20 SUSPENSION ORAL at 17:02

## 2018-09-18 RX ADMIN — LIDOCAINE HYDROCHLORIDE 15 ML: 20 SOLUTION ORAL; TOPICAL at 17:02

## 2018-09-18 NOTE — ED PROVIDER NOTES
History  Chief Complaint   Patient presents with    Abdominal Pain     epigastric, since friday    Nausea     Patient is a 53-year-old female  She has a long history of gastritis  She is out of her famotidine and omeprazole  For the last couple days she has been having a lot of epigastric pain  It is a burning pain that is worse at night  It is identical to her prior gastritis  No nausea or vomiting  No diarrhea or constipation  No hematemesis, attic easy or melena  No fever or chills  No vaginal or urinary symptoms  No chest pain or shortness of breath  Patient has had this same pain for a long time  She is followed by Gastroenterology  Currently symptoms are moderate in intensity  Aggravated by being out of her medication  Patient does not believe she is pregnant  Prior to Admission Medications   Prescriptions Last Dose Informant Patient Reported? Taking?    HYDROcodone-acetaminophen (NORCO) 5-325 mg per tablet   No No   Sig: Take 1 tablet by mouth every 6 (six) hours as needed for pain Max Daily Amount: 4 tablets   albuterol (PROVENTIL HFA,VENTOLIN HFA) 90 mcg/act inhaler   No No   Sig: Inhale 2 puffs every 6 (six) hours as needed for wheezing   benzonatate (TESSALON PERLES) 100 mg capsule   No No   Sig: Take 1 capsule (100 mg total) by mouth 3 (three) times a day as needed for cough   cetirizine (ZyrTEC) 10 mg tablet   No No   Sig: Take 1 tablet (10 mg total) by mouth 2 (two) times a day for 7 days   famotidine (PEPCID) 40 MG tablet   Yes No   Sig: TK 1 T PO HS   fluticasone (FLONASE) 50 mcg/act nasal spray   No No   Si sprays into each nostril daily   hydrOXYzine HCL (ATARAX) 25 mg tablet   No No   Sig: Take 1-2 tablets (25-50 mg total) by mouth daily at bedtime as needed for itching   hydrocortisone 2 5 % cream   No No   Sig: Apply topically 2 (two) times a day   naproxen (NAPROSYN) 500 mg tablet   No No   Sig: Take 1 tablet (500 mg total) by mouth 2 (two) times a day with meals Patient not taking: Reported on 8/30/2018    omeprazole (PriLOSEC) 40 MG capsule  Self Yes No   Sig: Take 40 mg by mouth daily     promethazine (PHENERGAN) 12 5 MG tablet   No No   Sig: Take 1 tablet (12 5 mg total) by mouth every 6 (six) hours as needed for nausea or vomiting      Facility-Administered Medications: None       Past Medical History:   Diagnosis Date    Acid reflux     Anxiety     Bursitis     Frequent headaches     Gastroparesis     Liver cyst     Lumbar herniated disc     Migraine     Seasonal allergies     Torn ACL     Wears glasses        Past Surgical History:   Procedure Laterality Date    KNEE SURGERY Right     IN ESOPHAGOGASTRODUODENOSCOPY TRANSORAL DIAGNOSTIC N/A 5/1/2017    Procedure: ESOPHAGOGASTRODUODENOSCOPY (EGD); Surgeon: Vince Dobbs MD;  Location: [facility]; Service: Gastroenterology    IN KNEE SCOPE,AID ANT CRUCIATE REPAIR Right 5/24/2018    Procedure: ARTHROSCOPIC RECONSTRUCTION ANTERIOR CRUCIATE LIGAMENT (ACL) WITH AUTOGRAFT AND ALLOGRAFT;  Surgeon: Jonelle Higgins MD;  Location: [facility];  Service: Orthopedics    TUBAL LIGATION      WISDOM TOOTH EXTRACTION         Family History   Problem Relation Age of Onset    Depression Mother     Anxiety disorder Mother     Sleep disorder Mother     Hyperlipidemia Father     Hypertension Father     Heart disease Father     Heart disease Paternal Aunt     Heart disease Paternal Uncle     Osteoporosis Maternal Grandmother     Alzheimer's disease Paternal Grandmother     Heart disease Paternal Grandmother     Heart disease Paternal Grandfather      I have reviewed and agree with the history as documented  Social History   Substance Use Topics    Smoking status: Never Smoker    Smokeless tobacco: Never Used    Alcohol use No        Review of Systems   Constitutional: Negative for chills and fever  HENT: Negative for rhinorrhea and sore throat  Eyes: Negative for pain, redness and visual disturbance  Respiratory: Negative for cough and shortness of breath  Cardiovascular: Negative for chest pain and leg swelling  Gastrointestinal: Positive for abdominal pain  Negative for diarrhea and vomiting  Endocrine: Negative for polydipsia and polyuria  Genitourinary: Negative for dysuria, frequency, hematuria, vaginal bleeding and vaginal discharge  Musculoskeletal: Negative for back pain and neck pain  Skin: Negative for rash and wound  Allergic/Immunologic: Negative for immunocompromised state  Neurological: Negative for weakness, numbness and headaches  Hematological: Does not bruise/bleed easily  Psychiatric/Behavioral: Negative for hallucinations and suicidal ideas  All other systems reviewed and are negative  Physical Exam  Physical Exam   Constitutional: She is oriented to person, place, and time  She appears well-developed and well-nourished  No distress  HENT:   Head: Normocephalic and atraumatic  Mouth/Throat: Oropharynx is clear and moist    Eyes: Conjunctivae are normal  Right eye exhibits no discharge  Left eye exhibits no discharge  No scleral icterus  Neck: Normal range of motion  Neck supple  Cardiovascular: Normal rate, regular rhythm, normal heart sounds and intact distal pulses  Exam reveals no gallop and no friction rub  No murmur heard  Pulmonary/Chest: Effort normal and breath sounds normal  No stridor  No respiratory distress  She has no wheezes  She has no rales  Abdominal: Soft  Bowel sounds are normal  She exhibits no distension and no mass  There is tenderness  There is no rebound and no guarding  No hernia  There is a mild amount of tenderness to the epigastrium  Musculoskeletal: Normal range of motion  She exhibits no edema, tenderness or deformity  No CVA tenderness  No calf tenderness/palpable cords  Neurological: She is alert and oriented to person, place, and time  She has normal strength  No sensory deficit  GCS eye subscore is 4   GCS verbal subscore is 5  GCS motor subscore is 6  Skin: Skin is warm and dry  No rash noted  She is not diaphoretic  Psychiatric: She has a normal mood and affect  Her behavior is normal    Vitals reviewed  Vital Signs  ED Triage Vitals [09/18/18 1620]   Temperature Pulse Respirations Blood Pressure SpO2   (!) 97 4 °F (36 3 °C) 82 18 139/79 100 %      Temp Source Heart Rate Source Patient Position - Orthostatic VS BP Location FiO2 (%)   Temporal Monitor Sitting Left arm --      Pain Score       --           Vitals:    09/18/18 1620   BP: 139/79   Pulse: 82   Patient Position - Orthostatic VS: Sitting       Visual Acuity      ED Medications  Medications   aluminum-magnesium hydroxide-simethicone (MYLANTA) 200-200-20 mg/5 mL oral suspension 30 mL (not administered)   lidocaine viscous (XYLOCAINE) 2 % mucosal solution 15 mL (not administered)       Diagnostic Studies  Results Reviewed     None                 No orders to display              Procedures  Procedures       Phone Contacts  ED Phone Contact    ED Course                               MDM  Number of Diagnoses or Management Options  Diagnosis management comments: History and physical exam is consistent with gastritis  Aggravated by being out of her H2 blocker and PPI  She has benign abdomen  This is not acute acute myocardial infarction  Appropriate for discharge and outpatient management  CritCare Time    Disposition  Final diagnoses:   Gastritis     Time reflects when diagnosis was documented in both MDM as applicable and the Disposition within this note     Time User Action Codes Description Comment    9/18/2018  4:49 PM Jenn Cazares Add [K26 56] Gastritis       ED Disposition     ED Disposition Condition Comment    Discharge  Denae Argueta discharge to home/self care      Condition at discharge: Good        Follow-up Information     Follow up With Specialties Details Why Contact Ariane Modi PA-C Family Medicine, Physician Assistant In 2 days  [address] [place] [id number]  [phone]            Patient's Medications   Discharge Prescriptions    FAMOTIDINE (PEPCID) 40 MG TABLET    Take 1 tablet (40 mg total) by mouth daily at bedtime for 30 days       Start Date: 9/18/2018 End Date: 10/18/2018       Order Dose: 40 mg       Quantity: 30 tablet    Refills: 0    OMEPRAZOLE (PRILOSEC) 40 MG CAPSULE    Take 1 capsule (40 mg total) by mouth daily       Start Date: 9/18/2018 End Date: --       Order Dose: 40 mg       Quantity: 30 capsule    Refills: 0     No discharge procedures on file      ED Provider  Electronically Signed by           Kathia Sewell MD  09/18/18 0403

## 2018-09-18 NOTE — DISCHARGE INSTRUCTIONS
Gastritis   WHAT YOU NEED TO KNOW:   Gastritis is inflammation or irritation of the lining of your stomach  DISCHARGE INSTRUCTIONS:   Call 911 for any of the following:   · You develop chest pain or shortness of breath  Return to the emergency department if:   · You vomit blood  · You have black or bloody bowel movements  · You have severe stomach or back pain  Contact your healthcare provider if:   · You have a fever  · You have new or worsening symptoms, even after treatment  · You have questions or concerns about your condition or care  Medicines:   · Medicines  may be given to help treat a bacterial infection or decrease stomach acid  · Take your medicine as directed  Contact your healthcare provider if you think your medicine is not helping or if you have side effects  Tell him or her if you are allergic to any medicine  Keep a list of the medicines, vitamins, and herbs you take  Include the amounts, and when and why you take them  Bring the list or the pill bottles to follow-up visits  Carry your medicine list with you in case of an emergency  Manage or prevent gastritis:   · Do not smoke  Nicotine and other chemicals in cigarettes and cigars can make your symptoms worse and cause lung damage  Ask your healthcare provider for information if you currently smoke and need help to quit  E-cigarettes or smokeless tobacco still contain nicotine  Talk to your healthcare provider before you use these products  · Do not drink alcohol  Alcohol can prevent healing and make your gastritis worse  Talk to your healthcare provider if you need help to stop drinking  · Do not take NSAIDs or aspirin unless directed  These and similar medicines can cause irritation  If your healthcare provider says it is okay to take NSAIDs, take them with food  · Do not eat foods that cause irritation  Foods such as oranges and salsa can cause burning or pain  Eat a variety of healthy foods   Examples include fruits (not citrus), vegetables, low-fat dairy products, beans, whole-grain breads, and lean meats and fish  Try to eat small meals, and drink water with your meals  Do not eat for at least 3 hours before you go to bed  · Find ways to relax and decrease stress  Stress can increase stomach acid and make gastritis worse  Activities such as yoga, meditation, or listening to music can help you relax  Spend time with friends, or do things you enjoy  Follow up with your healthcare provider as directed: You may need ongoing tests or treatment, or referral to a gastroenterologist  Write down your questions so you remember to ask them during your visits  © 2017 [facility] [facility] Information is for End User's use only and may not be sold, redistributed or otherwise used for commercial purposes  All illustrations and images included in CareNotes® are the copyrighted property of [employer]  or [employer]  The above information is an  only  It is not intended as medical advice for individual conditions or treatments  Talk to your doctor, nurse or pharmacist before following any medical regimen to see if it is safe and effective for you

## 2018-09-20 ENCOUNTER — OFFICE VISIT (OUTPATIENT)
Dept: PHYSICAL THERAPY | Facility: CLINIC | Age: 38
End: 2018-09-20
Payer: COMMERCIAL

## 2018-09-20 DIAGNOSIS — M25.561 RIGHT KNEE PAIN, UNSPECIFIED CHRONICITY: ICD-10-CM

## 2018-09-20 DIAGNOSIS — S83.511A RUPTURE OF ANTERIOR CRUCIATE LIGAMENT OF RIGHT KNEE, INITIAL ENCOUNTER: ICD-10-CM

## 2018-09-20 DIAGNOSIS — S83.511D RUPTURE OF ANTERIOR CRUCIATE LIGAMENT OF RIGHT KNEE, SUBSEQUENT ENCOUNTER: Primary | ICD-10-CM

## 2018-09-20 PROCEDURE — 97110 THERAPEUTIC EXERCISES: CPT | Performed by: PHYSICAL MEDICINE & REHABILITATION

## 2018-09-20 PROCEDURE — 97112 NEUROMUSCULAR REEDUCATION: CPT | Performed by: PHYSICAL MEDICINE & REHABILITATION

## 2018-09-20 NOTE — PROGRESS NOTES
Daily Note     Today's date: 2018  Patient name: Bernarda Pickett  : 1980  MRN: [id number]  Referring provider: Juan Hendrix MD  Dx:   Encounter Diagnosis     ICD-10-CM    1  Rupture of anterior cruciate ligament of right knee, subsequent encounter S83 511D    2  Right knee pain, unspecified chronicity M25 561    3  Rupture of anterior cruciate ligament of right knee, initial encounter S83 511A        Start Time: 1652  Stop Time: 1750  Total time in clinic (min): 58 minutes    Subjective: Pt states that she is experiencing increased sxs currently, she is unable to determine the cause         Objective: See treatment diary below  Precautions: Anxiety, latex allergy, neck pain, GERD     Daily Treatment Diary         Manual                        Patellar mobs                       PROM                                               FOTO                          Exercise Diary  8/23  8/27  8/30 9/10  9/13  9/17 9/20          Hamstring stretch 3x30" 3x30"  3 x 30" 3x30" 3 x 30"  3x30"  3 x 30"          Bike 10' np np' np  10'  10'  10'         Prone Quad stretch      3 x 30" 3x30"      3 x 30"         Mini Squats 2x15 2x15 with abd band  NV 2x15   2x15            Step ups    2R 20x Lat/A-P  10x ea  NV 2R 20x  1R x 30 (increased speed)  1R x 30 (increased speed)            tandem on foam      NV np                TM 10' 2 0 10' 2 6 10', 2 6 max 10', 2 6 max    10', 2 6 max  nv         Rocker board   30" A/P- Lateral  1x10 ea    30" A/P- Lateral  1x10 ea   30" A/P- Lateral  1x10 ea   30" ea         SLS      2 x 30" level surface 2x30" level surface               TKE @ tband                       SLS on foam 0* and 20*     3 x 20"  3x20"  3 x 30" 3x30"  3 x 30"         Ecc step downs   Lateral  2x10     Fwd x 10, lat x 20   Fwd x 10, lat x 20    1R  Fwd 10  Lat x 20         Plate lunges                       Wall squat     3 x 10"  3x10"  3 x 15" 3x20"  3 x 30"         S/lSLR  Squat wall support                        SAQ    2x15 2x 10 c 5" hold  2x10   5" hold  2 x 15  5" hold  2 x 15  5" hold   2x20  5" hold            Modalities                        CP prn                                                           Assessment: Tolerated treatment well  Patient demonstrated fatigue post treatment, exhibited good technique with therapeutic exercises and improved NM through the full available ROM  Plan: Progress treatment as tolerated

## 2018-09-24 ENCOUNTER — OFFICE VISIT (OUTPATIENT)
Dept: PHYSICAL THERAPY | Facility: CLINIC | Age: 38
End: 2018-09-24
Payer: COMMERCIAL

## 2018-09-24 DIAGNOSIS — S83.511A RUPTURE OF ANTERIOR CRUCIATE LIGAMENT OF RIGHT KNEE, INITIAL ENCOUNTER: ICD-10-CM

## 2018-09-24 DIAGNOSIS — M25.561 RIGHT KNEE PAIN, UNSPECIFIED CHRONICITY: ICD-10-CM

## 2018-09-24 DIAGNOSIS — S83.511D RUPTURE OF ANTERIOR CRUCIATE LIGAMENT OF RIGHT KNEE, SUBSEQUENT ENCOUNTER: Primary | ICD-10-CM

## 2018-09-24 PROCEDURE — 97112 NEUROMUSCULAR REEDUCATION: CPT

## 2018-09-24 PROCEDURE — 97110 THERAPEUTIC EXERCISES: CPT

## 2018-09-24 NOTE — PROGRESS NOTES
Daily Note     Today's date: 2018  Patient name: Timoteo Douglas  : 1980  MRN: [id number]  Referring provider: Cari Garcia MD  Dx:   Encounter Diagnosis     ICD-10-CM    1  Rupture of anterior cruciate ligament of right knee, subsequent encounter S83 511D    2  Right knee pain, unspecified chronicity M25 561    3  Rupture of anterior cruciate ligament of right knee, initial encounter S83 511A            Subjective: Pt c/o pain level 2/10 in the posterior area of her knee  No other complaints reported        Objective: See treatment diary below  Precautions: Anxiety, latex allergy, neck pain, GERD     Daily Treatment Diary         Manual                        Patellar mobs                       PROM                                               FOTO                          Exercise Diary  8/23  8/27  8/30 9/10  9/13  9/17 9/20  9/24        Hamstring stretch 3x30" 3x30"  3 x 30" 3x30" 3 x 30"  3x30"  3 x 30"  3x30"        Bike 10' np np' np  10'  10'  10' 10'        Prone Quad stretch      3 x 30" 3x30"      3 x 30" 3x30"        Mini Squats 2x15 2x15 with abd band  NV 2x15   2x15    2x15       Step ups    2R 20x Lat/A-P  10x ea  NV 2R 20x  1R x 30 (increased speed)  1R x 30 (increased speed)   1Rx30        tandem on foam      NV np       30"x3        TM 10' 2 0 10' 2 6 10', 2 6 max 10', 2 6 max    10', 2 6 max  nv nv       Rocker board   30" A/P- Lateral  1x10 ea    30" A/P- Lateral  1x10 ea   30" A/P- Lateral  1x10 ea   30" ea 30"ea  2x10        SLS      2 x 30" level surface 2x30" level surface        D/C       TKE @ tband                       SLS on foam 0* and 20*     3 x 20"  3x20"  3 x 30" 3x30"  3 x 30"  3x30"       Ecc step downs   Lateral  2x10     Fwd x 10, lat x 20   Fwd x 10, lat x 20    1R  Fwd 10  Lat x 20 1R  Fwd10  Latx20        Plate lunges                       Wall squat     3 x 10"  3x10"  3 x 15" 3x20"  3 x 30" 3x30"        S/lSLR  Squat wall support                        SAQ    2x15 2x 10 c 5" hold  2x10   5" hold  2 x 15  5" hold  2 x 15  5" hold   2x20  5" hold 2x20  5"hold           Modalities                        CP prn                                                           Assessment: Tolerated treatment well  Fatigue noted at the end of the exercise session  Patient declined a CP  Patient would benefit from continued therapy  Plan: Progress treatment as tolerated

## 2018-09-25 ENCOUNTER — OFFICE VISIT (OUTPATIENT)
Dept: OBGYN CLINIC | Facility: MEDICAL CENTER | Age: 38
End: 2018-09-25
Payer: COMMERCIAL

## 2018-09-25 VITALS
SYSTOLIC BLOOD PRESSURE: 124 MMHG | HEART RATE: 69 BPM | DIASTOLIC BLOOD PRESSURE: 81 MMHG | BODY MASS INDEX: 24.74 KG/M2 | WEIGHT: 126 LBS | HEIGHT: 60 IN

## 2018-09-25 DIAGNOSIS — S83.511D RUPTURE OF ANTERIOR CRUCIATE LIGAMENT OF RIGHT KNEE, SUBSEQUENT ENCOUNTER: Primary | ICD-10-CM

## 2018-09-25 PROCEDURE — 99213 OFFICE O/P EST LOW 20 MIN: CPT | Performed by: ORTHOPAEDIC SURGERY

## 2018-09-25 NOTE — PROGRESS NOTES
Orthopaedic Surgery - Office Note  Shyam Martinez (53 y o  female)   : 1980   MRN: [id number]  Encounter Date: 2018    Chief Complaint   Patient presents with    Right Knee - Follow-up       Assessment / Plan  S/p right knee arthroscopy with ACL recon with semitendinosus allograft on 18  · patient was instructed that she may progress to running or jogging in therapy  It was discussed with the patient that the clicking and "locking" that she feels is most likely scar tissue in her knee  She was instructed to continuing strengthening her quad  Return in about 4 weeks (around 10/23/2018) for Recheck  History of Present Illness  Shyam Martinez is a 30 y o  female who presents S/p right knee arthroscopy with ACL recon with semitendinosus allograft on 18  Pt is going to formal physical therapy 2x per week and states that she tries to work on her HEP as much as possible  Pt states that she notices improvement in her pain however she still notices weakness especially when walking up and down stairs  Pt has been using NSAIDs and warm compresses on her knee for pain relief when needed  Pt denies numbness and tingling  Pt states that her knee will lock on her on ocssasion when her knee is in full extension  Pt is also describing fatigue when she describes her knee locking on her  Review of Systems  Pertinent items are noted in HPI  All other systems were reviewed and are negative  Physical Exam  /81   Pulse 69   Ht 5' (1 524 m)   Wt 57 2 kg (126 lb)   LMP 2018   BMI 24 61 kg/m²   Cons: Appears well  No apparent distress  Psych: Alert  Oriented x3  Mood and affect normal   Eyes: PERRLA, EOMI  Resp: Normal effort  No audible wheezing or stridor  CV: Palpable pulse  No discernable arrhythmia  No LE edema  Lymph:  No palpable cervical, axillary, or inguinal lymphadenopathy  Skin: Warm  No palpable masses  No visible lesions  Neuro: Normal muscle tone    Normal and symmetric DTR's  Right Knee Exam  Alignment:  Normal knee alignment  Inspection:  No swelling  quad muscle atrophy  Incision healed  Palpation:  No tenderness  ROM:  Knee Extension 0  Knee Flexion 130  Strength:  Able to SLR without lag  Stability:  No objective knee instability  Stable Varus / Valgus stress, Lachman, and Posterior drawer  Tests:  No pertinent positive or negative tests  No mechanical locking of her right knee  Her "locking" seems to be the crepitus in her patella with ROM  Patella:  Patella tracks centrally with crepitus  Neurovascular:  Sensation intact in DP/SP/Moore/Sa/T nerve distributions  2+ DP & PT pulses  Gait:  Normal     Studies Reviewed  No studies to review    Procedures  No procedures today  Medical, Surgical, Family, and Social History  The patient's medical history, family history, and social history, were reviewed and updated as appropriate  Past Medical History:   Diagnosis Date    Acid reflux     Anxiety     Bursitis     Frequent headaches     Gastroparesis     Liver cyst     Lumbar herniated disc     Migraine     Seasonal allergies     Torn ACL     Wears glasses        Past Surgical History:   Procedure Laterality Date    KNEE SURGERY Right     MS ESOPHAGOGASTRODUODENOSCOPY TRANSORAL DIAGNOSTIC N/A 5/1/2017    Procedure: ESOPHAGOGASTRODUODENOSCOPY (EGD); Surgeon: Dannielle Prieto MD;  Location: [facility];   Service: Gastroenterology    MS KNEE SCOPE,AID ANT CRUCIATE REPAIR Right 5/24/2018    Procedure: ARTHROSCOPIC RECONSTRUCTION ANTERIOR CRUCIATE LIGAMENT (ACL) WITH AUTOGRAFT AND ALLOGRAFT;  Surgeon: Laree Brunner, MD;  Location: [facility] OR;  Service: Orthopedics    TUBAL LIGATION      WISDOM TOOTH EXTRACTION         Family History   Problem Relation Age of Onset    Depression Mother     Anxiety disorder Mother     Sleep disorder Mother     Hyperlipidemia Father     Hypertension Father     Heart disease Father     Heart disease Paternal Aunt     Heart disease Paternal Uncle     Osteoporosis Maternal Grandmother     Alzheimer's disease Paternal Grandmother     Heart disease Paternal Grandmother     Heart disease Paternal Grandfather        Social History     Occupational History    Not on file       Social History Main Topics    Smoking status: Never Smoker    Smokeless tobacco: Never Used    Alcohol use No    Drug use: No    Sexual activity: Not on file       Allergies   Allergen Reactions    Asa [Aspirin] Shortness Of Breath            Latex Hives    Meloxicam Other (See Comments)     bruising    Penicillin V Hives and Rash    Penicillins Hives and Rash         Current Outpatient Prescriptions:     albuterol (PROVENTIL HFA,VENTOLIN HFA) 90 mcg/act inhaler, Inhale 2 puffs every 6 (six) hours as needed for wheezing, Disp: 1 Inhaler, Rfl: 0    famotidine (PEPCID) 40 MG tablet, Take 1 tablet (40 mg total) by mouth daily at bedtime for 30 days, Disp: 30 tablet, Rfl: 0    fluticasone (FLONASE) 50 mcg/act nasal spray, 2 sprays into each nostril daily, Disp: 16 g, Rfl: 0    omeprazole (PriLOSEC) 40 MG capsule, Take 1 capsule (40 mg total) by mouth daily, Disp: 30 capsule, Rfl: 0    cetirizine (ZyrTEC) 10 mg tablet, Take 1 tablet (10 mg total) by mouth 2 (two) times a day for 7 days, Disp: 14 tablet, Rfl: 0    hydrocortisone 2 5 % cream, Apply topically 2 (two) times a day (Patient not taking: Reported on 9/25/2018 ), Disp: 30 g, Rfl: 0    naproxen (NAPROSYN) 500 mg tablet, Take 1 tablet (500 mg total) by mouth 2 (two) times a day with meals (Patient not taking: Reported on 8/30/2018 ), Disp: 60 tablet, Rfl: 0    promethazine (PHENERGAN) 12 5 MG tablet, Take 1 tablet (12 5 mg total) by mouth every 6 (six) hours as needed for nausea or vomiting (Patient not taking: Reported on 9/25/2018 ), Disp: 30 tablet, Rfl: 0      Gem Quick    Scribe Attestation    I,:   Mine [employer] am acting as a scribe while in the presence of the attending physician :        I,:   Shakila Celeste MD personally performed the services described in this documentation    as scribed in my presence :

## 2018-09-25 NOTE — LETTER
September 25, 2018     Patient: Bernarda Pickett   YOB: 1980   Date of Visit: 9/25/2018       To Whom it May Concern: Bernarda Pickett is under my professional care  She was seen in my office on 9/25/2018  She may return to work on 09/26/2018  If you have any questions or concerns, please don't hesitate to call           Sincerely,          Dinorah Garcia MD        CC: Bernarda Zhupepe

## 2018-09-27 ENCOUNTER — OFFICE VISIT (OUTPATIENT)
Dept: PHYSICAL THERAPY | Facility: CLINIC | Age: 38
End: 2018-09-27
Payer: COMMERCIAL

## 2018-09-27 DIAGNOSIS — S83.511D RUPTURE OF ANTERIOR CRUCIATE LIGAMENT OF RIGHT KNEE, SUBSEQUENT ENCOUNTER: Primary | ICD-10-CM

## 2018-09-27 DIAGNOSIS — M25.561 RIGHT KNEE PAIN, UNSPECIFIED CHRONICITY: ICD-10-CM

## 2018-09-27 DIAGNOSIS — S83.511A RUPTURE OF ANTERIOR CRUCIATE LIGAMENT OF RIGHT KNEE, INITIAL ENCOUNTER: ICD-10-CM

## 2018-09-27 PROCEDURE — G8982 BODY POS GOAL STATUS: HCPCS

## 2018-09-27 PROCEDURE — 97110 THERAPEUTIC EXERCISES: CPT

## 2018-09-27 PROCEDURE — 97112 NEUROMUSCULAR REEDUCATION: CPT

## 2018-09-27 PROCEDURE — G8981 BODY POS CURRENT STATUS: HCPCS

## 2018-09-27 NOTE — PROGRESS NOTES
Daily Note     Today's date: 2018  Patient name: Erick Barrow  : 1980  MRN: [id number]  Referring provider: Eliza Amos MD  Dx:   Encounter Diagnosis     ICD-10-CM    1  Rupture of anterior cruciate ligament of right knee, subsequent encounter S83 511D    2  Right knee pain, unspecified chronicity M25 561    3  Rupture of anterior cruciate ligament of right knee, initial encounter S83 511A            Subjective: Pt c/o pain level 5/10 in the posterior area of her knee  Pt states she kicked her door and is feeling more pain because of it        Objective: See treatment diary below  Precautions: Anxiety, latex allergy, neck pain, GERD     Daily Treatment Diary         Manual                        Patellar mobs                       PROM                                               FOTO                          Exercise Diary  8/23  8/27  8/30 9/10  9/13  9/17 9/20  9/24   9/27     Hamstring stretch 3x30" 3x30"  3 x 30" 3x30" 3 x 30"  3x30"  3 x 30"  3x30"  3x30"     Bike 10' np np' np  10'  10'  10' 10'  10'      Prone Quad stretch      3 x 30" 3x30"      3 x 30" 3x30"  3x30"     Mini Squats 2x15 2x15 with abd band  NV 2x15   2x15    2x15  2x15     Step ups    2R 20x Lat/A-P  10x ea  NV 2R 20x  1R x 30 (increased speed)  1R x 30 (increased speed)   1Rx30  1R 30x      tandem on foam      NV np       30"x3 3x30"       TM 10' 2 0 10' 2 6 10', 2 6 max 10', 2 6 max    10', 2 6 max  nv nv       Rocker board   30" A/P- Lateral  1x10 ea    30" A/P- Lateral  1x10 ea   30" A/P- Lateral  1x10 ea   30" ea 30"ea  2x10   30"ea  2x10      SLS      2 x 30" level surface 2x30" level surface        D/C       TKE @ tband                       SLS on foam 0* and 20*     3 x 20"  3x20"  3 x 30" 3x30"  3 x 30"  3x30" 3x30"     Ecc step downs   Lateral  2x10     Fwd x 10, lat x 20   Fwd x 10, lat x 20    1R  Fwd 10  Lat x 20 1R  Fwd10  Latx20   1R Fwd10  Latx20     Plate lunges                       Wall squat     3 x 10"  3x10"  3 x 15" 3x20"  3 x 30" 3x30"  3x30"      S/lSLR  Squat wall support                        SAQ    2x15 2x 10 c 5" hold  2x10   5" hold  2 x 15  5" hold  2 x 15  5" hold   2x20  5" hold 2x20  5"hold   2x20  5"hold        Modalities                        CP prn                                                       Assessment: Tolerated treatment well  Pt continues to work towards goals of increased LE strength and stability  Pt continues to show quad lag and decreased quad strength with exercises  Pt will benefit from further skilled PT to increase strength, flexibility and function  Continue to progress as able  Plan: Progress treatment as tolerated

## 2018-10-01 ENCOUNTER — OFFICE VISIT (OUTPATIENT)
Dept: PHYSICAL THERAPY | Facility: CLINIC | Age: 38
End: 2018-10-01
Payer: COMMERCIAL

## 2018-10-01 DIAGNOSIS — S83.511D RUPTURE OF ANTERIOR CRUCIATE LIGAMENT OF RIGHT KNEE, SUBSEQUENT ENCOUNTER: Primary | ICD-10-CM

## 2018-10-01 DIAGNOSIS — S83.511A RUPTURE OF ANTERIOR CRUCIATE LIGAMENT OF RIGHT KNEE, INITIAL ENCOUNTER: ICD-10-CM

## 2018-10-01 DIAGNOSIS — M25.561 RIGHT KNEE PAIN, UNSPECIFIED CHRONICITY: ICD-10-CM

## 2018-10-01 PROCEDURE — 97110 THERAPEUTIC EXERCISES: CPT

## 2018-10-01 PROCEDURE — 97112 NEUROMUSCULAR REEDUCATION: CPT

## 2018-10-01 NOTE — PROGRESS NOTES
Daily Note     Today's date: 10/1/2018  Patient name: Cookie Reina  : 1980  MRN: [id number]  Referring provider: Bernardo Gurrola MD  Dx:   Encounter Diagnosis     ICD-10-CM    1  Rupture of anterior cruciate ligament of right knee, subsequent encounter S83 511D    2  Right knee pain, unspecified chronicity M25 561    3  Rupture of anterior cruciate ligament of right knee, initial encounter S83 511A            Subjective: Pt continues to report pain at a 5/10 in the posterior area of her knee  Pt states her pain is residual from last week, when she kicked her door        Objective: See treatment diary below  Precautions: Anxiety, latex allergy, neck pain, GERD     Daily Treatment Diary         Manual                        Patellar mobs                       PROM                                               FOTO                          Exercise Diary  8/23  8/27  8/30 9/10  9/13  9/17 9/20  9/24   9/27  10/1   Hamstring stretch 3x30" 3x30"  3 x 30" 3x30" 3 x 30"  3x30"  3 x 30"  3x30"  3x30" 3x30"   Bike 10' np np' np  10'  10'  10' 10'  10'   10'   Prone Quad stretch      3 x 30" 3x30"      3 x 30" 3x30"  3x30" 3x30"   Mini Squats 2x15 2x15 with abd band  NV 2x15   2x15    2x15  2x15  2x15   Step ups    2R 20x Lat/A-P  10x ea  NV 2R 20x  1R x 30 (increased speed)  1R x 30 (increased speed)   1Rx30  1R 30x 1R 30x    tandem on foam      NV np       30"x3 3x30"  3x30"     TM 10' 2 0 10' 2 6 10', 2 6 max 10', 2 6 max    10', 2 6 max  nv nv 10', 2 6 max   10', 3 0 max   Rocker board   30" A/P- Lateral  1x10 ea    30" A/P- Lateral  1x10 ea   30" A/P- Lateral  1x10 ea   30" ea 30"ea  2x10   30"ea  2x10   30" ea  2x10   SLS      2 x 30" level surface 2x30" level surface        D/C       TKE @ tband                       SLS on foam 0* and 20*     3 x 20"  3x20"  3 x 30" 3x30"  3 x 30"  3x30" 3x30" 3x30"    Ecc step downs   Lateral  2x10     Fwd x 10, lat x 20   Fwd x 10, lat x 20    1R  Fwd 10  Lat x 20 1R  Fwd10  Latx20   1R Fwd10  Latx20  1R Fwd10  Latx20   Plate lunges                       Wall squat     3 x 10"  3x10"  3 x 15" 3x20"  3 x 30" 3x30"  3x30"  3x30"    S/lSLR  Squat wall support                        SAQ    2x15 2x 10 c 5" hold  2x10   5" hold  2 x 15  5" hold  2 x 15  5" hold   2x20  5" hold 2x20  5"hold   2x20  5"hold   2x20  5"hold      Modalities                        CP prn                                                       Assessment: Tolerated treatment well  Pt continues to work towards goals of increased LE strength and stability  Pt will benefit from further skilled PT to increase strength, flexibility and function  Continue to progress as able  Plan: Progress treatment as tolerated

## 2018-10-08 ENCOUNTER — OFFICE VISIT (OUTPATIENT)
Dept: PHYSICAL THERAPY | Facility: CLINIC | Age: 38
End: 2018-10-08
Payer: COMMERCIAL

## 2018-10-08 DIAGNOSIS — S83.511D RUPTURE OF ANTERIOR CRUCIATE LIGAMENT OF RIGHT KNEE, SUBSEQUENT ENCOUNTER: Primary | ICD-10-CM

## 2018-10-08 DIAGNOSIS — S83.511A RUPTURE OF ANTERIOR CRUCIATE LIGAMENT OF RIGHT KNEE, INITIAL ENCOUNTER: ICD-10-CM

## 2018-10-08 DIAGNOSIS — M25.561 RIGHT KNEE PAIN, UNSPECIFIED CHRONICITY: ICD-10-CM

## 2018-10-08 PROCEDURE — 97112 NEUROMUSCULAR REEDUCATION: CPT

## 2018-10-08 PROCEDURE — 97110 THERAPEUTIC EXERCISES: CPT

## 2018-10-08 NOTE — PROGRESS NOTES
Daily Note     Today's date: 10/8/2018  Patient name: Jonny Greenwood  : 1980  MRN: [id number]  Referring provider: Carey Briseno MD  Dx:   Encounter Diagnosis     ICD-10-CM    1  Rupture of anterior cruciate ligament of right knee, subsequent encounter S83 511D    2  Right knee pain, unspecified chronicity M25 561    3  Rupture of anterior cruciate ligament of right knee, initial encounter S83 511A            Subjective: Pt continues to report pain at a 5/10 in the posterior area of her knee, Pt states it seems to be the same  Objective: See treatment diary below  Precautions: Anxiety, latex allergy, neck pain, GERD     Daily Treatment Diary         Manual                        Patellar mobs                       PROM                                               FOTO                          Exercise Diary  10/8       9/24   9/27  10/1   Hamstring stretch 3x30"       3x30"  3x30" 3x30"   Bike 10'       10'  10'   10'   Prone Quad stretch 3x30"       3x30"  3x30" 3x30"   Mini Squats 2x15       2x15  2x15  2x15   Step ups    1R 30x       1Rx30  1R 30x 1R 30x    tandem on foam 3x30"       30"x3 3x30"  3x30"     TM 10' 3 0 max       nv 10', 2 6 max   10', 3 0 max   Rocker board 30"ea  2x10        30"ea  2x10   30"ea  2x10   30" ea  2x10   SLS on foam 0* and 20* 3x30"        3x30" 3x30" 3x30"    Ecc step downs 1R  Fwd10  Latx20       1R  Fwd10  Latx20   1R Fwd10  Latx20  1R Fwd10  Latx20   Wall squat 3x30"       3x30"  3x30"  3x30"    S/lSLR  Squat wall support                 SAQ 2x20 5" hold       2x20  5"hold   2x20  5"hold   2x20  5"hold      Modalities                        CP prn                                                       Assessment: Tolerated treatment well  Pt continues to work towards goals of increased LE strength and stability  Pt shows challenge with current exercises program  Pt declined "running" on TMill today   Pt will benefit from further skilled PT to increase strength, flexibility and function  Continue to progress as able  Plan: Progress treatment as tolerated

## 2018-10-11 ENCOUNTER — APPOINTMENT (OUTPATIENT)
Dept: PHYSICAL THERAPY | Facility: CLINIC | Age: 38
End: 2018-10-11
Payer: COMMERCIAL

## 2018-10-15 ENCOUNTER — OFFICE VISIT (OUTPATIENT)
Dept: PHYSICAL THERAPY | Facility: CLINIC | Age: 38
End: 2018-10-15
Payer: COMMERCIAL

## 2018-10-15 DIAGNOSIS — S83.511D RUPTURE OF ANTERIOR CRUCIATE LIGAMENT OF RIGHT KNEE, SUBSEQUENT ENCOUNTER: Primary | ICD-10-CM

## 2018-10-15 DIAGNOSIS — S83.511A RUPTURE OF ANTERIOR CRUCIATE LIGAMENT OF RIGHT KNEE, INITIAL ENCOUNTER: ICD-10-CM

## 2018-10-15 DIAGNOSIS — M25.561 RIGHT KNEE PAIN, UNSPECIFIED CHRONICITY: ICD-10-CM

## 2018-10-15 PROCEDURE — G8978 MOBILITY CURRENT STATUS: HCPCS | Performed by: PHYSICAL MEDICINE & REHABILITATION

## 2018-10-15 PROCEDURE — 97112 NEUROMUSCULAR REEDUCATION: CPT | Performed by: PHYSICAL MEDICINE & REHABILITATION

## 2018-10-15 PROCEDURE — 97110 THERAPEUTIC EXERCISES: CPT | Performed by: PHYSICAL MEDICINE & REHABILITATION

## 2018-10-15 PROCEDURE — G8979 MOBILITY GOAL STATUS: HCPCS | Performed by: PHYSICAL MEDICINE & REHABILITATION

## 2018-10-15 PROCEDURE — 97140 MANUAL THERAPY 1/> REGIONS: CPT | Performed by: PHYSICAL MEDICINE & REHABILITATION

## 2018-10-15 NOTE — PROGRESS NOTES
PT Re-Evaluation     Today's date: 10/15/2018  Patient name: Philippe Mclean  : 1980  MRN: [id number]  Referring provider: Lilo Godinez MD  Dx:   Encounter Diagnosis     ICD-10-CM    1  Rupture of anterior cruciate ligament of right knee, subsequent encounter S83 511D PT plan of care cert/re-cert   2  Right knee pain, unspecified chronicity M25 561 PT plan of care cert/re-cert   3  Rupture of anterior cruciate ligament of right knee, initial encounter S83 511A PT plan of care cert/re-cert       Start Time: 1700  Stop Time: 1800  Total time in clinic (min): 60 minutes    Assessment  Impairments: abnormal muscle firing, abnormal or restricted ROM, lacks appropriate home exercise program and pain with function    Assessment details: Pt is a 30 y o  female presenting to outpatient physical therapy s/p R ACL repair   Pt presents with pain,decreased strength, and deficits in neuromuscular control  She would like to return to running and has been cleared for progressing to return to run and jumping mechanics  Pt would benefit from skilled physical therapy to address limitations and to achieve goals  Thank you for this referral    Understanding of Dx/Px/POC: good   Prognosis: good    Goals  1  Patient will be independent in pre-operative HEP to maximize right knee ROM and strength for upcoming surgery  MET  2  Patient will demonstrate good quad set comparable to contralateral side in 1 weeks  MET  3  Patient will demonstrate ability to perform 10 SLR without quad lag in 4 weeks  MET  4  Patient will demonstrate right knee AROM 0-120 degrees in 4 weeks  MET  5  Pt will demonstrate the ability to run on the TM at 3 5 mph for 10 minutes in 4 weeks  NOT MET  6  Pt will demonstrate single leg hop testing on the R within 85% of the L in 4 weeks   NOT MET    Plan  Patient would benefit from: skilled physical therapy  Planned modality interventions: cryotherapy, biofeedback, TENS and thermotherapy: hydrocollator packs  Planned therapy interventions: neuromuscular re-education, patient education, therapeutic exercise, home exercise program, functional ROM exercises, flexibility, balance, strengthening, stretching, abdominal trunk stabilization, joint mobilization, manual therapy, gait training and therapeutic activities  Frequency: 2x week  Duration in weeks: 6  Treatment plan discussed with: patient        Subjective Evaluation    History of Present Illness  Mechanism of injury: Pt reports experiencing pain in the anterior knee and states that it is "buckling" more since 10/11/18  She denies injury but states that her work duties have changed 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  She states that the weather will increase sxs  Getting out of a low vehicle is difficult  Pain  Current pain ratin  At best pain ratin (rest, epsom salt bath, ibuprofen)  At worst pain ratin (walking, stairs, bending)  Quality: dull ache  Aggravating factors: nothing  Progression: improved    Patient Goals  Patient goals for therapy: increased motion, decreased pain, decreased edema, increased strength, independence with ADLs/IADLs, return to sport/leisure activities and return to work  Patient goal: Partially met        Objective     Tenderness   Left Knee   Tenderness in the medial retinaculum  Active Range of Motion   Left Knee   Flexion: 125 degrees   Extension: 0 degrees     Right Knee   Flexion: 125 degrees   Extension: 0 degrees   Extensor lag: WFL    Strength/Myotome Testing     Left Knee   Flexion: 5  Extension: 5  Quadriceps contraction: good    Right Knee   Flexion: 4  Extension: 4  Quadriceps contraction: fair    Additional Strength Details      Hip abduction left = 4+/5, right = 4+/5, compensation with hip flexor dominance (continued)      Functional Assessment     Comments  Pt is apprehensive and not able to hop on single leg         Flowsheet Rows      Most Recent Value   PT/OT G-Codes   Current Score  42   Projected Score  59   Assessment Type  Re-evaluation   G code set  Mobility: Walking & Moving Around   Mobility: Walking and Moving Around Current Status ()  CK   Mobility: Walking and Moving Around Goal Status ()  CK            Daily Treatment Diary     Precautions: Anxiety, latex allergy, neck pain, GERD       Manual                        Manual resisted HS curls             Hop testing when tolerated                                                                                                Exercise Diary  10/8  10/15           9/24   9/27  10/1   Elliptical  nv           Bike 10' -            10'  10'   10'   Mini Squats 2x15             2x15  2x15  2x15   Trampoline alternating toe to heel  2'           Trampoline incline walk  10x           High steps  4 laps            TM 10' 3 0 max  jog 2 8 mph 1 5 min           nv 10', 2 6 max   10', 3 0 max   Mountain climbers             Jump landing mechanics             SLS on foam 0* and 20* 3x30"              3x30" 3x30" 3x30"    Ecc step downs 1R  Fwd10  Latx20 1R  Fwd 10, lat 10            1R  Fwd10  Latx20   1R Fwd10  Latx20  1R Fwd10  Latx20   Wall squat 3x30"             3x30"  3x30"  3x30"    Ball hamstring                           Modalities                        CP prn

## 2018-10-18 ENCOUNTER — OFFICE VISIT (OUTPATIENT)
Dept: PHYSICAL THERAPY | Facility: CLINIC | Age: 38
End: 2018-10-18
Payer: COMMERCIAL

## 2018-10-18 DIAGNOSIS — M25.561 RIGHT KNEE PAIN, UNSPECIFIED CHRONICITY: ICD-10-CM

## 2018-10-18 DIAGNOSIS — S83.511A RUPTURE OF ANTERIOR CRUCIATE LIGAMENT OF RIGHT KNEE, INITIAL ENCOUNTER: ICD-10-CM

## 2018-10-18 DIAGNOSIS — S83.511D RUPTURE OF ANTERIOR CRUCIATE LIGAMENT OF RIGHT KNEE, SUBSEQUENT ENCOUNTER: Primary | ICD-10-CM

## 2018-10-18 PROCEDURE — 97110 THERAPEUTIC EXERCISES: CPT

## 2018-10-18 PROCEDURE — 97112 NEUROMUSCULAR REEDUCATION: CPT

## 2018-10-18 NOTE — PROGRESS NOTES
Daily Note     Today's date: 10/18/2018  Patient name: Azucena Rajan  : 1980  MRN: [id number]  Referring provider: Jaylin Nuñez MD  Dx:   Encounter Diagnosis     ICD-10-CM    1  Rupture of anterior cruciate ligament of right knee, subsequent encounter S83 511D    2  Right knee pain, unspecified chronicity M25 561    3  Rupture of anterior cruciate ligament of right knee, initial encounter S83 511A            Subjective: Pt continues to report pain at a 5/10 in the posterior area of her knee, Pt states it seems to be the same  Objective: See treatment diary below  Daily Treatment Diary     Precautions: Anxiety, latex allergy, neck pain, GERD       Manual                        Manual resisted HS curls             Hop testing when tolerated                                                                                                Exercise Diary  10/8  10/15  10/18              Elliptical  nv 10'          Bike 10' -   10'              Mini Squats 2x15    2x15              Trampoline alternating toe to heel  2' 2'          Trampoline incline walk  10x 10x          High steps  4 laps 4 laps           TM 10' 3 0 max  jog 2 8 mph 1 5 min  np              Mountain climbers   nv          Jump landing mechanics   nv          SLS on foam 0* and 20* 3x30"   3x30"               Ecc step downs 1R  Fwd10  Latx20 1R  Fwd 10, lat 10  1R  Fwd 10, lat 10                Wall squat 3x30"   3x30"               Ball hamstring                        Modalities                        CP prn                                                    Assessment: Tolerated treatment well  Pt continues to work towards goals of increased LE strength and stability  Pt shows challenge with newly initiated exercises  Pt will benefit from further skilled PT to increase strength, flexibility and function  Continue to progress as able  Plan: Progress treatment as tolerated

## 2018-10-22 ENCOUNTER — OFFICE VISIT (OUTPATIENT)
Dept: PHYSICAL THERAPY | Facility: CLINIC | Age: 38
End: 2018-10-22
Payer: COMMERCIAL

## 2018-10-22 DIAGNOSIS — M25.561 RIGHT KNEE PAIN, UNSPECIFIED CHRONICITY: ICD-10-CM

## 2018-10-22 DIAGNOSIS — S83.511A RUPTURE OF ANTERIOR CRUCIATE LIGAMENT OF RIGHT KNEE, INITIAL ENCOUNTER: ICD-10-CM

## 2018-10-22 DIAGNOSIS — S83.511D RUPTURE OF ANTERIOR CRUCIATE LIGAMENT OF RIGHT KNEE, SUBSEQUENT ENCOUNTER: Primary | ICD-10-CM

## 2018-10-22 PROCEDURE — 97112 NEUROMUSCULAR REEDUCATION: CPT | Performed by: PHYSICAL MEDICINE & REHABILITATION

## 2018-10-22 PROCEDURE — 97110 THERAPEUTIC EXERCISES: CPT | Performed by: PHYSICAL MEDICINE & REHABILITATION

## 2018-10-22 NOTE — PROGRESS NOTES
Daily Note     Today's date: 10/22/2018  Patient name: Abdi Davis  : 1980  MRN: [id number]  Referring provider: Dandre Neff MD  Dx:   Encounter Diagnosis     ICD-10-CM    1  Rupture of anterior cruciate ligament of right knee, subsequent encounter S83 511D    2  Right knee pain, unspecified chronicity M25 561    3  Rupture of anterior cruciate ligament of right knee, initial encounter S83 511A        Start Time: 1550  Stop Time: 1637  Total time in clinic (min): 47 minutes    Subjective: Pt states that she is experiencing soreness in the R LE  She continues to report compliance with HEP  Objective: See treatment diary below  Daily Treatment Diary      Precautions: Anxiety, latex allergy, neck pain, GERD        Manual                        Manual resisted HS curls                       Hop testing when tolerated                                                                                                  Exercise Diary  10/8  10/15  10/18  10/22               Elliptical   nv 10'  10'               Bike 10' -   10'  np               Mini Squats 2x15    2x15  2 x 20               Trampoline alternating toe to heel   2' 2'  3'               Trampoline incline walk   10x 10x 10 x                High steps   4 laps 4 laps  np                TM 10' 3 0 max  jog 2 8 mph 1 5 min  np 3 x 1' intervals  2 8 max x                Mountain climbers     nv 2 x 30"                Jump landing mechanics     nv                 SLS on foam 0* and 20* 3x30"   3x30"   3 x 30"EA               Ecc step downs 1R  Fwd10  Latx20 1R  Fwd 10, lat 10  1R  Fwd 10, lat 10   1R 20 fwd, 20 lat                Wall squat 3x30"   3x30"                  Ball hamstring                           Modalities                        CP prn                                                           Assessment: Tolerated treatment well  Patient demonstrated fatigue post treatment and limitations in eccentric quad strength    She demonstrated improved NM control during progressions of neuro-reeducation exercises  Plan: Progress treatment as tolerated

## 2018-10-25 ENCOUNTER — OFFICE VISIT (OUTPATIENT)
Dept: PHYSICAL THERAPY | Facility: CLINIC | Age: 38
End: 2018-10-25
Payer: COMMERCIAL

## 2018-10-25 DIAGNOSIS — S83.511D RUPTURE OF ANTERIOR CRUCIATE LIGAMENT OF RIGHT KNEE, SUBSEQUENT ENCOUNTER: Primary | ICD-10-CM

## 2018-10-25 DIAGNOSIS — S83.511A RUPTURE OF ANTERIOR CRUCIATE LIGAMENT OF RIGHT KNEE, INITIAL ENCOUNTER: ICD-10-CM

## 2018-10-25 DIAGNOSIS — M25.561 RIGHT KNEE PAIN, UNSPECIFIED CHRONICITY: ICD-10-CM

## 2018-10-25 PROCEDURE — 97112 NEUROMUSCULAR REEDUCATION: CPT

## 2018-10-25 PROCEDURE — 97110 THERAPEUTIC EXERCISES: CPT

## 2018-10-25 PROCEDURE — 97116 GAIT TRAINING THERAPY: CPT

## 2018-10-25 NOTE — PROGRESS NOTES
Daily Note     Today's date: 10/25/2018  Patient name: Tiffany Leroy  : 1980  MRN: [id number]  Referring provider: Glenn Greenfield MD  Dx:   Encounter Diagnosis     ICD-10-CM    1  Rupture of anterior cruciate ligament of right knee, subsequent encounter S83 511D    2  Right knee pain, unspecified chronicity M25 561    3  Rupture of anterior cruciate ligament of right knee, initial encounter S83 511A        Start Time: 1645  Stop Time: 8278  Total time in clinic (min): 40 minutes    Subjective: Pt states that she is experiencing some soreness in the R LE, today  Pt offers no new c/o pain  Objective: See treatment diary below  Daily Treatment Diary      Precautions: Anxiety, latex allergy, neck pain, GERD        Manual                        Manual resisted HS curls                       Hop testing when tolerated                                                                                                  Exercise Diary  10/8  10/15  10/18  10/22  10/25             Elliptical   nv 10'  10' 10'              Bike 10' -   10'  np  --             Mini Squats 2x15    2x15  2 x 20  2x20             Trampoline alternating toe to heel   2' 2'  3'  3'             Trampoline incline walk   10x 10x 10 x  10x              High steps   4 laps 4 laps  np  4 laps              TM 10' 3 0 max  jog 2 8 mph 1 5 min  np 3 x 1' intervals  2 8 max x   3x 1' intervals 3 0 max             Mountain climbers     nv 2 x 30"  2x30"             Jump landing mechanics     nv                 SLS on foam 0* and 20* 3x30"   3x30"   3 x 30"EA 3x30" ea              Ecc step downs 1R  Fwd10  Latx20 1R  Fwd 10, lat 10  1R  Fwd 10, lat 10   1R 20 fwd, 20 lat  1R 20 ea             Wall squat 3x30"   3x30"    3x30"             Ball hamstring                           Modalities                        CP prn                                                           Assessment: Tolerated treatment well   Patient demonstrated fatigue post treatment and limitations in eccentric quad strength  Pt continues to work towards goals of increased LE strength and NM control  Pt will benefit from further skilled PT to increase strength, flexibility and function  Continue to progress as able  Plan: Progress treatment as tolerated

## 2018-10-26 ENCOUNTER — OFFICE VISIT (OUTPATIENT)
Dept: OBGYN CLINIC | Facility: MEDICAL CENTER | Age: 38
End: 2018-10-26
Payer: COMMERCIAL

## 2018-10-26 VITALS
HEIGHT: 60 IN | BODY MASS INDEX: 25.52 KG/M2 | WEIGHT: 130 LBS | HEART RATE: 76 BPM | SYSTOLIC BLOOD PRESSURE: 117 MMHG | DIASTOLIC BLOOD PRESSURE: 87 MMHG

## 2018-10-26 DIAGNOSIS — Z98.890 S/P ACL RECONSTRUCTION: Primary | ICD-10-CM

## 2018-10-26 PROCEDURE — 99213 OFFICE O/P EST LOW 20 MIN: CPT | Performed by: ORTHOPAEDIC SURGERY

## 2018-10-26 RX ORDER — TRAZODONE HYDROCHLORIDE 50 MG/1
50 TABLET ORAL AS NEEDED
Refills: 1 | COMMUNITY
Start: 2018-10-17 | End: 2021-02-11

## 2018-10-26 RX ORDER — FLUOXETINE 10 MG/1
10 CAPSULE ORAL EVERY MORNING
Refills: 1 | COMMUNITY
Start: 2018-10-17 | End: 2021-02-11

## 2018-10-26 NOTE — LETTER
October 26, 2018     Patient: Dale Schmidt   YOB: 1980   Date of Visit: 10/26/2018       To Whom it May Concern: Dale Schmidt is under my professional care  She was seen in my office on 10/26/2018  Please excuse her from work today 10/26/2018  If you have any questions or concerns, please don't hesitate to call           Sincerely,          Chaim Guzman MD        CC: No Recipients

## 2018-10-29 ENCOUNTER — OFFICE VISIT (OUTPATIENT)
Dept: PHYSICAL THERAPY | Facility: CLINIC | Age: 38
End: 2018-10-29
Payer: COMMERCIAL

## 2018-10-29 DIAGNOSIS — S83.511D RUPTURE OF ANTERIOR CRUCIATE LIGAMENT OF RIGHT KNEE, SUBSEQUENT ENCOUNTER: Primary | ICD-10-CM

## 2018-10-29 DIAGNOSIS — S83.511A RUPTURE OF ANTERIOR CRUCIATE LIGAMENT OF RIGHT KNEE, INITIAL ENCOUNTER: ICD-10-CM

## 2018-10-29 DIAGNOSIS — M25.561 RIGHT KNEE PAIN, UNSPECIFIED CHRONICITY: ICD-10-CM

## 2018-10-29 PROCEDURE — 97116 GAIT TRAINING THERAPY: CPT

## 2018-10-29 PROCEDURE — 97110 THERAPEUTIC EXERCISES: CPT

## 2018-10-29 PROCEDURE — 97112 NEUROMUSCULAR REEDUCATION: CPT

## 2018-10-29 NOTE — PROGRESS NOTES
Daily Note     Today's date: 10/29/2018  Patient name: Cookie Reina  : 1980  MRN: [id number]  Referring provider: Bernardo Gurrola MD  Dx:   Encounter Diagnosis     ICD-10-CM    1  Rupture of anterior cruciate ligament of right knee, subsequent encounter S83 511D    2  Right knee pain, unspecified chronicity M25 561    3  Rupture of anterior cruciate ligament of right knee, initial encounter S83 511A                   Subjective: Patient noted no new complaints  Objective: See treatment diary below      Assessment: Patient noted that HR TR on trampoline increased patient's pain today while performing pain decreased when patient stopped  Patient noted that some times that exercises bothers her and sometimes it does not  Patient noted fatigue in LE and feet  while performing trampoline no increase of pain  Plan: Continue per plan of care        Precautions: Anxiety, latex allergy, neck pain, GERD        Manual                        Manual resisted HS curls                       Hop testing when tolerated                                                                                                  Exercise Diary  10/8  10/15  10/18  10/22  10/25 10/29            Elliptical   nv 10'  10' 10'  10'            Bike 10' -   10'  np  --             Mini Squats 2x15    2x15  2 x 20  2x20 2x20            Trampoline alternating toe to heel   2' 2'  3'  3'  3'           Trampoline incline walk   10x 10x 10 x  10x   10x           High steps   4 laps 4 laps  np  4 laps  4 laps             TM 10' 3 0 max  jog 2 8 mph 1 5 min  np 3 x 1' intervals  2 8 max x   3x 1' intervals 3 0 max  3x 1' intervals 3 0 max            Mountain climbers     nv 2 x 30"  2x30" 2x30"            Jump landing mechanics     nv                 SLS on foam 0* and 20* 3x30"   3x30"   3 x 30"EA 3x30" ea  3x30" ea            Ecc step downs 1R  Fwd10  Latx20 1R  Fwd 10, lat 10  1R  Fwd 10, lat 10   1R 20 fwd, 20 lat  1R 20 ea 1R 20 ea            Wall squat 3x30"   3x30"    3x30" 3 x 30"             Ball hamstring                           Modalities                        CP prn

## 2018-10-31 ENCOUNTER — HOSPITAL ENCOUNTER (EMERGENCY)
Facility: HOSPITAL | Age: 38
Discharge: HOME | End: 2018-10-31
Attending: EMERGENCY MEDICINE
Payer: COMMERCIAL

## 2018-10-31 VITALS
RESPIRATION RATE: 16 BRPM | SYSTOLIC BLOOD PRESSURE: 135 MMHG | WEIGHT: 132.94 LBS | BODY MASS INDEX: 25.96 KG/M2 | DIASTOLIC BLOOD PRESSURE: 75 MMHG | HEART RATE: 80 BPM | TEMPERATURE: 98.1 F | OXYGEN SATURATION: 100 %

## 2018-10-31 DIAGNOSIS — G43.909 MIGRAINE: Primary | ICD-10-CM

## 2018-10-31 LAB
BILIRUB UR QL STRIP: NEGATIVE
CLARITY UR: CLEAR
COLOR UR: YELLOW
EXT PREG TEST URINE: NORMAL
GLUCOSE UR STRIP-MCNC: NEGATIVE MG/DL
HGB UR QL STRIP.AUTO: NEGATIVE
KETONES UR STRIP-MCNC: NEGATIVE MG/DL
LEUKOCYTE ESTERASE UR QL STRIP: NEGATIVE
NITRITE UR QL STRIP: NEGATIVE
PH UR STRIP.AUTO: 6 [PH] (ref 4.5–8)
PROT UR STRIP-MCNC: NEGATIVE MG/DL
SP GR UR STRIP.AUTO: 1.01 (ref 1–1.04)
UROBILINOGEN UA: NEGATIVE MG/DL

## 2018-10-31 PROCEDURE — 96372 THER/PROPH/DIAG INJ SC/IM: CPT

## 2018-10-31 PROCEDURE — 99283 EMERGENCY DEPT VISIT LOW MDM: CPT

## 2018-10-31 PROCEDURE — 81025 URINE PREGNANCY TEST: CPT | Performed by: EMERGENCY MEDICINE

## 2018-10-31 RX ORDER — ACETAMINOPHEN 325 MG/1
TABLET ORAL
Status: COMPLETED
Start: 2018-10-31 | End: 2018-10-31

## 2018-10-31 RX ORDER — KETOROLAC TROMETHAMINE 30 MG/ML
INJECTION, SOLUTION INTRAMUSCULAR; INTRAVENOUS
Status: COMPLETED
Start: 2018-10-31 | End: 2018-10-31

## 2018-10-31 RX ORDER — DIPHENHYDRAMINE HCL 25 MG
25 TABLET ORAL ONCE
Status: COMPLETED | OUTPATIENT
Start: 2018-10-31 | End: 2018-10-31

## 2018-10-31 RX ORDER — METOCLOPRAMIDE 10 MG/1
TABLET ORAL
Status: COMPLETED
Start: 2018-10-31 | End: 2018-10-31

## 2018-10-31 RX ORDER — METOCLOPRAMIDE 10 MG/1
10 TABLET ORAL ONCE
Status: COMPLETED | OUTPATIENT
Start: 2018-10-31 | End: 2018-10-31

## 2018-10-31 RX ORDER — KETOROLAC TROMETHAMINE 30 MG/ML
30 INJECTION, SOLUTION INTRAMUSCULAR; INTRAVENOUS ONCE
Status: COMPLETED | OUTPATIENT
Start: 2018-10-31 | End: 2018-10-31

## 2018-10-31 RX ORDER — ONDANSETRON 4 MG/1
TABLET, ORALLY DISINTEGRATING ORAL
Status: COMPLETED
Start: 2018-10-31 | End: 2018-10-31

## 2018-10-31 RX ORDER — ACETAMINOPHEN 325 MG/1
650 TABLET ORAL ONCE
Status: COMPLETED | OUTPATIENT
Start: 2018-10-31 | End: 2018-10-31

## 2018-10-31 RX ORDER — BUTALBITAL, ACETAMINOPHEN AND CAFFEINE 50; 325; 40 MG/1; MG/1; MG/1
1 TABLET ORAL EVERY 6 HOURS PRN
Qty: 10 TABLET | Refills: 0 | Status: SHIPPED | OUTPATIENT
Start: 2018-10-31 | End: 2018-11-03

## 2018-10-31 RX ORDER — ONDANSETRON 4 MG/1
4 TABLET, ORALLY DISINTEGRATING ORAL ONCE
Status: COMPLETED | OUTPATIENT
Start: 2018-10-31 | End: 2018-10-31

## 2018-10-31 RX ORDER — DIPHENHYDRAMINE HCL 25 MG
TABLET ORAL
Status: COMPLETED
Start: 2018-10-31 | End: 2018-10-31

## 2018-10-31 RX ADMIN — METOCLOPRAMIDE HYDROCHLORIDE 10 MG: 10 TABLET ORAL at 19:26

## 2018-10-31 RX ADMIN — KETOROLAC TROMETHAMINE 30 MG: 30 INJECTION, SOLUTION INTRAMUSCULAR; INTRAVENOUS at 18:18

## 2018-10-31 RX ADMIN — Medication 25 MG: at 19:26

## 2018-10-31 RX ADMIN — METOCLOPRAMIDE 10 MG: 10 TABLET ORAL at 19:26

## 2018-10-31 RX ADMIN — DIPHENHYDRAMINE HCL 25 MG: 25 TABLET, FILM COATED ORAL at 19:26

## 2018-10-31 RX ADMIN — ONDANSETRON 4 MG: 4 TABLET, ORALLY DISINTEGRATING ORAL at 18:17

## 2018-10-31 RX ADMIN — ACETAMINOPHEN 650 MG: 325 TABLET ORAL at 19:26

## 2018-10-31 NOTE — ED PROVIDER NOTES
History  Chief Complaint   Patient presents with    Headache     rt sided headache for 1 week     Patient is a 72-year-old female with a history of migraines coming in for headache is been intermittent for 1 week  Patient states that she has had migraines in the past as similar to the her old migraines  Over the past week is been intermittent on the right side  She describes as a stabbing sensation  She also reports photophobia nauseousness when it comes on  She has taken Tylenol and Advil with sometimes relieves it  She has not fallen, hit her head or head trauma  She has no fevers, chills, amaurosis fugax  She has no dysphagia, dysarthria, confusion  Patient has no weakness of the bilateral upper lower and lower extremities  She denies any paresthesias throughout the bilateral upper lower extremities  She has no family history of subarachnoid or sudden death          History provided by:  Patient   used: No    Headache   Pain location:  R temporal, R parietal and frontal  Quality:  Sharp and stabbing  Onset quality:  Gradual  Duration:  1 week  Timing:  Intermittent  Progression:  Waxing and waning  Chronicity:  Recurrent  Similar to prior headaches: yes    Context: not activity, not exposure to bright light, not caffeine, not coughing, not defecating, not eating, not stress, not exposure to cold air, not intercourse, not loud noise and not straining    Relieved by:  Nothing  Worsened by:  Nothing  Ineffective treatments:  Acetaminophen and NSAIDs  Associated symptoms: nausea and photophobia    Associated symptoms: no abdominal pain, no back pain, no blurred vision, no congestion, no cough, no diarrhea, no dizziness, no drainage, no ear pain, no eye pain, no facial pain, no fatigue, no fever, no focal weakness, no hearing loss, no loss of balance, no myalgias, no near-syncope, no neck pain, no neck stiffness, no numbness, no paresthesias, no seizures, no sinus pressure, no sore throat, no swollen glands, no syncope, no tingling, no URI, no visual change, no vomiting and no weakness    Risk factors: no anger, no family hx of SAH, does not have insomnia and lifestyle not sedentary        Prior to Admission Medications   Prescriptions Last Dose Informant Patient Reported? Taking? FLUoxetine (PROzac) 10 mg capsule   Yes No   Sig: Take 10 mg by mouth every morning   albuterol (PROVENTIL HFA,VENTOLIN HFA) 90 mcg/act inhaler   No No   Sig: Inhale 2 puffs every 6 (six) hours as needed for wheezing   cetirizine (ZyrTEC) 10 mg tablet   No No   Sig: Take 1 tablet (10 mg total) by mouth 2 (two) times a day for 7 days   famotidine (PEPCID) 40 MG tablet   No No   Sig: Take 1 tablet (40 mg total) by mouth daily at bedtime for 30 days   fluticasone (FLONASE) 50 mcg/act nasal spray   No No   Si sprays into each nostril daily   hydrocortisone 2 5 % cream   No No   Sig: Apply topically 2 (two) times a day   Patient not taking: Reported on 2018    omeprazole (PriLOSEC) 40 MG capsule   No No   Sig: Take 1 capsule (40 mg total) by mouth daily   traZODone (DESYREL) 50 mg tablet   Yes No   Sig: Take 50 mg by mouth daily at bedtime      Facility-Administered Medications: None       Past Medical History:   Diagnosis Date    Acid reflux     Anxiety     Bursitis     Frequent headaches     Gastroparesis     Liver cyst     Lumbar herniated disc     Migraine     Seasonal allergies     Torn ACL     Wears glasses        Past Surgical History:   Procedure Laterality Date    KNEE SURGERY Right     CO ESOPHAGOGASTRODUODENOSCOPY TRANSORAL DIAGNOSTIC N/A 2017    Procedure: ESOPHAGOGASTRODUODENOSCOPY (EGD); Surgeon: Lalitha Clancy MD;  Location: [facility];   Service: Gastroenterology    CO KNEE SCOPE,AID ANT CRUCIATE REPAIR Right 2018    Procedure: ARTHROSCOPIC RECONSTRUCTION ANTERIOR CRUCIATE LIGAMENT (ACL) WITH AUTOGRAFT AND ALLOGRAFT;  Surgeon: Shaheed Flannery MD;  Location: [facility]; Service: Orthopedics    TUBAL LIGATION      WISDOM TOOTH EXTRACTION         Family History   Problem Relation Age of Onset    Depression Mother     Anxiety disorder Mother     Sleep disorder Mother     Hyperlipidemia Father     Hypertension Father     Heart disease Father     Heart disease Paternal Aunt     Heart disease Paternal Uncle     Osteoporosis Maternal Grandmother     Alzheimer's disease Paternal Grandmother     Heart disease Paternal Grandmother     Heart disease Paternal Grandfather      I have reviewed and agree with the history as documented  Social History   Substance Use Topics    Smoking status: Never Smoker    Smokeless tobacco: Never Used    Alcohol use No        Review of Systems   Constitutional: Negative for diaphoresis, fatigue and fever  HENT: Negative for congestion, ear pain, hearing loss, postnasal drip, sinus pressure and sore throat  Eyes: Positive for photophobia  Negative for blurred vision, pain and visual disturbance  Respiratory: Negative for cough, chest tightness and shortness of breath  Cardiovascular: Negative for chest pain, palpitations, syncope and near-syncope  Gastrointestinal: Positive for nausea  Negative for abdominal pain, diarrhea and vomiting  Genitourinary: Negative for difficulty urinating and dysuria  Musculoskeletal: Negative for back pain, myalgias, neck pain and neck stiffness  Skin: Negative for rash  Neurological: Positive for headaches  Negative for dizziness, focal weakness, seizures, weakness, numbness, paresthesias and loss of balance  Psychiatric/Behavioral: Negative for confusion  All other systems reviewed and are negative  Physical Exam  Physical Exam   Constitutional: She is oriented to person, place, and time  She appears well-developed and well-nourished  No distress  HENT:   Head: Normocephalic and atraumatic     Mouth/Throat: Oropharynx is clear and moist    Eyes: Pupils are equal, round, and reactive to light  Conjunctivae and EOM are normal    Neck: Normal range of motion  Neck supple  Cardiovascular: Normal rate, regular rhythm, normal heart sounds and intact distal pulses  No murmur heard  Pulmonary/Chest: Effort normal and breath sounds normal  No stridor  No respiratory distress  Abdominal: Soft  Bowel sounds are normal  She exhibits no distension  There is no tenderness  Musculoskeletal: Normal range of motion  She exhibits no edema  Neurological: She is alert and oriented to person, place, and time  She displays normal reflexes  No cranial nerve deficit or sensory deficit  She exhibits normal muscle tone  Coordination normal    Patient without ataxia  NIH 0   Skin: Skin is warm  Capillary refill takes less than 2 seconds  She is not diaphoretic  Nursing note and vitals reviewed        Vital Signs  ED Triage Vitals   Temperature Pulse Respirations Blood Pressure SpO2   10/31/18 1739 10/31/18 1739 10/31/18 1739 10/31/18 1739 10/31/18 1739   98 1 °F (36 7 °C) 79 16 134/88 99 %      Temp Source Heart Rate Source Patient Position - Orthostatic VS BP Location FiO2 (%)   10/31/18 1739 10/31/18 1739 10/31/18 1739 10/31/18 1739 --   Tympanic Monitor Sitting Left arm       Pain Score       10/31/18 1818       7           Vitals:    10/31/18 1739   BP: 134/88   Pulse: 79   Patient Position - Orthostatic VS: Sitting       Visual Acuity  Visual Acuity      Most Recent Value   L Pupil Size (mm)  3   R Pupil Size (mm)  3          ED Medications  Medications   ketorolac (TORADOL) injection 30 mg (30 mg Intramuscular Given 10/31/18 1818)   ondansetron (ZOFRAN-ODT) dispersible tablet 4 mg (4 mg Oral Given 10/31/18 1817)   metoclopramide (REGLAN) tablet 10 mg (10 mg Oral Given 10/31/18 1926)   diphenhydrAMINE (BENADRYL) tablet 25 mg (25 mg Oral Given 10/31/18 1926)   acetaminophen (TYLENOL) tablet 650 mg (650 mg Oral Given 10/31/18 1926)       Diagnostic Studies  Results Reviewed     Procedure Component Value Units Date/Time    UA w Reflex to Microscopic [[id number]]  (Normal) Collected:  10/31/18 1758    Lab Status:  Final result Specimen:  Urine from Urine, Clean Catch Updated:  10/31/18 1814     Color, UA Yellow     Clarity, UA Clear     Specific Gravity, UA 1 015     pH, UA 6 0     Leukocytes, UA Negative     Nitrite, UA Negative     Protein, UA Negative mg/dl      Glucose, UA Negative mg/dl      Ketones, UA Negative mg/dl      Bilirubin, UA Negative     Blood, UA Negative     UROBILINOGEN UA Negative mg/dL     POCT pregnancy, urine [[id number]]  (Normal) Resulted:  10/31/18 1758    Lab Status:  Final result Updated:  10/31/18 1758     EXT PREG TEST UR (Ref: Negative) neg preg                 No orders to display              Procedures  Procedures       Phone Contacts  ED Phone Contact    ED Course                               MDM  Number of Diagnoses or Management Options  Diagnosis management comments:   Patient is a 29-year-old female coming in today for headaches that been intermittent for 1 week  On exam patient is neurologically intact with no focal deficit  Will check urine in the provide pain relief      6:59 PM  Patient states she feels the same  Will give Reglan, Benadryl as well as Tylenol     8:01 PM  Patient feels better  Long discussion with her regarding need to follow up with PCP to help place her on preventative medication  Will give abortive medications at home  Patient feels well  Patient review neuro intact with no focal deficits  Will DC home  Portions of the record may have been created with voice recognition software  Occasional wrong word or "sound a like" substitutions may have occurred due to the inherent limitations of voice recognition software  Read the chart carefully and recognize, using context, where substitutions have occurred           Amount and/or Complexity of Data Reviewed  Clinical lab tests: ordered and reviewed  Tests in the medicine section of CPT®: ordered and reviewed      CritCare Time    Disposition  Final diagnoses:   Migraine     Time reflects when diagnosis was documented in both MDM as applicable and the Disposition within this note     Time User Action Codes Description Comment    10/31/2018  8:02 PM Kalin Early Add [G43 909] Migraine       ED Disposition     ED Disposition Condition Comment    Discharge  Eligio Ashton discharge to home/self care  Condition at discharge: Stable        Follow-up Information     Follow up With Specialties Details Why Contact Info    Miguelina De Dios PA-C Family Medicine, Physician Assistant Schedule an appointment as soon as possible for a visit in 2 days  [address] [facility] [address] [place] [address] [address] [place] [address]            Patient's Medications   Discharge Prescriptions    BUTALBITAL-ACETAMINOPHEN-CAFFEINE (FIORICET,ESGIC) -40 MG PER TABLET    Take 1 tablet by mouth every 6 (six) hours as needed for headaches for up to 3 days       Start Date: 10/31/2018End Date: 11/3/2018       Order Dose: 1 tablet       Quantity: 10 tablet    Refills: 0     No discharge procedures on file      ED Provider  Electronically Signed by           Real Jerome DO  10/31/18 2024

## 2018-11-01 NOTE — DISCHARGE INSTRUCTIONS
Migraine Headache   WHAT YOU SHOULD KNOW:   A migraine is a severe headache  The pain can be so severe that it interferes with your daily activities  A migraine can last a few hours up to several days  The exact cause of migraines is not known  It may be caused by changes in your body chemicals and extra sensitive nerves in your brain  AFTER YOU LEAVE:   Medicines:  Take medicine as soon as you feel a migraine begin  · Pain medicine: You may need medicine to take away or decrease pain  You may need a doctor's order for this medicine  Do not wait until the pain is severe before you take your medicine  · Migraine medicines: These are used to help prevent a migraine or stop it once it starts  · Antinausea medicine: This medicine may be given to calm your stomach and to help prevent vomiting  They can also help relieve pain  · Take your medicine as directed  Call your healthcare provider if you think your medicine is not helping or if you have side effects  Tell him if you are allergic to any medicine  Keep a list of the medicines, vitamins, and herbs you take  Include the amounts, and when and why you take them  Bring the list or the pill bottles to follow-up visits  Carry your medicine list with you in case of an emergency  Manage your symptoms:   · Rest:  Rest in a dark, quiet room  This will help decrease your pain  · Ice:  Ice helps decrease pain  Use an ice pack or put crushed ice in a plastic bag  Cover the ice pack with a towel and place it on your head where it hurts for 15 to 20 minutes every hour  · Heat:  Heat helps decrease pain and muscle spasms  Use a small towel dampened with warm water or a heating pad, or sit in a warm bath  Apply heat on the area for 20 to 30 minutes every 2 hours  You may alternate heat and ice  Keep a headache diary:  Write down when your migraines start and stop  Include your symptoms and what you were doing when a migraine began   Record what you ate or drank for 24 hours before the migraine started  Describe the pain and where it hurts  Keep track of what you did to treat your migraine and whether it worked  Follow up with your primary healthcare provider or neurologist as directed:  Bring your headache diary with you when you see your primary healthcare provider  Write down your questions so you remember to ask them during your visits  Prevent another migraine:   · Do not smoke: If you smoke, it is never too late to quit  Tobacco smoke can trigger a migraine  It can also cause heart disease, lung disease, cancer, and other health problems  Quitting smoking will improve your health and the health of those around you  If you smoke, ask for information about how to stop  · Do not drink alcohol:  Alcohol can trigger a migraine  It can also interfere with the medicines used to treat your migraine  · Get regular exercise:  Exercise may help prevent migraines  Talk to your primary healthcare provider about the best exercise plan for you  · Manage stress:  Stress may trigger a migraine  Learn new ways to relax, such as deep breathing  · Stick to a sleep schedule:  Go to bed and get up at the same time each day  · Eat regular meals:  Include healthy foods such as include fruit, vegetables, whole-grain breads, low-fat dairy products, beans, lean meat, and fish  Avoid trigger foods like chocolate, hard cheese, and red wine  Foods that contain gluten, nitrates, MSG, or artificial sweeteners may also trigger migraines  Caffeine, which is often used to treat migraines, can also trigger them  Contact your primary healthcare provider or neurologist if:   · You have a fever  · Your migraines interfere with your daily activities  · Your medicines or treatments stop working  · You have questions about your condition or care    Seek care immediately or call 911 if:   · You have a headache that seems different or much worse than your usual migraine headache  · You have a severe headache with a fever or a stiff neck  · You have new problems with speech, vision, balance, or movement  · You feel like you are going to faint, you become confused, or you have a seizure  © 2014 [address] is for End User's use only and may not be sold, redistributed or otherwise used for commercial purposes  All illustrations and images included in CareNotes® are the copyrighted property of A D [employer] M [employer]  or [employer]  The above information is an  only  It is not intended as medical advice for individual conditions or treatments  Talk to your doctor, nurse or pharmacist before following any medical regimen to see if it is safe and effective for you  Migraine Headache   WHAT YOU NEED TO KNOW:   What is a migraine headache? A migraine is a severe headache  The pain can be so severe that it interferes with your daily activities  A migraine can last a few hours up to several days  The exact cause of migraines is not known  What can trigger a migraine headache? · Stress, eye strain, oversleeping, or not getting enough sleep    · Hormone changes in women from birth control pills, pregnancy, menopause, or during a monthly period    · Skipping meals, going too long without eating, or not drinking enough liquids    · Certain foods or drinks such as chocolate, hard cheese, red wine, or drinks that contain caffeine    · Foods that contain gluten, nitrates, MSG, or artificial sweeteners    · Sunlight, bright or flashing lights, loud noises, smoke, or strong smells    · Heat, humidity, or changes in the weather  What are the warning signs that a migraine headache is about to start?   Warning signs usually start 15 to 60 minutes before the headache:  · Visual changes (auras), such as blurred vision, temporary blind or bright spots, lines, or hallucinations    · Unusual tiredness or frequent yawning    · Tingling in an arm or leg  What are the signs and symptoms of a migraine headache? A migraine headache usually begins as a dull ache around the eye or temple  The pain may get worse with movement  You may also have the following:  · Pain in your head that may increase to the point that you cannot do everyday activities    · Pain on one or both sides of your head    · Throbbing, pulsing, or pounding pain in your head    · Nausea and vomiting    · Sensitivity to light, noise, or smells  How is a migraine headache diagnosed? Your healthcare provider will ask questions about your headaches  Describe the pain and any other symptoms, such as nausea  Tell the provider if you think anything triggered the pain  The provider will also want to know what you ate and drank before the pain started  Tell the provider about any medical conditions you have or that run in your family  Include any recent stressors you have had  You may also need any of the following:  · A neurologic exam  is used to check how your pupils react to light  Your healthcare provider may check your memory, hand grasp, and balance  · CT or MRI pictures  may be taken of your brain  You may be given contrast liquid to help your brain show up better in the pictures  Tell the healthcare provider if you have ever had an allergic reaction to contrast liquid  Do not enter the MRI room with anything metal  Metal can cause serious injury  Tell the healthcare provider if you have any metal in or on your body  How is a migraine headache treated? Migraines cannot be cured  The goal of treatment is to reduce your symptoms  Take medicine as soon as you feel a migraine begin  · Prescription pain medicine  may be given  Do not wait until the pain is severe before you take your medicine  · Migraine medicines  are used to help prevent a migraine or stop it once it starts  · Antinausea medicine  may be given to calm your stomach and to help prevent vomiting   This medicine can also help relieve pain  What can I do to manage my symptoms? · Rest in a dark, quiet room  This will help decrease your pain  Sleep may also help relieve the pain  · Apply ice to decrease pain  Use an ice pack, or put crushed ice in a plastic bag  Cover the ice pack with a towel and place it on your head  Apply ice for 15 to 20 minutes every hour  · Apply heat to decrease pain and muscle spasms  Use a small towel dampened with warm water or a heating pad, or sit in a warm bath  Apply heat on the area for 20 to 30 minutes every 2 hours  You may alternate heat and ice  · Keep a migraine record  Write down when your migraines start and stop  Include your symptoms and what you were doing when a migraine began  Record what you ate or drank for 24 hours before the migraine started  Keep track of what you did to treat your migraine and if it worked  Bring the migraine record with you to visits with your healthcare provider  What can I do to prevent another migraine headache? · Do not smoke  Nicotine and other chemicals in cigarettes and cigars can trigger a migraine or make it worse  Ask your healthcare provider for information if you currently smoke and need help to quit  E-cigarettes or smokeless tobacco still contain nicotine  Talk to your healthcare provider before you use these products  · Do not drink alcohol  Alcohol can trigger a migraine  It can also keep medicines used to treat your migraines from working  · Get regular exercise  Exercise may help prevent migraines  Talk to your healthcare provider about the best exercise plan for you  Try to get at least 30 minutes of exercise on most days  · Manage stress  Stress may trigger a migraine  Learn new ways to relax, such as deep breathing  · Create a sleep schedule  Go to bed and get up at the same times each day  Do not watch television before bed  · Eat regular meals    Include healthy foods such as include fruit, vegetables, whole-grain breads, low-fat dairy products, beans, lean meat, and fish  Do not have food or drinks that trigger your migraines  When should I seek immediate care? · You have a headache that seems different or much worse than your usual migraine headache  · You have a severe headache with a fever or a stiff neck  · You have new problems with speech, vision, balance, or movement  · You feel like you are going to faint, you become confused, or you have a seizure  When should I contact my healthcare provider? · Your migraines interfere with your daily activities  · Your medicines or treatments stop working  · You have questions or concerns about your condition or care  CARE AGREEMENT:   You have the right to help plan your care  Learn about your health condition and how it may be treated  Discuss treatment options with your caregivers to decide what care you want to receive  You always have the right to refuse treatment  The above information is an  only  It is not intended as medical advice for individual conditions or treatments  Talk to your doctor, nurse or pharmacist before following any medical regimen to see if it is safe and effective for you  © 2017 [address]  Information is for End User's use only and may not be sold, redistributed or otherwise used for commercial purposes  All illustrations and images included in CareNotes® are the copyrighted property of A [employer] M [employer] Inc  or [employer]

## 2018-11-05 ENCOUNTER — OFFICE VISIT (OUTPATIENT)
Dept: PHYSICAL THERAPY | Facility: CLINIC | Age: 38
End: 2018-11-05
Payer: COMMERCIAL

## 2018-11-05 DIAGNOSIS — S83.511A RUPTURE OF ANTERIOR CRUCIATE LIGAMENT OF RIGHT KNEE, INITIAL ENCOUNTER: ICD-10-CM

## 2018-11-05 DIAGNOSIS — M25.561 RIGHT KNEE PAIN, UNSPECIFIED CHRONICITY: ICD-10-CM

## 2018-11-05 DIAGNOSIS — S83.511D RUPTURE OF ANTERIOR CRUCIATE LIGAMENT OF RIGHT KNEE, SUBSEQUENT ENCOUNTER: Primary | ICD-10-CM

## 2018-11-05 PROCEDURE — 97116 GAIT TRAINING THERAPY: CPT

## 2018-11-05 PROCEDURE — 97112 NEUROMUSCULAR REEDUCATION: CPT

## 2018-11-05 PROCEDURE — 97110 THERAPEUTIC EXERCISES: CPT

## 2018-11-05 NOTE — PROGRESS NOTES
Daily Note     Today's date: 2018  Patient name: Edison Little  : 1980  MRN: [id number]  Referring provider: Maki Mcqueen MD  Dx:   Encounter Diagnosis     ICD-10-CM    1  Rupture of anterior cruciate ligament of right knee, subsequent encounter S83 511D    2  Right knee pain, unspecified chronicity M25 561    3  Rupture of anterior cruciate ligament of right knee, initial encounter S83 511A        Start Time: 1605  Stop Time: 1645  Total time in clinic (min): 40 minutes    Subjective: Patient noted no new complaints  Pt states she continues to have stiffness and soreness         Objective: See treatment diary below    Precautions: Anxiety, latex allergy, neck pain, GERD        Manual                        Manual resisted HS curls                       Hop testing when tolerated                                                                                                  Exercise Diary  10/8  10/15  10/18  10/22  10/25 10/29  11/5          Elliptical   nv 10'  10' 10'  10'   10'         Bike 10' -   10'  np  --   --          Mini Squats 2x15    2x15  2 x 20  2x20 2x20   2x20         Trampoline alternating toe to heel   2' 2'  3'  3'  3' 3'          Trampoline incline walk   10x 10x 10 x  10x   10x  10x         High steps   4 laps 4 laps  np  4 laps  4 laps   4 laps          TM 10' 3 0 max  jog 2 8 mph 1 5 min  np 3 x 1' intervals  2 8 max x   3x 1' intervals 3 0 max  3x 1' intervals 3 0 max  3x 1' intervals 3 0 max          Mountain climbers     nv 2 x 30"  2x30" 2x30"  2x30"          Jump landing mechanics     nv                 SLS on foam 0* and 20* 3x30"   3x30"   3 x 30"EA 3x30" ea  3x30" ea  3x30" ea         Ecc step downs 1R  Fwd10  Latx20 1R  Fwd 10, lat 10  1R  Fwd 10, lat 10   1R 20 fwd, 20 lat  1R 20 ea 1R 20 ea  1R 20x          Wall squat 3x30"   3x30"    3x30" 3 x 30"   3x30"         Ball hamstring                           Modalities                        CP prn                                                  Assessment: Pt continues to work towards goals of increased LE strength and stability  Pt needed VCing for proper form and technique with SLS on foam with varying knee flexion  Pt will benefit from further skilled PT to increase strength, flexibility and function  Continue to progress as able  Plan: Continue per plan of care

## 2018-11-08 ENCOUNTER — EVALUATION (OUTPATIENT)
Dept: PHYSICAL THERAPY | Facility: CLINIC | Age: 38
End: 2018-11-08
Payer: COMMERCIAL

## 2018-11-08 DIAGNOSIS — S83.511D RUPTURE OF ANTERIOR CRUCIATE LIGAMENT OF RIGHT KNEE, SUBSEQUENT ENCOUNTER: Primary | ICD-10-CM

## 2018-11-08 DIAGNOSIS — M25.561 RIGHT KNEE PAIN, UNSPECIFIED CHRONICITY: ICD-10-CM

## 2018-11-08 PROCEDURE — G8979 MOBILITY GOAL STATUS: HCPCS | Performed by: PHYSICAL MEDICINE & REHABILITATION

## 2018-11-08 PROCEDURE — G8980 MOBILITY D/C STATUS: HCPCS | Performed by: PHYSICAL MEDICINE & REHABILITATION

## 2018-11-08 PROCEDURE — 97140 MANUAL THERAPY 1/> REGIONS: CPT | Performed by: PHYSICAL MEDICINE & REHABILITATION

## 2018-11-08 PROCEDURE — G8978 MOBILITY CURRENT STATUS: HCPCS | Performed by: PHYSICAL MEDICINE & REHABILITATION

## 2018-11-08 PROCEDURE — 97110 THERAPEUTIC EXERCISES: CPT | Performed by: PHYSICAL MEDICINE & REHABILITATION

## 2018-11-08 NOTE — PROGRESS NOTES
PT Discharge    Today's date: 2018  Patient name: Den Rivers  : 1980  MRN: [id number]  Referring provider: Carole Kaur MD  Dx:   Encounter Diagnosis     ICD-10-CM    1  Rupture of anterior cruciate ligament of right knee, subsequent encounter S83 511D    2  Right knee pain, unspecified chronicity M25 561        Start Time: 1700  Stop Time: 1800  Total time in clinic (min): 60 minutes    Assessment  Impairments: abnormal muscle firing, abnormal or restricted ROM, lacks appropriate home exercise program and pain with function    Assessment details: Pt is a 30 y o  female presenting to outpatient physical therapy s/p R ACL repair   She continues to  present with pain (with onset of fatigue),decreased strength, and deficits in neuromuscular control  Due to insurance coverage the pt is unable to continue OPPT at this time  Understanding of Dx/Px/POC: good   Prognosis: good    Goals  1  Patient will be independent in pre-operative HEP to maximize right knee ROM and strength for upcoming surgery  MET  2  Patient will demonstrate good quad set comparable to contralateral side in 1 weeks  MET  3  Patient will demonstrate ability to perform 10 SLR without quad lag in 4 weeks  MET  4  Patient will demonstrate right knee AROM 0-120 degrees in 4 weeks  MET  5  Pt will demonstrate the ability to run on the TM at 3 5 mph for 10 minutes in 4 weeks  NOT MET  6  Pt will demonstrate single leg hop testing on the R within 85% of the L in 4 weeks  NOT MET    Plan  Patient would benefit from: skilled physical therapy  Planned therapy interventions: home exercise program  Treatment plan discussed with: patient  Plan details: Pt provided comprehensive HEP, and 1 month free for the fitness program          Subjective Evaluation    History of Present Illness  Mechanism of injury: Pt reports experiencing pain in the anterior knee and states that it is "buckling" more since 10/11/18   She denies injury but states that her work duties have changed 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  She states that the weather will increase sxs  Getting out of a low vehicle is difficult  (18) Chief complaint is increased pain with cold or rainy weather  With some assignments at work she has increased sxs, typically requiring repeated sit to stand activities, and pushing in the lunge position  Pain  Current pain ratin  At best pain ratin (rest, epsom salt bath, ibuprofen)  At worst pain ratin (walking, stairs, bending)  Quality: dull ache  Aggravating factors: nothing  Progression: improved    Patient Goals  Patient goals for therapy: increased motion, decreased pain, decreased edema, increased strength, independence with ADLs/IADLs, return to sport/leisure activities and return to work  Patient goal: Partially met        Objective     Tenderness   Left Knee   Tenderness in the medial retinaculum  Active Range of Motion   Left Knee   Flexion: 125 degrees   Extension: 0 degrees     Right Knee   Flexion: 125 degrees   Extension: 0 degrees   Extensor lag: WFL    Strength/Myotome Testing     Left Knee   Flexion: 5  Extension: 5  Quadriceps contraction: good    Right Knee   Flexion: 4+  Extension: 4+  Quadriceps contraction: good    Additional Strength Details      Hip abduction left = 4+/5, right = 4+/5, compensation with hip flexor dominance (continued)      Functional Assessment     Comments  Pt is apprehensive and not able to hop on single leg         Flowsheet Rows      Most Recent Value   PT/OT G-Codes   Current Score  57   Projected Score  59   FOTO information reviewed  Yes   Assessment Type  Discharge   G code set  Mobility: Walking & Moving Around   Mobility: Walking and Moving Around Current Status ()  CK   Mobility: Walking and Moving Around Goal Status ()  CK   Mobility: Walking and Moving Around Discharge Status ()  CK            Daily Treatment Diary Precautions: Anxiety, latex allergy, neck pain, GERD            Manual                        Manual resisted HS curls                       Hop testing when tolerated                                                                                                  Exercise Diary  10/8  10/15  10/18  10/22  10/25 10/29  11/5          Elliptical   nv 10'  10' 10'  10'   10'         Bike 10' -   10'  np  --   --          Mini Squats 2x15    2x15  2 x 20  2x20 2x20   2x20         Trampoline alternating toe to heel   2' 2'  3'  3'  3' 3'          Trampoline incline walk   10x 10x 10 x  10x   10x  10x         High steps   4 laps 4 laps  np  4 laps  4 laps   4 laps          TM 10' 3 0 max  jog 2 8 mph 1 5 min  np 3 x 1' intervals  2 8 max x   3x 1' intervals 3 0 max  3x 1' intervals 3 0 max  3x 1' intervals 3 0 max          Mountain climbers     nv 2 x 30"  2x30" 2x30"  2x30"          Jump landing mechanics     nv                 SLS on foam 0* and 20* 3x30"   3x30"   3 x 30"EA 3x30" ea  3x30" ea  3x30" ea         Ecc step downs 1R  Fwd10  Latx20 1R  Fwd 10, lat 10  1R  Fwd 10, lat 10   1R 20 fwd, 20 lat  1R 20 ea 1R 20 ea  1R 20x          Wall squat 3x30"   3x30"    3x30" 3 x 30"   3x30"         Ball hamstring                           Modalities                        CP prn

## 2018-12-18 ENCOUNTER — OFFICE VISIT (OUTPATIENT)
Dept: OBGYN CLINIC | Facility: MEDICAL CENTER | Age: 38
End: 2018-12-18
Payer: COMMERCIAL

## 2018-12-18 VITALS
BODY MASS INDEX: 26.31 KG/M2 | HEIGHT: 60 IN | HEART RATE: 86 BPM | SYSTOLIC BLOOD PRESSURE: 120 MMHG | DIASTOLIC BLOOD PRESSURE: 78 MMHG | WEIGHT: 134 LBS

## 2018-12-18 DIAGNOSIS — S83.511D RUPTURE OF ANTERIOR CRUCIATE LIGAMENT OF RIGHT KNEE, SUBSEQUENT ENCOUNTER: Primary | ICD-10-CM

## 2018-12-18 PROCEDURE — 99213 OFFICE O/P EST LOW 20 MIN: CPT | Performed by: STUDENT IN AN ORGANIZED HEALTH CARE EDUCATION/TRAINING PROGRAM

## 2018-12-18 RX ORDER — IBUPROFEN 800 MG/1
800 TABLET ORAL EVERY 8 HOURS PRN
COMMUNITY

## 2018-12-18 RX ORDER — TRAMADOL HYDROCHLORIDE 50 MG/1
50 TABLET ORAL EVERY 6 HOURS PRN
COMMUNITY
End: 2018-12-18

## 2018-12-18 RX ORDER — HYDROXYZINE 50 MG/1
50 TABLET, FILM COATED ORAL
Refills: 0 | COMMUNITY
Start: 2018-09-20 | End: 2018-12-18

## 2018-12-18 NOTE — PROGRESS NOTES
Orthopaedic Surgery - Office Note  Scott Rios (41 y o  female)   : 1980   MRN: [id number]  Encounter Date: 2018    Chief Complaint   Patient presents with    Right Knee - Follow-up       Assessment / Plan  Status post right knee arthroscopy with ACL reconstruction with semitendinosus allograft on 2018    · Will refer pt to PT, focus on strength of hip/thigh muscles  · C/w exercises at home  · C/w activity as tolerated  · Follow up in 2 months    History of Present Illness  Scott Rios is a 41 y o  female who presents status post right knee arthroscopy with ACL reconstruction with semitendinosus allograft on 2018  Patient states she is now done with formal PT and has been doing exercises at home  Denies any catching or clicking but continues to complain of some buckling and some mild pain in her knee cap  She is able to go up the stairs easier however continues to struggle going down the stairs  Denies numbness or tingling  Review of Systems  Pertinent items are noted in HPI  All other systems were reviewed and are negative  Physical Exam  /78   Pulse 86   Ht 5' (1 524 m)   Wt 60 8 kg (134 lb)   BMI 26 17 kg/m²   Cons: Appears well  No apparent distress  Psych: Alert  Oriented x3  Mood and affect normal   Eyes: PERRLA, EOMI  Resp: Normal effort  No audible wheezing or stridor  CV: Palpable pulse  No discernable arrhythmia  No LE edema  Lymph:  No palpable cervical, axillary, or inguinal lymphadenopathy  Skin: Warm  No palpable masses  No visible lesions  Neuro: Normal muscle tone  Normal and symmetric DTR's  Right Knee Exam  Alignment:  Normal knee alignment  Inspection:  No swelling  No erythema  No ecchymosis  Palpation:  No tenderness  ROM:  0-135   Strength:  5/5 quads and hamstrings  Stability:  No objective knee instability  Stable Varus / Valgus stress, Lachman, and Posterior drawer    Tests:  No pertinent positive or negative tests  Patella:  Patella tracks centrally without crepitus  Neurovascular:  Sensation intact in DP/SP/Moore/Sa/T nerve distributions  Palpable DP & PT pulses  Gait:  Normal       Studies Reviewed  No studies to review    Procedures  No procedures today  Medical, Surgical, Family, and Social History  The patient's medical history, family history, and social history, were reviewed and updated as appropriate  Past Medical History:   Diagnosis Date    Acid reflux     Anxiety     Bursitis     Frequent headaches     Gastroparesis     Liver cyst     Lumbar herniated disc     Migraine     Seasonal allergies     Torn ACL     Wears glasses        Past Surgical History:   Procedure Laterality Date    KNEE SURGERY Right     WA ESOPHAGOGASTRODUODENOSCOPY TRANSORAL DIAGNOSTIC N/A 5/1/2017    Procedure: ESOPHAGOGASTRODUODENOSCOPY (EGD); Surgeon: Gary Boone MD;  Location: [facility]; Service: Gastroenterology    WA KNEE SCOPE,AID ANT CRUCIATE REPAIR Right 5/24/2018    Procedure: ARTHROSCOPIC RECONSTRUCTION ANTERIOR CRUCIATE LIGAMENT (ACL) WITH AUTOGRAFT AND ALLOGRAFT;  Surgeon: Tiffanie Davila MD;  [facility];  Service: Orthopedics    TUBAL LIGATION      WISDOM TOOTH EXTRACTION         Family History   Problem Relation Age of Onset    Depression Mother     Anxiety disorder Mother     Sleep disorder Mother     Hyperlipidemia Father     Hypertension Father     Heart disease Father     Heart disease Paternal Aunt     Heart disease Paternal Uncle     Osteoporosis Maternal Grandmother     Alzheimer's disease Paternal Grandmother     Heart disease Paternal Grandmother     Heart disease Paternal Grandfather        Social History     Occupational History    Not on file       Social History Main Topics    Smoking status: Never Smoker    Smokeless tobacco: Never Used    Alcohol use No    Drug use: No    Sexual activity: Not on file       Allergies   Allergen Reactions    Asa [Aspirin] Shortness Of Breath            Latex Hives    Meloxicam Other (See Comments)     bruising    Penicillin V Hives and Rash    Penicillins Hives and Rash         Current Outpatient Prescriptions:     albuterol (PROVENTIL HFA,VENTOLIN HFA) 90 mcg/act inhaler, Inhale 2 puffs every 6 (six) hours as needed for wheezing (Patient taking differently: Inhale 2 puffs as needed for wheezing  ), Disp: 1 Inhaler, Rfl: 0    FLUoxetine (PROzac) 10 mg capsule, Take 10 mg by mouth every morning, Disp: , Rfl: 1    fluticasone (FLONASE) 50 mcg/act nasal spray, 2 sprays into each nostril daily (Patient taking differently: 2 sprays into each nostril as needed  ), Disp: 16 g, Rfl: 0    ibuprofen (MOTRIN) 800 mg tablet, Take 800 mg by mouth every 8 (eight) hours as needed, Disp: , Rfl:     omeprazole (PriLOSEC) 40 MG capsule, Take 1 capsule (40 mg total) by mouth daily, Disp: 30 capsule, Rfl: 0    traZODone (DESYREL) 50 mg tablet, Take 50 mg by mouth as needed  , Disp: , Rfl: 1    cetirizine (ZyrTEC) 10 mg tablet, Take 1 tablet (10 mg total) by mouth 2 (two) times a day for 7 days, Disp: 14 tablet, Rfl: 0    famotidine (PEPCID) 40 MG tablet, Take 1 tablet (40 mg total) by mouth daily at bedtime for 30 days, Disp: 30 tablet, Rfl: 0      Sandee Miranda DPM    Scribe Attestation    I,:    am acting as a scribe while in the presence of the attending physician :        I,:    personally performed the services described in this documentation    as scribed in my presence :

## 2019-02-19 ENCOUNTER — OFFICE VISIT (OUTPATIENT)
Dept: OBGYN CLINIC | Facility: MEDICAL CENTER | Age: 39
End: 2019-02-19
Payer: COMMERCIAL

## 2019-02-19 VITALS
HEIGHT: 65 IN | HEART RATE: 73 BPM | SYSTOLIC BLOOD PRESSURE: 122 MMHG | WEIGHT: 138 LBS | DIASTOLIC BLOOD PRESSURE: 82 MMHG | BODY MASS INDEX: 22.99 KG/M2

## 2019-02-19 DIAGNOSIS — Z98.890 S/P ACL RECONSTRUCTION: Primary | ICD-10-CM

## 2019-02-19 PROCEDURE — 99213 OFFICE O/P EST LOW 20 MIN: CPT | Performed by: ORTHOPAEDIC SURGERY

## 2019-02-19 NOTE — PROGRESS NOTES
Orthopaedic Surgery - Office Note  Bridgett Lowe (41 y o  female)   : 1980   MRN: [id number]  Encounter Date: 2019    Chief Complaint   Patient presents with    Right Knee - Follow-up       Assessment / Plan  S/p right knee arthroscopy with ACL reconstruction with semitendinosus allograft on 18    · Activity as tolerated  · Continue HEP  · Focus on progressing strength    · I recommended that she checks in with her pain management doctor to focus on the back as her leg pain is likely related to her back  Return in about 3 months (around 2019) for Recheck  History of Present Illness  Bridgett Lowe is a 41 y o  female who presents s/p right knee arthroscopy with ACL reconstruction with semitendinosus allograft on 18  She reports that she is doing well but recently has been having pain in her right leg that she attributes to her known herniated discs  She denies numbness, tingling or swelling  She continues to complain of occasional "buckling " She is continuing with her HEP  Review of Systems  Pertinent items are noted in HPI  All other systems were reviewed and are negative  Physical Exam  /82   Pulse 73   Ht 5' 5" (1 651 m)   Wt 62 6 kg (138 lb)   BMI 22 96 kg/m²   Cons: Appears well  No apparent distress  Psych: Alert  Oriented x3  Mood and affect normal   Eyes: PERRLA, EOMI  Resp: Normal effort  No audible wheezing or stridor  CV: Palpable pulse  No discernable arrhythmia  No LE edema  Lymph:  No palpable cervical, axillary, or inguinal lymphadenopathy  Skin: Warm  No palpable masses  No visible lesions  Neuro: Normal muscle tone  Normal and symmetric DTR's  Right Knee Exam  Alignment:  Normal knee alignment  Inspection:  No swelling  Incision healed  Palpation:  No tenderness  No effusion  No clicking, catching, or snapping  ROM:  Knee Extension 0  Knee Flexion 130  Strength:  5/5 hip flexors and abductors   5/5 quadriceps and hamstrings  Stability:  (-) Lachman  (-) Pivot-shift  Tests:  No pertinent positive or negative tests  Patella:  Patella tracks centrally without crepitus  Neurovascular:  Sensation intact in DP/SP/Moore/Sa/T nerve distributions  2+ DP & PT pulses  Brisk capillary refill in all toes  Toes warm and perfused  Gait:  Normal     Studies Reviewed  No studies to review    Procedures  No procedures today  Medical, Surgical, Family, and Social History  The patient's medical history, family history, and social history, were reviewed and updated as appropriate  Past Medical History:   Diagnosis Date    Acid reflux     Anxiety     Bursitis     Frequent headaches     Gastroparesis     Liver cyst     Lumbar herniated disc     Migraine     Seasonal allergies     Torn ACL     Wears glasses        Past Surgical History:   Procedure Laterality Date    KNEE SURGERY Right     [place] ESOPHAGOGASTRODUODENOSCOPY TRANSORAL DIAGNOSTIC N/A 5/1/2017    Procedure: ESOPHAGOGASTRODUODENOSCOPY (EGD); Surgeon: Kimber Cuellar MD;  Location: [facility];   Service: Gastroenterology    [place] KNEE SCOPE,AID ANT CRUCIATE REPAIR Right 5/24/2018    Procedure: ARTHROSCOPIC RECONSTRUCTION ANTERIOR CRUCIATE LIGAMENT (ACL) WITH AUTOGRAFT AND ALLOGRAFT;  Surgeon: Armani Lang MD;  Location: [facility] OR;  Service: Orthopedics    TUBAL LIGATION      WISDOM TOOTH EXTRACTION         Family History   Problem Relation Age of Onset    Depression Mother     Anxiety disorder Mother     Sleep disorder Mother     Hyperlipidemia Father     Hypertension Father     Heart disease Father     Heart disease Paternal Aunt     Heart disease Paternal Uncle     Osteoporosis Maternal Grandmother     Alzheimer's disease Paternal Grandmother     Heart disease Paternal Grandmother     Heart disease Paternal Grandfather        Social History     Occupational History    Not on file   Tobacco Use    Smoking status: Never Smoker    Smokeless tobacco: Never Used   Substance and Sexual Activity    Alcohol use: No    Drug use: No    Sexual activity: Not on file       Allergies   Allergen Reactions    Asa [Aspirin] Shortness Of Breath            Latex Hives    Meloxicam Other (See Comments)     bruising    Penicillin V Hives and Rash    Penicillins Hives and Rash         Current Outpatient Medications:     albuterol (PROVENTIL HFA,VENTOLIN HFA) 90 mcg/act inhaler, Inhale 2 puffs every 6 (six) hours as needed for wheezing (Patient taking differently: Inhale 2 puffs as needed for wheezing  ), Disp: 1 Inhaler, Rfl: 0    cetirizine (ZyrTEC) 10 mg tablet, Take 1 tablet (10 mg total) by mouth 2 (two) times a day for 7 days, Disp: 14 tablet, Rfl: 0    famotidine (PEPCID) 40 MG tablet, Take 1 tablet (40 mg total) by mouth daily at bedtime for 30 days, Disp: 30 tablet, Rfl: 0    FLUoxetine (PROzac) 10 mg capsule, Take 10 mg by mouth every morning, Disp: , Rfl: 1    fluticasone (FLONASE) 50 mcg/act nasal spray, 2 sprays into each nostril daily (Patient taking differently: 2 sprays into each nostril as needed  ), Disp: 16 g, Rfl: 0    ibuprofen (MOTRIN) 800 mg tablet, Take 800 mg by mouth every 8 (eight) hours as needed, Disp: , Rfl:     omeprazole (PriLOSEC) 40 MG capsule, Take 1 capsule (40 mg total) by mouth daily, Disp: 30 capsule, Rfl: 0    traZODone (DESYREL) 50 mg tablet, Take 50 mg by mouth as needed  , Disp: , Rfl: 1      Govind Miller MA    Scribe Attestation    I,:   Govind Miller MA am acting as a scribe while in the presence of the attending physician :        I,:   Armani Lang MD personally performed the services described in this documentation    as scribed in my presence :

## 2019-02-19 NOTE — LETTER
February 19, 2019     Patient: Mahogany Womack   YOB: 1980   Date of Visit: 2/19/2019       To Whom it May Concern: Mahogany Womack is under my professional care  She was seen in my office on 2/19/2019  She may return to work on 2/19/19  If you have any questions or concerns, please don't hesitate to call           Sincerely,          Vannesa Abad MD        CC: No Recipients

## 2019-04-16 ENCOUNTER — HOSPITAL ENCOUNTER (EMERGENCY)
Facility: HOSPITAL | Age: 39
Discharge: HOME | End: 2019-04-16
Attending: EMERGENCY MEDICINE | Admitting: EMERGENCY MEDICINE
Payer: COMMERCIAL

## 2019-04-16 ENCOUNTER — APPOINTMENT (EMERGENCY)
Dept: CT IMAGING | Facility: HOSPITAL | Age: 39
End: 2019-04-16
Payer: COMMERCIAL

## 2019-04-16 VITALS
TEMPERATURE: 98.1 F | HEART RATE: 78 BPM | RESPIRATION RATE: 18 BRPM | SYSTOLIC BLOOD PRESSURE: 119 MMHG | BODY MASS INDEX: 23.52 KG/M2 | OXYGEN SATURATION: 100 % | DIASTOLIC BLOOD PRESSURE: 81 MMHG | WEIGHT: 141.31 LBS

## 2019-04-16 DIAGNOSIS — G43.909 MIGRAINE: Primary | ICD-10-CM

## 2019-04-16 LAB
ANION GAP BLD CALC-SCNC: 15 MMOL/L (ref 4–13)
BUN BLD-MCNC: 6 MG/DL (ref 5–25)
CA-I BLD-SCNC: 1.25 MMOL/L (ref 1.12–1.32)
CHLORIDE BLD-SCNC: 101 MMOL/L (ref 100–108)
CREAT BLD-MCNC: 0.7 MG/DL (ref 0.6–1.3)
EXT PREG TEST URINE: NEGATIVE
GFR SERPL CREATININE-BSD FRML MDRD: 112 ML/MIN/1.73SQ M
GLUCOSE SERPL-MCNC: 89 MG/DL (ref 65–140)
HCT VFR BLD CALC: 42 % (ref 34.8–46.1)
HGB BLDA-MCNC: 14.3 G/DL (ref 11.5–15.4)
PCO2 BLD: 29 MMOL/L (ref 21–32)
POTASSIUM BLD-SCNC: 3.6 MMOL/L (ref 3.5–5.3)
SODIUM BLD-SCNC: 141 MMOL/L (ref 136–145)
SPECIMEN SOURCE: ABNORMAL

## 2019-04-16 PROCEDURE — 96375 TX/PRO/DX INJ NEW DRUG ADDON: CPT

## 2019-04-16 PROCEDURE — 96361 HYDRATE IV INFUSION ADD-ON: CPT

## 2019-04-16 PROCEDURE — 99284 EMERGENCY DEPT VISIT MOD MDM: CPT

## 2019-04-16 PROCEDURE — 85014 HEMATOCRIT: CPT

## 2019-04-16 PROCEDURE — 80047 BASIC METABLC PNL IONIZED CA: CPT

## 2019-04-16 PROCEDURE — 96374 THER/PROPH/DIAG INJ IV PUSH: CPT

## 2019-04-16 PROCEDURE — 99284 EMERGENCY DEPT VISIT MOD MDM: CPT | Performed by: EMERGENCY MEDICINE

## 2019-04-16 PROCEDURE — 70450 CT HEAD/BRAIN W/O DYE: CPT

## 2019-04-16 PROCEDURE — 81025 URINE PREGNANCY TEST: CPT | Performed by: EMERGENCY MEDICINE

## 2019-04-16 RX ORDER — DEXAMETHASONE SODIUM PHOSPHATE 4 MG/ML
10 INJECTION, SOLUTION INTRA-ARTICULAR; INTRALESIONAL; INTRAMUSCULAR; INTRAVENOUS; SOFT TISSUE ONCE
Status: COMPLETED | OUTPATIENT
Start: 2019-04-16 | End: 2019-04-16

## 2019-04-16 RX ORDER — KETOROLAC TROMETHAMINE 30 MG/ML
15 INJECTION, SOLUTION INTRAMUSCULAR; INTRAVENOUS ONCE
Status: COMPLETED | OUTPATIENT
Start: 2019-04-16 | End: 2019-04-16

## 2019-04-16 RX ORDER — METOCLOPRAMIDE HYDROCHLORIDE 5 MG/ML
10 INJECTION INTRAMUSCULAR; INTRAVENOUS ONCE
Status: COMPLETED | OUTPATIENT
Start: 2019-04-16 | End: 2019-04-16

## 2019-04-16 RX ORDER — METOCLOPRAMIDE 10 MG/1
10 TABLET ORAL EVERY 6 HOURS
Qty: 10 TABLET | Refills: 0 | Status: SHIPPED | OUTPATIENT
Start: 2019-04-16 | End: 2021-06-07 | Stop reason: ALTCHOICE

## 2019-04-16 RX ADMIN — DEXAMETHASONE SODIUM PHOSPHATE 10 MG: 4 INJECTION, SOLUTION INTRAMUSCULAR; INTRAVENOUS at 09:44

## 2019-04-16 RX ADMIN — METOCLOPRAMIDE 10 MG: 5 INJECTION, SOLUTION INTRAMUSCULAR; INTRAVENOUS at 09:48

## 2019-04-16 RX ADMIN — SODIUM CHLORIDE 1000 ML: 0.9 INJECTION, SOLUTION INTRAVENOUS at 09:40

## 2019-04-16 RX ADMIN — KETOROLAC TROMETHAMINE 15 MG: 30 INJECTION, SOLUTION INTRAMUSCULAR; INTRAVENOUS at 09:42

## 2019-05-10 ENCOUNTER — HOSPITAL ENCOUNTER (EMERGENCY)
Facility: HOSPITAL | Age: 39
Discharge: HOME | End: 2019-05-10
Attending: EMERGENCY MEDICINE
Payer: COMMERCIAL

## 2019-05-10 VITALS
DIASTOLIC BLOOD PRESSURE: 85 MMHG | HEART RATE: 85 BPM | OXYGEN SATURATION: 98 % | SYSTOLIC BLOOD PRESSURE: 117 MMHG | TEMPERATURE: 98.8 F | WEIGHT: 133.6 LBS | BODY MASS INDEX: 22.23 KG/M2 | RESPIRATION RATE: 18 BRPM

## 2019-05-10 DIAGNOSIS — F41.0 PANIC ATTACKS: Primary | ICD-10-CM

## 2019-05-10 DIAGNOSIS — F41.0 ANXIETY ATTACK: ICD-10-CM

## 2019-05-10 PROCEDURE — 99284 EMERGENCY DEPT VISIT MOD MDM: CPT | Performed by: EMERGENCY MEDICINE

## 2019-05-10 PROCEDURE — 99282 EMERGENCY DEPT VISIT SF MDM: CPT

## 2019-05-10 RX ORDER — HYDROXYZINE HYDROCHLORIDE 25 MG/1
50 TABLET, FILM COATED ORAL EVERY 6 HOURS PRN
Qty: 25 TABLET | Refills: 0 | Status: SHIPPED | OUTPATIENT
Start: 2019-05-10 | End: 2022-02-15

## 2019-05-17 ENCOUNTER — OFFICE VISIT (OUTPATIENT)
Dept: OBGYN CLINIC | Facility: MEDICAL CENTER | Age: 39
End: 2019-05-17
Payer: COMMERCIAL

## 2019-05-17 VITALS
HEART RATE: 78 BPM | DIASTOLIC BLOOD PRESSURE: 84 MMHG | WEIGHT: 133.6 LBS | BODY MASS INDEX: 22.26 KG/M2 | HEIGHT: 65 IN | SYSTOLIC BLOOD PRESSURE: 124 MMHG

## 2019-05-17 DIAGNOSIS — Z98.890 S/P ACL RECONSTRUCTION: Primary | ICD-10-CM

## 2019-05-17 PROCEDURE — 99213 OFFICE O/P EST LOW 20 MIN: CPT | Performed by: ORTHOPAEDIC SURGERY

## 2019-06-26 ENCOUNTER — APPOINTMENT (OUTPATIENT)
Dept: LAB | Facility: HOSPITAL | Age: 39
End: 2019-06-26
Payer: COMMERCIAL

## 2019-06-26 ENCOUNTER — TRANSCRIBE ORDERS (OUTPATIENT)
Dept: ADMINISTRATIVE | Facility: HOSPITAL | Age: 39
End: 2019-06-26

## 2019-06-26 DIAGNOSIS — Z11.3 SCREENING FOR STD (SEXUALLY TRANSMITTED DISEASE): ICD-10-CM

## 2019-06-26 DIAGNOSIS — Z11.3 SCREENING FOR STD (SEXUALLY TRANSMITTED DISEASE): Primary | ICD-10-CM

## 2019-06-26 PROCEDURE — 36415 COLL VENOUS BLD VENIPUNCTURE: CPT

## 2019-06-26 PROCEDURE — 87389 HIV-1 AG W/HIV-1&-2 AB AG IA: CPT

## 2019-06-26 PROCEDURE — 86592 SYPHILIS TEST NON-TREP QUAL: CPT

## 2019-06-27 LAB — RPR SER QL: NORMAL

## 2019-06-28 LAB — HIV 1+2 AB+HIV1 P24 AG SERPL QL IA: NORMAL

## 2019-07-25 ENCOUNTER — TRANSCRIBE ORDERS (OUTPATIENT)
Dept: ADMINISTRATIVE | Facility: HOSPITAL | Age: 39
End: 2019-07-25

## 2019-07-25 ENCOUNTER — APPOINTMENT (OUTPATIENT)
Dept: LAB | Facility: HOSPITAL | Age: 39
End: 2019-07-25
Payer: COMMERCIAL

## 2019-07-25 DIAGNOSIS — N92.6 IRREGULAR MENSTRUAL CYCLE: Primary | ICD-10-CM

## 2019-07-25 DIAGNOSIS — N92.6 IRREGULAR MENSTRUAL CYCLE: ICD-10-CM

## 2019-07-25 LAB
EST. AVERAGE GLUCOSE BLD GHB EST-MCNC: 100 MG/DL
FSH SERPL-ACNC: 1.8 MIU/ML
HBA1C MFR BLD: 5.1 % (ref 4.2–6.3)
PROLACTIN SERPL-MCNC: 17.7 NG/ML
TESTOST SERPL-MCNC: 26 NG/DL
TSH SERPL DL<=0.05 MIU/L-ACNC: 1.5 UIU/ML (ref 0.36–3.74)

## 2019-07-25 PROCEDURE — 36415 COLL VENOUS BLD VENIPUNCTURE: CPT

## 2019-07-25 PROCEDURE — 84403 ASSAY OF TOTAL TESTOSTERONE: CPT

## 2019-07-25 PROCEDURE — 82626 DEHYDROEPIANDROSTERONE: CPT

## 2019-07-25 PROCEDURE — 83036 HEMOGLOBIN GLYCOSYLATED A1C: CPT

## 2019-07-25 PROCEDURE — 83001 ASSAY OF GONADOTROPIN (FSH): CPT

## 2019-07-25 PROCEDURE — 84146 ASSAY OF PROLACTIN: CPT

## 2019-07-25 PROCEDURE — 84443 ASSAY THYROID STIM HORMONE: CPT

## 2019-07-27 LAB — DHEA SERPL-MCNC: 248 NG/DL (ref 31–701)

## 2019-08-22 ENCOUNTER — HOSPITAL ENCOUNTER (EMERGENCY)
Facility: HOSPITAL | Age: 39
Discharge: LEFT BEFORE DISCHARGE | End: 2019-08-22
Attending: EMERGENCY MEDICINE | Admitting: EMERGENCY MEDICINE
Payer: COMMERCIAL

## 2019-08-22 ENCOUNTER — APPOINTMENT (EMERGENCY)
Dept: ULTRASOUND IMAGING | Facility: HOSPITAL | Age: 39
End: 2019-08-22
Payer: COMMERCIAL

## 2019-08-22 VITALS
TEMPERATURE: 97.9 F | BODY MASS INDEX: 22.98 KG/M2 | SYSTOLIC BLOOD PRESSURE: 144 MMHG | RESPIRATION RATE: 16 BRPM | WEIGHT: 138.1 LBS | OXYGEN SATURATION: 98 % | DIASTOLIC BLOOD PRESSURE: 89 MMHG | HEART RATE: 82 BPM

## 2019-08-22 DIAGNOSIS — R10.2 SUPRAPUBIC ABDOMINAL PAIN: ICD-10-CM

## 2019-08-22 DIAGNOSIS — Z53.21 ELOPED FROM EMERGENCY DEPARTMENT: Primary | ICD-10-CM

## 2019-08-22 LAB
BILIRUB UR QL STRIP: NEGATIVE
CLARITY UR: CLEAR
COLOR UR: NORMAL
EXT PREG TEST URINE: NEGATIVE
EXT. CONTROL ED NAV: NORMAL
GLUCOSE UR STRIP-MCNC: NEGATIVE MG/DL
HGB UR QL STRIP.AUTO: NEGATIVE
KETONES UR STRIP-MCNC: NEGATIVE MG/DL
LEUKOCYTE ESTERASE UR QL STRIP: NEGATIVE
NITRITE UR QL STRIP: NEGATIVE
PH UR STRIP.AUTO: 6 [PH]
PROT UR STRIP-MCNC: NEGATIVE MG/DL
SP GR UR STRIP.AUTO: 1.01 (ref 1–1.04)
UROBILINOGEN UA: NEGATIVE MG/DL

## 2019-08-22 PROCEDURE — 99282 EMERGENCY DEPT VISIT SF MDM: CPT | Performed by: PHYSICIAN ASSISTANT

## 2019-08-22 PROCEDURE — 81025 URINE PREGNANCY TEST: CPT | Performed by: PHYSICIAN ASSISTANT

## 2019-08-22 PROCEDURE — 81003 URINALYSIS AUTO W/O SCOPE: CPT | Performed by: PHYSICIAN ASSISTANT

## 2019-08-22 PROCEDURE — 99283 EMERGENCY DEPT VISIT LOW MDM: CPT

## 2019-08-22 RX ORDER — ACETAMINOPHEN AND CODEINE PHOSPHATE 120; 12 MG/5ML; MG/5ML
0.35 SOLUTION ORAL DAILY
COMMUNITY
Start: 2019-08-13 | End: 2021-06-07 | Stop reason: ALTCHOICE

## 2019-08-22 NOTE — ED NOTES
Patient eloped, prior to [address] [facility] [address] [facility] [address] coming        [facility]  08/22/19 [address] [place] [address]  08/22/19 2722

## 2019-08-22 NOTE — ED PROVIDER NOTES
History  Chief Complaint   Patient presents with    Possible UTI     Left suprapubic "ache"  "It hurts when I pee too"  Patient is a 59-year-old female who presents today with chief complaint of suprapubic abdominal pain  Patient reports pain is increased with urination  Patient reports she has had a history of ovarian cysts in the past however is unsure which side over a she had a cyst on  Patient denies any vaginal discharge or vaginal bleeding  Patient reports he had a tubal ligation  Patient denies any nausea or vomiting associated with her symptoms  Patient denies any chest pain, shortness of breath, lightheadedness or dizziness, fevers or chills  History provided by:  Patient   used: No    Abdominal Pain   Pain location:  Suprapubic  Pain quality: aching    Pain radiates to:  Does not radiate  Pain severity:  Moderate  Onset quality:  Gradual  Duration:  1 day  Timing:  Intermittent  Progression:  Waxing and waning  Chronicity:  New  Relieved by:  None tried  Worsened by:  Nothing  Ineffective treatments:  None tried  Associated symptoms: dysuria    Associated symptoms: no chest pain, no chills, no fatigue, no fever, no hematuria, no nausea, no shortness of breath, no sore throat and no vomiting        Prior to Admission Medications   Prescriptions Last Dose Informant Patient Reported? Taking?    FLUoxetine (PROzac) 10 mg capsule   Yes No   Sig: Take 10 mg by mouth every morning   albuterol (PROVENTIL HFA,VENTOLIN HFA) 90 mcg/act inhaler   No No   Sig: Inhale 2 puffs every 6 (six) hours as needed for wheezing   Patient taking differently: Inhale 2 puffs as needed for wheezing     cetirizine (ZyrTEC) 10 mg tablet   No No   Sig: Take 1 tablet (10 mg total) by mouth 2 (two) times a day for 7 days   famotidine (PEPCID) 40 MG tablet   No No   Sig: Take 1 tablet (40 mg total) by mouth daily at bedtime for 30 days   fluticasone (FLONASE) 50 mcg/act nasal spray   No No   Si sprays into each nostril daily   Patient taking differently: 2 sprays into each nostril as needed     hydrOXYzine HCL (ATARAX) 25 mg tablet   No No   Sig: Take 2 tablets (50 mg total) by mouth every 6 (six) hours as needed for anxiety   ibuprofen (MOTRIN) 800 mg tablet   Yes No   Sig: Take 800 mg by mouth every 8 (eight) hours as needed   metoclopramide (REGLAN) 10 mg tablet   No No   Sig: Take 1 tablet (10 mg total) by mouth every 6 (six) hours   norethindrone (MICRONOR) 0 35 MG tablet   Yes Yes   Sig: Take 0 35 mg by mouth daily   omeprazole (PriLOSEC) 40 MG capsule   No No   Sig: Take 1 capsule (40 mg total) by mouth daily   traZODone (DESYREL) 50 mg tablet   Yes No   Sig: Take 50 mg by mouth as needed        Facility-Administered Medications: None       Past Medical History:   Diagnosis Date    Acid reflux     Anxiety     Bursitis     Frequent headaches     Gastric ulcer     Gastroparesis     Liver cyst     Lumbar herniated disc     Migraine     Seasonal allergies     Torn ACL     Wears glasses        Past Surgical History:   Procedure Laterality Date    KNEE SURGERY Right     [place] ESOPHAGOGASTRODUODENOSCOPY TRANSORAL DIAGNOSTIC N/A 5/1/2017    Procedure: ESOPHAGOGASTRODUODENOSCOPY (EGD); Surgeon: Shane Chapman MD;  Location: [facility];   Service: Gastroenterology    [place] KNEE SCOPE,AID ANT CRUCIATE REPAIR Right 5/24/2018    Procedure: ARTHROSCOPIC RECONSTRUCTION ANTERIOR CRUCIATE LIGAMENT (ACL) WITH AUTOGRAFT AND ALLOGRAFT;  Surgeon: Barney De Leon MD;  Location: [facility] OR;  Service: Orthopedics    TUBAL LIGATION      WISDOM TOOTH EXTRACTION         Family History   Problem Relation Age of Onset    Depression Mother     Anxiety disorder Mother     Sleep disorder Mother     Hypertension Mother     Hyperlipidemia Mother     Hyperlipidemia Father     Hypertension Father     Heart disease Father     Heart disease Paternal Aunt     Heart disease Paternal Uncle     Osteoporosis Maternal Grandmother     Alzheimer's disease Paternal Grandmother     Heart disease Paternal Grandmother     Heart disease Paternal Grandfather     Stroke Paternal Grandfather      I have reviewed and agree with the history as documented  Social History     Tobacco Use    Smoking status: Never Smoker    Smokeless tobacco: Never Used   Substance Use Topics    Alcohol use: No    Drug use: No        Review of Systems   Constitutional: Negative for chills, fatigue and fever  HENT: Negative for congestion, ear pain, rhinorrhea and sore throat  Eyes: Negative for redness  Respiratory: Negative for chest tightness and shortness of breath  Cardiovascular: Negative for chest pain and palpitations  Gastrointestinal: Positive for abdominal pain  Negative for nausea and vomiting  Genitourinary: Positive for dysuria  Negative for hematuria  Musculoskeletal: Negative  Skin: Negative for rash  Neurological: Negative for dizziness, syncope, light-headedness and numbness  Physical Exam  Physical Exam   Constitutional: She is oriented to person, place, and time  She appears well-developed and well-nourished  HENT:   Head: Normocephalic  Eyes: No scleral icterus  Cardiovascular: Normal rate and regular rhythm  Pulmonary/Chest: Effort normal and breath sounds normal  No stridor  Abdominal: Soft  She exhibits no distension  There is tenderness  Musculoskeletal: Normal range of motion  Neurological: She is alert and oriented to person, place, and time  Skin: Skin is warm and dry  Capillary refill takes less than 2 seconds  Psychiatric: She has a normal mood and affect  Nursing note and vitals reviewed        Vital Signs  ED Triage Vitals [08/22/19 1708]   Temperature Pulse Respirations Blood Pressure SpO2   97 9 °F (36 6 °C) 82 16 144/89 98 %      Temp Source Heart Rate Source Patient Position - Orthostatic VS BP Location FiO2 (%)   Tympanic Monitor Sitting Left arm --      Pain Score 5           Vitals:    08/22/19 1708   BP: 144/89   Pulse: 82   Patient Position - Orthostatic VS: Sitting         Visual Acuity      ED Medications  Medications - No data to display    Diagnostic Studies  Results Reviewed     Procedure Component Value Units Date/Time    UA w Reflex to Microscopic w Reflex to Culture [[id number]]  (Normal) Collected:  08/22/19 1712    Lab Status:  Final result Specimen:  Urine, Clean Catch Updated:  08/22/19 1732     Color, UA Straw     Clarity, UA Clear     Specific Gravity, UA 1 015     pH, UA 6 0     Leukocytes, UA Negative     Nitrite, UA Negative     Protein, UA Negative mg/dl      Glucose, UA Negative mg/dl      Ketones, UA Negative mg/dl      Bilirubin, UA Negative     Blood, UA Negative     UROBILINOGEN UA Negative mg/dL     POCT pregnancy, urine [[id number]]  (Normal) Resulted:  08/22/19 1713    Lab Status:  Final result Updated:  08/22/19 1714     EXT PREG TEST UR (Ref: Negative) negative     Control valid                 US pelvis complete w transvaginal    (Results Pending)              Procedures  Procedures       ED Course  ED Course as of Aug 22 2005   Thu Aug 22, 2019   1900 Ultrasound arrives to perform the pelvic/transvaginal ultrasound however patient was nowhere to be found  Patient likely eloped as a search of the ER was negative for the patient                                  MDM  Number of Diagnoses or Management Options  Diagnosis management comments: Patient reporting symptoms of a uti including dyrusia, suprapubic aching with urination and frequency of urination however urinalysis does not reveal bacteria or nitrites  Patient reports a potential history of ovarian cysts, will obtain u/s to evaluate for ovarian cyst or torsion        Disposition  Final diagnoses:   Eloped from emergency department   Suprapubic abdominal pain     Time reflects when diagnosis was documented in both MDM as applicable and the Disposition within this note     Time User Action Codes Description Comment    8/22/2019  7:01 PM [place] [facility] [C98 04] Eloped from emergency department     8/22/2019  7:01 PM [facility]] Suprapubic abdominal pain       ED Disposition     ED Disposition Condition Date/Time Comment    Eloped  Thu Aug 22, 2019  7:01 PM       Follow-up Information    None         Discharge Medication List as of 8/22/2019  7:03 PM      CONTINUE these medications which have NOT CHANGED    Details   norethindrone (MICRONOR) 0 35 MG tablet Take 0 35 mg by mouth daily, Starting Tue 8/13/2019, Historical Med      albuterol (PROVENTIL HFA,VENTOLIN HFA) 90 mcg/act inhaler Inhale 2 puffs every 6 (six) hours as needed for wheezing, Starting Fri 8/24/2018, Normal      cetirizine (ZyrTEC) 10 mg tablet Take 1 tablet (10 mg total) by mouth 2 (two) times a day for 7 days, Starting Sat 7/14/2018, Until Sat 7/21/2018, Print      famotidine (PEPCID) 40 MG tablet Take 1 tablet (40 mg total) by mouth daily at bedtime for 30 days, Starting Tue 9/18/2018, Until Thu 10/18/2018, Normal      FLUoxetine (PROzac) 10 mg capsule Take 10 mg by mouth every morning, Starting Wed 10/17/2018, Historical Med      fluticasone (FLONASE) 50 mcg/act nasal spray 2 sprays into each nostril daily, Starting Fri 8/24/2018, Normal      hydrOXYzine HCL (ATARAX) 25 mg tablet Take 2 tablets (50 mg total) by mouth every 6 (six) hours as needed for anxiety, Starting Fri 5/10/2019, Print      ibuprofen (MOTRIN) 800 mg tablet Take 800 mg by mouth every 8 (eight) hours as needed, Historical Med      metoclopramide (REGLAN) 10 mg tablet Take 1 tablet (10 mg total) by mouth every 6 (six) hours, Starting Tue 4/16/2019, Print      omeprazole (PriLOSEC) 40 MG capsule Take 1 capsule (40 mg total) by mouth daily, Starting Tue 9/18/2018, Normal      traZODone (DESYREL) 50 mg tablet Take 50 mg by mouth as needed  , Starting Wed 10/17/2018, Historical Med           No discharge procedures on file      ED Provider  Electronically Signed by           Apolonia Durand PA-C  08/22/19 2005

## 2019-09-26 ENCOUNTER — HOSPITAL ENCOUNTER (EMERGENCY)
Facility: HOSPITAL | Age: 39
Discharge: HOME | End: 2019-09-26
Attending: EMERGENCY MEDICINE
Payer: COMMERCIAL

## 2019-09-26 ENCOUNTER — APPOINTMENT (EMERGENCY)
Dept: RADIOLOGY | Facility: HOSPITAL | Age: 39
End: 2019-09-26
Payer: COMMERCIAL

## 2019-09-26 VITALS
BODY MASS INDEX: 23.11 KG/M2 | WEIGHT: 138.89 LBS | HEART RATE: 98 BPM | OXYGEN SATURATION: 100 % | RESPIRATION RATE: 16 BRPM | DIASTOLIC BLOOD PRESSURE: 87 MMHG | TEMPERATURE: 97.8 F | SYSTOLIC BLOOD PRESSURE: 129 MMHG

## 2019-09-26 DIAGNOSIS — J40 BRONCHITIS: Primary | ICD-10-CM

## 2019-09-26 PROCEDURE — 99284 EMERGENCY DEPT VISIT MOD MDM: CPT | Performed by: PHYSICIAN ASSISTANT

## 2019-09-26 PROCEDURE — 99283 EMERGENCY DEPT VISIT LOW MDM: CPT

## 2019-09-26 PROCEDURE — 94640 AIRWAY INHALATION TREATMENT: CPT

## 2019-09-26 PROCEDURE — 71046 X-RAY EXAM CHEST 2 VIEWS: CPT

## 2019-09-26 RX ORDER — AZITHROMYCIN 250 MG/1
TABLET, FILM COATED ORAL
Qty: 6 TABLET | Refills: 0 | Status: SHIPPED | OUTPATIENT
Start: 2019-09-26 | End: 2019-09-30

## 2019-09-26 RX ORDER — ALBUTEROL SULFATE 90 UG/1
2 AEROSOL, METERED RESPIRATORY (INHALATION) EVERY 4 HOURS PRN
Qty: 1 INHALER | Refills: 0 | Status: SHIPPED | OUTPATIENT
Start: 2019-09-26 | End: 2021-06-07 | Stop reason: ALTCHOICE

## 2019-09-26 RX ORDER — IPRATROPIUM BROMIDE AND ALBUTEROL SULFATE 2.5; .5 MG/3ML; MG/3ML
3 SOLUTION RESPIRATORY (INHALATION) ONCE
Status: COMPLETED | OUTPATIENT
Start: 2019-09-26 | End: 2019-09-26

## 2019-09-26 RX ORDER — PREDNISONE 20 MG/1
20 TABLET ORAL 3 TIMES DAILY
Qty: 15 TABLET | Refills: 0 | Status: SHIPPED | OUTPATIENT
Start: 2019-09-26 | End: 2019-10-01

## 2019-09-26 RX ADMIN — IPRATROPIUM BROMIDE AND ALBUTEROL SULFATE 3 ML: 2.5; .5 SOLUTION RESPIRATORY (INHALATION) at 17:05

## 2019-09-26 NOTE — ED PROVIDER NOTES
History  Chief Complaint   Patient presents with    Nasal Congestion     states that she has been congested for 3-4 days  has used Allegra, Vicks formula 44, benadryl, and flonase with little relief     21-year-old female presenting for evaluation of multiple URI symptoms  Patient reports nasal congestion along with sore throat and dry cough for the past 2 or 3 days  She has tried [place] and over-the-counter cough and cold medications out relief  Denies any sick contacts at home  No fevers chills sweats  She does report chest tightness and wheezing consistent with bronchitis in the past   Denies any abdominal pain nausea vomiting or diarrhea  She reports usually DuoNeb and prednisone improved her symptoms  Prior to Admission Medications   Prescriptions Last Dose Informant Patient Reported? Taking?    FLUoxetine (PROzac) 10 mg capsule   Yes No   Sig: Take 10 mg by mouth every morning   albuterol (PROVENTIL HFA,VENTOLIN HFA) 90 mcg/act inhaler   No No   Sig: Inhale 2 puffs every 6 (six) hours as needed for wheezing   Patient taking differently: Inhale 2 puffs as needed for wheezing     cetirizine (ZyrTEC) 10 mg tablet   No No   Sig: Take 1 tablet (10 mg total) by mouth 2 (two) times a day for 7 days   famotidine (PEPCID) 40 MG tablet   No No   Sig: Take 1 tablet (40 mg total) by mouth daily at bedtime for 30 days   fluticasone (FLONASE) 50 mcg/act nasal spray   No No   Si sprays into each nostril daily   Patient taking differently: 2 sprays into each nostril as needed     hydrOXYzine HCL (ATARAX) 25 mg tablet   No No   Sig: Take 2 tablets (50 mg total) by mouth every 6 (six) hours as needed for anxiety   ibuprofen (MOTRIN) 800 mg tablet   Yes No   Sig: Take 800 mg by mouth every 8 (eight) hours as needed   metoclopramide (REGLAN) 10 mg tablet   No No   Sig: Take 1 tablet (10 mg total) by mouth every 6 (six) hours   norethindrone (MICRONOR) 0 35 MG tablet   Yes No   Sig: Take 0 35 mg by mouth daily   omeprazole (PriLOSEC) 40 MG capsule   No No   Sig: Take 1 capsule (40 mg total) by mouth daily   traZODone (DESYREL) 50 mg tablet   Yes No   Sig: Take 50 mg by mouth as needed        Facility-Administered Medications: None       Past Medical History:   Diagnosis Date    Acid reflux     Anxiety     Bursitis     Frequent headaches     Gastric ulcer     Gastroparesis     Liver cyst     Lumbar herniated disc     Migraine     Seasonal allergies     Torn ACL     Wears glasses        Past Surgical History:   Procedure Laterality Date    KNEE SURGERY Right     DE ESOPHAGOGASTRODUODENOSCOPY TRANSORAL DIAGNOSTIC N/A 5/1/2017    Procedure: ESOPHAGOGASTRODUODENOSCOPY (EGD); Surgeon: Roberto Carlos Gonzales MD;  Location: [facility]; Service: Gastroenterology    DE KNEE SCOPE,AID ANT CRUCIATE REPAIR Right 5/24/2018    Procedure: ARTHROSCOPIC RECONSTRUCTION ANTERIOR CRUCIATE LIGAMENT (ACL) WITH AUTOGRAFT AND ALLOGRAFT;  Surgeon: Kimberlee Adams MD;  Location: [facility] OR;  Service: Orthopedics    TUBAL LIGATION      WISDOM TOOTH EXTRACTION         Family History   Problem Relation Age of Onset    Depression Mother     Anxiety disorder Mother     Sleep disorder Mother     Hypertension Mother     Hyperlipidemia Mother     Hyperlipidemia Father     Hypertension Father     Heart disease Father     Heart disease Paternal Aunt     Heart disease Paternal Uncle     Osteoporosis Maternal Grandmother     Alzheimer's disease Paternal Grandmother     Heart disease Paternal Grandmother     Heart disease Paternal Grandfather     Stroke Paternal Grandfather      I have reviewed and agree with the history as documented  Social History     Tobacco Use    Smoking status: Never Smoker    Smokeless tobacco: Never Used   Substance Use Topics    Alcohol use: Yes     Comment: rare    Drug use: No        Review of Systems   All other systems reviewed and are negative        Physical Exam  Physical Exam Constitutional: She is oriented to person, place, and time  She appears well-developed and well-nourished  No distress  HENT:   Head: Normocephalic and atraumatic  Right Ear: External ear normal    Left Ear: External ear normal    Nose: Nose normal    Mouth/Throat: Oropharynx is clear and moist  No oropharyngeal exudate  Eyes: Conjunctivae are normal    EOM grossly intact   Neck: Normal range of motion  Neck supple  No JVD present  Cardiovascular: Normal rate  Pulmonary/Chest: Effort normal  No stridor  No respiratory distress  She has wheezes  Fair air movement throughout, expiratory wheezing bilateral lung bases   Abdominal: Soft  Musculoskeletal:   FROM, steady gait, cap refill brisk, strength and sensation grossly intact throughout   Neurological: She is alert and oriented to person, place, and time  Skin: Skin is warm and dry  Capillary refill takes less than 2 seconds  Psychiatric: She has a normal mood and affect  Her behavior is normal    Nursing note and vitals reviewed  Vital Signs  ED Triage Vitals [09/26/19 1627]   Temperature Pulse Respirations Blood Pressure SpO2   97 8 °F (36 6 °C) 98 16 129/87 100 %      Temp Source Heart Rate Source Patient Position - Orthostatic VS BP Location FiO2 (%)   Tympanic Monitor Sitting Left arm --      Pain Score       No Pain           Vitals:    09/26/19 1627   BP: 129/87   Pulse: 98   Patient Position - Orthostatic VS: Sitting         Visual Acuity      ED Medications  Medications   ipratropium-albuterol (DUO-NEB) 0 5-2 5 mg/3 mL inhalation solution 3 mL (3 mL Nebulization Given 9/26/19 1705)       Diagnostic Studies  Results Reviewed     None                 XR chest 2 views   Final Result by Ann Aguilar MD (09/26 1828)      No acute cardiopulmonary disease              Workstation performed: [id number]                    Procedures  Procedures       ED Course                               MDM  Number of Diagnoses or Management Options  Bronchitis:   Diagnosis management comments: 53-year-old female presenting for evaluation of multiple URI symptoms, no acute infiltrate visualized on x-ray, will treat for bronchitis, patient improved after DuoNeb in the ED, she has no respiratory distress, afebrile nontoxic no acute distress, follow up with PCP as an outpatient    All imaging discussed with patient, strict return to ED precautions discussed  Pt verbalizes understanding and agrees with plan  Pt is stable for discharge    Portions of the record may have been created with voice recognition software  Occasional wrong word or "sound a like" substitutions may have occurred due to the inherent limitations of voice recognition software  Read the chart carefully and recognize, using context, where substitutions have occurred  Disposition  Final diagnoses:   Bronchitis     Time reflects when diagnosis was documented in both MDM as applicable and the Disposition within this note     Time User Action Codes Description Comment    9/26/2019  5:23 PM Erick Page Add [J40] Bronchitis       ED Disposition     ED Disposition Condition Date/Time Comment    Discharge Stable Thu Sep 26, 2019  5:23 PM Jaime Ramos discharge to home/self care              Follow-up Information     Follow up With Specialties Details Why Contact Info    Aftab Fajardo PA-C Family Medicine, Physician Assistant Call in 1 day  [address] [place] [address] 43             Discharge Medication List as of 9/26/2019  5:24 PM      START taking these medications    Details   !! albuterol (PROVENTIL HFA,VENTOLIN HFA) 90 mcg/act inhaler Inhale 2 puffs every 4 (four) hours as needed for wheezing, Starting Thu 9/26/2019, Print      azithromycin (ZITHROMAX) 250 mg tablet Take 2 tablets today then 1 tablet daily x 4 days, Print      predniSONE 20 mg tablet Take 1 tablet (20 mg total) by mouth 3 (three) times a day for 5 days, Starting Thu 9/26/2019, Until Tue 10/1/2019, Print       !! - Potential duplicate medications found  Please discuss with provider  CONTINUE these medications which have NOT CHANGED    Details   !! albuterol (PROVENTIL HFA,VENTOLIN HFA) 90 mcg/act inhaler Inhale 2 puffs every 6 (six) hours as needed for wheezing, Starting Fri 8/24/2018, Normal      cetirizine (ZyrTEC) 10 mg tablet Take 1 tablet (10 mg total) by mouth 2 (two) times a day for 7 days, Starting Sat 7/14/2018, Until Sat 7/21/2018, Print      famotidine (PEPCID) 40 MG tablet Take 1 tablet (40 mg total) by mouth daily at bedtime for 30 days, Starting Tue 9/18/2018, Until Thu 10/18/2018, Normal      FLUoxetine (PROzac) 10 mg capsule Take 10 mg by mouth every morning, Starting Wed 10/17/2018, Historical Med      fluticasone (FLONASE) 50 mcg/act nasal spray 2 sprays into each nostril daily, Starting Fri 8/24/2018, Normal      hydrOXYzine HCL (ATARAX) 25 mg tablet Take 2 tablets (50 mg total) by mouth every 6 (six) hours as needed for anxiety, Starting Fri 5/10/2019, Print      ibuprofen (MOTRIN) 800 mg tablet Take 800 mg by mouth every 8 (eight) hours as needed, Historical Med      metoclopramide (REGLAN) 10 mg tablet Take 1 tablet (10 mg total) by mouth every 6 (six) hours, Starting Tue 4/16/2019, Print      norethindrone (MICRONOR) 0 35 MG tablet Take 0 35 mg by mouth daily, Starting Tue 8/13/2019, Historical Med      omeprazole (PriLOSEC) 40 MG capsule Take 1 capsule (40 mg total) by mouth daily, Starting Tue 9/18/2018, Normal      traZODone (DESYREL) 50 mg tablet Take 50 mg by mouth as needed  , Starting Wed 10/17/2018, Historical Med       !! - Potential duplicate medications found  Please discuss with provider  No discharge procedures on file      ED Provider  Electronically Signed by           Radha Sanders PA-C  09/26/19 1401

## 2020-01-21 ENCOUNTER — HOSPITAL ENCOUNTER (EMERGENCY)
Facility: HOSPITAL | Age: 40
Discharge: HOME | End: 2020-01-21
Attending: EMERGENCY MEDICINE | Admitting: EMERGENCY MEDICINE
Payer: COMMERCIAL

## 2020-01-21 VITALS
BODY MASS INDEX: 23.41 KG/M2 | OXYGEN SATURATION: 100 % | TEMPERATURE: 96.8 F | HEART RATE: 77 BPM | DIASTOLIC BLOOD PRESSURE: 88 MMHG | RESPIRATION RATE: 16 BRPM | SYSTOLIC BLOOD PRESSURE: 146 MMHG | WEIGHT: 140.7 LBS

## 2020-01-21 DIAGNOSIS — J06.9 UPPER RESPIRATORY INFECTION: ICD-10-CM

## 2020-01-21 DIAGNOSIS — J02.0 STREP PHARYNGITIS: Primary | ICD-10-CM

## 2020-01-21 PROCEDURE — 99284 EMERGENCY DEPT VISIT MOD MDM: CPT | Performed by: EMERGENCY MEDICINE

## 2020-01-21 PROCEDURE — 99283 EMERGENCY DEPT VISIT LOW MDM: CPT

## 2020-01-21 RX ORDER — AZITHROMYCIN 250 MG/1
250 TABLET, FILM COATED ORAL EVERY 24 HOURS
Qty: 4 TABLET | Refills: 0 | Status: SHIPPED | OUTPATIENT
Start: 2020-01-21 | End: 2020-01-25

## 2020-01-21 RX ORDER — AZITHROMYCIN 250 MG/1
500 TABLET, FILM COATED ORAL ONCE
Status: COMPLETED | OUTPATIENT
Start: 2020-01-21 | End: 2020-01-21

## 2020-01-21 RX ADMIN — AZITHROMYCIN 500 MG: 250 TABLET, FILM COATED ORAL at 16:54

## 2020-01-21 RX ADMIN — DEXAMETHASONE SODIUM PHOSPHATE 10 MG: 10 INJECTION, SOLUTION INTRAMUSCULAR; INTRAVENOUS at 16:54

## 2020-01-21 NOTE — ED PROVIDER NOTES
History  Chief Complaint   Patient presents with    Cold Like Symptoms     sore throat, cough, headache and ear pain since last week  Last took motrin this morning with no relief  No flu shot  Patient is a healthy 61-year-old female coming in today with sore throat cough, headaches and ear pain for approximately 1 week  She states that she has been taking over-the-counter medications with mild to moderate relief  She has no recent travel, sick contacts, no known fevers  She has subjective fevers last week which resolved  She did not have the flu shot  She is nose sick contacts  She has been more difficulty eating because her throat hurts the worst   She has no abdominal pain, nausea, vomiting or diarrhea  History provided by:  Patient   used: No    Sore Throat   Location:  Generalized  Quality:  Aching and sore  Severity:  Mild  Onset quality:  Gradual  Duration:  1 week  Timing:  Constant  Progression:  Unchanged  Chronicity:  New  Relieved by:  Nothing  Worsened by:  Nothing  Ineffective treatments:  OTC medications  Associated symptoms: ear pain, rhinorrhea and sinus congestion    Associated symptoms: no abdominal pain, no adenopathy, no chest pain, no chills, no cough, no drooling, no ear discharge, no epistaxis, no eye discharge, no fever, no headaches, no neck stiffness, no night sweats, no plugged ear sensation, no postnasal drip, no rash, no shortness of breath, no stridor, no trouble swallowing and no voice change    Risk factors: no exposure to strep, no exposure to mono, no sick contacts, no recent dental procedure, no recent endoscopy and no recent ENT procedure        Prior to Admission Medications   Prescriptions Last Dose Informant Patient Reported? Taking?    FLUoxetine (PROzac) 10 mg capsule   Yes No   Sig: Take 10 mg by mouth every morning   albuterol (PROVENTIL HFA,VENTOLIN HFA) 90 mcg/act inhaler   No No   Sig: Inhale 2 puffs every 6 (six) hours as needed for wheezing   Patient taking differently: Inhale 2 puffs as needed for wheezing     albuterol (PROVENTIL HFA,VENTOLIN HFA) 90 mcg/act inhaler   No No   Sig: Inhale 2 puffs every 4 (four) hours as needed for wheezing   cetirizine (ZyrTEC) 10 mg tablet   No No   Sig: Take 1 tablet (10 mg total) by mouth 2 (two) times a day for 7 days   famotidine (PEPCID) 40 MG tablet   No No   Sig: Take 1 tablet (40 mg total) by mouth daily at bedtime for 30 days   fluticasone (FLONASE) 50 mcg/act nasal spray   No No   Si sprays into each nostril daily   Patient taking differently: 2 sprays into each nostril as needed     hydrOXYzine HCL (ATARAX) 25 mg tablet   No No   Sig: Take 2 tablets (50 mg total) by mouth every 6 (six) hours as needed for anxiety   ibuprofen (MOTRIN) 800 mg tablet   Yes No   Sig: Take 800 mg by mouth every 8 (eight) hours as needed   metoclopramide (REGLAN) 10 mg tablet   No No   Sig: Take 1 tablet (10 mg total) by mouth every 6 (six) hours   norethindrone (MICRONOR) 0 35 MG tablet   Yes No   Sig: Take 0 35 mg by mouth daily   omeprazole (PriLOSEC) 40 MG capsule   No No   Sig: Take 1 capsule (40 mg total) by mouth daily   traZODone (DESYREL) 50 mg tablet   Yes No   Sig: Take 50 mg by mouth as needed        Facility-Administered Medications: None       Past Medical History:   Diagnosis Date    Acid reflux     Anxiety     Bursitis     Frequent headaches     Gastric ulcer     Gastroparesis     Liver cyst     Lumbar herniated disc     Migraine     Seasonal allergies     Torn ACL     Wears glasses        Past Surgical History:   Procedure Laterality Date    KNEE SURGERY Right     VT ESOPHAGOGASTRODUODENOSCOPY TRANSORAL DIAGNOSTIC N/A 2017    Procedure: ESOPHAGOGASTRODUODENOSCOPY (EGD); Surgeon: Oksana Gonsalez MD;  Location: [facility];   Service: Gastroenterology    VT KNEE SCOPE,AID ANT CRUCIATE REPAIR Right 2018    Procedure: ARTHROSCOPIC RECONSTRUCTION ANTERIOR CRUCIATE LIGAMENT (ACL) WITH AUTOGRAFT AND ALLOGRAFT;  Surgeon: Cecile Cabello MD;  Location: [facility];  Service: Orthopedics    TUBAL LIGATION      WISDOM TOOTH EXTRACTION         Family History   Problem Relation Age of Onset    Depression Mother     Anxiety disorder Mother     Sleep disorder Mother     Hypertension Mother     Hyperlipidemia Mother     Hyperlipidemia Father     Hypertension Father     Heart disease Father     Heart disease Paternal Aunt     Heart disease Paternal Uncle     Osteoporosis Maternal Grandmother     Alzheimer's disease Paternal Grandmother     Heart disease Paternal Grandmother     Heart disease Paternal Grandfather     Stroke Paternal Grandfather      I have reviewed and agree with the history as documented  Social History     Tobacco Use    Smoking status: Never Smoker    Smokeless tobacco: Never Used   Substance Use Topics    Alcohol use: Not Currently     Comment: rare    Drug use: No        Review of Systems   Constitutional: Negative for chills, diaphoresis, fever and night sweats  HENT: Positive for ear pain, rhinorrhea and sore throat  Negative for drooling, ear discharge, nosebleeds, postnasal drip, trouble swallowing and voice change  Eyes: Negative  Negative for discharge and visual disturbance  Respiratory: Negative  Negative for cough, chest tightness, shortness of breath and stridor  Cardiovascular: Negative  Negative for chest pain and palpitations  Gastrointestinal: Negative  Negative for abdominal pain, nausea and vomiting  Genitourinary: Negative  Negative for difficulty urinating and dysuria  Musculoskeletal: Negative  Negative for back pain, neck pain and neck stiffness  Skin: Negative for rash  Neurological: Negative for weakness and headaches  Hematological: Negative  Negative for adenopathy  Psychiatric/Behavioral: Negative  Negative for confusion  All other systems reviewed and are negative        Physical Exam  Physical Exam Constitutional: She is oriented to person, place, and time  She appears well-developed and well-nourished  No distress  HENT:   Head: Normocephalic and atraumatic  Right Ear: Hearing, tympanic membrane, external ear and ear canal normal    Left Ear: Hearing, tympanic membrane, external ear and ear canal normal    Nose: Nose normal    Mouth/Throat: Oropharynx is clear and moist    Patient is maintaining airway maintaining secretions  Uvula midline  There is posterior pharyngeal erythema and mild exudate bilaterally  There is no brawniness under the tongue  There is no stridor   Eyes: Pupils are equal, round, and reactive to light  Conjunctivae, EOM and lids are normal    Neck: Normal range of motion  Neck supple  Cardiovascular: Normal rate, regular rhythm, normal heart sounds and intact distal pulses  No murmur heard  Pulmonary/Chest: Effort normal and breath sounds normal  No stridor  No respiratory distress  Abdominal: Soft  Bowel sounds are normal  She exhibits no distension  There is no tenderness  Musculoskeletal: Normal range of motion  She exhibits no edema  Neurological: She is alert and oriented to person, place, and time  No cranial nerve deficit  No slurred speech  No facial asymmetry   Skin: Skin is warm  Capillary refill takes less than 2 seconds  She is not diaphoretic  Nursing note and vitals reviewed        Vital Signs  ED Triage Vitals [01/21/20 1622]   Temperature Pulse Respirations Blood Pressure SpO2   (!) 96 8 °F (36 °C) 77 16 146/88 100 %      Temp Source Heart Rate Source Patient Position - Orthostatic VS BP Location FiO2 (%)   Tympanic Monitor Sitting Left arm --      Pain Score       --           Vitals:    01/21/20 1622   BP: 146/88   Pulse: 77   Patient Position - Orthostatic VS: Sitting         Visual Acuity      ED Medications  Medications   azithromycin (ZITHROMAX) tablet 500 mg (500 mg Oral Given 1/21/20 1654)   dexamethasone 10 mg/mL oral liquid 10 mg 1 mL (10 mg Oral Given 1/21/20 1654)       Diagnostic Studies  Results Reviewed     None                 No orders to display              Procedures  Procedures         ED Course  ED Course as of Jan 21 1658 Tue Jan 21, 2020   1630 Patient is a 28-year-old female coming in today with URI signs symptoms and sore throat  This been ongoing for week  On exam she is neuro intact with no focal deficits are nontoxic appearing  Clinically patient has subjective fevers, exudate, posterior pharyngeal erythema with no cough  Will treat empirically for strep throat  Discussed with patient regarding this and discussed with her the accuracy of strep testing  She is agreeable  Portions of the record may have been created with voice recognition software  Occasional wrong word or "sound a like" substitutions may have occurred due to the inherent limitations of voice recognition software  Read the chart carefully and recognize, using context, where substitutions have occurred  MDM      Disposition  Final diagnoses:   Strep pharyngitis   Upper respiratory infection     Time reflects when diagnosis was documented in both MDM as applicable and the Disposition within this note     Time User Action Codes Description Comment    1/21/2020  4:55 PM Joan Ortega Add [J02 0] Strep pharyngitis     1/21/2020  4:55 PM Joan Ortega Add [J06 9] Upper respiratory infection       ED Disposition     ED Disposition Condition Date/Time Comment    Discharge Stable Tue Jan 21, 2020  4:54 PM Dulce Leo discharge to home/self care              Follow-up Information     Follow up With Specialties Details Why Contact Info    Tamie Ford PA-C Family Medicine, Physician Assistant Schedule an appointment as soon as possible for a visit in 1 week  [address]  [address]  [phone]            Patient's Medications   Discharge Prescriptions    AZITHROMYCIN (ZITHROMAX) 250 MG TABLET Take 1 tablet (250 mg total) by mouth every 24 hours for 4 days Take 1 tablet daily x 4 days       Start Date: 1/21/2020 End Date: 1/25/2020       Order Dose: 250 mg       Quantity: 4 tablet    Refills: 0     No discharge procedures on file      ED Provider  Electronically Signed by           Zoila Chua DO  01/21/20 1714

## 2020-06-19 ENCOUNTER — APPOINTMENT (EMERGENCY)
Dept: CT IMAGING | Facility: HOSPITAL | Age: 40
End: 2020-06-19
Payer: COMMERCIAL

## 2020-06-19 ENCOUNTER — APPOINTMENT (EMERGENCY)
Dept: RADIOLOGY | Facility: HOSPITAL | Age: 40
End: 2020-06-19
Payer: COMMERCIAL

## 2020-06-19 ENCOUNTER — HOSPITAL ENCOUNTER (EMERGENCY)
Facility: HOSPITAL | Age: 40
Discharge: HOME | End: 2020-06-19
Attending: EMERGENCY MEDICINE
Payer: COMMERCIAL

## 2020-06-19 VITALS
SYSTOLIC BLOOD PRESSURE: 125 MMHG | BODY MASS INDEX: 23.22 KG/M2 | RESPIRATION RATE: 18 BRPM | TEMPERATURE: 96.9 F | OXYGEN SATURATION: 100 % | HEART RATE: 86 BPM | WEIGHT: 139.55 LBS | DIASTOLIC BLOOD PRESSURE: 60 MMHG

## 2020-06-19 DIAGNOSIS — R20.2 NUMBNESS AND TINGLING: Primary | ICD-10-CM

## 2020-06-19 DIAGNOSIS — R20.0 NUMBNESS AND TINGLING: Primary | ICD-10-CM

## 2020-06-19 DIAGNOSIS — K02.9 DENTAL CAVITY: ICD-10-CM

## 2020-06-19 LAB
ALBUMIN SERPL BCP-MCNC: 4 G/DL (ref 3.5–5)
ALP SERPL-CCNC: 70 U/L (ref 46–116)
ALT SERPL W P-5'-P-CCNC: 16 U/L (ref 12–78)
ANION GAP SERPL CALCULATED.3IONS-SCNC: 7 MMOL/L (ref 4–13)
AST SERPL W P-5'-P-CCNC: 8 U/L (ref 5–45)
ATRIAL RATE: 84 BPM
BASOPHILS # BLD AUTO: 0.05 THOUSANDS/ΜL (ref 0–0.1)
BASOPHILS NFR BLD AUTO: 1 % (ref 0–1)
BILIRUB SERPL-MCNC: 0.36 MG/DL (ref 0.2–1)
BILIRUB UR QL STRIP: NEGATIVE
BUN SERPL-MCNC: 8 MG/DL (ref 5–25)
CALCIUM SERPL-MCNC: 9.2 MG/DL (ref 8.3–10.1)
CHLORIDE SERPL-SCNC: 101 MMOL/L (ref 100–108)
CLARITY UR: CLEAR
CLARITY, POC: CLEAR
CO2 SERPL-SCNC: 29 MMOL/L (ref 21–32)
COLOR UR: YELLOW
COLOR, POC: YELLOW
CREAT SERPL-MCNC: 0.81 MG/DL (ref 0.6–1.3)
EOSINOPHIL # BLD AUTO: 0.1 THOUSAND/ΜL (ref 0–0.61)
EOSINOPHIL NFR BLD AUTO: 1 % (ref 0–6)
ERYTHROCYTE [DISTWIDTH] IN BLOOD BY AUTOMATED COUNT: 12.6 % (ref 11.6–15.1)
EXT PREG TEST URINE: NEGATIVE
EXT. CONTROL ED NAV: NORMAL
GFR SERPL CREATININE-BSD FRML MDRD: 93 ML/MIN/1.73SQ M
GLUCOSE SERPL-MCNC: 80 MG/DL (ref 65–140)
GLUCOSE UR STRIP-MCNC: NEGATIVE MG/DL
HCT VFR BLD AUTO: 41.9 % (ref 34.8–46.1)
HGB BLD-MCNC: 13.7 G/DL (ref 11.5–15.4)
HGB UR QL STRIP.AUTO: NEGATIVE
IMM GRANULOCYTES # BLD AUTO: 0.04 THOUSAND/UL (ref 0–0.2)
IMM GRANULOCYTES NFR BLD AUTO: 1 % (ref 0–2)
KETONES UR STRIP-MCNC: NEGATIVE MG/DL
LEUKOCYTE ESTERASE UR QL STRIP: NEGATIVE
LYMPHOCYTES # BLD AUTO: 1.56 THOUSANDS/ΜL (ref 0.6–4.47)
LYMPHOCYTES NFR BLD AUTO: 18 % (ref 14–44)
MCH RBC QN AUTO: 29.1 PG (ref 26.8–34.3)
MCHC RBC AUTO-ENTMCNC: 32.7 G/DL (ref 31.4–37.4)
MCV RBC AUTO: 89 FL (ref 82–98)
MONOCYTES # BLD AUTO: 0.39 THOUSAND/ΜL (ref 0.17–1.22)
MONOCYTES NFR BLD AUTO: 4 % (ref 4–12)
NEUTROPHILS # BLD AUTO: 6.7 THOUSANDS/ΜL (ref 1.85–7.62)
NEUTS SEG NFR BLD AUTO: 75 % (ref 43–75)
NITRITE UR QL STRIP: NEGATIVE
NRBC BLD AUTO-RTO: 0 /100 WBCS
P AXIS: 57 DEGREES
PH UR STRIP.AUTO: 7 [PH] (ref 4.5–8)
PLATELET # BLD AUTO: 277 THOUSANDS/UL (ref 149–390)
PMV BLD AUTO: 9.7 FL (ref 8.9–12.7)
POTASSIUM SERPL-SCNC: 3.7 MMOL/L (ref 3.5–5.3)
PR INTERVAL: 140 MS
PROT SERPL-MCNC: 8 G/DL (ref 6.4–8.2)
PROT UR STRIP-MCNC: NEGATIVE MG/DL
QRS AXIS: 30 DEGREES
QRSD INTERVAL: 76 MS
QT INTERVAL: 348 MS
QTC INTERVAL: 411 MS
RBC # BLD AUTO: 4.7 MILLION/UL (ref 3.81–5.12)
SODIUM SERPL-SCNC: 137 MMOL/L (ref 136–145)
SP GR UR STRIP.AUTO: 1.01 (ref 1–1.03)
T WAVE AXIS: 69 DEGREES
TROPONIN I SERPL-MCNC: <0.02 NG/ML
TSH SERPL DL<=0.05 MIU/L-ACNC: 0.96 UIU/ML (ref 0.36–3.74)
UROBILINOGEN UR QL STRIP.AUTO: 0.2 E.U./DL
VENTRICULAR RATE: 84 BPM
WBC # BLD AUTO: 8.84 THOUSAND/UL (ref 4.31–10.16)

## 2020-06-19 PROCEDURE — 99284 EMERGENCY DEPT VISIT MOD MDM: CPT | Performed by: PHYSICIAN ASSISTANT

## 2020-06-19 PROCEDURE — 84443 ASSAY THYROID STIM HORMONE: CPT | Performed by: PHYSICIAN ASSISTANT

## 2020-06-19 PROCEDURE — 93010 ELECTROCARDIOGRAM REPORT: CPT | Performed by: INTERNAL MEDICINE

## 2020-06-19 PROCEDURE — 84484 ASSAY OF TROPONIN QUANT: CPT | Performed by: PHYSICIAN ASSISTANT

## 2020-06-19 PROCEDURE — 80053 COMPREHEN METABOLIC PANEL: CPT | Performed by: PHYSICIAN ASSISTANT

## 2020-06-19 PROCEDURE — 70498 CT ANGIOGRAPHY NECK: CPT

## 2020-06-19 PROCEDURE — 93005 ELECTROCARDIOGRAM TRACING: CPT

## 2020-06-19 PROCEDURE — 71045 X-RAY EXAM CHEST 1 VIEW: CPT

## 2020-06-19 PROCEDURE — 81025 URINE PREGNANCY TEST: CPT | Performed by: PHYSICIAN ASSISTANT

## 2020-06-19 PROCEDURE — 36415 COLL VENOUS BLD VENIPUNCTURE: CPT | Performed by: PHYSICIAN ASSISTANT

## 2020-06-19 PROCEDURE — 70496 CT ANGIOGRAPHY HEAD: CPT

## 2020-06-19 PROCEDURE — 85025 COMPLETE CBC W/AUTO DIFF WBC: CPT | Performed by: PHYSICIAN ASSISTANT

## 2020-06-19 PROCEDURE — 99284 EMERGENCY DEPT VISIT MOD MDM: CPT

## 2020-06-19 PROCEDURE — 81003 URINALYSIS AUTO W/O SCOPE: CPT

## 2020-06-19 RX ADMIN — IOHEXOL 85 ML: 350 INJECTION, SOLUTION INTRAVENOUS at 11:40

## 2020-10-28 ENCOUNTER — HOSPITAL ENCOUNTER (EMERGENCY)
Facility: HOSPITAL | Age: 40
Discharge: HOME | End: 2020-10-28
Attending: EMERGENCY MEDICINE | Admitting: EMERGENCY MEDICINE
Payer: COMMERCIAL

## 2020-10-28 VITALS
BODY MASS INDEX: 24.28 KG/M2 | HEART RATE: 83 BPM | SYSTOLIC BLOOD PRESSURE: 134 MMHG | RESPIRATION RATE: 14 BRPM | DIASTOLIC BLOOD PRESSURE: 78 MMHG | WEIGHT: 145.72 LBS | HEIGHT: 65 IN | TEMPERATURE: 97.9 F | OXYGEN SATURATION: 99 %

## 2020-10-28 DIAGNOSIS — L02.411 ABSCESS OF RIGHT AXILLA: Primary | ICD-10-CM

## 2020-10-28 PROCEDURE — 99283 EMERGENCY DEPT VISIT LOW MDM: CPT

## 2020-10-28 PROCEDURE — 99284 EMERGENCY DEPT VISIT MOD MDM: CPT | Performed by: PHYSICIAN ASSISTANT

## 2020-10-28 RX ORDER — SULFAMETHOXAZOLE AND TRIMETHOPRIM 800; 160 MG/1; MG/1
1 TABLET ORAL 2 TIMES DAILY
Qty: 14 TABLET | Refills: 0 | Status: SHIPPED | OUTPATIENT
Start: 2020-10-28 | End: 2020-11-04

## 2020-11-17 ENCOUNTER — HOSPITAL ENCOUNTER (EMERGENCY)
Facility: HOSPITAL | Age: 40
Discharge: HOME | End: 2020-11-17
Attending: EMERGENCY MEDICINE | Admitting: EMERGENCY MEDICINE
Payer: COMMERCIAL

## 2020-11-17 VITALS
OXYGEN SATURATION: 99 % | HEART RATE: 85 BPM | SYSTOLIC BLOOD PRESSURE: 119 MMHG | BODY MASS INDEX: 24.13 KG/M2 | WEIGHT: 145 LBS | RESPIRATION RATE: 18 BRPM | DIASTOLIC BLOOD PRESSURE: 67 MMHG | TEMPERATURE: 97.7 F

## 2020-11-17 DIAGNOSIS — Z20.822 ENCOUNTER FOR LABORATORY TESTING FOR COVID-19 VIRUS: ICD-10-CM

## 2020-11-17 DIAGNOSIS — J06.9 URI (UPPER RESPIRATORY INFECTION): Primary | ICD-10-CM

## 2020-11-17 PROCEDURE — 99283 EMERGENCY DEPT VISIT LOW MDM: CPT

## 2020-11-17 PROCEDURE — 87637 SARSCOV2&INF A&B&RSV AMP PRB: CPT | Performed by: PHYSICIAN ASSISTANT

## 2020-11-17 PROCEDURE — 99284 EMERGENCY DEPT VISIT MOD MDM: CPT | Performed by: PHYSICIAN ASSISTANT

## 2020-11-17 RX ORDER — ALBUTEROL SULFATE 90 UG/1
2 AEROSOL, METERED RESPIRATORY (INHALATION) EVERY 6 HOURS PRN
Qty: 1 INHALER | Refills: 0 | Status: SHIPPED | OUTPATIENT
Start: 2020-11-17 | End: 2021-11-15 | Stop reason: SDUPTHER

## 2020-11-20 LAB
FLUAV RNA NPH QL NAA+PROBE: NOT DETECTED
FLUBV RNA NPH QL NAA+PROBE: NOT DETECTED
RSV RNA NPH QL NAA+PROBE: NOT DETECTED
SARS-COV-2 RNA NPH QL NAA+PROBE: NOT DETECTED

## 2021-01-27 ENCOUNTER — HOSPITAL ENCOUNTER (EMERGENCY)
Facility: HOSPITAL | Age: 41
Discharge: HOME | End: 2021-01-27
Attending: EMERGENCY MEDICINE | Admitting: EMERGENCY MEDICINE
Payer: COMMERCIAL

## 2021-01-27 VITALS
WEIGHT: 149.47 LBS | HEART RATE: 76 BPM | BODY MASS INDEX: 24.87 KG/M2 | RESPIRATION RATE: 18 BRPM | SYSTOLIC BLOOD PRESSURE: 151 MMHG | DIASTOLIC BLOOD PRESSURE: 81 MMHG | TEMPERATURE: 97.8 F | OXYGEN SATURATION: 100 %

## 2021-01-27 DIAGNOSIS — M62.838 NECK MUSCLE SPASM: Primary | ICD-10-CM

## 2021-01-27 PROCEDURE — 99284 EMERGENCY DEPT VISIT MOD MDM: CPT | Performed by: PHYSICIAN ASSISTANT

## 2021-01-27 PROCEDURE — 96372 THER/PROPH/DIAG INJ SC/IM: CPT

## 2021-01-27 PROCEDURE — 99283 EMERGENCY DEPT VISIT LOW MDM: CPT

## 2021-01-27 RX ORDER — DIAZEPAM 5 MG/1
5 TABLET ORAL EVERY 6 HOURS PRN
Qty: 5 TABLET | Refills: 0 | Status: SHIPPED | OUTPATIENT
Start: 2021-01-27 | End: 2021-06-07 | Stop reason: ALTCHOICE

## 2021-01-27 RX ORDER — KETOROLAC TROMETHAMINE 30 MG/ML
15 INJECTION, SOLUTION INTRAMUSCULAR; INTRAVENOUS ONCE
Status: COMPLETED | OUTPATIENT
Start: 2021-01-27 | End: 2021-01-27

## 2021-01-27 RX ORDER — DIAZEPAM 5 MG/ML
5 INJECTION, SOLUTION INTRAMUSCULAR; INTRAVENOUS ONCE
Status: DISCONTINUED | OUTPATIENT
Start: 2021-01-27 | End: 2021-01-27

## 2021-01-27 RX ORDER — DIAZEPAM 5 MG/ML
5 INJECTION, SOLUTION INTRAMUSCULAR; INTRAVENOUS ONCE
Status: COMPLETED | OUTPATIENT
Start: 2021-01-27 | End: 2021-01-27

## 2021-01-27 RX ADMIN — KETOROLAC TROMETHAMINE 15 MG: 30 INJECTION, SOLUTION INTRAMUSCULAR at 15:56

## 2021-01-27 RX ADMIN — DIAZEPAM 5 MG: 10 INJECTION, SOLUTION INTRAMUSCULAR; INTRAVENOUS at 15:58

## 2021-01-27 NOTE — ED PROVIDER NOTES
History  Chief Complaint   Patient presents with    Neck Pain     Neck pain with radiation into head, back and RLE  Denies numbness/tingling  Denies trauma/injury  Using pain patches, motrin, tylenol without relief  Sx x1 month  47year old female PMH HTN presents today complaining of neck pain for the past month  Acute worsening last night  Radiates into head and R upper back  No acute injury or trauma  No new activity  Has been using topical pain patches, tylenol without improvement  Pt also had a migraine a few days before  Prior to Admission Medications   Prescriptions Last Dose Informant Patient Reported? Taking?    FLUoxetine (PROzac) 10 mg capsule   Yes No   Sig: Take 10 mg by mouth every morning   albuterol (PROVENTIL HFA,VENTOLIN HFA) 90 mcg/act inhaler   No No   Sig: Inhale 2 puffs every 6 (six) hours as needed for wheezing   Patient taking differently: Inhale 2 puffs as needed for wheezing     albuterol (PROVENTIL HFA,VENTOLIN HFA) 90 mcg/act inhaler   No No   Sig: Inhale 2 puffs every 4 (four) hours as needed for wheezing   albuterol (PROVENTIL HFA,VENTOLIN HFA) 90 mcg/act inhaler   No No   Sig: Inhale 2 puffs every 6 (six) hours as needed for wheezing or shortness of breath   cetirizine (ZyrTEC) 10 mg tablet   No No   Sig: Take 1 tablet (10 mg total) by mouth 2 (two) times a day for 7 days   famotidine (PEPCID) 40 MG tablet   No No   Sig: Take 1 tablet (40 mg total) by mouth daily at bedtime for 30 days   fluticasone (FLONASE) 50 mcg/act nasal spray   No No   Si sprays into each nostril daily   Patient taking differently: 2 sprays into each nostril as needed     hydrOXYzine HCL (ATARAX) 25 mg tablet   No No   Sig: Take 2 tablets (50 mg total) by mouth every 6 (six) hours as needed for anxiety   ibuprofen (MOTRIN) 800 mg tablet   Yes No   Sig: Take 800 mg by mouth every 8 (eight) hours as needed   metoclopramide (REGLAN) 10 mg tablet   No No   Sig: Take 1 tablet (10 mg total) by mouth every 6 (six) hours   norethindrone (MICRONOR) 0 35 MG tablet   Yes No   Sig: Take 0 35 mg by mouth daily   omeprazole (PriLOSEC) 40 MG capsule   No No   Sig: Take 1 capsule (40 mg total) by mouth daily   traZODone (DESYREL) 50 mg tablet   Yes No   Sig: Take 50 mg by mouth as needed        Facility-Administered Medications: None       Past Medical History:   Diagnosis Date    Acid reflux     Anxiety     Bursitis     Frequent headaches     Gastric ulcer     Gastroparesis     Liver cyst     Lumbar herniated disc     Migraine     Seasonal allergies     Torn ACL     Wears glasses        Past Surgical History:   Procedure Laterality Date    KNEE SURGERY Right     CO ESOPHAGOGASTRODUODENOSCOPY TRANSORAL DIAGNOSTIC N/A 5/1/2017    Procedure: ESOPHAGOGASTRODUODENOSCOPY (EGD); Surgeon: Pat Estrada MD;  Location: [facility]; Service: Gastroenterology    CO KNEE SCOPE,AID ANT CRUCIATE REPAIR Right 5/24/2018    Procedure: ARTHROSCOPIC RECONSTRUCTION ANTERIOR CRUCIATE LIGAMENT (ACL) WITH AUTOGRAFT AND ALLOGRAFT;  Surgeon: Skylar Mack MD;  Location: [facility] OR;  Service: Orthopedics    TUBAL LIGATION      WISDOM TOOTH EXTRACTION         Family History   Problem Relation Age of Onset    Depression Mother     Anxiety disorder Mother     Sleep disorder Mother     Hypertension Mother     Hyperlipidemia Mother     Hyperlipidemia Father     Hypertension Father     Heart disease Father     Heart disease Paternal Aunt     Heart disease Paternal Uncle     Osteoporosis Maternal Grandmother     Alzheimer's disease Paternal Grandmother     Heart disease Paternal Grandmother     Heart disease Paternal Grandfather     Stroke Paternal Grandfather      I have reviewed and agree with the history as documented      E-Cigarette/Vaping     E-Cigarette/Vaping Substances     Social History     Tobacco Use    Smoking status: Never Smoker    Smokeless tobacco: Never Used   Substance Use Topics    Alcohol use: Not Currently     Comment: rare    Drug use: No       Review of Systems   Musculoskeletal: Positive for neck pain  All other systems reviewed and are negative  Physical Exam  Physical Exam  Constitutional:       General: She is not in acute distress  Appearance: She is not toxic-appearing  HENT:      Head: Normocephalic and atraumatic  Mouth/Throat:      Mouth: Mucous membranes are moist       Pharynx: No oropharyngeal exudate or posterior oropharyngeal erythema  Eyes:      Pupils: Pupils are equal, round, and reactive to light  Neck:      Musculoskeletal: Normal range of motion and neck supple  Muscular tenderness (spasm) present  No spinous process tenderness  Thyroid: No thyroid mass, thyromegaly or thyroid tenderness  Vascular: No carotid bruit  Trachea: Trachea normal    Cardiovascular:      Rate and Rhythm: Normal rate and regular rhythm  Pulses: Normal pulses  Pulmonary:      Effort: Pulmonary effort is normal  No respiratory distress  Breath sounds: Normal breath sounds  No wheezing  Abdominal:      General: Abdomen is flat  Bowel sounds are normal       Palpations: Abdomen is soft  Lymphadenopathy:      Cervical: No cervical adenopathy  Skin:     General: Skin is warm and dry  Capillary Refill: Capillary refill takes less than 2 seconds  Findings: No erythema or rash  Neurological:      General: No focal deficit present  Mental Status: She is alert  Cranial Nerves: No cranial nerve deficit        Deep Tendon Reflexes: Reflexes normal    Psychiatric:         Mood and Affect: Mood normal          Behavior: Behavior normal          Vital Signs  ED Triage Vitals [01/27/21 1518]   Temperature Pulse Respirations Blood Pressure SpO2   97 8 °F (36 6 °C) 76 18 151/81 100 %      Temp Source Heart Rate Source Patient Position - Orthostatic VS BP Location FiO2 (%)   Temporal Monitor Sitting Right arm --      Pain Score       7 Vitals:    01/27/21 1518   BP: 151/81   Pulse: 76   Patient Position - Orthostatic VS: Sitting         Visual Acuity      ED Medications  Medications   ketorolac (TORADOL) injection 15 mg (15 mg Intramuscular Given 1/27/21 1556)   diazepam (VALIUM) injection 5 mg (5 mg Intramuscular Given 1/27/21 1558)       Diagnostic Studies  Results Reviewed     None                 No orders to display              Procedures  Procedures         ED Course                                           MDM    Disposition  Final diagnoses:   Neck muscle spasm     Time reflects when diagnosis was documented in both MDM as applicable and the Disposition within this note     Time User Action Codes Description Comment    1/27/2021  4:30 PM Evelin Mayfield Add [B53 430] Neck muscle spasm       ED Disposition     ED Disposition Condition Date/Time Comment    Discharge Stable Wed Jan 27, 2021  4:30 PM Yoselin Thomas discharge to home/self care              Follow-up Information     Follow up With Specialties Details Why Contact Info Additional [phone]   [address], [address] [place] [id number]  [address] [place] [address], [address], [address] [place] [address], [address]          Discharge Medication List as of 1/27/2021  4:34 PM      START taking these medications    Details   diazepam (VALIUM) 5 mg tablet Take 1 tablet (5 mg total) by mouth every 6 (six) hours as needed for anxiety, Starting Wed 1/27/2021, Normal         CONTINUE these medications which have NOT CHANGED    Details   !! albuterol (PROVENTIL HFA,VENTOLIN HFA) 90 mcg/act inhaler Inhale 2 puffs every 6 (six) hours as needed for wheezing, Starting Fri 8/24/2018, Normal      !! albuterol (PROVENTIL HFA,VENTOLIN HFA) 90 mcg/act inhaler Inhale 2 puffs every 4 (four) hours as needed for wheezing, Starting Thu 9/26/2019, Print      !! albuterol (PROVENTIL HFA,VENTOLIN HFA) 90 mcg/act inhaler Inhale 2 puffs every 6 (six) hours as needed for wheezing or shortness of breath, Starting Tue 11/17/2020, Normal      cetirizine (ZyrTEC) 10 mg tablet Take 1 tablet (10 mg total) by mouth 2 (two) times a day for 7 days, Starting Sat 7/14/2018, Until Sat 7/21/2018, Print      famotidine (PEPCID) 40 MG tablet Take 1 tablet (40 mg total) by mouth daily at bedtime for 30 days, Starting Tue 9/18/2018, Until Thu 10/18/2018, Normal      FLUoxetine (PROzac) 10 mg capsule Take 10 mg by mouth every morning, Starting Wed 10/17/2018, Historical Med      fluticasone (FLONASE) 50 mcg/act nasal spray 2 sprays into each nostril daily, Starting Fri 8/24/2018, Normal      hydrOXYzine HCL (ATARAX) 25 mg tablet Take 2 tablets (50 mg total) by mouth every 6 (six) hours as needed for anxiety, Starting Fri 5/10/2019, Print      ibuprofen (MOTRIN) 800 mg tablet Take 800 mg by mouth every 8 (eight) hours as needed, Historical Med      metoclopramide (REGLAN) 10 mg tablet Take 1 tablet (10 mg total) by mouth every 6 (six) hours, Starting Tue 4/16/2019, Print      norethindrone (MICRONOR) 0 35 MG tablet Take 0 35 mg by mouth daily, Starting Tue 8/13/2019, Historical Med      omeprazole (PriLOSEC) 40 MG capsule Take 1 capsule (40 mg total) by mouth daily, Starting Tue 9/18/2018, Normal      traZODone (DESYREL) 50 mg tablet Take 50 mg by mouth as needed  , Starting Wed 10/17/2018, Historical Med       !! - Potential duplicate medications found  Please discuss with provider  No discharge procedures on file      PDMP Review     None          ED Provider  Electronically Signed by           Keyona Finley PA-C  02/01/21 2532

## 2021-01-27 NOTE — Clinical Note
Kenn Welch was seen and treated in our emergency department on 1/27/2021  Diagnosis:     Hailee Schilder    She may return on this date: 01/29/2021         If you have any questions or concerns, please don't hesitate to call        Sergio Martinez PA-C    ______________________________           _______________          _______________  Hospital Representative                              Date                                Time

## 2021-02-11 ENCOUNTER — OFFICE VISIT (OUTPATIENT)
Dept: FAMILY MEDICINE CLINIC | Facility: CLINIC | Age: 41
End: 2021-02-11

## 2021-02-11 VITALS
HEART RATE: 87 BPM | BODY MASS INDEX: 25.99 KG/M2 | WEIGHT: 156 LBS | DIASTOLIC BLOOD PRESSURE: 90 MMHG | SYSTOLIC BLOOD PRESSURE: 128 MMHG | RESPIRATION RATE: 18 BRPM | HEIGHT: 65 IN | OXYGEN SATURATION: 99 % | TEMPERATURE: 97.7 F

## 2021-02-11 DIAGNOSIS — Z00.01 ENCOUNTER FOR GENERAL ADULT MEDICAL EXAMINATION WITH ABNORMAL FINDINGS: Primary | ICD-10-CM

## 2021-02-11 DIAGNOSIS — K21.9 CHRONIC GERD: ICD-10-CM

## 2021-02-11 DIAGNOSIS — M54.2 CERVICAL SPINE PAIN: ICD-10-CM

## 2021-02-11 DIAGNOSIS — Z00.00 HEALTHCARE MAINTENANCE: ICD-10-CM

## 2021-02-11 DIAGNOSIS — Z82.49 FAMILY HISTORY OF EARLY CAD: ICD-10-CM

## 2021-02-11 DIAGNOSIS — K43.9 ABDOMINAL WALL HERNIA: ICD-10-CM

## 2021-02-11 DIAGNOSIS — G43.109 CHRONIC MIGRAINE WITH AURA: ICD-10-CM

## 2021-02-11 DIAGNOSIS — R03.0 ELEVATED BLOOD PRESSURE READING IN OFFICE WITHOUT DIAGNOSIS OF HYPERTENSION: ICD-10-CM

## 2021-02-11 PROBLEM — G43.E09 CHRONIC MIGRAINE WITH AURA: Status: ACTIVE | Noted: 2021-02-11

## 2021-02-11 PROCEDURE — 1036F TOBACCO NON-USER: CPT | Performed by: NURSE PRACTITIONER

## 2021-02-11 PROCEDURE — 3008F BODY MASS INDEX DOCD: CPT | Performed by: NURSE PRACTITIONER

## 2021-02-11 PROCEDURE — 3725F SCREEN DEPRESSION PERFORMED: CPT | Performed by: NURSE PRACTITIONER

## 2021-02-11 PROCEDURE — 99214 OFFICE O/P EST MOD 30 MIN: CPT | Performed by: NURSE PRACTITIONER

## 2021-02-11 RX ORDER — AMITRIPTYLINE HYDROCHLORIDE 10 MG/1
10 TABLET, FILM COATED ORAL
Qty: 30 TABLET | Refills: 0 | Status: SHIPPED | OUTPATIENT
Start: 2021-02-11 | End: 2021-03-11

## 2021-02-11 RX ORDER — GABAPENTIN 300 MG/1
300 CAPSULE ORAL 3 TIMES DAILY
Qty: 90 CAPSULE | Refills: 0 | Status: SHIPPED | OUTPATIENT
Start: 2021-02-11 | End: 2021-03-11

## 2021-02-11 RX ORDER — OMEPRAZOLE 40 MG/1
40 CAPSULE, DELAYED RELEASE ORAL DAILY
Qty: 90 CAPSULE | Refills: 0 | Status: SHIPPED | OUTPATIENT
Start: 2021-02-11 | End: 2021-05-10

## 2021-02-11 RX ORDER — FAMOTIDINE 20 MG/1
20 TABLET, FILM COATED ORAL
Qty: 90 TABLET | Refills: 0 | Status: SHIPPED | OUTPATIENT
Start: 2021-02-11 | End: 2021-05-10

## 2021-02-11 NOTE — ASSESSMENT & PLAN NOTE
Likely connected to chronic migraine but she states neck to should pain always an issue - manufacturing work for years  Declines PT at this time  Used to follow ortho and pain med, had injections, doesn't want them anymore  Elavil treating migraines but combine w/gabapentin for better   Continue advil PRN

## 2021-02-11 NOTE — PROGRESS NOTES
Assessment/Plan:    Problem List Items Addressed This Visit        Digestive    Chronic GERD     Hasn't f/u w/gastro "in years"  Takes prilosec during the day, pepcid at bedtime  This regimen has worked and is reasonable to continue at this time  She declines consult from GI at this time  Relevant Medications    famotidine (PEPCID) 20 mg tablet    omeprazole (PriLOSEC) 40 MG capsule       Cardiovascular and Mediastinum    Chronic migraine with aura     Everyday occurrence  Advil migraine helps somewhat    -Discussed option of Imitrex, but try amtriptyline as prophylaxis    -Will cont w/Advil and f/u w/me if desires Imitrex  -Referral to Neuro         Relevant Medications    amitriptyline (ELAVIL) 10 mg tablet    gabapentin (NEURONTIN) 300 mg capsule       Other    Abdominal wall hernia     Historical dx  Not palapable on exam   No pain  Watchful waiting w/ongoing f/u         Elevated blood pressure reading in office without diagnosis of hypertension     Pt reports always slightly high  Advised lifestyle changes as below  Reassess next visit         Family history of early CAD     Anticipitory Guidance: Discussed diet - eating whole grains, lean meat, avoiding fast food, high-fat meats, and sugary beverages  Discussed that he is normal weight but if he continues to eat poorly, this may catch up with him  A poor diet may lead to high cholesterol or prediabetes  We discussed strive-for-five fruits and vegetables a day  Patient acknowledges understanding  Wt  Loss and exercise will improve blood pressure and outcomes in cardiac health  Cervical spine pain     Likely connected to chronic migraine but she states neck to should pain always an issue - manufacturing work for years  Declines PT at this time  Used to follow ortho and pain med, had injections, doesn't want them anymore  Elavil treating migraines but combine w/gabapentin for better   Continue advil PRN             Relevant Medications    gabapentin (NEURONTIN) 300 mg capsule      Other Visit Diagnoses     Encounter for general adult medical examination with abnormal findings    -  Primary    Relevant Orders    CBC and differential    Basic metabolic panel    Lipid panel    TSH, 3rd generation with Free T4 reflex    Vitamin D 25 hydroxy    Healthcare maintenance                Return in about 3 months (around 5/11/2021) for Recheck BP, neck pain  I have spent 30 minutes with Patient  today in which greater than 50% of this time was spent in counseling/coordination of care regarding Diagnostic results, Prognosis, Risks and benefits of tx options, Intructions for management, Patient and family education, Importance of tx compliance, Risk factor reductions and Impressions  Subjective:     HPI: Eliot Bernheim is a 47 y o  female who  has a past medical history of Acid reflux, Anxiety, Bursitis, Frequent headaches, Gastric ulcer, Gastroparesis, Liver cyst, Lumbar herniated disc, Migraine, Seasonal allergies, Torn ACL, and Wears glasses  She also has no past medical history of ADHD (attention deficit hyperactivity disorder), Asthma, Bleeding disorder ([address] ), Cancer ([address] ), Chronic kidney disease, Depression, Diabetes insipidus ([address] ), Disease of thyroid gland, Fractures, Head injury, Heart disease, History of transfusion, Hypertension, Neurological disorder, Osteoarthritis, Rheumatic disease, Rheumatoid arthritis ([address] ), Seizures ([address] ), Skin disorder, Stomach disorder, Stroke [facility]), or Vascular disorder  who presented to the office today to establish herself with this practice as a new patient  Her chief complaint is neck pain and migraines  She has a long history of 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    She used to follow Ortho and Pain Medicine at [facility] but she no longer goes because she states she was told the most they can do for her are injections, of which she received several and she no longer desires  Her cervical spine pain is concurrent with everyday migraines  These do present with aura  She takes Advil migraine as she is allergic to aspirin  She has not tried an abortive such as Imitrex in the past and would like to stick with Advil for right now  She is amicable to referral with Neuro  She denies syncope, nausea and vomiting, vomiting or other red flags  We did discuss recent study showing better pain control with the combination of Elavil and gabapentin for chronic back pain  The added benefit of this approach is prophylaxis of migraines with the Elavil  She has no other complaints at this time  She would like us to be aware of her family's history of coronary artery disease  Her blood pressure is slightly elevated and we discussed healthy lifestyle habits to naturally bring this down  She also has chronic GERD for which she is to follow GI and takes both Prilosec and Pepcid      The following portions of the patient's history were reviewed and updated as appropriate: allergies, current medications, past family history, past medical history, past social history, past surgical history, and problem list     Current Outpatient Medications on File Prior to Visit   Medication Sig Dispense Refill    albuterol (PROVENTIL HFA,VENTOLIN HFA) 90 mcg/act inhaler Inhale 2 puffs every 6 (six) hours as needed for wheezing (Patient taking differently: Inhale 2 puffs as needed for wheezing  ) 1 Inhaler 0    albuterol (PROVENTIL HFA,VENTOLIN HFA) 90 mcg/act inhaler Inhale 2 puffs every 4 (four) hours as needed for wheezing 1 Inhaler 0    albuterol (PROVENTIL HFA,VENTOLIN HFA) 90 mcg/act inhaler Inhale 2 puffs every 6 (six) hours as needed for wheezing or shortness of breath 1 Inhaler 0    fluticasone (FLONASE) 50 mcg/act nasal spray 2 sprays into each nostril daily (Patient taking differently: 2 sprays into each nostril as needed  ) 16 g 0    ibuprofen (MOTRIN) 800 mg tablet Take 800 mg by mouth every 8 (eight) hours as needed      [DISCONTINUED] omeprazole (PriLOSEC) 40 MG capsule Take 1 capsule (40 mg total) by mouth daily 30 capsule 0    cetirizine (ZyrTEC) 10 mg tablet Take 1 tablet (10 mg total) by mouth 2 (two) times a day for 7 days 14 tablet 0    diazepam (VALIUM) 5 mg tablet Take 1 tablet (5 mg total) by mouth every 6 (six) hours as needed for anxiety (Patient not taking: Reported on 2/11/2021) 5 tablet 0    hydrOXYzine HCL (ATARAX) 25 mg tablet Take 2 tablets (50 mg total) by mouth every 6 (six) hours as needed for anxiety (Patient not taking: Reported on 2/11/2021) 25 tablet 0    metoclopramide (REGLAN) 10 mg tablet Take 1 tablet (10 mg total) by mouth every 6 (six) hours (Patient not taking: Reported on 2/11/2021) 10 tablet 0    norethindrone (MICRONOR) 0 35 MG tablet Take 0 35 mg by mouth daily      [DISCONTINUED] famotidine (PEPCID) 40 MG tablet Take 1 tablet (40 mg total) by mouth daily at bedtime for 30 days 30 tablet 0    [DISCONTINUED] FLUoxetine (PROzac) 10 mg capsule Take 10 mg by mouth every morning  1    [DISCONTINUED] traZODone (DESYREL) 50 mg tablet Take 50 mg by mouth as needed    1     No current facility-administered medications on file prior to visit  Review of Systems   Constitutional: Negative for appetite change, chills, fever and unexpected weight change  HENT: Negative for congestion, dental problem, ear pain, mouth sores, nosebleeds, postnasal drip, rhinorrhea, sore throat and trouble swallowing  Eyes: Negative for pain and visual disturbance  Respiratory: Negative for cough and shortness of breath  Cardiovascular: Negative for chest pain and palpitations  Gastrointestinal: Negative for abdominal pain, blood in stool, constipation, diarrhea, nausea and vomiting  GERD symtoms   Genitourinary: Negative for dysuria and hematuria  Musculoskeletal: Positive for neck pain  Negative for arthralgias and back pain     Skin: Negative for color change and rash  Neurological: Positive for headaches  Negative for dizziness, seizures, syncope, facial asymmetry and light-headedness  W/aura   Psychiatric/Behavioral: Negative  All other systems reviewed and are negative  Objective:    /90 (BP Location: Left arm, Patient Position: Sitting, Cuff Size: Standard)   Pulse 87   Temp 97 7 °F (36 5 °C) (Temporal)   Resp 18   Ht 5' 5" (1 651 m)   Wt 70 8 kg (156 lb)   LMP 01/20/2021 (Approximate) Comment: irregular  SpO2 99%   BMI 25 96 kg/m²     Physical Exam  Vitals signs reviewed  Constitutional:       General: She is not in acute distress  Appearance: Normal appearance  HENT:      Head: Normocephalic  Right Ear: Tympanic membrane, ear canal and external ear normal  There is no impacted cerumen  Left Ear: Tympanic membrane, ear canal and external ear normal  There is no impacted cerumen  Nose: Nose normal  No congestion  Mouth/Throat:      Mouth: Mucous membranes are moist    Eyes:      Extraocular Movements: Extraocular movements intact  Conjunctiva/sclera: Conjunctivae normal       Pupils: Pupils are equal, round, and reactive to light  Neck:      Musculoskeletal: Normal range of motion and neck supple  No muscular tenderness  Cardiovascular:      Rate and Rhythm: Normal rate and regular rhythm  Pulses: Normal pulses  Heart sounds: Normal heart sounds  No murmur  No friction rub  No gallop  Pulmonary:      Effort: Pulmonary effort is normal       Breath sounds: Normal breath sounds  No wheezing  Abdominal:      General: Bowel sounds are normal       Palpations: Abdomen is soft  Tenderness: There is no abdominal tenderness  Musculoskeletal: Normal range of motion  Right lower leg: No edema  Left lower leg: No edema  Skin:     General: Skin is warm and dry  Capillary Refill: Capillary refill takes less than 2 seconds     Neurological:      General: No focal deficit present  Mental Status: She is alert and oriented to person, place, and time  Psychiatric:         Mood and Affect: Mood normal          Behavior: Behavior normal          Mary Janeduong AAMIR Maya  02/11/21  5:05 PM    Patient Instructions     Wellness Visit for Adults   AMBULATORY CARE:   A wellness visit  is when you see your healthcare provider to get screened for health problems  Your healthcare provider will also give you advice on how to stay healthy  Write down your questions so you remember to ask them  Ask your healthcare provider how often you should have a wellness visit  What happens at a wellness visit:  Your healthcare provider will ask about your health, and your family history of health problems  This includes high blood pressure, heart disease, and cancer  He or she will ask if you have symptoms that concern you, if you smoke, and about your mood  You may also be asked about your intake of medicines, supplements, food, and alcohol  Any of the following may be done:  · Your weight  will be checked  Your height may also be checked so your body mass index (BMI) can be calculated  Your BMI shows if you are at a healthy weight  · Your blood pressure  and heart rate will be checked  Your temperature may also be checked  · Blood and urine tests  may be done  Blood tests may be done to check your cholesterol levels  Abnormal cholesterol levels increase your risk for heart disease and stroke  You may also need a blood or urine test to check for diabetes if you are at increased risk  Urine tests may be done to look for signs of an infection or kidney disease  · A physical exam  includes checking your heartbeat and lungs with a stethoscope  Your healthcare provider may also check your skin to look for sun damage  · Screening tests  may be recommended  A screening test is done to check for diseases that may not cause symptoms   The screening tests you may need depend on your age, gender, family history, and lifestyle habits  For example, colorectal screening may be recommended if you are 48years old or older  Screening tests you need if you are a woman:   · A Pap smear  is used to screen for cervical cancer  Pap smears are usually done every 3 to 5 years depending on your age  You may need them more often if you have had abnormal Pap smear test results in the past  Ask your healthcare provider how often you should have a Pap smear  · A mammogram  is an x-ray of your breasts to screen for breast cancer  Experts recommend mammograms every 2 years starting at age 48 years  You may need a mammogram at age 52 years or younger if you have an increased risk for breast cancer  Talk to your healthcare provider about when you should start having mammograms and how often you need them  Vaccines you may need:   · Get an influenza vaccine  every year  The influenza vaccine protects you from the flu  Several types of viruses cause the flu  The viruses change over time, so new vaccines are made each year  · Get a tetanus-diphtheria (Td) booster vaccine  every 10 years  This vaccine protects you against tetanus and diphtheria  Tetanus is a severe infection that may cause painful muscle spasms and lockjaw  Diphtheria is a severe bacterial infection that causes a thick covering in the back of your mouth and throat  · Get a human papillomavirus (HPV) vaccine  if you are female and aged 23 to 32 or male 23 to 24 and never received it  This vaccine protects you from HPV infection  HPV is the most common infection spread by sexual contact  HPV may also cause vaginal, penile, and anal cancers  · Get a pneumococcal vaccine  if you are aged 72 years or older  The pneumococcal vaccine is an injection given to protect you from pneumococcal disease  Pneumococcal disease is an infection caused by pneumococcal bacteria  The infection may cause pneumonia, meningitis, or an ear infection      · Get a shingles vaccine  if you are 60 or older, even if you have had shingles before  The shingles vaccine is an injection to protect you from the varicella-zoster virus  This is the same virus that causes chickenpox  Shingles is a painful rash that develops in people who had chickenpox or have been exposed to the virus  How to eat healthy:  My Plate is a model for planning healthy meals  It shows the types and amounts of foods that should go on your plate  Fruits and vegetables make up about half of your plate, and grains and protein make up the other half  A serving of dairy is included on the side of your plate  The amount of calories and serving sizes you need depends on your age, gender, weight, and height  Examples of healthy foods are listed below:  · Eat a variety of vegetables  such as dark green, red, and orange vegetables  You can also include canned vegetables low in sodium (salt) and frozen vegetables without added butter or sauces  · Eat a variety of fresh fruits , canned fruit in 100% juice, frozen fruit, and dried fruit  · Include whole grains  At least half of the grains you eat should be whole grains  Examples include whole-wheat bread, wheat pasta, brown rice, and whole-grain cereals such as oatmeal     · Eat a variety of protein foods such as seafood (fish and shellfish), lean meat, and poultry without skin (turkey and chicken)  Examples of lean meats include pork leg, shoulder, or tenderloin, and beef round, sirloin, tenderloin, and extra lean ground beef  Other protein foods include eggs and egg substitutes, beans, peas, soy products, nuts, and seeds  · Choose low-fat dairy products such as skim or 1% milk or low-fat yogurt, cheese, and cottage cheese  · Limit unhealthy fats  such as butter, hard margarine, and shortening  Exercise:  Exercise at least 30 minutes per day on most days of the week  Some examples of exercise include walking, biking, dancing, and swimming   You can also fit in more physical activity by taking the stairs instead of the elevator or parking farther away from stores  Include muscle strengthening activities 2 days each week  Regular exercise provides many health benefits  It helps you manage your weight, and decreases your risk for type 2 diabetes, heart disease, stroke, and high blood pressure  Exercise can also help improve your mood  Ask your healthcare provider about the best exercise plan for you  General health and safety guidelines:   · Do not smoke  Nicotine and other chemicals in cigarettes and cigars can cause lung damage  Ask your healthcare provider for information if you currently smoke and need help to quit  E-cigarettes or smokeless tobacco still contain nicotine  Talk to your healthcare provider before you use these products  · Limit alcohol  A drink of alcohol is 12 ounces of beer, 5 ounces of wine, or 1½ ounces of liquor  · Lose weight, if needed  Being overweight increases your risk of certain health conditions  These include heart disease, high blood pressure, type 2 diabetes, and certain types of cancer  · Protect your skin  Do not sunbathe or use tanning beds  Use sunscreen with a SPF 15 or higher  Apply sunscreen at least 15 minutes before you go outside  Reapply sunscreen every 2 hours  Wear protective clothing, hats, and sunglasses when you are outside  · Drive safely  Always wear your seatbelt  Make sure everyone in your car wears a seatbelt  A seatbelt can save your life if you are in an accident  Do not use your cell phone when you are driving  This could distract you and cause an accident  Pull over if you need to make a call or send a text message  · Practice safe sex  Use latex condoms if are sexually active and have more than one partner  Your healthcare provider may recommend screening tests for sexually transmitted infections (STIs)  · Wear helmets, lifejackets, and protective gear    Always wear a helmet when you ride a bike or motorcycle, go skiing, or play sports that could cause a head injury  Wear protective equipment when you play sports  Wear a lifejacket when you are on a boat or doing water sports  © Copyright [address] Information is for End User's use only and may not be sold, redistributed or otherwise used for commercial purposes  All illustrations and images included in CareNotes® are the copyrighted property of A D A M [employer] Inc  or [facility] [employer]   The above information is an  only  It is not intended as medical advice for individual conditions or treatments  Talk to your doctor, nurse or pharmacist before following any medical regimen to see if it is safe and effective for you

## 2021-02-11 NOTE — ASSESSMENT & PLAN NOTE
Hasn't f/u w/gastro "in years"  Takes prilosec during the day, pepcid at bedtime  This regimen has worked and is reasonable to continue at this time  She declines consult from GI at this time

## 2021-02-11 NOTE — PATIENT INSTRUCTIONS

## 2021-02-11 NOTE — ASSESSMENT & PLAN NOTE
Everyday occurrence  Advil migraine helps somewhat    -Discussed option of Imitrex, but try amtriptyline as prophylaxis    -Will cont w/Advil and f/u w/me if desires Imitrex     -Referral to Neuro

## 2021-02-11 NOTE — ASSESSMENT & PLAN NOTE
Anticipitory Guidance: Discussed diet - eating whole grains, lean meat, avoiding fast food, high-fat meats, and sugary beverages  Discussed that he is normal weight but if he continues to eat poorly, this may catch up with him  A poor diet may lead to high cholesterol or prediabetes  We discussed strive-for-five fruits and vegetables a day  Patient acknowledges understanding  Wt  Loss and exercise will improve blood pressure and outcomes in cardiac health

## 2021-03-11 DIAGNOSIS — G43.109 CHRONIC MIGRAINE WITH AURA: ICD-10-CM

## 2021-03-11 DIAGNOSIS — M54.2 CERVICAL SPINE PAIN: ICD-10-CM

## 2021-03-11 RX ORDER — GABAPENTIN 300 MG/1
CAPSULE ORAL
Qty: 90 CAPSULE | Refills: 0 | Status: SHIPPED | OUTPATIENT
Start: 2021-03-11 | End: 2021-05-11

## 2021-03-11 RX ORDER — AMITRIPTYLINE HYDROCHLORIDE 10 MG/1
TABLET, FILM COATED ORAL
Qty: 30 TABLET | Refills: 0 | Status: SHIPPED | OUTPATIENT
Start: 2021-03-11 | End: 2021-05-11

## 2021-04-08 NOTE — PROGRESS NOTES
Orthopaedic Surgery - Office Note  Radha Bellamy (8 y o  female)   : 1980   MRN: [id number]  Encounter Date: 10/26/2018    Chief Complaint   Patient presents with    Right Knee - Follow-up       Assessment / Plan  Status post right knee arthroscopy with ACL reconstruction with semitendinosus allograft on 2018    · Continue physical therapy  · Continue quadriceps strengthening  · Activities as tolerated  Return in about 6 weeks (around 2018)  History of Present Illness  Radha Bellamy is a 30 y o  female who presents to the office status post right knee arthroscopy with ACL reconstruction with semitendinosus allograft on 2018  She has been progressing in physical therapy with jogging  She still reports catching with full extension, occasional buckling and some posterior knee pain  She struggles with stairs  She denies any numbness or tingling  Review of Systems  Pertinent items are noted in HPI  All other systems were reviewed and are negative  Physical Exam  /87   Pulse 76   Ht 5' (1 524 m)   Wt 59 kg (130 lb)   BMI 25 39 kg/m²   Cons: Appears well  No apparent distress  Psych: Alert  Oriented x3  Mood and affect normal   Eyes: PERRLA, EOMI  Resp: Normal effort  No audible wheezing or stridor  CV: Palpable pulse  No discernable arrhythmia  No LE edema  Lymph:  No palpable cervical, axillary, or inguinal lymphadenopathy  Skin: Warm  No palpable masses  No visible lesions  Neuro: Normal muscle tone  Normal and symmetric DTR's  Right Knee Exam  Alignment:  Normal knee alignment  Inspection:  No swelling  Quadriceps muscle atrophy  Incision healed  Palpation:  No tenderness  ROM:  0-135 degrees  Strength:  5/5 quadriceps and hamstrings  Stability:  No objective knee instability  Stable Varus / Valgus stress, Lachman, and Posterior drawer  (-) Lachman  (-) Pivot-shift  Tests:  No pertinent positive or negative tests    Patella:  Patella tracks The patient is scheduled at [facility] for a colon with Dr Lindy Oleary on 6/2/2021  miralax/dulcolax prep instructions have been gone over in the office, with the patient, by the MA  The patient is aware that they will receive a call with the arrival time the day prior to procedure and that they will need a  the day of the procedure   I have asked the patient to call with any questions that they might have prior to procedure centrally with crepitus  Neurovascular:  Sensation intact in DP/SP/Moore/Sa/T nerve distributions  Palpable DP & PT pulses  Gait:  Normal       Studies Reviewed  No studies to review    Procedures  No procedures today  Medical, Surgical, Family, and Social History  The patient's medical history, family history, and social history, were reviewed and updated as appropriate  Past Medical History:   Diagnosis Date    Acid reflux     Anxiety     Bursitis     Frequent headaches     Gastroparesis     Liver cyst     Lumbar herniated disc     Migraine     Seasonal allergies     Torn ACL     Wears glasses        Past Surgical History:   Procedure Laterality Date    KNEE SURGERY Right     AL ESOPHAGOGASTRODUODENOSCOPY TRANSORAL DIAGNOSTIC N/A 5/1/2017    Procedure: ESOPHAGOGASTRODUODENOSCOPY (EGD); Surgeon: Lalitha Clancy MD;  Location: [facility]; Service: Gastroenterology    AL KNEE SCOPE,AID ANT CRUCIATE REPAIR Right 5/24/2018    Procedure: ARTHROSCOPIC RECONSTRUCTION ANTERIOR CRUCIATE LIGAMENT (ACL) WITH AUTOGRAFT AND ALLOGRAFT;  Surgeon: Shaheed Flannery MD;  Location: [facility] OR;  Service: Orthopedics    TUBAL LIGATION      WISDOM TOOTH EXTRACTION         Family History   Problem Relation Age of Onset    Depression Mother     Anxiety disorder Mother     Sleep disorder Mother     Hyperlipidemia Father     Hypertension Father     Heart disease Father     Heart disease Paternal Aunt     Heart disease Paternal Uncle     Osteoporosis Maternal Grandmother     Alzheimer's disease Paternal Grandmother     Heart disease Paternal Grandmother     Heart disease Paternal Grandfather        Social History     Occupational History    Not on file       Social History Main Topics    Smoking status: Never Smoker    Smokeless tobacco: Never Used    Alcohol use No    Drug use: No    Sexual activity: Not on file       Allergies   Allergen Reactions    Asa [Aspirin] Shortness Of Breath            Latex Hives    Meloxicam Other (See Comments)     bruising    Penicillin V Hives and Rash    Penicillins Hives and Rash         Current Outpatient Prescriptions:     albuterol (PROVENTIL HFA,VENTOLIN HFA) 90 mcg/act inhaler, Inhale 2 puffs every 6 (six) hours as needed for wheezing, Disp: 1 Inhaler, Rfl: 0    cetirizine (ZyrTEC) 10 mg tablet, Take 1 tablet (10 mg total) by mouth 2 (two) times a day for 7 days, Disp: 14 tablet, Rfl: 0    famotidine (PEPCID) 40 MG tablet, Take 1 tablet (40 mg total) by mouth daily at bedtime for 30 days, Disp: 30 tablet, Rfl: 0    FLUoxetine (PROzac) 10 mg capsule, Take 10 mg by mouth every morning, Disp: , Rfl: 1    fluticasone (FLONASE) 50 mcg/act nasal spray, 2 sprays into each nostril daily, Disp: 16 g, Rfl: 0    hydrocortisone 2 5 % cream, Apply topically 2 (two) times a day (Patient not taking: Reported on 9/25/2018 ), Disp: 30 g, Rfl: 0    omeprazole (PriLOSEC) 40 MG capsule, Take 1 capsule (40 mg total) by mouth daily, Disp: 30 capsule, Rfl: 0    traZODone (DESYREL) 50 mg tablet, Take 50 mg by mouth daily at bedtime, Disp: , Rfl: 1      [employer]    I,:   [employer] am acting as a scribe while in the presence of the attending physician :        I,:   Chaim Guzman MD personally performed the services described in this documentation    as scribed in my presence :

## 2021-05-10 DIAGNOSIS — K21.9 CHRONIC GERD: ICD-10-CM

## 2021-05-10 RX ORDER — CLINDAMYCIN HYDROCHLORIDE 150 MG/1
CAPSULE ORAL
COMMUNITY
Start: 2021-03-27 | End: 2021-06-07 | Stop reason: ALTCHOICE

## 2021-05-10 RX ORDER — FAMOTIDINE 20 MG/1
TABLET, FILM COATED ORAL
Qty: 90 TABLET | Refills: 0 | Status: SHIPPED | OUTPATIENT
Start: 2021-05-10 | End: 2021-08-06

## 2021-05-10 RX ORDER — OMEPRAZOLE 40 MG/1
CAPSULE, DELAYED RELEASE ORAL
Qty: 90 CAPSULE | Refills: 0 | Status: SHIPPED | OUTPATIENT
Start: 2021-05-10 | End: 2021-06-07 | Stop reason: ALTCHOICE

## 2021-05-11 ENCOUNTER — OFFICE VISIT (OUTPATIENT)
Dept: FAMILY MEDICINE CLINIC | Facility: CLINIC | Age: 41
End: 2021-05-11

## 2021-05-11 VITALS
OXYGEN SATURATION: 99 % | RESPIRATION RATE: 18 BRPM | BODY MASS INDEX: 25.01 KG/M2 | TEMPERATURE: 96.4 F | SYSTOLIC BLOOD PRESSURE: 126 MMHG | HEART RATE: 102 BPM | DIASTOLIC BLOOD PRESSURE: 84 MMHG | WEIGHT: 150.1 LBS | HEIGHT: 65 IN

## 2021-05-11 DIAGNOSIS — G43.109 CHRONIC MIGRAINE WITH AURA: ICD-10-CM

## 2021-05-11 DIAGNOSIS — R03.0 ELEVATED BLOOD PRESSURE READING IN OFFICE WITHOUT DIAGNOSIS OF HYPERTENSION: ICD-10-CM

## 2021-05-11 DIAGNOSIS — M54.2 CERVICAL SPINE PAIN: Primary | ICD-10-CM

## 2021-05-11 PROCEDURE — 99214 OFFICE O/P EST MOD 30 MIN: CPT | Performed by: NURSE PRACTITIONER

## 2021-05-11 RX ORDER — AMITRIPTYLINE HYDROCHLORIDE 10 MG/1
10 TABLET, FILM COATED ORAL
Qty: 30 TABLET | Refills: 5 | Status: SHIPPED | OUTPATIENT
Start: 2021-05-11 | End: 2021-08-13 | Stop reason: SDUPTHER

## 2021-05-11 RX ORDER — METHOCARBAMOL 750 MG/1
750 TABLET, FILM COATED ORAL EVERY 6 HOURS PRN
Qty: 90 TABLET | Refills: 0 | Status: SHIPPED | OUTPATIENT
Start: 2021-05-11 | End: 2021-11-15 | Stop reason: SDUPTHER

## 2021-05-11 NOTE — PATIENT INSTRUCTIONS
DASH Eating Plan   WHAT YOU NEED TO KNOW:   The DASH (Dietary Approaches to Stop Hypertension) Eating Plan is designed to help prevent or lower high blood pressure  It can also help to lower LDL (bad) cholesterol and decrease your risk of heart disease  The plan is low in sodium, sugar, unhealthy fats, and total fat  It is high in potassium, calcium, magnesium, and fiber  These nutrients are added when you eat more fruits, vegetables, and whole grains  DISCHARGE INSTRUCTIONS:   Your sodium limit each day: Your dietitian will tell you how much sodium is safe for you to have each day  People with high blood pressure should have no more than 1,500 to 2,300 mg of sodium in a day  A teaspoon (tsp) of salt has 2,300 mg of sodium  This may seem like a difficult goal, but small changes to the foods you eat can make a big difference  Your healthcare provider or dietitian can help you create a meal plan that follows your sodium limit  How to limit sodium:   · Read food labels  Food labels can help you choose foods that are low in sodium  The amount of sodium is listed in milligrams (mg)  The % Daily Value (DV) column tells you how much of your daily needs are met by 1 serving of the food for each nutrient listed  Choose foods that have less than 5% of the DV of sodium  These foods are considered low in sodium  Foods that have 20% or more of the DV of sodium are considered high in sodium  Avoid foods that have more than 300 mg of sodium in each serving  Choose foods that say low-sodium, reduced-sodium, or no salt added on the food label  · Avoid salt  Do not salt food at the table, and add very little salt to foods during cooking  Use herbs and spices, such as onions, garlic, and salt-free seasonings to add flavor to foods  Try lemon or lime juice or vinegar to give foods a tart flavor  Use hot peppers or a small amount of hot pepper sauce to add a spicy flavor to foods  · Ask about salt substitutes    Ask your healthcare provider if you may use salt substitutes  Some salt substitutes have ingredients that can be harmful if you have certain health conditions  · Choose foods carefully at restaurants  Meals from restaurants, especially fast food restaurants, are often high in sodium  Some restaurants have nutrition information that tells you the amount of sodium in their foods  Ask to have your food prepared with less, or no salt  What you need to know about fats:   · Include healthy fats  Examples are unsaturated fats and omega-3 fatty acids  Unsaturated fats are found in soybean, canola, olive, or sunflower oil, and liquid and soft tub margarines  Omega-3 fatty acids are found in fatty fish, such as salmon, tuna, mackerel, and sardines  It is also found in flaxseed oil and ground flaxseed  · Avoid unhealthy fats  Do not eat unhealthy fats, such as saturated fats and trans fats  Saturated fats are found in foods that contain fat from animals  Examples are fatty meats, whole milk, butter, cream, and other dairy foods  It is also found in shortening, stick margarine, palm oil, and coconut oil  Trans fats are found in fried foods, crackers, chips, and baked goods made with margarine or shortening  Foods to include: With the DASH eating plan, you need to eat a certain number of servings from each food group  This will help you get enough of certain nutrients and limit others  The amount of servings you should eat depends on how many calories you need  Your dietitian can tell you how many calories you need  The number of servings listed next to the food groups below are for people who need about 2,000 calories each day  · Grains:  6 to 8 servings (3 of these servings should be whole-grain foods)    ? 1 slice of whole-grain bread     ? 1 ounce of dry cereal    ? ½ cup of cooked cereal, pasta, or brown rice    · Vegetables and fruits:  4 to 5 servings of fruits and 4 to 5 servings of vegetables    ?  1 medium fruit    ? ½ cup of frozen, canned (no added salt), or chopped fresh vegetables     ? ½ cup of fresh, frozen, dried, or canned fruit (canned in light syrup or fruit juice)    ? ½ cup of vegetable or fruit juice    · Dairy:  2 to 3 servings    ? 1 cup of nonfat (skim) or 1% milk    ? 1½ ounces of fat-free or low-fat cheese    ? 6 ounces of nonfat or low-fat yogurt    · Lean meat, poultry, and fish:  6 ounces or less    ? Poultry (chicken, turkey) with no skin    ? Fish (especially fatty fish, such as salmon, fresh tuna, or mackerel)    ? Lean beef and pork (loin, round, extra lean hamburger)    ? Egg whites and egg substitutes    · Nuts, seeds, and legumes:  4 to 5 servings each week    ? ½ cup of cooked beans and peas    ? 1½ ounces of unsalted nuts    ? 2 tablespoons of peanut butter or seeds    · Sweets and added sugars:  5 or less each week    ? 1 tablespoon of sugar, jelly, or jam    ? ½ cup of sorbet or gelatin    ? 1 cup of lemonade    · Fats:  2 to 3 servings each week    ? 1 teaspoon of soft margarine or vegetable oil    ? 1 tablespoon of mayonnaise    ? 2 tablespoons of salad dressing    Foods to avoid:   · Grains:      ? Baked goods, such as doughnuts, pastries, cookies, and biscuits (high in fat and sugar)    ? Mixes for cornbread and biscuits, packaged foods, such as bread stuffing, rice and pasta mixes, macaroni and cheese, and instant cereals (high in sodium)    · Fruits and vegetables:      ? Regular, canned vegetables (high in sodium)    ? Sauerkraut, pickled vegetables, and other foods prepared in brine (high in sodium)    ? Fried vegetables or vegetables in butter or high-fat sauces    ? Fruit in cream or butter sauce (high in fat)    · Dairy:      ? Whole milk, 2% milk, and cream (high in fat)    ?  Regular cheese and processed cheese (high in fat and sodium)    · Meats and protein foods:      ? Smoked or cured meat, such as corned beef, huynh, ham, hot dogs, and sausage (high in fat and sodium)    ? Canned beans and canned meats or spreads, such as potted meats, sardines, anchovies, and imitation seafood (high in sodium)    ? Deli or lunch meats, such as bologna, ham, turkey, and roast beef (high in sodium)    ? High-fat meat (T-bone steak, regular hamburger, and ribs)    ? Whole eggs and egg yolks (high in fat)    · Other:      ? Seasonings made with salt, such as garlic salt, celery salt, onion salt, seasoned salt, meat tenderizers, and monosodium glutamate (MSG)    ? Miso soup and canned or dried soup mixes (high in sodium)    ? Regular soy sauce, barbecue sauce, teriyaki sauce, steak sauce, Worcestershire sauce, and most flavored vinegars (high in sodium)    ? Regular condiments, such as mustard, ketchup, and salad dressings (high in sodium)    ? Gravy and sauces, such as Ej or cheese sauces (high in sodium and fat)    ? Drinks high in sugar, such as soda or fruit drinks    ? Snack foods, such as salted chips, popcorn, pretzels, pork rinds, salted crackers, and salted nuts    ? Frozen foods, such as dinners, entrees, vegetables with sauces, and breaded meats (high in sodium)    Other guidelines to follow:   · Maintain a healthy weight  Your risk for heart disease is higher if you are overweight  Your healthcare provider may suggest that you lose weight if you are overweight  You can lose weight by eating fewer calories and foods that have added sugars and fat  The DASH meal plan can help you do this  Decrease calories by eating smaller portions at each meal and fewer snacks  Ask your healthcare provider for more information about how to lose weight  · Exercise regularly  Regular exercise can help you reach or maintain a healthy weight  Regular exercise can also help decrease your blood pressure and improve your cholesterol levels  Get 30 minutes or more of moderate exercise each day of the week  To lose weight, get at least 60 minutes of exercise   Talk to your healthcare provider about the best exercise program for you  · Limit alcohol  Women should limit alcohol to 1 drink a day  Men should limit alcohol to 2 drinks a day  A drink of alcohol is 12 ounces of beer, 5 ounces of wine, or 1½ ounces of liquor  © Copyright [address] Information is for End User's use only and may not be sold, redistributed or otherwise used for commercial purposes  All illustrations and images included in CareNotes® are the copyrighted property of A D A M [employer] Inc  or [facility] [employer]   The above information is an  only  It is not intended as medical advice for individual conditions or treatments  Talk to your doctor, nurse or pharmacist before following any medical regimen to see if it is safe and effective for you

## 2021-05-11 NOTE — PROGRESS NOTES
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  Gabapentin failed to provide relief  Meloxicam also failed  Advised to continue ice, proper posture, ibuprofen as needed  Will trial Robaxin  Will refer to PT if no relief  Relevant Medications    methocarbamol (ROBAXIN) 750 mg tablet            Return if symptoms worsen or fail to improve  Subjective:     HPI: Angus Livingston is a 47 y o  female who presented to the office today for a follow-up neck pain, blood pressure, and migraines  She was started on amitriptyline which she states was very effective for preventing headaches  She states she still has the neck pain and therefore 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  She states meloxicam and gabapentin were both ineffective  She takes ibuprofen with some relief  She also obtained a wrist blood pressure cuff, she states she is sometimes at goal but sometimes above goal   It is known that wrist cuffs are inaccurate  Historical records of blood pressure show that patient is mostly at goal   We will refuse to diet and lifestyle interventions to control blood pressure  She has no new health concerns at this time      The following portions of the patient's history were reviewed and updated as appropriate: allergies, current medications, past family history, past medical history, past social history, past surgical history, and problem list     Current Outpatient Medications on File Prior to Visit   Medication Sig Dispense Refill    albuterol (PROVENTIL HFA,VENTOLIN HFA) 90 mcg/act inhaler Inhale 2 puffs every 6 (six) hours as needed for wheezing (Patient taking differently: Inhale 2 puffs as needed for wheezing  ) 1 Inhaler 0    albuterol (PROVENTIL HFA,VENTOLIN HFA) 90 mcg/act inhaler Inhale 2 puffs every 4 (four) hours as needed for wheezing 1 Inhaler 0    albuterol (PROVENTIL HFA,VENTOLIN HFA) 90 mcg/act inhaler Inhale 2 puffs every 6 (six) hours as needed for wheezing or shortness of breath 1 Inhaler 0    clindamycin (CLEOCIN) 150 mg capsule TAKE 1 CAPSULE EVERY 6 HOURS FOR 7 DAYS      famotidine (PEPCID) 20 mg tablet TAKE 1 TABLET(20 MG) BY MOUTH DAILY AT BEDTIME 90 tablet 0    fluticasone (FLONASE) 50 mcg/act nasal spray 2 sprays into each nostril daily (Patient taking differently: 2 sprays into each nostril as needed  ) 16 g 0    ibuprofen (MOTRIN) 800 mg tablet Take 800 mg by mouth every 8 (eight) hours as needed      norethindrone (MICRONOR) 0 35 MG tablet Take 0 35 mg by mouth daily      omeprazole (PriLOSEC) 40 MG capsule TAKE 1 CAPSULE(40 MG) BY MOUTH DAILY 90 capsule 0    [DISCONTINUED] amitriptyline (ELAVIL) 10 mg tablet TAKE 1 TABLET(10 MG) BY MOUTH DAILY AT BEDTIME 30 tablet 0    [DISCONTINUED] gabapentin (NEURONTIN) 300 mg capsule TAKE 1 CAPSULE(300 MG) BY MOUTH THREE TIMES DAILY 90 capsule 0    cetirizine (ZyrTEC) 10 mg tablet Take 1 tablet (10 mg total) by mouth 2 (two) times a day for 7 days 14 tablet 0    diazepam (VALIUM) 5 mg tablet Take 1 tablet (5 mg total) by mouth every 6 (six) hours as needed for anxiety (Patient not taking: Reported on 2/11/2021) 5 tablet 0    hydrOXYzine HCL (ATARAX) 25 mg tablet Take 2 tablets (50 mg total) by mouth every 6 (six) hours as needed for anxiety (Patient not taking: Reported on 2/11/2021) 25 tablet 0    metoclopramide (REGLAN) 10 mg tablet Take 1 tablet (10 mg total) by mouth every 6 (six) hours (Patient not taking: Reported on 2/11/2021) 10 tablet 0     No current facility-administered medications on file prior to visit  Review of Systems   Constitutional: Negative for appetite change, chills, fever and unexpected weight change  HENT: Negative for congestion, dental problem, ear pain, mouth sores, nosebleeds, postnasal drip, rhinorrhea, sore throat and trouble swallowing  Eyes: Negative for pain and visual disturbance  Respiratory: Negative for cough and shortness of breath  Cardiovascular: Negative for chest pain and palpitations  Gastrointestinal: Negative for abdominal pain, blood in stool, constipation, diarrhea, nausea and vomiting  GERD symtoms   Genitourinary: Negative for dysuria and hematuria  Musculoskeletal: Positive for neck pain  Negative for arthralgias and back pain  Skin: Negative for color change and rash  Neurological: Positive for headaches  Negative for dizziness, seizures, syncope, facial asymmetry and light-headedness  W/aura   Psychiatric/Behavioral: Negative  All other systems reviewed and are negative  Objective:    /84 (BP Location: Left arm, Patient Position: Sitting, Cuff Size: Standard)   Pulse 102   Temp (!) 96 4 °F (35 8 °C) (Temporal)   Resp 18   Ht 5' 5" (1 651 m)   Wt 68 1 kg (150 lb 1 6 oz)   LMP 04/06/2021 (Approximate)   SpO2 99%   Breastfeeding No   BMI 24 98 kg/m²     Physical Exam  Vitals signs reviewed  Constitutional:       General: She is not in acute distress  Appearance: Normal appearance  HENT:      Head: Normocephalic  Right Ear: Tympanic membrane, ear canal and external ear normal  There is no impacted cerumen  Left Ear: Tympanic membrane, ear canal and external ear normal  There is no impacted cerumen  Nose: Nose normal  No congestion  Mouth/Throat:      Mouth: Mucous membranes are moist    Eyes:      Extraocular Movements: Extraocular movements intact  Conjunctiva/sclera: Conjunctivae normal       Pupils: Pupils are equal, round, and reactive to light  Neck:      Musculoskeletal: Normal range of motion and neck supple  No muscular tenderness  Cardiovascular:      Rate and Rhythm: Normal rate and regular rhythm  Pulses: Normal pulses  Heart sounds: Normal heart sounds  No murmur  No friction rub  No gallop  Pulmonary:      Effort: Pulmonary effort is normal       Breath sounds: Normal breath sounds  No wheezing  Abdominal:      General: Bowel sounds are normal       Palpations: Abdomen is soft  Tenderness: There is no abdominal tenderness  Musculoskeletal: Normal range of motion  Right lower leg: No edema  Left lower leg: No edema  Skin:     General: Skin is warm and dry  Capillary Refill: Capillary refill takes less than 2 seconds  Neurological:      General: No focal deficit present  Mental Status: She is alert and oriented to person, place, and time  Psychiatric:         Mood and Affect: Mood normal          Behavior: Behavior normal          AAMIR Harper  05/11/21  4:20 PM    Patient Instructions     DASH Eating Plan   WHAT YOU NEED TO KNOW:   The DASH (Dietary Approaches to Stop Hypertension) Eating Plan is designed to help prevent or lower high blood pressure  It can also help to lower LDL (bad) cholesterol and decrease your risk of heart disease  The plan is low in sodium, sugar, unhealthy fats, and total fat  It is high in potassium, calcium, magnesium, and fiber  These nutrients are added when you eat more fruits, vegetables, and whole grains  DISCHARGE INSTRUCTIONS:   Your sodium limit each day: Your dietitian will tell you how much sodium is safe for you to have each day  People with high blood pressure should have no more than 1,500 to 2,300 mg of sodium in a day  A teaspoon (tsp) of salt has 2,300 mg of sodium   This may seem like a difficult goal, but small changes to the foods you eat can make a big difference  Your healthcare provider or dietitian can help you create a meal plan that follows your sodium limit  How to limit sodium:   · Read food labels  Food labels can help you choose foods that are low in sodium  The amount of sodium is listed in milligrams (mg)  The % Daily Value (DV) column tells you how much of your daily needs are met by 1 serving of the food for each nutrient listed  Choose foods that have less than 5% of the DV of sodium  These foods are considered low in sodium  Foods that have 20% or more of the DV of sodium are considered high in sodium  Avoid foods that have more than 300 mg of sodium in each serving  Choose foods that say low-sodium, reduced-sodium, or no salt added on the food label  · Avoid salt  Do not salt food at the table, and add very little salt to foods during cooking  Use herbs and spices, such as onions, garlic, and salt-free seasonings to add flavor to foods  Try lemon or lime juice or vinegar to give foods a tart flavor  Use hot peppers or a small amount of hot pepper sauce to add a spicy flavor to foods  · Ask about salt substitutes  Ask your healthcare provider if you may use salt substitutes  Some salt substitutes have ingredients that can be harmful if you have certain health conditions  · Choose foods carefully at restaurants  Meals from restaurants, especially fast food restaurants, are often high in sodium  Some restaurants have nutrition information that tells you the amount of sodium in their foods  Ask to have your food prepared with less, or no salt  What you need to know about fats:   · Include healthy fats  Examples are unsaturated fats and omega-3 fatty acids  Unsaturated fats are found in soybean, canola, olive, or sunflower oil, and liquid and soft tub margarines  Omega-3 fatty acids are found in fatty fish, such as salmon, tuna, mackerel, and sardines  It is also found in flaxseed oil and ground flaxseed  · Avoid unhealthy fats  Do not eat unhealthy fats, such as saturated fats and trans fats  Saturated fats are found in foods that contain fat from animals  Examples are fatty meats, whole milk, butter, cream, and other dairy foods  It is also found in shortening, stick margarine, palm oil, and coconut oil  Trans fats are found in fried foods, crackers, chips, and baked goods made with margarine or shortening  Foods to include: With the DASH eating plan, you need to eat a certain number of servings from each food group  This will help you get enough of certain nutrients and limit others  The amount of servings you should eat depends on how many calories you need  Your dietitian can tell you how many calories you need  The number of servings listed next to the food groups below are for people who need about 2,000 calories each day  · Grains:  6 to 8 servings (3 of these servings should be whole-grain foods)    ? 1 slice of whole-grain bread     ? 1 ounce of dry cereal    ? ½ cup of cooked cereal, pasta, or brown rice    · Vegetables and fruits:  4 to 5 servings of fruits and 4 to 5 servings of vegetables    ? 1 medium fruit    ? ½ cup of frozen, canned (no added salt), or chopped fresh vegetables     ? ½ cup of fresh, frozen, dried, or canned fruit (canned in light syrup or fruit juice)    ? ½ cup of vegetable or fruit juice    · Dairy:  2 to 3 servings    ? 1 cup of nonfat (skim) or 1% milk    ? 1½ ounces of fat-free or low-fat cheese    ? 6 ounces of nonfat or low-fat yogurt    · Lean meat, poultry, and fish:  6 ounces or less    ? Poultry (chicken, turkey) with no skin    ? Fish (especially fatty fish, such as salmon, fresh tuna, or mackerel)    ? Lean beef and pork (loin, round, extra lean hamburger)    ? Egg whites and egg substitutes    · Nuts, seeds, and legumes:  4 to 5 servings each week    ? ½ cup of cooked beans and peas    ? 1½ ounces of unsalted nuts    ?  2 tablespoons of peanut butter or seeds    · Sweets and added sugars:  5 or less each week    ? 1 tablespoon of sugar, jelly, or jam    ? ½ cup of sorbet or gelatin    ? 1 cup of lemonade    · Fats:  2 to 3 servings each week    ? 1 teaspoon of soft margarine or vegetable oil    ? 1 tablespoon of mayonnaise    ? 2 tablespoons of salad dressing    Foods to avoid:   · Grains:      ? Baked goods, such as doughnuts, pastries, cookies, and biscuits (high in fat and sugar)    ? Mixes for cornbread and biscuits, packaged foods, such as bread stuffing, rice and pasta mixes, macaroni and cheese, and instant cereals (high in sodium)    · Fruits and vegetables:      ? Regular, canned vegetables (high in sodium)    ? Sauerkraut, pickled vegetables, and other foods prepared in brine (high in sodium)    ? Fried vegetables or vegetables in butter or high-fat sauces    ? Fruit in cream or butter sauce (high in fat)    · Dairy:      ? Whole milk, 2% milk, and cream (high in fat)    ? Regular cheese and processed cheese (high in fat and sodium)    · Meats and protein foods:      ? Smoked or cured meat, such as corned beef, huynh, ham, hot dogs, and sausage (high in fat and sodium)    ? Canned beans and canned meats or spreads, such as potted meats, sardines, anchovies, and imitation seafood (high in sodium)    ? Deli or lunch meats, such as bologna, ham, turkey, and roast beef (high in sodium)    ? High-fat meat (T-bone steak, regular hamburger, and ribs)    ? Whole eggs and egg yolks (high in fat)    · Other:      ? Seasonings made with salt, such as garlic salt, celery salt, onion salt, seasoned salt, meat tenderizers, and monosodium glutamate (MSG)    ? Miso soup and canned or dried soup mixes (high in sodium)    ? Regular soy sauce, barbecue sauce, teriyaki sauce, steak sauce, Worcestershire sauce, and most flavored vinegars (high in sodium)    ? Regular condiments, such as mustard, ketchup, and salad dressings (high in sodium)    ?  Gravy and sauces, such as Ej or cheese sauces (high in sodium and fat)    ? Drinks high in sugar, such as soda or fruit drinks    ? Snack foods, such as salted chips, popcorn, pretzels, pork rinds, salted crackers, and salted nuts    ? Frozen foods, such as dinners, entrees, vegetables with sauces, and breaded meats (high in sodium)    Other guidelines to follow:   · Maintain a healthy weight  Your risk for heart disease is higher if you are overweight  Your healthcare provider may suggest that you lose weight if you are overweight  You can lose weight by eating fewer calories and foods that have added sugars and fat  The DASH meal plan can help you do this  Decrease calories by eating smaller portions at each meal and fewer snacks  Ask your healthcare provider for more information about how to lose weight  · Exercise regularly  Regular exercise can help you reach or maintain a healthy weight  Regular exercise can also help decrease your blood pressure and improve your cholesterol levels  Get 30 minutes or more of moderate exercise each day of the week  To lose weight, get at least 60 minutes of exercise  Talk to your healthcare provider about the best exercise program for you  · Limit alcohol  Women should limit alcohol to 1 drink a day  Men should limit alcohol to 2 drinks a day  A drink of alcohol is 12 ounces of beer, 5 ounces of wine, or 1½ ounces of liquor  © Copyright [address] Information is for End User's use only and may not be sold, redistributed or otherwise used for commercial purposes  All illustrations and images included in CareNotes® are the copyrighted property of [employer]  or [address] [place] [address]  The above information is an  only  It is not intended as medical advice for individual conditions or treatments  Talk to your doctor, nurse or pharmacist before following any medical regimen to see if it is safe and effective for you

## 2021-05-11 NOTE — ASSESSMENT & PLAN NOTE
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

## 2021-05-12 ENCOUNTER — IMMUNIZATIONS (OUTPATIENT)
Dept: FAMILY MEDICINE CLINIC | Facility: HOSPITAL | Age: 41
End: 2021-05-12

## 2021-05-12 DIAGNOSIS — Z23 ENCOUNTER FOR IMMUNIZATION: Primary | ICD-10-CM

## 2021-05-12 PROCEDURE — 91301 SARS-COV-2 / COVID-19 MRNA VACCINE (MODERNA) 100 MCG: CPT

## 2021-05-12 PROCEDURE — 0011A SARS-COV-2 / COVID-19 MRNA VACCINE (MODERNA) 100 MCG: CPT

## 2021-06-07 ENCOUNTER — HOSPITAL ENCOUNTER (EMERGENCY)
Facility: HOSPITAL | Age: 41
Discharge: HOME | End: 2021-06-07
Attending: EMERGENCY MEDICINE | Admitting: EMERGENCY MEDICINE
Payer: COMMERCIAL

## 2021-06-07 ENCOUNTER — APPOINTMENT (EMERGENCY)
Dept: CT IMAGING | Facility: HOSPITAL | Age: 41
End: 2021-06-07
Payer: COMMERCIAL

## 2021-06-07 VITALS
HEART RATE: 109 BPM | DIASTOLIC BLOOD PRESSURE: 83 MMHG | OXYGEN SATURATION: 99 % | SYSTOLIC BLOOD PRESSURE: 128 MMHG | TEMPERATURE: 101.3 F | RESPIRATION RATE: 16 BRPM

## 2021-06-07 DIAGNOSIS — R11.2 NAUSEA AND VOMITING: ICD-10-CM

## 2021-06-07 DIAGNOSIS — K52.9 COLITIS: Primary | ICD-10-CM

## 2021-06-07 DIAGNOSIS — R19.7 DIARRHEA: ICD-10-CM

## 2021-06-07 DIAGNOSIS — K76.89 HEPATIC CYST: ICD-10-CM

## 2021-06-07 LAB
ALBUMIN SERPL BCP-MCNC: 3.5 G/DL (ref 3.5–5)
ALP SERPL-CCNC: 64 U/L (ref 46–116)
ALT SERPL W P-5'-P-CCNC: 16 U/L (ref 12–78)
ANION GAP SERPL CALCULATED.3IONS-SCNC: 11 MMOL/L (ref 4–13)
AST SERPL W P-5'-P-CCNC: 16 U/L (ref 5–45)
BASOPHILS # BLD AUTO: 0.03 THOUSANDS/ΜL (ref 0–0.1)
BASOPHILS NFR BLD AUTO: 0 % (ref 0–1)
BILIRUB SERPL-MCNC: 0.42 MG/DL (ref 0.2–1)
BILIRUB UR QL STRIP: ABNORMAL
BUN SERPL-MCNC: 7 MG/DL (ref 5–25)
CALCIUM SERPL-MCNC: 9 MG/DL (ref 8.3–10.1)
CHLORIDE SERPL-SCNC: 99 MMOL/L (ref 100–108)
CLARITY UR: CLEAR
CO2 SERPL-SCNC: 24 MMOL/L (ref 21–32)
COLOR UR: YELLOW
CREAT SERPL-MCNC: 0.79 MG/DL (ref 0.6–1.3)
EOSINOPHIL # BLD AUTO: 0 THOUSAND/ΜL (ref 0–0.61)
EOSINOPHIL NFR BLD AUTO: 0 % (ref 0–6)
ERYTHROCYTE [DISTWIDTH] IN BLOOD BY AUTOMATED COUNT: 13.2 % (ref 11.6–15.1)
EXT PREG TEST URINE: NORMAL
EXT. CONTROL ED NAV: NORMAL
GLUCOSE SERPL-MCNC: 128 MG/DL (ref 65–140)
GLUCOSE UR STRIP-MCNC: NEGATIVE MG/DL
HCT VFR BLD AUTO: 39.6 % (ref 36.5–46.1)
HGB BLD-MCNC: 13.3 G/DL (ref 12–15.4)
HGB UR QL STRIP.AUTO: ABNORMAL
IMM GRANULOCYTES # BLD AUTO: 0.05 THOUSAND/UL (ref 0–0.2)
IMM GRANULOCYTES NFR BLD AUTO: 1 % (ref 0–2)
KETONES UR STRIP-MCNC: ABNORMAL MG/DL
LEUKOCYTE ESTERASE UR QL STRIP: NEGATIVE
LIPASE SERPL-CCNC: 70 U/L (ref 73–393)
LYMPHOCYTES # BLD AUTO: 0.53 THOUSANDS/ΜL (ref 0.6–4.47)
LYMPHOCYTES NFR BLD AUTO: 6 % (ref 14–44)
MCH RBC QN AUTO: 28.5 PG (ref 26.8–34.3)
MCHC RBC AUTO-ENTMCNC: 33.6 G/DL (ref 31.4–37.4)
MCV RBC AUTO: 85 FL (ref 82–98)
MONOCYTES # BLD AUTO: 0.41 THOUSAND/ΜL (ref 0.17–1.22)
MONOCYTES NFR BLD AUTO: 5 % (ref 4–12)
NEUTROPHILS # BLD AUTO: 7.79 THOUSANDS/ΜL (ref 1.85–7.62)
NEUTS SEG NFR BLD AUTO: 88 % (ref 43–75)
NITRITE UR QL STRIP: NEGATIVE
NRBC BLD AUTO-RTO: 0 /100 WBCS
PH UR STRIP.AUTO: 6.5 [PH] (ref 4.5–8)
PLATELET # BLD AUTO: 210 THOUSANDS/UL (ref 149–390)
PMV BLD AUTO: 9.9 FL (ref 8.9–12.7)
POTASSIUM SERPL-SCNC: 3.6 MMOL/L (ref 3.5–5.3)
PROT SERPL-MCNC: 7.6 G/DL (ref 6.4–8.2)
PROT UR STRIP-MCNC: ABNORMAL MG/DL
RBC # BLD AUTO: 4.67 MILLION/UL (ref 3.88–5.12)
SODIUM SERPL-SCNC: 134 MMOL/L (ref 136–145)
SP GR UR STRIP.AUTO: >=1.03 (ref 1–1.03)
UROBILINOGEN UR QL STRIP.AUTO: 0.2 E.U./DL
WBC # BLD AUTO: 8.81 THOUSAND/UL (ref 4.31–10.16)

## 2021-06-07 PROCEDURE — 99284 EMERGENCY DEPT VISIT MOD MDM: CPT

## 2021-06-07 PROCEDURE — 81025 URINE PREGNANCY TEST: CPT | Performed by: PHYSICIAN ASSISTANT

## 2021-06-07 PROCEDURE — 96375 TX/PRO/DX INJ NEW DRUG ADDON: CPT

## 2021-06-07 PROCEDURE — 99285 EMERGENCY DEPT VISIT HI MDM: CPT | Performed by: PHYSICIAN ASSISTANT

## 2021-06-07 PROCEDURE — 87186 SC STD MICRODIL/AGAR DIL: CPT | Performed by: PHYSICIAN ASSISTANT

## 2021-06-07 PROCEDURE — 96361 HYDRATE IV INFUSION ADD-ON: CPT

## 2021-06-07 PROCEDURE — 83993 ASSAY FOR CALPROTECTIN FECAL: CPT | Performed by: PHYSICIAN ASSISTANT

## 2021-06-07 PROCEDURE — 74177 CT ABD & PELVIS W/CONTRAST: CPT

## 2021-06-07 PROCEDURE — 83690 ASSAY OF LIPASE: CPT | Performed by: PHYSICIAN ASSISTANT

## 2021-06-07 PROCEDURE — 85025 COMPLETE CBC W/AUTO DIFF WBC: CPT | Performed by: PHYSICIAN ASSISTANT

## 2021-06-07 PROCEDURE — 96374 THER/PROPH/DIAG INJ IV PUSH: CPT

## 2021-06-07 PROCEDURE — 36415 COLL VENOUS BLD VENIPUNCTURE: CPT | Performed by: PHYSICIAN ASSISTANT

## 2021-06-07 PROCEDURE — 87505 NFCT AGENT DETECTION GI: CPT | Performed by: PHYSICIAN ASSISTANT

## 2021-06-07 PROCEDURE — 80053 COMPREHEN METABOLIC PANEL: CPT | Performed by: PHYSICIAN ASSISTANT

## 2021-06-07 RX ORDER — ACETAMINOPHEN 325 MG/1
650 TABLET ORAL ONCE
Status: COMPLETED | OUTPATIENT
Start: 2021-06-07 | End: 2021-06-07

## 2021-06-07 RX ORDER — DICYCLOMINE HCL 20 MG
20 TABLET ORAL ONCE
Status: COMPLETED | OUTPATIENT
Start: 2021-06-07 | End: 2021-06-07

## 2021-06-07 RX ORDER — ONDANSETRON 2 MG/ML
4 INJECTION INTRAMUSCULAR; INTRAVENOUS ONCE
Status: COMPLETED | OUTPATIENT
Start: 2021-06-07 | End: 2021-06-07

## 2021-06-07 RX ORDER — MORPHINE SULFATE 4 MG/ML
4 INJECTION, SOLUTION INTRAMUSCULAR; INTRAVENOUS ONCE
Status: COMPLETED | OUTPATIENT
Start: 2021-06-07 | End: 2021-06-07

## 2021-06-07 RX ORDER — DICYCLOMINE HCL 20 MG
20 TABLET ORAL 2 TIMES DAILY
Qty: 20 TABLET | Refills: 0 | Status: SHIPPED | OUTPATIENT
Start: 2021-06-07 | End: 2021-08-23 | Stop reason: SDUPTHER

## 2021-06-07 RX ORDER — ONDANSETRON 4 MG/1
4 TABLET, ORALLY DISINTEGRATING ORAL EVERY 6 HOURS PRN
Qty: 20 TABLET | Refills: 0 | Status: SHIPPED | OUTPATIENT
Start: 2021-06-07 | End: 2021-08-23 | Stop reason: SDUPTHER

## 2021-06-07 RX ADMIN — SODIUM CHLORIDE 1000 ML: 0.9 INJECTION, SOLUTION INTRAVENOUS at 07:37

## 2021-06-07 RX ADMIN — MORPHINE SULFATE 4 MG: 4 INJECTION INTRAVENOUS at 11:26

## 2021-06-07 RX ADMIN — ACETAMINOPHEN 650 MG: 325 TABLET, FILM COATED ORAL at 11:40

## 2021-06-07 RX ADMIN — SODIUM CHLORIDE 1000 ML: 0.9 INJECTION, SOLUTION INTRAVENOUS at 11:24

## 2021-06-07 RX ADMIN — IOHEXOL 100 ML: 350 INJECTION, SOLUTION INTRAVENOUS at 09:47

## 2021-06-07 RX ADMIN — ONDANSETRON 4 MG: 2 INJECTION INTRAMUSCULAR; INTRAVENOUS at 07:39

## 2021-06-07 RX ADMIN — DICYCLOMINE HYDROCHLORIDE 20 MG: 20 TABLET ORAL at 07:39

## 2021-06-07 NOTE — ED PROVIDER NOTES
History  Chief Complaint   Patient presents with    Diarrhea     Diarrhea, nausea, vomiting, generalized abdominal pain x 4 days   Abdominal Pain     Patient is a 16-year-old, with history of anxiety, acid reflux, gastroparesis, gastric ulcers, migraine headaches, presents emergency department for evaluation of abdominal pain, vomiting and diarrhea  Patient states 4 days ago she began with diffuse abdominal pain, described as colicky associated with multiple episodes of NBNB vomiting and watery diarrhea  Patient denies sick contacts or recent travel  Patient has been taking Imodium without relief of her diarrhea  Patient denies fever, chills, chest pain, shortness of breath, dysuria, hematuria, flank pain, hematochezia, hematemesis, melena  Prior to Admission Medications   Prescriptions Last Dose Informant Patient Reported? Taking?    albuterol (PROVENTIL HFA,VENTOLIN HFA) 90 mcg/act inhaler More than a month at Unknown time  No No   Sig: Inhale 2 puffs every 6 (six) hours as needed for wheezing or shortness of breath   amitriptyline (ELAVIL) 10 mg tablet More than a month at Unknown time  No No   Sig: Take 1 tablet (10 mg total) by mouth daily at bedtime   famotidine (PEPCID) 20 mg tablet   No No   Sig: TAKE 1 TABLET(20 MG) BY MOUTH DAILY AT BEDTIME   fluticasone (FLONASE) 50 mcg/act nasal spray   No No   Si sprays into each nostril daily   Patient taking differently: 2 sprays into each nostril as needed     hydrOXYzine HCL (ATARAX) 25 mg tablet   No No   Sig: Take 2 tablets (50 mg total) by mouth every 6 (six) hours as needed for anxiety   Patient not taking: Reported on 2021   ibuprofen (MOTRIN) 800 mg tablet Past Month at Unknown time  Yes Yes   Sig: Take 800 mg by mouth every 8 (eight) hours as needed   methocarbamol (ROBAXIN) 750 mg tablet Past Month at Unknown time  No Yes   Sig: Take 1 tablet (750 mg total) by mouth every 6 (six) hours as needed for muscle spasms Facility-Administered Medications: None       Past Medical History:   Diagnosis Date    Acid reflux     Anxiety     Bursitis     Frequent headaches     Gastric ulcer     Gastroparesis     Liver cyst     Lumbar herniated disc     Migraine     Seasonal allergies     Torn ACL     Wears glasses        Past Surgical History:   Procedure Laterality Date    KNEE SURGERY Right     CA ESOPHAGOGASTRODUODENOSCOPY TRANSORAL DIAGNOSTIC N/A 5/1/2017    Procedure: ESOPHAGOGASTRODUODENOSCOPY (EGD); Surgeon: Jose Lopez MD;  Location: [facility] GI LAB; Service: Gastroenterology    CA KNEE SCOPE,AID ANT CRUCIATE REPAIR Right 5/24/2018    Procedure: ARTHROSCOPIC RECONSTRUCTION ANTERIOR CRUCIATE LIGAMENT (ACL) WITH AUTOGRAFT AND ALLOGRAFT;  Surgeon: Jose Rafael Rodríguez MD;  Location: [facility] OR;  Service: Orthopedics    TUBAL LIGATION      WISDOM TOOTH EXTRACTION         Family History   Problem Relation Age of Onset    Depression Mother     Anxiety disorder Mother     Sleep disorder Mother     Hypertension Mother     Hyperlipidemia Mother     Hyperlipidemia Father     Hypertension Father     Heart disease Father     Heart disease Paternal Aunt     Heart disease Paternal Uncle     Osteoporosis Maternal Grandmother     Alzheimer's disease Paternal Grandmother     Heart disease Paternal Grandmother     Heart disease Paternal Grandfather     Stroke Paternal Grandfather      I have reviewed and agree with the history as documented  E-Cigarette/Vaping     E-Cigarette/Vaping Substances     Social History     Tobacco Use    Smoking status: Never Smoker    Smokeless tobacco: Never Used   Substance Use Topics    Alcohol use: Not Currently     Comment: rare    Drug use: No       Review of Systems   Constitutional: Negative for chills and fever  HENT: Negative for ear pain and sore throat  Eyes: Negative for visual disturbance  Respiratory: Negative for cough and shortness of breath      Cardiovascular: Negative for chest pain, palpitations and leg swelling  Gastrointestinal: Positive for abdominal pain, diarrhea, nausea and vomiting  Genitourinary: Negative for dysuria and hematuria  Musculoskeletal: Negative for back pain and neck pain  Skin: Negative for rash  Neurological: Negative for speech difficulty and headaches  Psychiatric/Behavioral: Negative for confusion  Physical Exam  Physical Exam  Constitutional:       General: Chirag Tsang is not in acute distress  Appearance: Chirag Tsang is well-developed  Chirag Tsang is not ill-appearing, toxic-appearing or diaphoretic  HENT:      Head: Normocephalic and atraumatic  Right Ear: External ear normal       Left Ear: External ear normal       Nose: Nose normal       Mouth/Throat:      Lips: Pink  Mouth: Mucous membranes are moist    Eyes:      Extraocular Movements: Extraocular movements intact  Conjunctiva/sclera: Conjunctivae normal    Neck:      Musculoskeletal: Normal range of motion and neck supple  Cardiovascular:      Rate and Rhythm: Normal rate and regular rhythm  Pulmonary:      Effort: Pulmonary effort is normal       Breath sounds: Normal breath sounds  No decreased breath sounds, wheezing, rhonchi or rales  Abdominal:      General: Abdomen is flat  There is no distension  Palpations: Abdomen is soft  Tenderness: There is generalized abdominal tenderness  There is no guarding or rebound  Skin:     General: Skin is warm  Capillary Refill: Capillary refill takes less than 2 seconds  Coloration: Skin is not jaundiced or pale  Findings: No rash  Neurological:      Mental Status: Chirag Tsang is alert and oriented to person, place, and time     Psychiatric:         Mood and Affect: Mood and affect normal          Speech: Speech normal          Vital Signs  ED Triage Vitals [06/07/21 0701]   Temperature Pulse Respirations Blood Pressure SpO2   99 1 °F (37 3 °C) (!) 107 16 120/79 99 %      Temp Source Heart Rate Source Patient Position - Orthostatic VS BP Location FiO2 (%)   Oral Monitor Sitting Right arm --      Pain Score       8           Vitals:    06/07/21 0701 06/07/21 0917 06/07/21 1125   BP: 120/79 119/72 128/83   Pulse: (!) 107 93 (!) 109   Patient Position - Orthostatic VS: Sitting Sitting Sitting         Visual Acuity      ED Medications  Medications   sodium chloride 0 9 % bolus 1,000 mL (0 mL Intravenous Stopped 6/7/21 1033)   ondansetron (ZOFRAN) injection 4 mg (4 mg Intravenous Given 6/7/21 0739)   dicyclomine (BENTYL) tablet 20 mg (20 mg Oral Given 6/7/21 0739)   iohexol (OMNIPAQUE) 350 MG/ML injection (SINGLE-DOSE) 100 mL (100 mL Intravenous Given 6/7/21 0947)   sodium chloride 0 9 % bolus 1,000 mL (0 mL Intravenous Stopped 6/7/21 1245)   morphine (PF) 4 mg/mL injection 4 mg (4 mg Intravenous Given 6/7/21 1126)   acetaminophen (TYLENOL) tablet 650 mg (650 mg Oral Given 6/7/21 1140)       Diagnostic Studies  Results Reviewed     Procedure Component Value Units Date/Time    Clostridium difficile toxin by PCR with EIA [[id number]]  (Normal) Collected: 06/07/21 0953    Lab Status: Final result Specimen: Stool from Per Rectum Updated: 06/08/21 0537      C difficile toxin by PCR  Negative    Narrative:           Mar Grullon [[id number]] Collected: 06/07/21 1214    Lab Status: In process Specimen: Stool from Rectum Updated: 06/07/21 1217    Stool Enteric Bacterial Panel by PCR [[id number]] Collected: 06/07/21 0958    Lab Status:  In process Specimen: Stool from Rectum Updated: 06/07/21 1004    Comprehensive metabolic panel [[id number]]  (Abnormal) Collected: 06/07/21 0738    Lab Status: Final result Specimen: Blood from Arm, Left Updated: 06/07/21 0806     Sodium 134 mmol/L      Potassium 3 6 mmol/L      Chloride 99 mmol/L      CO2 24 mmol/L      ANION GAP 11 mmol/L      BUN 7 mg/dL      Creatinine 0 79 mg/dL      Glucose 128 mg/dL      Calcium 9 0 mg/dL      AST 16 U/L      ALT 16 U/L      Alkaline Phosphatase 64 U/L      Total Protein 7 6 g/dL      Albumin 3 5 g/dL      Total Bilirubin 0 42 mg/dL      eGFR --    Narrative:      Notes:     1  eGFR calculation is only valid for adults 18 years and older  2  EGFR calculation cannot be performed for patients who are transgender, non-binary, or whose legal sex, sex at birth, and gender identity differ      Lipase [[id number]]  (Abnormal) Collected: 06/07/21 0738    Lab Status: Final result Specimen: Blood from Arm, Left Updated: 06/07/21 0806     Lipase 70 u/L     CBC and differential [[id number]]  (Abnormal) Collected: 06/07/21 0738    Lab Status: Final result Specimen: Blood from Arm, Left Updated: 06/07/21 0746     WBC 8 81 Thousand/uL      RBC 4 67 Million/uL      Hemoglobin 13 3 g/dL      Hematocrit 39 6 %      MCV 85 fL      MCH 28 5 pg      MCHC 33 6 g/dL      RDW 13 2 %      MPV 9 9 fL      Platelets 839 Thousands/uL      nRBC 0 /100 WBCs      Neutrophils Relative 88 %      Immat GRANS % 1 %      Lymphocytes Relative 6 %      Monocytes Relative 5 %      Eosinophils Relative 0 %      Basophils Relative 0 %      Neutrophils Absolute 7 79 Thousands/µL      Immature Grans Absolute 0 05 Thousand/uL      Lymphocytes Absolute 0 53 Thousands/µL      Monocytes Absolute 0 41 Thousand/µL      Eosinophils Absolute 0 00 Thousand/µL      Basophils Absolute 0 03 Thousands/µL     POCT pregnancy, urine [[id number]]  (Normal) Resulted: 06/07/21 0741    Lab Status: Final result Updated: 06/07/21 0741     EXT PREG TEST UR (Ref: Negative) Negative (-)     Control Valid    Urine Macroscopic, POC [[id number]]  (Abnormal) Collected: 06/07/21 0735    Lab Status: Final result Specimen: Urine Updated: 06/07/21 0737     Color, UA Yellow     Clarity, UA Clear     pH, UA 6 5     Leukocytes, UA Negative     Nitrite, UA Negative     Protein,  (2+) mg/dl      Glucose, UA Negative mg/dl      Ketones, UA 80 (3+) mg/dl      Urobilinogen, UA 0 2 E U /dl      Bilirubin, UA Interference- unable to analyze     Blood, UA Trace     Specific Gravity, UA >=1 030    Narrative:      CLINITEK RESULT                 CT abdomen pelvis with contrast   Final Result by Dominik Marin MD (06/07 1006)      1  Colonic wall thickening and minimal inflammatory change concerning for nonspecific infectious or inflammatory colitis  Interval segments of normal bowel raises suspicion for Crohn's disease  2   Probable left adnexal involuting follicle with physiologic fluid  3   Hepatic cysts, stable  Workstation performed: [id number]                    Procedures  Procedures         ED Course  ED Course as of Jun 08 0855   Mon Jun 07, 2021   0738 Ketones, UA(!): 80 (3+)   3168 Patient able to provide stool sample, will order enteric bacterial panel and c-diff      0940 Patient without significant improvement in symptoms, still with abdominal tenderness, with obtain CT a/p      1009 1  Colonic wall thickening and minimal inflammatory change concerning for nonspecific infectious or inflammatory colitis  Interval segments of normal bowel raises suspicion for Crohn's disease  2   Probable left adnexal involuting follicle with physiologic fluid  3   Hepatic cysts, stable  CT abdomen pelvis with contrast   1220 Patient feeling improved, will discharge  Discussed case with GI AP on-call, Marisela Nielson, states unlikely Crohn's disease, okay for outpatient follow-up  SBIRT 22yo+      Most Recent Value   SBIRT (22 yo +)   In order to provide better care to our patients, we are screening all of our patients for alcohol and drug use  Would it be okay to ask you these screening questions?   No Filed at: 06/07/2021 1245                    MDM  Number of Diagnoses or Management Options  Colitis: new and requires workup  Diarrhea: new and does not require workup  Hepatic cyst: new and requires workup  Nausea and vomiting: new and does not require workup  Diagnosis management comments: Patient is a 72-year-old, with history of anxiety, acid reflux, gastroparesis, gastric ulcers, migraine headaches, presents emergency department for evaluation of abdominal pain, vomiting and diarrhea  Patient states 4 days ago she began with diffuse abdominal pain, described as colicky associated with multiple episodes of NBNB vomiting and watery diarrhea  Patient febrile and tachycardic on arrival, tylenol given  Urine pregnancy negative  UA shows no infection, 3+ ketones - 2L IVF given  No evidence of leukocytosis   CT a/p shows: 1  Colonic wall thickening and minimal inflammatory change concerning for nonspecific infectious or inflammatory colitis  Interval segments of normal bowel raises suspicion for Crohn's disease  2   Probable left adnexal involuting follicle with physiologic fluid  3   Hepatic cysts, stable      Zofran, morphine and bentyl given with improvement in symptoms  Patient feeling improved, will discharge  Discussed case with GI AP on-call, Kenzie Sethi, states unlikely Crohn's disease, okay for outpatient follow-up  Recommends obtaining fecal protectian  Enteric panel and c-diff sent, patient will be notified if positive results  Information for GI provided to patient and encouraged to follow-up  Rx for zofran and bentyl provided, advised to stay well hydrated and eat BRAT diet  Patient verbalizes understanding and agrees with plan  The management plan was discussed in detail with the patient at bedside and all questions were answered  Prior to discharge, I provided both verbal and written instructions  I discussed with the patient the signs and symptoms for which to return to the emergency department  All questions were answered and patient was comfortable with the plan of care and discharged to home  The patient agrees to return to the Emergency Department for concerns and/or progression of illness        Disposition  Final diagnoses:   Colitis   Nausea and vomiting Diarrhea   Hepatic cyst     Time reflects when diagnosis was documented in both MDM as applicable and the Disposition within this note     Time User Action Codes Description Comment    6/7/2021 12:21 PM Edith Banerjee Add [K52 9] Colitis     6/7/2021 12:21 PM Edith Banerjee Add [R11 2] Nausea and vomiting     6/7/2021 12:21 PM Edith Banerjee Add [R19 7] Diarrhea     6/8/2021  8:53 AM Edith Banrejee Add [K76 89] Hepatic cyst       ED Disposition     ED Disposition Condition Date/Time Comment    Discharge Stable Mon Jun 7, 2021 12:21 PM Tiffany Draft discharge to home/self care              Follow-up Information     Follow up With Specialties Details Why Contact Info Additional [facility] Schedule an appointment as soon as possible for a visit in 2 days  [address] [address]  [place] [address] [facility] [address]  Kei Serna [address] [facility], [address], [address] [place] [address], [facility], [address] [place] [address], [id number] [phone]          Discharge Medication List as of 6/7/2021 12:36 PM      START taking these medications    Details   dicyclomine (BENTYL) 20 mg tablet Take 1 tablet (20 mg total) by mouth 2 (two) times a day, Starting Mon 6/7/2021, Print      ondansetron (ZOFRAN-ODT) 4 mg disintegrating tablet Take 1 tablet (4 mg total) by mouth every 6 (six) hours as needed for nausea or vomiting, Starting Mon 6/7/2021, Print         CONTINUE these medications which have NOT CHANGED    Details   !! albuterol (PROVENTIL HFA,VENTOLIN HFA) 90 mcg/act inhaler Inhale 2 puffs every 6 (six) hours as needed for wheezing or shortness of breath, Starting Tue 11/17/2020, Normal      amitriptyline (ELAVIL) 10 mg tablet Take 1 tablet (10 mg total) by mouth daily at bedtime, Starting Tue 5/11/2021, Normal      famotidine (PEPCID) 20 mg tablet TAKE 1 TABLET(20 MG) BY MOUTH DAILY AT BEDTIME, Normal      fluticasone (FLONASE) 50 mcg/act nasal spray 2 sprays into each nostril daily, Starting Fri 8/24/2018, Normal      hydrOXYzine HCL (ATARAX) 25 mg tablet Take 2 tablets (50 mg total) by mouth every 6 (six) hours as needed for anxiety, Starting Fri 5/10/2019, Print      ibuprofen (MOTRIN) 800 mg tablet Take 800 mg by mouth every 8 (eight) hours as needed, Historical Med      methocarbamol (ROBAXIN) 750 mg tablet Take 1 tablet (750 mg total) by mouth every 6 (six) hours as needed for muscle spasms, Starting Tue 5/11/2021, Normal      !! albuterol (PROVENTIL HFA,VENTOLIN HFA) 90 mcg/act inhaler Inhale 2 puffs every 6 (six) hours as needed for wheezing, Starting Fri 8/24/2018, Normal      !! albuterol (PROVENTIL HFA,VENTOLIN HFA) 90 mcg/act inhaler Inhale 2 puffs every 4 (four) hours as needed for wheezing, Starting Thu 9/26/2019, Print      cetirizine (ZyrTEC) 10 mg tablet Take 1 tablet (10 mg total) by mouth 2 (two) times a day for 7 days, Starting Sat 7/14/2018, Until Sat 7/21/2018, Print      clindamycin (CLEOCIN) 150 mg capsule TAKE 1 CAPSULE EVERY 6 HOURS FOR 7 DAYS, Historical Med      diazepam (VALIUM) 5 mg tablet Take 1 tablet (5 mg total) by mouth every 6 (six) hours as needed for anxiety, Starting Wed 1/27/2021, Normal      metoclopramide (REGLAN) 10 mg tablet Take 1 tablet (10 mg total) by mouth every 6 (six) hours, Starting Tue 4/16/2019, Print      norethindrone (MICRONOR) 0 35 MG tablet Take 0 35 mg by mouth daily, Starting Tue 8/13/2019, Historical Med      omeprazole (PriLOSEC) 40 MG capsule TAKE 1 CAPSULE(40 MG) BY MOUTH DAILY, Normal       !! - Potential duplicate medications found  Please discuss with provider  No discharge procedures on file      PDMP Review     None          ED Provider  Electronically Signed by           Liliana Norman PA-C  06/08/21 8591

## 2021-06-07 NOTE — Clinical Note
Jeanninebridget Lomashayde was seen and treated in our emergency department on 6/7/2021  Diagnosis:     Janet Webster  may return to work on return date  Eligio Ashton may return on this date: 06/10/2021         If you have any questions or concerns, please don't hesitate to call        Itzel Landin PA-C    ______________________________           _______________          _______________  Hospital Representative                              Date                                Time

## 2021-06-08 ENCOUNTER — HOSPITAL ENCOUNTER (EMERGENCY)
Facility: HOSPITAL | Age: 41
Discharge: HOME | End: 2021-06-08
Attending: EMERGENCY MEDICINE | Admitting: EMERGENCY MEDICINE
Payer: COMMERCIAL

## 2021-06-08 ENCOUNTER — TELEPHONE (OUTPATIENT)
Dept: OTHER | Facility: OTHER | Age: 41
End: 2021-06-08

## 2021-06-08 VITALS
SYSTOLIC BLOOD PRESSURE: 121 MMHG | TEMPERATURE: 98 F | RESPIRATION RATE: 17 BRPM | DIASTOLIC BLOOD PRESSURE: 68 MMHG | BODY MASS INDEX: 23.81 KG/M2 | WEIGHT: 143.08 LBS | HEART RATE: 85 BPM | OXYGEN SATURATION: 99 %

## 2021-06-08 DIAGNOSIS — K52.9 COLITIS: Primary | ICD-10-CM

## 2021-06-08 LAB
ALBUMIN SERPL BCP-MCNC: 3.2 G/DL (ref 3.5–5)
ALP SERPL-CCNC: 98 U/L (ref 46–116)
ALT SERPL W P-5'-P-CCNC: 60 U/L (ref 12–78)
ANION GAP SERPL CALCULATED.3IONS-SCNC: 9 MMOL/L (ref 4–13)
AST SERPL W P-5'-P-CCNC: 56 U/L (ref 5–45)
BACTERIA UR QL AUTO: ABNORMAL /HPF
BASOPHILS # BLD AUTO: 0.04 THOUSANDS/ΜL (ref 0–0.1)
BASOPHILS NFR BLD AUTO: 1 % (ref 0–1)
BILIRUB SERPL-MCNC: 0.31 MG/DL (ref 0.2–1)
BILIRUB UR QL STRIP: ABNORMAL
BUN SERPL-MCNC: 4 MG/DL (ref 5–25)
C DIFF TOX B TCDB STL QL NAA+PROBE: NEGATIVE
CALCIUM ALBUM COR SERPL-MCNC: 9.2 MG/DL (ref 8.3–10.1)
CALCIUM SERPL-MCNC: 8.6 MG/DL (ref 8.3–10.1)
CAMPYLOBACTER DNA SPEC NAA+PROBE: ABNORMAL
CHLORIDE SERPL-SCNC: 104 MMOL/L (ref 100–108)
CLARITY UR: CLEAR
CO2 SERPL-SCNC: 26 MMOL/L (ref 21–32)
COLOR UR: YELLOW
CREAT SERPL-MCNC: 0.71 MG/DL (ref 0.6–1.3)
EOSINOPHIL # BLD AUTO: 0.01 THOUSAND/ΜL (ref 0–0.61)
EOSINOPHIL NFR BLD AUTO: 0 % (ref 0–6)
ERYTHROCYTE [DISTWIDTH] IN BLOOD BY AUTOMATED COUNT: 13.1 % (ref 11.6–15.1)
GLUCOSE SERPL-MCNC: 97 MG/DL (ref 65–140)
GLUCOSE UR STRIP-MCNC: NEGATIVE MG/DL
HCT VFR BLD AUTO: 38 % (ref 36.5–46.1)
HGB BLD-MCNC: 12.8 G/DL (ref 12–15.4)
HGB UR QL STRIP.AUTO: ABNORMAL
IMM GRANULOCYTES # BLD AUTO: 0.06 THOUSAND/UL (ref 0–0.2)
IMM GRANULOCYTES NFR BLD AUTO: 1 % (ref 0–2)
KETONES UR STRIP-MCNC: ABNORMAL MG/DL
LEUKOCYTE ESTERASE UR QL STRIP: NEGATIVE
LIPASE SERPL-CCNC: 163 U/L (ref 73–393)
LYMPHOCYTES # BLD AUTO: 0.82 THOUSANDS/ΜL (ref 0.6–4.47)
LYMPHOCYTES NFR BLD AUTO: 10 % (ref 14–44)
MCH RBC QN AUTO: 28.6 PG (ref 26.8–34.3)
MCHC RBC AUTO-ENTMCNC: 33.7 G/DL (ref 31.4–37.4)
MCV RBC AUTO: 85 FL (ref 82–98)
MONOCYTES # BLD AUTO: 0.54 THOUSAND/ΜL (ref 0.17–1.22)
MONOCYTES NFR BLD AUTO: 7 % (ref 4–12)
MUCOUS THREADS UR QL AUTO: ABNORMAL
NEUTROPHILS # BLD AUTO: 6.86 THOUSANDS/ΜL (ref 1.85–7.62)
NEUTS SEG NFR BLD AUTO: 81 % (ref 43–75)
NITRITE UR QL STRIP: NEGATIVE
NON-SQ EPI CELLS URNS QL MICRO: ABNORMAL /HPF
NRBC BLD AUTO-RTO: 0 /100 WBCS
PH UR STRIP.AUTO: 5.5 [PH] (ref 4.5–8)
PLATELET # BLD AUTO: 233 THOUSANDS/UL (ref 149–390)
PMV BLD AUTO: 9.9 FL (ref 8.9–12.7)
POTASSIUM SERPL-SCNC: 3.2 MMOL/L (ref 3.5–5.3)
PROT SERPL-MCNC: 7.2 G/DL (ref 6.4–8.2)
PROT UR STRIP-MCNC: ABNORMAL MG/DL
RBC # BLD AUTO: 4.47 MILLION/UL (ref 3.88–5.12)
RBC #/AREA URNS AUTO: ABNORMAL /HPF
SALMONELLA DNA SPEC QL NAA+PROBE: DETECTED
SHIGA TOXIN STX GENE SPEC NAA+PROBE: ABNORMAL
SHIGELLA DNA SPEC QL NAA+PROBE: ABNORMAL
SODIUM SERPL-SCNC: 139 MMOL/L (ref 136–145)
SP GR UR STRIP.AUTO: 1.02 (ref 1–1.03)
UROBILINOGEN UR QL STRIP.AUTO: 0.2 E.U./DL
WBC # BLD AUTO: 8.33 THOUSAND/UL (ref 4.31–10.16)
WBC #/AREA URNS AUTO: ABNORMAL /HPF

## 2021-06-08 PROCEDURE — 81001 URINALYSIS AUTO W/SCOPE: CPT

## 2021-06-08 PROCEDURE — 96375 TX/PRO/DX INJ NEW DRUG ADDON: CPT

## 2021-06-08 PROCEDURE — 85025 COMPLETE CBC W/AUTO DIFF WBC: CPT | Performed by: EMERGENCY MEDICINE

## 2021-06-08 PROCEDURE — 96361 HYDRATE IV INFUSION ADD-ON: CPT

## 2021-06-08 PROCEDURE — 99284 EMERGENCY DEPT VISIT MOD MDM: CPT

## 2021-06-08 PROCEDURE — 99285 EMERGENCY DEPT VISIT HI MDM: CPT | Performed by: EMERGENCY MEDICINE

## 2021-06-08 PROCEDURE — 36415 COLL VENOUS BLD VENIPUNCTURE: CPT | Performed by: EMERGENCY MEDICINE

## 2021-06-08 PROCEDURE — 83690 ASSAY OF LIPASE: CPT | Performed by: EMERGENCY MEDICINE

## 2021-06-08 PROCEDURE — 96374 THER/PROPH/DIAG INJ IV PUSH: CPT

## 2021-06-08 PROCEDURE — 80053 COMPREHEN METABOLIC PANEL: CPT | Performed by: EMERGENCY MEDICINE

## 2021-06-08 RX ORDER — DIPHENOXYLATE HYDROCHLORIDE AND ATROPINE SULFATE 2.5; .025 MG/1; MG/1
2 TABLET ORAL ONCE
Status: COMPLETED | OUTPATIENT
Start: 2021-06-08 | End: 2021-06-08

## 2021-06-08 RX ORDER — OXYCODONE HYDROCHLORIDE AND ACETAMINOPHEN 5; 325 MG/1; MG/1
1 TABLET ORAL EVERY 4 HOURS PRN
Qty: 15 TABLET | Refills: 0 | Status: SHIPPED | OUTPATIENT
Start: 2021-06-08 | End: 2021-06-18

## 2021-06-08 RX ORDER — ONDANSETRON 2 MG/ML
4 INJECTION INTRAMUSCULAR; INTRAVENOUS ONCE
Status: COMPLETED | OUTPATIENT
Start: 2021-06-08 | End: 2021-06-08

## 2021-06-08 RX ORDER — MORPHINE SULFATE 4 MG/ML
4 INJECTION, SOLUTION INTRAMUSCULAR; INTRAVENOUS ONCE
Status: COMPLETED | OUTPATIENT
Start: 2021-06-08 | End: 2021-06-08

## 2021-06-08 RX ORDER — METRONIDAZOLE 500 MG/1
500 TABLET ORAL EVERY 8 HOURS SCHEDULED
Qty: 21 TABLET | Refills: 0 | Status: SHIPPED | OUTPATIENT
Start: 2021-06-08 | End: 2021-06-15

## 2021-06-08 RX ORDER — CIPROFLOXACIN 500 MG/1
500 TABLET, FILM COATED ORAL 2 TIMES DAILY
Qty: 14 TABLET | Refills: 0 | Status: SHIPPED | OUTPATIENT
Start: 2021-06-08 | End: 2021-06-15

## 2021-06-08 RX ADMIN — MORPHINE SULFATE 4 MG: 4 INJECTION INTRAVENOUS at 10:36

## 2021-06-08 RX ADMIN — DIPHENOXYLATE HYDROCHLORIDE AND ATROPINE SULFATE 2 TABLET: 2.5; .025 TABLET ORAL at 09:08

## 2021-06-08 RX ADMIN — SODIUM CHLORIDE 1000 ML: 0.9 INJECTION, SOLUTION INTRAVENOUS at 09:11

## 2021-06-08 RX ADMIN — ONDANSETRON 4 MG: 2 INJECTION INTRAMUSCULAR; INTRAVENOUS at 09:09

## 2021-06-08 NOTE — ED NOTES
Patient states that all home medications should be updated in the computer       Janene De La Vega RN  06/08/21 8590

## 2021-06-08 NOTE — RESULT ENCOUNTER NOTE
Patient called back  Reviewed results with patient and answered questions  Provided instructions regarding the need to continue cipro as prescribed  Provided education and reviewed RTER instructions  Patient notes understanding

## 2021-06-08 NOTE — RESULT ENCOUNTER NOTE
LMOM x1; patient presented for continued diarrhea and was started empirically on cipro/flagyl  Will need notification of salmonella and continue cipro

## 2021-06-08 NOTE — ED PROVIDER NOTES
History  Chief Complaint   Patient presents with    Abdominal Pain     Dx w/ colitis yesterday, reports worsening of symptoms  Noticed bright red blood in stool today  82-year-old female with lower abdominal cramping, nausea/vomiting/diarrhea for the past 5 days  She was here in the ER yesterday and had a CT which showed:     1  Colonic wall thickening and minimal inflammatory change concerning for nonspecific infectious or inflammatory colitis  Interval segments of normal bowel raises suspicion for Crohn's disease  2   Probable left adnexal involuting follicle with physiologic fluid  3   Hepatic cysts, stable  Today she noticed a small amount of blood when wiping after she had an episode of diarrhea  She reports having a fever yesterday  No recent travel history or antibiotics  She tested negative for C diff yesterday  Prior to Admission Medications   Prescriptions Last Dose Informant Patient Reported? Taking?    albuterol (PROVENTIL HFA,VENTOLIN HFA) 90 mcg/act inhaler   No No   Sig: Inhale 2 puffs every 6 (six) hours as needed for wheezing or shortness of breath   amitriptyline (ELAVIL) 10 mg tablet   No No   Sig: Take 1 tablet (10 mg total) by mouth daily at bedtime   dicyclomine (BENTYL) 20 mg tablet   No No   Sig: Take 1 tablet (20 mg total) by mouth 2 (two) times a day   famotidine (PEPCID) 20 mg tablet   No No   Sig: TAKE 1 TABLET(20 MG) BY MOUTH DAILY AT BEDTIME   fluticasone (FLONASE) 50 mcg/act nasal spray   No No   Si sprays into each nostril daily   Patient taking differently: 2 sprays into each nostril as needed     hydrOXYzine HCL (ATARAX) 25 mg tablet   No No   Sig: Take 2 tablets (50 mg total) by mouth every 6 (six) hours as needed for anxiety   Patient not taking: Reported on 2021   ibuprofen (MOTRIN) 800 mg tablet   Yes No   Sig: Take 800 mg by mouth every 8 (eight) hours as needed   methocarbamol (ROBAXIN) 750 mg tablet   No No   Sig: Take 1 tablet (750 mg total) by mouth every 6 (six) hours as needed for muscle spasms   ondansetron (ZOFRAN-ODT) 4 mg disintegrating tablet   No No   Sig: Take 1 tablet (4 mg total) by mouth every 6 (six) hours as needed for nausea or vomiting      Facility-Administered Medications: None       Past Medical History:   Diagnosis Date    Acid reflux     Anxiety     Bursitis     Frequent headaches     Gastric ulcer     Gastroparesis     Liver cyst     Lumbar herniated disc     Migraine     Seasonal allergies     Torn ACL     Wears glasses        Past Surgical History:   Procedure Laterality Date    KNEE SURGERY Right     OK ESOPHAGOGASTRODUODENOSCOPY TRANSORAL DIAGNOSTIC N/A 5/1/2017    Procedure: ESOPHAGOGASTRODUODENOSCOPY (EGD); Surgeon: Odessa Banegas MD;  Location: [facility] GI [facility]; Service: Gastroenterology    OK KNEE SCOPE,AID ANT CRUCIATE REPAIR Right 5/24/2018    Procedure: ARTHROSCOPIC RECONSTRUCTION ANTERIOR CRUCIATE LIGAMENT (ACL) WITH AUTOGRAFT AND ALLOGRAFT;  Surgeon: Russel Espinosa MD;  Location: [facility] OR;  Service: Orthopedics    TUBAL LIGATION      WISDOM TOOTH EXTRACTION         Family History   Problem Relation Age of Onset    Depression Mother     Anxiety disorder Mother     Sleep disorder Mother     Hypertension Mother     Hyperlipidemia Mother     Hyperlipidemia Father     Hypertension Father     Heart disease Father     Heart disease Paternal Aunt     Heart disease Paternal Uncle     Osteoporosis Maternal Grandmother     Alzheimer's disease Paternal Grandmother     Heart disease Paternal Grandmother     Heart disease Paternal Grandfather     Stroke Paternal Grandfather      I have reviewed and agree with the history as documented      E-Cigarette/Vaping     E-Cigarette/Vaping Substances     Social History     Tobacco Use    Smoking status: Never Smoker    Smokeless tobacco: Never Used   Substance Use Topics    Alcohol use: Not Currently     Comment: rare    Drug use: No       Review of Systems   Constitutional: Negative for appetite change, fatigue and fever  HENT: Negative for rhinorrhea and sore throat  Respiratory: Negative for cough, shortness of breath and wheezing  Cardiovascular: Negative for chest pain and leg swelling  Gastrointestinal: Positive for abdominal pain, diarrhea, nausea and vomiting  Genitourinary: Negative for dysuria and flank pain  Musculoskeletal: Negative for back pain and neck pain  Skin: Negative for rash  Neurological: Negative for syncope and headaches  Psychiatric/Behavioral:        Mood normal       Physical Exam  Physical Exam  Vitals signs and nursing note reviewed  Constitutional:       Appearance: Nimesh Acosta is well-developed  HENT:      Head: Normocephalic and atraumatic  Neck:      Musculoskeletal: Normal range of motion and neck supple  Cardiovascular:      Rate and Rhythm: Normal rate and regular rhythm  Pulmonary:      Effort: Pulmonary effort is normal  No respiratory distress  Breath sounds: Normal breath sounds  Abdominal:      Palpations: Abdomen is soft  Comments: Lower abdominal tenderness, no rebound/guarding   Musculoskeletal: Normal range of motion  Skin:     General: Skin is warm and dry  Neurological:      Mental Status: Nimesh Acosta is alert and oriented to person, place, and time           Vital Signs  ED Triage Vitals [06/08/21 0808]   Temperature Pulse Respirations Blood Pressure SpO2   98 °F (36 7 °C) 100 18 138/89 99 %      Temp src Heart Rate Source Patient Position - Orthostatic VS BP Location FiO2 (%)   -- Monitor Lying Right arm --      Pain Score       9           Vitals:    06/08/21 0808 06/08/21 0859 06/08/21 1028   BP: 138/89 123/68 121/68   Pulse: 100 95 85   Patient Position - Orthostatic VS: Lying Standing Lying         Visual Acuity      ED Medications  Medications   sodium chloride 0 9 % bolus 1,000 mL (0 mL Intravenous Stopped 6/8/21 1036)   ondansetron (ZOFRAN) injection 4 mg (4 mg Intravenous Given 6/8/21 0909)   diphenoxylate-atropine (LOMOTIL) 2 5-0 025 mg per tablet 2 tablet (2 tablets Oral Given 6/8/21 0908)   morphine (PF) 4 mg/mL injection 4 mg (4 mg Intravenous Given 6/8/21 1036)       Diagnostic Studies  Results Reviewed     Procedure Component Value Units Date/Time    Urine Microscopic [[id number]]  (Abnormal) Collected: 06/08/21 0901    Lab Status: Final result Specimen: Urine, Clean Catch Updated: 06/08/21 1017     RBC, UA 0-1 /hpf      WBC, UA 1-2 /hpf      Epithelial Cells Occasional /hpf      Bacteria, UA Moderate /hpf      MUCUS THREADS Innumerable    Lipase [[id number]]  (Normal) Collected: 06/08/21 0911    Lab Status: Final result Specimen: Blood from Arm, Right Updated: 06/08/21 0935     Lipase 163 u/L     Comprehensive metabolic panel [[id number]]  (Abnormal) Collected: 06/08/21 0911    Lab Status: Final result Specimen: Blood from Arm, Right Updated: 06/08/21 0935     Sodium 139 mmol/L      Potassium 3 2 mmol/L      Chloride 104 mmol/L      CO2 26 mmol/L      ANION GAP 9 mmol/L      BUN 4 mg/dL      Creatinine 0 71 mg/dL      Glucose 97 mg/dL      Calcium 8 6 mg/dL      Corrected Calcium 9 2 mg/dL      AST 56 U/L      ALT 60 U/L      Alkaline Phosphatase 98 U/L      Total Protein 7 2 g/dL      Albumin 3 2 g/dL      Total Bilirubin 0 31 mg/dL      eGFR --    Narrative:      Notes:     1  eGFR calculation is only valid for adults 18 years and older  2  EGFR calculation cannot be performed for patients who are transgender, non-binary, or whose legal sex, sex at birth, and gender identity differ      CBC and differential [[id number]]  (Abnormal) Collected: 06/08/21 0911    Lab Status: Final result Specimen: Blood from Arm, Right Updated: 06/08/21 0918     WBC 8 33 Thousand/uL      RBC 4 47 Million/uL      Hemoglobin 12 8 g/dL      Hematocrit 38 0 %      MCV 85 fL      MCH 28 6 pg      MCHC 33 7 g/dL      RDW 13 1 %      MPV 9 9 fL      Platelets 931 Thousands/uL      nRBC 0 /100 WBCs      Neutrophils Relative 81 %      Immat GRANS % 1 %      Lymphocytes Relative 10 %      Monocytes Relative 7 %      Eosinophils Relative 0 %      Basophils Relative 1 %      Neutrophils Absolute 6 86 Thousands/µL      Immature Grans Absolute 0 06 Thousand/uL      Lymphocytes Absolute 0 82 Thousands/µL      Monocytes Absolute 0 54 Thousand/µL      Eosinophils Absolute 0 01 Thousand/µL      Basophils Absolute 0 04 Thousands/µL     Urine Macroscopic, POC [[id number]]  (Abnormal) Collected: 06/08/21 0901    Lab Status: Final result Specimen: Urine Updated: 06/08/21 0902     Color, UA Yellow     Clarity, UA Clear     pH, UA 5 5     Leukocytes, UA Negative     Nitrite, UA Negative     Protein,  (2+) mg/dl      Glucose, UA Negative mg/dl      Ketones, UA 15 (1+) mg/dl      Urobilinogen, UA 0 2 E U /dl      Bilirubin, UA Interference- unable to analyze     Blood, UA Trace     Specific [place], UA 1 020    Narrative:      CLINITEK RESULT                 No orders to display              Procedures  Procedures         ED Course                             SBIRT 22yo+      Most Recent Value   SBIRT (24 yo +)   In order to provide better care to our patients, we are screening all of our patients for alcohol and drug use  Would it be okay to ask you these screening questions? No Filed at: 06/08/2021 0810                    MDM  Number of Diagnoses or Management Options  Colitis:      Amount and/or Complexity of Data Reviewed  Clinical lab tests: ordered and reviewed  Tests in the radiology section of CPT®: ordered and reviewed    Risk of Complications, Morbidity, and/or Mortality  Presenting problems: moderate  General comments: Antibiotics was added for patient's worsening of symptoms    She is stable for outpatient follow-up with a family doctor and the GI doctor        Disposition  Final diagnoses:   Colitis     Time reflects when diagnosis was documented in both MDM as applicable and the Disposition within this note     Time User Action Codes Description Comment    6/8/2021 10:32 AM Sharon Wilkes Add [K52 9] Colitis       ED Disposition     ED Disposition Condition Date/Time Comment    Discharge Stable Tue Jun 8, 2021 10:32 AM Regis Bermeo discharge to home/self care              Follow-up Information     Follow up With Specialties Details Why Contact Info Additional Information    Hola Veliz Nurse Practitioner, Perioperative, Medical Surgical   [address] [facility]  [facility] Sharon Galindo  [address]        [facility]   [address] [id number]  [facility] [address] [facility], [address], [address] [place] [address], [id number] [phone]          Discharge Medication List as of 6/8/2021 10:35 AM      START taking these medications    Details   ciprofloxacin (CIPRO) 500 mg tablet Take 1 tablet (500 mg total) by mouth 2 (two) times a day for 7 days, Starting Tue 6/8/2021, Until Tue 6/15/2021, Normal      metroNIDAZOLE (FLAGYL) 500 mg tablet Take 1 tablet (500 mg total) by mouth every 8 (eight) hours for 7 days, Starting Tue 6/8/2021, Until Tue 6/15/2021, Normal      oxyCODONE-acetaminophen (PERCOCET) 5-325 mg per tablet Take 1 tablet by mouth every 4 (four) hours as needed for moderate pain for up to 10 daysMax Daily Amount: 6 tablets, Starting Tue 6/8/2021, Until Fri 6/18/2021, Normal         CONTINUE these medications which have NOT CHANGED    Details   albuterol (PROVENTIL HFA,VENTOLIN HFA) 90 mcg/act inhaler Inhale 2 puffs every 6 (six) hours as needed for wheezing or shortness of breath, Starting Tue 11/17/2020, Normal      amitriptyline (ELAVIL) 10 mg tablet Take 1 tablet (10 mg total) by mouth daily at bedtime, Starting Tue 5/11/2021, Normal      dicyclomine (BENTYL) 20 mg tablet Take 1 tablet (20 mg total) by mouth 2 (two) times a day, Starting Mon 6/7/2021, Print      famotidine (PEPCID) 20 mg tablet TAKE 1 TABLET(20 MG) BY MOUTH DAILY AT BEDTIME, Normal      fluticasone (FLONASE) 50 mcg/act nasal spray 2 sprays into each nostril daily, Starting Fri 8/24/2018, Normal      hydrOXYzine HCL (ATARAX) 25 mg tablet Take 2 tablets (50 mg total) by mouth every 6 (six) hours as needed for anxiety, Starting Fri 5/10/2019, Print      ibuprofen (MOTRIN) 800 mg tablet Take 800 mg by mouth every 8 (eight) hours as needed, Historical Med      methocarbamol (ROBAXIN) 750 mg tablet Take 1 tablet (750 mg total) by mouth every 6 (six) hours as needed for muscle spasms, Starting Tue 5/11/2021, Normal      ondansetron (ZOFRAN-ODT) 4 mg disintegrating tablet Take 1 tablet (4 mg total) by mouth every 6 (six) hours as needed for nausea or vomiting, Starting Mon 6/7/2021, Print           No discharge procedures on file      PDMP Review     None          ED Provider  Electronically Signed by           Garret Figueroa MD  06/08/21 2909

## 2021-06-09 LAB — CALPROTECTIN STL-MCNT: 2559 UG/G (ref 0–120)

## 2021-06-16 ENCOUNTER — OFFICE VISIT (OUTPATIENT)
Dept: FAMILY MEDICINE CLINIC | Facility: CLINIC | Age: 41
End: 2021-06-16

## 2021-06-16 VITALS
SYSTOLIC BLOOD PRESSURE: 112 MMHG | TEMPERATURE: 97.3 F | RESPIRATION RATE: 16 BRPM | OXYGEN SATURATION: 99 % | DIASTOLIC BLOOD PRESSURE: 68 MMHG | WEIGHT: 144 LBS | HEART RATE: 96 BPM | HEIGHT: 65 IN | BODY MASS INDEX: 23.99 KG/M2

## 2021-06-16 DIAGNOSIS — A02.9 SALMONELLA INFECTION: Primary | ICD-10-CM

## 2021-06-16 PROCEDURE — 1036F TOBACCO NON-USER: CPT | Performed by: NURSE PRACTITIONER

## 2021-06-16 PROCEDURE — 99214 OFFICE O/P EST MOD 30 MIN: CPT | Performed by: NURSE PRACTITIONER

## 2021-06-16 PROCEDURE — 3008F BODY MASS INDEX DOCD: CPT | Performed by: NURSE PRACTITIONER

## 2021-06-16 RX ORDER — SULFAMETHOXAZOLE AND TRIMETHOPRIM 800; 160 MG/1; MG/1
1 TABLET ORAL EVERY 12 HOURS SCHEDULED
Qty: 14 TABLET | Refills: 0 | Status: SHIPPED | OUTPATIENT
Start: 2021-06-16 | End: 2021-06-23

## 2021-06-16 NOTE — PATIENT INSTRUCTIONS
Antibiotic Resistant Bacteria   WHAT YOU NEED TO KNOW:   How do bacteria become resistant? · Antibiotic medicines kill bacteria (germs) that cause infections in the body  Antibiotics do not work against bacteria that have become resistant  · When antibiotics are not used correctly, they may not kill all of the bacteria  The bacteria that an antibiotic does not kill can grow stronger  The antibiotic may not be able to kill the new germs  Germs can become resistant when the wrong type, wrong dose, or wrong treatment length of antibiotic is used  Germs can also become resistant to more than one type of antibiotic  This has made it harder to cure infections that were once easily treated  Who has an increased risk for infection with antibiotic-resistant bacteria? · People who have been in the hospital for a long time or who live in long-term care facilities    · Children who have used antibiotics frequently    · Older adults who have medical conditions that require other medicines    · People who have tubes or lines in their body, such as IVs or catheters     · People who have recently been treated with antibiotics     · People who have a weak immune system    What are the risks of antibiotic-resistant bacteria? People with infections caused by antibiotic-resistant bacteria stay sick longer  Treatment requires more healthcare provider visits or longer hospital stays  Stronger antibiotic medicine may need to be given and this can cause worse side effects  Any delay in treatment can cause the infection to become worse  Even if the stronger antibiotics kill the bacteria, the infection can become life-threatening  How can I help prevent the spread of antibiotic-resistant bacteria? · Understand that antibiotics cannot cure many common illnesses  Your healthcare provider may not order antibiotics to treat you or your child  Antibiotics are not usually needed to treat many colds and ear infections       · Always take antibiotics exactly as ordered by your healthcare provider  Ask when you should start to feel better  Do not stop taking your medicine unless directed by your healthcare provider  Follow up with your healthcare provider as directed  · Never save antibiotics or take leftover antibiotics that were given to you for another illness  · Wash your hands often with soap and hot water  Carry germ-killing gel with you  You can use the gel to clean your hands when you have no soap and water  Clean surfaces well  Clean counters, door knobs, bath tubs, and toilet seats  Ask your healthcare provider what kind of  to use  Where can I get more information? · Centers for Disease Control and Prevention ([facility])  [address] , [address]  Phone: [phone]  Web Address: [employer]    When should I contact my healthcare provider? · You still have a fever, even after 3 days of antibiotic treatment  CARE AGREEMENT:   You have the right to help plan your care  Learn about your health condition and how it may be treated  Discuss treatment options with your healthcare providers to decide what care you want to receive  You always have the right to refuse treatment  The above information is an  only  It is not intended as medical advice for individual conditions or treatments  Talk to your doctor, nurse or pharmacist before following any medical regimen to see if it is safe and effective for you  © Copyright [address] [place] [address] Drive Information is for End User's use only and may not be sold, redistributed or otherwise used for commercial purposes   All illustrations and images included in CareNotes® are the copyrighted property of [employer]  or [address] [place] [address]

## 2021-06-16 NOTE — PROGRESS NOTES
Assessment/Plan:    Problem List Items Addressed This Visit        Other    Salmonella infection - Primary     Patient endorses significant improvement in abdominal pain, diarrhea and appetite  She is tolerating food and fluids  Due to increasing rates of salmonella resistance and continued symptoms of loose stools, will prescribe additional antibiotic for broader coverage  Explained to patient that use of diabetics themselves can actually cause indigestion and loose stools  Advised patient to obtain over-the-counter probiotics or yogurt to restore natural microbiome, advised good hydration  Advised patient follow up as needed if symptoms do not resolve or worsen  Relevant Medications    sulfamethoxazole-trimethoprim (BACTRIM DS) 800-160 mg per tablet            Return if symptoms worsen or fail to improve  A chart review was performed and previous primary care visit notes were reviewed  All applicable imaging studies were reviewed and images were reviewed personally  All applicable laboratory studies were reviewed personally  Care everywhere review was performed if  available and all pertinent notes were reviewed  Subjective:     HPI: Elsa Webster is a 47 y o  adult who  has a past medical history of Acid reflux, Anxiety, Bursitis, Frequent headaches, Gastric ulcer, Gastroparesis, Liver cyst, Lumbar herniated disc, Migraine, Seasonal allergies, Torn ACL, and Wears glasses  Elsa Webster also has no past medical history of ADHD (attention deficit hyperactivity disorder), Asthma, Bleeding disorder ([address] ), Cancer ([address] ), Chronic kidney disease, Depression, Diabetes insipidus ([address] ), Disease of thyroid gland, Fractures, Head injury, Heart disease, History of transfusion, Hypertension, Neurological disorder, Osteoarthritis, Rheumatic disease, Rheumatoid arthritis ([address] ), Seizures ([address] ), Skin disorder, Stomach disorder, Stroke [facility]), or Vascular disorder   who presented to the office today for follow-up from ED encounter which revealed salmonella infection  Patient has not been able to identify the causative food  She does state that after a week of severe abdominal pain and multiple loose bowel movements her symptoms have mostly resolved  She states she still feels like Eula Hodgkins is a little bit of infection left in me  Her pain and multiple bowel movements a day have resolved, however she states her bowel movements are still loose  She states she is tolerating fluids and her appetite is slowly returning to normal   She did finish full 7 day course of both antibiotics prescribed by ED  She has no other health concerns at this time      The following portions of the patient's history were reviewed and updated as appropriate: allergies, current medications, past family history, past medical history, past social history, past surgical history, and problem list     Current Outpatient Medications on File Prior to Visit   Medication Sig Dispense Refill    albuterol (PROVENTIL HFA,VENTOLIN HFA) 90 mcg/act inhaler Inhale 2 puffs every 6 (six) hours as needed for wheezing or shortness of breath 1 Inhaler 0    amitriptyline (ELAVIL) 10 mg tablet Take 1 tablet (10 mg total) by mouth daily at bedtime 30 tablet 5    dicyclomine (BENTYL) 20 mg tablet Take 1 tablet (20 mg total) by mouth 2 (two) times a day 20 tablet 0    famotidine (PEPCID) 20 mg tablet TAKE 1 TABLET(20 MG) BY MOUTH DAILY AT BEDTIME 90 tablet 0    fluticasone (FLONASE) 50 mcg/act nasal spray 2 sprays into each nostril daily (Patient taking differently: 2 sprays into each nostril as needed  ) 16 g 0    ibuprofen (MOTRIN) 800 mg tablet Take 800 mg by mouth every 8 (eight) hours as needed      methocarbamol (ROBAXIN) 750 mg tablet Take 1 tablet (750 mg total) by mouth every 6 (six) hours as needed for muscle spasms 90 tablet 0    ondansetron (ZOFRAN-ODT) 4 mg disintegrating tablet Take 1 tablet (4 mg total) by mouth every 6 (six) hours as needed for nausea or vomiting 20 tablet 0    oxyCODONE-acetaminophen (PERCOCET) 5-325 mg per tablet Take 1 tablet by mouth every 4 (four) hours as needed for moderate pain for up to 10 daysMax Daily Amount: 6 tablets 15 tablet 0    [] ciprofloxacin (CIPRO) 500 mg tablet Take 1 tablet (500 mg total) by mouth 2 (two) times a day for 7 days 14 tablet 0    hydrOXYzine HCL (ATARAX) 25 mg tablet Take 2 tablets (50 mg total) by mouth every 6 (six) hours as needed for anxiety (Patient not taking: Reported on 2021) 25 tablet 0    [] metroNIDAZOLE (FLAGYL) 500 mg tablet Take 1 tablet (500 mg total) by mouth every 8 (eight) hours for 7 days 21 tablet 0     No current facility-administered medications on file prior to visit  Review of Systems   Constitutional: Negative for fever and unexpected weight change  HENT: Negative for congestion, ear pain, rhinorrhea and sore throat  Eyes: Negative for visual disturbance  Respiratory: Negative for cough and shortness of breath  Cardiovascular: Negative for chest pain  Gastrointestinal: Positive for diarrhea  Negative for abdominal pain, blood in stool, constipation, nausea and vomiting  Endocrine: Negative  Genitourinary: Negative for dysuria and hematuria  Musculoskeletal: Negative for arthralgias and back pain  Skin: Negative for rash  Allergic/Immunologic: Negative  Neurological: Negative for dizziness, syncope, light-headedness and headaches  Hematological: Negative  Psychiatric/Behavioral: Negative  All other systems reviewed and are negative  Objective:    /68 (BP Location: Left arm, Patient Position: Sitting, Cuff Size: Standard)   Pulse 96   Temp (!) 97 3 °F (36 3 °C) (Temporal)   Resp 16   Ht 5' 5" (1 651 m)   Wt 65 3 kg (144 lb)   LMP 2021 (Exact Date)   SpO2 99%   Breastfeeding No   BMI 23 96 kg/m²     Physical Exam  Constitutional:       Appearance: Normal appearance   Rosa Herring Daniel is normal weight  HENT:      Head: Normocephalic  Right Ear: Tympanic membrane, ear canal and external ear normal  There is no impacted cerumen  Left Ear: Tympanic membrane, ear canal and external ear normal  There is no impacted cerumen  Nose: Nose normal       Mouth/Throat:      Mouth: Mucous membranes are moist    Eyes:      Extraocular Movements: Extraocular movements intact  Pupils: Pupils are equal, round, and reactive to light  Cardiovascular:      Rate and Rhythm: Normal rate and regular rhythm  Pulses: Normal pulses  Heart sounds: Normal heart sounds  Pulmonary:      Effort: Pulmonary effort is normal       Breath sounds: Normal breath sounds  Abdominal:      General: Bowel sounds are normal       Palpations: Abdomen is soft  Musculoskeletal:         General: No tenderness or signs of injury  Normal range of motion  Cervical back: Normal range of motion  Right lower leg: No edema  Left lower leg: No edema  Skin:     General: Skin is warm and dry  Capillary Refill: Capillary refill takes less than 2 seconds  Findings: No bruising, lesion or rash  Neurological:      General: No focal deficit present  Mental Status: Eligio Ashton is alert and oriented to person, place, and time  Psychiatric:         Mood and Affect: Mood normal          Behavior: Behavior normal          Thought Content: Thought content normal          AAMIR Lees  06/16/21  4:55 PM    Patient Instructions   Antibiotic Resistant Bacteria   WHAT YOU NEED TO KNOW:   How do bacteria become resistant? · Antibiotic medicines kill bacteria (germs) that cause infections in the body  Antibiotics do not work against bacteria that have become resistant  · When antibiotics are not used correctly, they may not kill all of the bacteria  The bacteria that an antibiotic does not kill can grow stronger  The antibiotic may not be able to kill the new germs   Germs can become resistant when the wrong type, wrong dose, or wrong treatment length of antibiotic is used  Germs can also become resistant to more than one type of antibiotic  This has made it harder to cure infections that were once easily treated  Who has an increased risk for infection with antibiotic-resistant bacteria? · People who have been in the hospital for a long time or who live in long-term care facilities    · Children who have used antibiotics frequently    · Older adults who have medical conditions that require other medicines    · People who have tubes or lines in their body, such as IVs or catheters     · People who have recently been treated with antibiotics     · People who have a weak immune system    What are the risks of antibiotic-resistant bacteria? People with infections caused by antibiotic-resistant bacteria stay sick longer  Treatment requires more healthcare provider visits or longer hospital stays  Stronger antibiotic medicine may need to be given and this can cause worse side effects  Any delay in treatment can cause the infection to become worse  Even if the stronger antibiotics kill the bacteria, the infection can become life-threatening  How can I help prevent the spread of antibiotic-resistant bacteria? · Understand that antibiotics cannot cure many common illnesses  Your healthcare provider may not order antibiotics to treat you or your child  Antibiotics are not usually needed to treat many colds and ear infections  · Always take antibiotics exactly as ordered by your healthcare provider  Ask when you should start to feel better  Do not stop taking your medicine unless directed by your healthcare provider  Follow up with your healthcare provider as directed  · Never save antibiotics or take leftover antibiotics that were given to you for another illness  · Wash your hands often with soap and hot water  Carry germ-killing gel with you   You can use the gel to clean your hands when you have no soap and water  Clean surfaces well  Clean counters, door knobs, bath tubs, and toilet seats  Ask your healthcare provider what kind of  to use  Where can I get more information? · Centers for Disease Control and Prevention (CDC)  [address] , [address]  Phone: [phone]- [phone]  Web Address: [employer]    When should I contact my healthcare provider? · You still have a fever, even after 3 days of antibiotic treatment  CARE AGREEMENT:   You have the right to help plan your care  Learn about your health condition and how it may be treated  Discuss treatment options with your healthcare providers to decide what care you want to receive  You always have the right to refuse treatment  The above information is an  only  It is not intended as medical advice for individual conditions or treatments  Talk to your doctor, nurse or pharmacist before following any medical regimen to see if it is safe and effective for you  © Copyright [address] Information is for End User's use only and may not be sold, redistributed or otherwise used for commercial purposes   All illustrations and images included in CareNotes® are the copyrighted property of [employer]  or [address] [place] [address]

## 2021-06-16 NOTE — ASSESSMENT & PLAN NOTE
Patient endorses significant improvement in abdominal pain, diarrhea and appetite  She is tolerating food and fluids  Due to increasing rates of salmonella resistance and continued symptoms of loose stools, will prescribe additional antibiotic for broader coverage  Explained to patient that use of diabetics themselves can actually cause indigestion and loose stools  Advised patient to obtain over-the-counter probiotics or yogurt to restore natural microbiome, advised good hydration  Advised patient follow up as needed if symptoms do not resolve or worsen

## 2021-06-17 LAB — CULTURE ID FROM ENTERIC PCR: ABNORMAL

## 2021-06-22 ENCOUNTER — IMMUNIZATIONS (OUTPATIENT)
Dept: FAMILY MEDICINE CLINIC | Facility: HOSPITAL | Age: 41
End: 2021-06-22

## 2021-06-22 DIAGNOSIS — Z23 ENCOUNTER FOR IMMUNIZATION: Primary | ICD-10-CM

## 2021-06-22 PROCEDURE — 0012A SARS-COV-2 / COVID-19 MRNA VACCINE (MODERNA) 100 MCG: CPT

## 2021-06-22 PROCEDURE — 91301 SARS-COV-2 / COVID-19 MRNA VACCINE (MODERNA) 100 MCG: CPT

## 2021-07-19 ENCOUNTER — APPOINTMENT (OUTPATIENT)
Dept: LAB | Facility: HOSPITAL | Age: 41
End: 2021-07-19
Payer: COMMERCIAL

## 2021-07-19 DIAGNOSIS — Z20.2 VENEREAL DISEASE CONTACT: ICD-10-CM

## 2021-07-19 LAB
CHOLEST SERPL-MCNC: 220 MG/DL (ref 50–200)
HCV AB SER QL: NORMAL
HDLC SERPL-MCNC: 50 MG/DL
LDLC SERPL CALC-MCNC: 154 MG/DL (ref 0–100)
NONHDLC SERPL-MCNC: 170 MG/DL
RPR SER QL: NORMAL
TRIGL SERPL-MCNC: 80 MG/DL

## 2021-07-19 PROCEDURE — 86592 SYPHILIS TEST NON-TREP QUAL: CPT

## 2021-07-19 PROCEDURE — 80061 LIPID PANEL: CPT

## 2021-07-19 PROCEDURE — 36415 COLL VENOUS BLD VENIPUNCTURE: CPT

## 2021-07-19 PROCEDURE — 86803 HEPATITIS C AB TEST: CPT

## 2021-08-05 ENCOUNTER — OFFICE VISIT (OUTPATIENT)
Dept: GASTROENTEROLOGY | Facility: MEDICAL CENTER | Age: 41
End: 2021-08-05
Payer: COMMERCIAL

## 2021-08-05 ENCOUNTER — APPOINTMENT (OUTPATIENT)
Dept: LAB | Facility: MEDICAL CENTER | Age: 41
End: 2021-08-05
Payer: COMMERCIAL

## 2021-08-05 ENCOUNTER — APPOINTMENT (OUTPATIENT)
Dept: LAB | Facility: HOSPITAL | Age: 41
End: 2021-08-05
Attending: INTERNAL MEDICINE
Payer: COMMERCIAL

## 2021-08-05 VITALS
SYSTOLIC BLOOD PRESSURE: 128 MMHG | HEART RATE: 85 BPM | BODY MASS INDEX: 23.89 KG/M2 | DIASTOLIC BLOOD PRESSURE: 70 MMHG | TEMPERATURE: 97.9 F | WEIGHT: 143.56 LBS

## 2021-08-05 DIAGNOSIS — K21.9 CHRONIC GERD: ICD-10-CM

## 2021-08-05 DIAGNOSIS — R19.7 DIARRHEA, UNSPECIFIED TYPE: Primary | ICD-10-CM

## 2021-08-05 DIAGNOSIS — Z00.01 ENCOUNTER FOR GENERAL ADULT MEDICAL EXAMINATION WITH ABNORMAL FINDINGS: ICD-10-CM

## 2021-08-05 DIAGNOSIS — R19.7 DIARRHEA, UNSPECIFIED TYPE: ICD-10-CM

## 2021-08-05 LAB
25(OH)D3 SERPL-MCNC: 27.7 NG/ML (ref 30–100)
ANION GAP SERPL CALCULATED.3IONS-SCNC: 3 MMOL/L (ref 4–13)
BASOPHILS # BLD AUTO: 0.04 THOUSANDS/ΜL (ref 0–0.1)
BASOPHILS NFR BLD AUTO: 1 % (ref 0–1)
BUN SERPL-MCNC: 6 MG/DL (ref 5–25)
C DIFF TOX B TCDB STL QL NAA+PROBE: NEGATIVE
CALCIUM SERPL-MCNC: 9.2 MG/DL (ref 8.3–10.1)
CHLORIDE SERPL-SCNC: 107 MMOL/L (ref 100–108)
CHOLEST SERPL-MCNC: 193 MG/DL (ref 50–200)
CO2 SERPL-SCNC: 27 MMOL/L (ref 21–32)
CREAT SERPL-MCNC: 0.6 MG/DL (ref 0.6–1.3)
CRP SERPL QL: 7 MG/L
EOSINOPHIL # BLD AUTO: 0.06 THOUSAND/ΜL (ref 0–0.61)
EOSINOPHIL NFR BLD AUTO: 1 % (ref 0–6)
ERYTHROCYTE [DISTWIDTH] IN BLOOD BY AUTOMATED COUNT: 13.9 % (ref 11.6–15.1)
GLUCOSE P FAST SERPL-MCNC: 83 MG/DL (ref 65–99)
HCT VFR BLD AUTO: 40.4 % (ref 36.5–46.1)
HDLC SERPL-MCNC: 50 MG/DL
HGB BLD-MCNC: 13.1 G/DL (ref 12–15.4)
IMM GRANULOCYTES # BLD AUTO: 0.02 THOUSAND/UL (ref 0–0.2)
IMM GRANULOCYTES NFR BLD AUTO: 0 % (ref 0–2)
LDLC SERPL CALC-MCNC: 116 MG/DL (ref 0–100)
LYMPHOCYTES # BLD AUTO: 1.96 THOUSANDS/ΜL (ref 0.6–4.47)
LYMPHOCYTES NFR BLD AUTO: 28 % (ref 14–44)
MCH RBC QN AUTO: 28.2 PG (ref 26.8–34.3)
MCHC RBC AUTO-ENTMCNC: 32.4 G/DL (ref 31.4–37.4)
MCV RBC AUTO: 87 FL (ref 82–98)
MONOCYTES # BLD AUTO: 0.32 THOUSAND/ΜL (ref 0.17–1.22)
MONOCYTES NFR BLD AUTO: 5 % (ref 4–12)
NEUTROPHILS # BLD AUTO: 4.5 THOUSANDS/ΜL (ref 1.85–7.62)
NEUTS SEG NFR BLD AUTO: 65 % (ref 43–75)
NONHDLC SERPL-MCNC: 143 MG/DL
NRBC BLD AUTO-RTO: 0 /100 WBCS
PLATELET # BLD AUTO: 332 THOUSANDS/UL (ref 149–390)
PMV BLD AUTO: 9.8 FL (ref 8.9–12.7)
POTASSIUM SERPL-SCNC: 3.8 MMOL/L (ref 3.5–5.3)
RBC # BLD AUTO: 4.64 MILLION/UL (ref 3.88–5.12)
SODIUM SERPL-SCNC: 137 MMOL/L (ref 136–145)
TRIGL SERPL-MCNC: 136 MG/DL
TSH SERPL DL<=0.05 MIU/L-ACNC: 1.42 UIU/ML (ref 0.36–3.74)
WBC # BLD AUTO: 6.9 THOUSAND/UL (ref 4.31–10.16)

## 2021-08-05 PROCEDURE — 83993 ASSAY FOR CALPROTECTIN FECAL: CPT

## 2021-08-05 PROCEDURE — 99204 OFFICE O/P NEW MOD 45 MIN: CPT | Performed by: INTERNAL MEDICINE

## 2021-08-05 PROCEDURE — 87798 DETECT AGENT NOS DNA AMP: CPT

## 2021-08-05 PROCEDURE — 82306 VITAMIN D 25 HYDROXY: CPT

## 2021-08-05 PROCEDURE — 85025 COMPLETE CBC W/AUTO DIFF WBC: CPT

## 2021-08-05 PROCEDURE — 84443 ASSAY THYROID STIM HORMONE: CPT

## 2021-08-05 PROCEDURE — 1036F TOBACCO NON-USER: CPT | Performed by: INTERNAL MEDICINE

## 2021-08-05 PROCEDURE — 36415 COLL VENOUS BLD VENIPUNCTURE: CPT

## 2021-08-05 PROCEDURE — 0097U HB GI PATHOGEN 22 TARGETS (AKA CPT0097U): CPT

## 2021-08-05 PROCEDURE — 87505 NFCT AGENT DETECTION GI: CPT

## 2021-08-05 PROCEDURE — 80048 BASIC METABOLIC PNL TOTAL CA: CPT

## 2021-08-05 PROCEDURE — 80061 LIPID PANEL: CPT

## 2021-08-05 PROCEDURE — 86140 C-REACTIVE PROTEIN: CPT

## 2021-08-05 NOTE — PROGRESS NOTES
[facility]  Alie Crawley 47 y o  adult MRN: [id number]  Encounter: [id number]          ASSESSMENT AND PLAN:    Alie Crawley is a 47 y o  adult who presents with complaint of recent episode of pain, diarrhea, nausea in the setting of 3 concurrent infections (salmonella, norovirus and C diff)  Suspect she had norovirus and salmonella, and then caught c diff from antibiotics She does not recall what antibiotics she was on  CT from June with patchy colitis  calprotectin 2559  + Salmonella, + norovirus, + c diff  CBC normal and CMP with mild elevated AST but otherwise normal  Overall, she now feel better and is doing well  1  Diarrhea, unspecified type    2  Chronic GERD        Orders Placed This Encounter   Procedures    Calprotectin,Fecal    Clostridium difficile toxin by PCR with EIA    Stool Enteric Bacterial Panel by PCR    Norovirus, RT-PCR    C-reactive protein    MISCELLANEOUS LAB TEST     Repeat inflammatory markers (calprotectin and CRP)  Repeat stool tests for infection to try and document true resolution (and since are still more frequent than the baseline)  Depending on the above may recommend colonoscopy  Continue pepcid for gastritis  Avoid alcohol  ______________________________________________________________________    Referred by Prisca Faulkner for colitis  HPI:    Alie Crawley is a 47 y o  adult who presents with complaint of abdominal pain, diarrhea  She was having abdominal pain, nausea, vomiting, diarrhea and fever beginning in June  She thought it was a bug  Waited 5 days and then went to the ED  Thought it was gastritis or a medication  She had salmonella and colitis  She was given abx  She was later to found to be C diff positive as well  She also had norovirusShe has been on antibiotics for 1 month  Currently she feels better but she continues to have small stomach spasms in the epigastric region   + nausea every other day without vomiting  The stools are now better and semi-normal  She is having 3 BMs per day  She was previously constipated  No more fevers  No heartburn, dysphagia, odynophagia  + weight loss (10+ lbs since this started)    No prior colonoscopy  Prior EGD in 2017 with gastritis  REVIEW OF SYSTEMS:  10 point ROS reviewed and negative, except as above      Historical Information   Past Medical History:   Diagnosis Date    Acid reflux     Anxiety     Bursitis     Frequent headaches     Gastric ulcer     Gastroparesis     Liver cyst     Lumbar herniated disc     Migraine     Seasonal allergies     Torn ACL     Wears glasses      Past Surgical History:   Procedure Laterality Date    KNEE SURGERY Right     VT ESOPHAGOGASTRODUODENOSCOPY TRANSORAL DIAGNOSTIC N/A 5/1/2017    Procedure: ESOPHAGOGASTRODUODENOSCOPY (EGD); Surgeon: Errol Holder MD;  Location: [facility] GI LAB;   Service: Gastroenterology    VT KNEE SCOPE,AID ANT CRUCIATE REPAIR Right 5/24/2018    Procedure: ARTHROSCOPIC RECONSTRUCTION ANTERIOR CRUCIATE LIGAMENT (ACL) WITH AUTOGRAFT AND ALLOGRAFT;  Surgeon: Rocio Caban MD;  Location: [facility] OR;  Service: Orthopedics    TUBAL LIGATION      WISDOM TOOTH EXTRACTION       Social History   Social History     Substance and Sexual Activity   Alcohol Use Not Currently    Comment: rare     Social History     Substance and Sexual Activity   Drug Use No     Social History     Tobacco Use   Smoking Status Never Smoker   Smokeless Tobacco Never Used     Family History   Problem Relation Age of Onset    Depression Mother     Anxiety disorder Mother     Sleep disorder Mother     Hypertension Mother     Hyperlipidemia Mother     Hyperlipidemia Father     Hypertension Father     Heart disease Father     Heart disease Paternal Aunt     Heart disease Paternal Uncle     Osteoporosis Maternal Grandmother     Alzheimer's disease Paternal Grandmother     Heart disease Paternal Grandmother  Heart disease Paternal Grandfather     Stroke Paternal Grandfather        Meds/Allergies       Current Outpatient Medications:     albuterol (PROVENTIL HFA,VENTOLIN HFA) 90 mcg/act inhaler    amitriptyline (ELAVIL) 10 mg tablet    dicyclomine (BENTYL) 20 mg tablet    famotidine (PEPCID) 20 mg tablet    fluticasone (FLONASE) 50 mcg/act nasal spray    ibuprofen (MOTRIN) 800 mg tablet    methocarbamol (ROBAXIN) 750 mg tablet    ondansetron (ZOFRAN-ODT) 4 mg disintegrating tablet    hydrOXYzine HCL (ATARAX) 25 mg tablet    Allergies   Allergen Reactions    Asa [Aspirin] Shortness Of Breath            Latex Hives    Meloxicam Other (See Comments)     bruising    Penicillin V Hives and Rash    Penicillins Hives and Rash           Objective     Blood pressure 128/70, pulse 85, temperature 97 9 °F (36 6 °C), weight 65 1 kg (143 lb 9 oz), not currently breastfeeding  Body mass index is 23 89 kg/m²  PHYSICAL EXAMINATION:    General Appearance:   Alert, cooperative, no distress   HEENT:  Normocephalic, atraumatic, anicteric  Neck supple, symmetrical, trachea midline  Lungs:   Equal chest rise and unlabored breathing, normal effort, no coughing  Cardiovascular:   No visualized JVD  Abdomen:   No abdominal distension  Skin:   No jaundice, rashes, or lesions  Musculoskeletal:   Normal range of motion visualized  Psych:  Normal affect and normal insight  Neuro:  Alert and appropriate  Lab Results:   No visits with results within 1 Day(s) from this visit     Latest known visit with results is:   Appointment on 07/19/2021   Component Date Value    Cholesterol 07/19/2021 220*    Triglycerides 07/19/2021 80     HDL, Direct 07/19/2021 50     LDL Calculated 07/19/2021 154*    Non-HDL-Chol (CHOL-HDL) 07/19/2021 170     Hepatitis C Ab 07/19/2021 Non-reactive     RPR 07/19/2021 Non-Reactive        Lab Results   Component Value Date    WBC 8 33 06/08/2021    HGB 12 8 06/08/2021    HCT 38 0 06/08/2021    MCV 85 06/08/2021     06/08/2021       Lab Results   Component Value Date     06/23/2015    SODIUM 139 06/08/2021    K 3 2 (L) 06/08/2021     06/08/2021    CO2 26 06/08/2021    ANIONGAP 8 06/23/2015    AGAP 9 06/08/2021    BUN 4 (L) 06/08/2021    CREATININE 0 71 06/08/2021    GLUC 97 06/08/2021    CALCIUM 8 6 06/08/2021    AST 56 (H) 06/08/2021    ALT 60 06/08/2021    ALKPHOS 98 06/08/2021    PROT 7 7 06/23/2015    TP 7 2 06/08/2021    BILITOT 0 3 06/23/2015    TBILI 0 31 06/08/2021    EGFR 93 06/19/2020       No results found for: CRP    Lab Results   Component Value Date    GGT3XYLFNELL 0 957 06/19/2020       No results found for: IRON, TIBC, FERRITIN    Radiology Results:   No results found

## 2021-08-06 DIAGNOSIS — K21.9 CHRONIC GERD: ICD-10-CM

## 2021-08-06 LAB
CAMPYLOBACTER DNA SPEC NAA+PROBE: NORMAL
SALMONELLA DNA SPEC QL NAA+PROBE: NORMAL
SHIGA TOXIN STX GENE SPEC NAA+PROBE: NORMAL
SHIGELLA DNA SPEC QL NAA+PROBE: NORMAL

## 2021-08-06 RX ORDER — FAMOTIDINE 20 MG/1
TABLET, FILM COATED ORAL
Qty: 90 TABLET | Refills: 0 | Status: SHIPPED | OUTPATIENT
Start: 2021-08-06 | End: 2021-11-01

## 2021-08-09 LAB — CALPROTECTIN STL-MCNT: 117 UG/G (ref 0–120)

## 2021-08-13 ENCOUNTER — OFFICE VISIT (OUTPATIENT)
Dept: FAMILY MEDICINE CLINIC | Facility: CLINIC | Age: 41
End: 2021-08-13

## 2021-08-13 VITALS
DIASTOLIC BLOOD PRESSURE: 68 MMHG | OXYGEN SATURATION: 99 % | HEIGHT: 65 IN | SYSTOLIC BLOOD PRESSURE: 114 MMHG | WEIGHT: 143 LBS | HEART RATE: 85 BPM | BODY MASS INDEX: 23.82 KG/M2 | RESPIRATION RATE: 20 BRPM | TEMPERATURE: 96.8 F

## 2021-08-13 DIAGNOSIS — G43.109 CHRONIC MIGRAINE WITH AURA: ICD-10-CM

## 2021-08-13 DIAGNOSIS — M25.521 RIGHT ELBOW PAIN: Primary | ICD-10-CM

## 2021-08-13 DIAGNOSIS — A02.9 SALMONELLA INFECTION: ICD-10-CM

## 2021-08-13 PROCEDURE — 99214 OFFICE O/P EST MOD 30 MIN: CPT | Performed by: NURSE PRACTITIONER

## 2021-08-13 PROCEDURE — 3008F BODY MASS INDEX DOCD: CPT | Performed by: INTERNAL MEDICINE

## 2021-08-13 RX ORDER — DICYCLOMINE HYDROCHLORIDE 10 MG/1
CAPSULE ORAL
COMMUNITY
Start: 2021-07-13 | End: 2021-11-15

## 2021-08-13 RX ORDER — AMITRIPTYLINE HYDROCHLORIDE 10 MG/1
10 TABLET, FILM COATED ORAL
Qty: 30 TABLET | Refills: 5 | Status: SHIPPED | OUTPATIENT
Start: 2021-08-13 | End: 2021-11-15 | Stop reason: SDUPTHER

## 2021-08-13 RX ORDER — VANCOMYCIN HYDROCHLORIDE 125 MG/1
CAPSULE ORAL
COMMUNITY
Start: 2021-07-13 | End: 2021-11-15

## 2021-08-13 RX ORDER — METRONIDAZOLE 7.5 MG/G
GEL VAGINAL
COMMUNITY
Start: 2021-07-30

## 2021-08-13 RX ORDER — CIPROFLOXACIN 500 MG/1
TABLET, FILM COATED ORAL
COMMUNITY
Start: 2021-07-13 | End: 2021-11-15

## 2021-08-13 NOTE — PROGRESS NOTES
Assessment/Plan:    Problem List Items Addressed This Visit        Cardiovascular and Mediastinum    Chronic migraine with aura    Relevant Medications    amitriptyline (ELAVIL) 10 mg tablet       Other    Salmonella infection     · Patient endorses significant improvement in abdominal pain, diarrhea and appetite  She is tolerating food and fluids  · Add'l ABX were prescribed at last encounter due to increasing rates of salmonella resistance and continued symptoms of loose stools  · Explained to patient that use of diabetics themselves can actually cause indigestion and loose stools  · She presented to ED with repeat infection of salmonella and newly found C-Diff, norovirus infections  · Advised patient to obtain over-the-counter probiotics or yogurt to restore natural microbiome, advised good hydration  · Long conversation with patient trying to investigate possible sources of this repeat infection  · Reminded patient to fully cook meat, wash surfaces thoroughly that had raw meat on them  Wash fresh vegetables  · F/u prn for repeat s/s of infection  Relevant Medications    ciprofloxacin (CIPRO) 500 mg tablet      Other Visit Diagnoses     Right elbow pain    -  Primary    Relevant Medications    Diclofenac Sodium (VOLTAREN) 1 %            Return in about 3 months (around 11/13/2021) for Annual physical     A chart review was performed and previous primary care visit notes were reviewed  All applicable imaging studies were reviewed and images were reviewed personally  All applicable laboratory studies were reviewed personally  Care everywhere review was performed if  available and all pertinent notes were reviewed  Subjective:     HPI: Milena Meeks is a 47 y o  adult who  has a past medical history of Acid reflux, Anxiety, Bursitis, Frequent headaches, Gastric ulcer, Gastroparesis, Liver cyst, Lumbar herniated disc, Migraine, Seasonal allergies, Torn ACL, and Wears glasses   Milena Meeks also has no past medical history of ADHD (attention deficit hyperactivity disorder), Asthma, Bleeding disorder ([facility] [address] ), Cancer ([facility] ), Chronic kidney disease, Depression, Diabetes insipidus ([facility] ), Disease of thyroid gland, Fractures, Head injury, Heart disease, History of transfusion, Hypertension, Neurological disorder, Osteoarthritis, Rheumatic disease, Rheumatoid arthritis ([facility] ), Seizures ([facility] ), Skin disorder, Stomach disorder, Stroke [facility]), or Vascular disorder  who presented to the office today for ED follow-up for Salmonella, norovirus, C diff infection  This is the 2nd time she presented to the ED with a GI infection  Back in May she was found to have Salmonella and treated, at follow-up with me I extended her antibiotic coverage due to continued symptoms as well as known increasing salmonella resistance to antibiotics  She had a period of time, about 1 month, with no symptoms, in July she presented to the ED again where she was now found to have the 3 above mentioned infections  We had a long conversation trying to identify how or from what source she originally and repeatedly contracted these infections, which are typically fecal oral route or from [address] [place] [address]  We looked at the FDA recall list from the months of April and June before her to infections  No one else in her family experience these infections either  She works in 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  She has no other concerns at this time, she feels well and endorses regular bowel regimen, no fever, no nausea, no vomiting      The following portions of the patient's history were reviewed and updated as appropriate: allergies, current medications, past family history, past medical history, past social history, past surgical history, and problem list     Current Outpatient Medications on File Prior to Visit   Medication Sig Dispense Refill    albuterol (PROVENTIL HFA,VENTOLIN HFA) 90 mcg/act inhaler Inhale 2 puffs every 6 (six) hours as needed for wheezing or shortness of breath 1 Inhaler 0    dicyclomine (BENTYL) 20 mg tablet Take 1 tablet (20 mg total) by mouth 2 (two) times a day 20 tablet 0    famotidine (PEPCID) 20 mg tablet TAKE 1 TABLET(20 MG) BY MOUTH DAILY AT BEDTIME 90 tablet 0    ibuprofen (MOTRIN) 800 mg tablet Take 800 mg by mouth every 8 (eight) hours as needed      methocarbamol (ROBAXIN) 750 mg tablet Take 1 tablet (750 mg total) by mouth every 6 (six) hours as needed for muscle spasms 90 tablet 0    ondansetron (ZOFRAN-ODT) 4 mg disintegrating tablet Take 1 tablet (4 mg total) by mouth every 6 (six) hours as needed for nausea or vomiting 20 tablet 0    ciprofloxacin (CIPRO) 500 mg tablet       dicyclomine (BENTYL) 10 mg capsule TAKE 1 CAPSULE BY MOUTH FOUR TIMES DAILY AS NEEDED FOR ABDOMINAL PAIN      fluticasone (FLONASE) 50 mcg/act nasal spray 2 sprays into each nostril daily (Patient not taking: Reported on 8/13/2021) 16 g 0    hydrOXYzine HCL (ATARAX) 25 mg tablet Take 2 tablets (50 mg total) by mouth every 6 (six) hours as needed for anxiety (Patient not taking: Reported on 2/11/2021) 25 tablet 0    metroNIDAZOLE (METROGEL) 0 75 % vaginal gel INSERT 1 APPLICATORFUL VAGINALLY EVERY NIGHT FOR 5 DAYS      vancomycin (VANCOCIN) 125 MG capsule        No current facility-administered medications on file prior to visit  Review of Systems   Constitutional: Negative  HENT: Negative  Eyes: Negative  Respiratory: Negative  Cardiovascular: Negative  Gastrointestinal: Negative  Genitourinary: Negative  Musculoskeletal: Negative  Neurological: Negative  Psychiatric/Behavioral: Negative                Objective:    /68 (BP Location: Left arm, Patient Position: Sitting, Cuff Size: Standard)   Pulse 85   Temp (!) 96 8 °F (36 °C) (Temporal)   Resp 20   Ht 5' 5" (1 651 m)   Wt 64 9 kg (143 lb)   LMP 08/06/2021   SpO2 99%   BMI 23 80 kg/m²     Physical Exam  Constitutional:       Appearance: Normal appearance  Bernarda Pickett is normal weight  HENT:      Head: Normocephalic  Right Ear: External ear normal       Left Ear: External ear normal       Nose: Nose normal       Mouth/Throat:      Mouth: Mucous membranes are moist    Eyes:      Extraocular Movements: Extraocular movements intact  Pupils: Pupils are equal, round, and reactive to light  Cardiovascular:      Rate and Rhythm: Normal rate and regular rhythm  Pulses: Normal pulses  Heart sounds: Normal heart sounds  Pulmonary:      Effort: Pulmonary effort is normal       Breath sounds: Normal breath sounds  Abdominal:      General: Bowel sounds are normal       Palpations: Abdomen is soft  Musculoskeletal:         General: No tenderness or signs of injury  Normal range of motion  Cervical back: Normal range of motion  Right lower leg: No edema  Left lower leg: No edema  Skin:     General: Skin is warm and dry  Capillary Refill: Capillary refill takes less than 2 seconds  Findings: No bruising, lesion or rash  Neurological:      General: No focal deficit present  Mental Status: Bernarda Pickett is alert and oriented to person, place, and time  Psychiatric:         Mood and Affect: Mood normal          Behavior: Behavior normal          Thought Content: Thought content normal          AAMIR Linda  08/16/21  11:41 AM    Patient Instructions     Tennis Elbow Exercises   AMBULATORY CARE:   Tennis elbow exercises  help decrease pain in your elbow, forearm, wrist, and hand  They also help strengthen your arm muscles and prevent further injury  Start these exercises slowly  Do the exercises on both arms  Stop if you feel pain  · Finger extensions:  Hold your fingers close together  Put a rubber band around the outside of your fingers and thumb  Spread your fingers apart and then slowly bring them together without letting the rubber band fall off  Repeat 40 times           · Wrist flexor stretch:  Hold your arm straight out in front of you with your palm facing down  Use your other hand to grasp your fingers  Keep your elbow straight and slowly bend your hand back  Your fingertips should point up and your palm should face away from you  Do this until you feel a stretch in the top of your wrist  Hold for 10 seconds  Repeat 5 times  · Wrist extensor stretch: This stretch is the opposite of the wrist flexor stretch  Hold your arm straight out in front of you with your palm facing down  Use your other hand to grasp your fingers  Keep your elbow straight and slowly bend your hand down  Your fingertips should point down and your palm should face you  Do this until you feel a stretch in your wrist  Hold for 10 seconds  Repeat 5 times  · Bicep curls:  Place your hand under your injured elbow for support  Turn your palm so that it faces up and hold a weight in your hand  Ask your healthcare provider how much weight you should use  Slowly bend and straighten your elbow 30 times  · :  Hold a soft rubber ball or tennis ball in your hand  Squeeze the ball as hard as you can and hold this position  Ask your healthcare provider how long to hold this position  Repeat this exercise as directed by your healthcare provider  Contact your healthcare provider if:   · You have increased pain or weakness in your arm, wrist, hand, or fingers  · You have new numbness or tingling in your arm, hand, or fingers  · You have questions or concerns about tennis elbow exercises  © Copyright [employer] 2021 Information is for End User's use only and may not be sold, redistributed or otherwise used for commercial purposes  All illustrations and images included in CareNotes® are the copyrighted property of [employer]  or Chetna Sebastian  The above information is an  only  It is not intended as medical advice for individual conditions or treatments   Talk to your doctor, nurse or pharmacist before following any medical regimen to see if it is safe and effective for you

## 2021-08-13 NOTE — PATIENT INSTRUCTIONS
Tennis Elbow Exercises   AMBULATORY CARE:   Tennis elbow exercises  help decrease pain in your elbow, forearm, wrist, and hand  They also help strengthen your arm muscles and prevent further injury  Start these exercises slowly  Do the exercises on both arms  Stop if you feel pain  · Finger extensions:  Hold your fingers close together  Put a rubber band around the outside of your fingers and thumb  Spread your fingers apart and then slowly bring them together without letting the rubber band fall off  Repeat 40 times  · Wrist flexor stretch:  Hold your arm straight out in front of you with your palm facing down  Use your other hand to grasp your fingers  Keep your elbow straight and slowly bend your hand back  Your fingertips should point up and your palm should face away from you  Do this until you feel a stretch in the top of your wrist  Hold for 10 seconds  Repeat 5 times  · Wrist extensor stretch: This stretch is the opposite of the wrist flexor stretch  Hold your arm straight out in front of you with your palm facing down  Use your other hand to grasp your fingers  Keep your elbow straight and slowly bend your hand down  Your fingertips should point down and your palm should face you  Do this until you feel a stretch in your wrist  Hold for 10 seconds  Repeat 5 times  · Bicep curls:  Place your hand under your injured elbow for support  Turn your palm so that it faces up and hold a weight in your hand  Ask your healthcare provider how much weight you should use  Slowly bend and straighten your elbow 30 times  · :  Hold a soft rubber ball or tennis ball in your hand  Squeeze the ball as hard as you can and hold this position  Ask your healthcare provider how long to hold this position  Repeat this exercise as directed by your healthcare provider  Contact your healthcare provider if:   · You have increased pain or weakness in your arm, wrist, hand, or fingers      · You have new numbness or tingling in your arm, hand, or fingers  · You have questions or concerns about tennis elbow exercises  © Copyright [employer] 2021 Information is for End User's use only and may not be sold, redistributed or otherwise used for commercial purposes  All illustrations and images included in CareNotes® are the copyrighted property of A D A M , Inc  or [facility] Huy Li   The above information is an  only  It is not intended as medical advice for individual conditions or treatments  Talk to your doctor, nurse or pharmacist before following any medical regimen to see if it is safe and effective for you

## 2021-08-16 NOTE — ASSESSMENT & PLAN NOTE
· Patient endorses significant improvement in abdominal pain, diarrhea and appetite  She is tolerating food and fluids  · Add'l ABX were prescribed at last encounter due to increasing rates of salmonella resistance and continued symptoms of loose stools  · Explained to patient that use of diabetics themselves can actually cause indigestion and loose stools  · She presented to ED with repeat infection of salmonella and newly found C-Diff, norovirus infections  · Advised patient to obtain over-the-counter probiotics or yogurt to restore natural microbiome, advised good hydration  · Long conversation with patient trying to investigate possible sources of this repeat infection  · Reminded patient to fully cook meat, wash surfaces thoroughly that had raw meat on them  Wash fresh vegetables  · F/u prn for repeat s/s of infection

## 2021-08-19 ENCOUNTER — TELEPHONE (OUTPATIENT)
Dept: FAMILY MEDICINE CLINIC | Facility: CLINIC | Age: 41
End: 2021-08-19

## 2021-08-19 ENCOUNTER — PREP FOR PROCEDURE (OUTPATIENT)
Dept: GASTROENTEROLOGY | Facility: MEDICAL CENTER | Age: 41
End: 2021-08-19

## 2021-08-19 DIAGNOSIS — R19.7 DIARRHEA, UNSPECIFIED TYPE: Primary | ICD-10-CM

## 2021-08-19 LAB
MISCELLANEOUS LAB TEST RESULT: NORMAL
MISCELLANEOUS LAB TEST RESULT: NORMAL

## 2021-08-19 NOTE — TELEPHONE ENCOUNTER
Pt left a voicemail stating her gastro told her dont take antibiotic for norovirus, she want to let you now, if you have another opinion

## 2021-08-20 ENCOUNTER — TELEPHONE (OUTPATIENT)
Dept: GASTROENTEROLOGY | Facility: CLINIC | Age: 41
End: 2021-08-20

## 2021-08-20 NOTE — TELEPHONE ENCOUNTER
Colon on 10/25/21 with Dr Garry Bennett on 10/25/21 at [facility]  Miralax/dulcolax prep instructions gone over verbally & mailed to the patient

## 2021-08-23 ENCOUNTER — TELEMEDICINE (OUTPATIENT)
Dept: FAMILY MEDICINE CLINIC | Facility: CLINIC | Age: 41
End: 2021-08-23

## 2021-08-23 DIAGNOSIS — K52.9 COLITIS: ICD-10-CM

## 2021-08-23 PROCEDURE — 99213 OFFICE O/P EST LOW 20 MIN: CPT | Performed by: NURSE PRACTITIONER

## 2021-08-23 RX ORDER — ONDANSETRON 4 MG/1
4 TABLET, ORALLY DISINTEGRATING ORAL EVERY 6 HOURS PRN
Qty: 20 TABLET | Refills: 0 | Status: SHIPPED | OUTPATIENT
Start: 2021-08-23 | End: 2021-11-15

## 2021-08-23 RX ORDER — DICYCLOMINE HCL 20 MG
20 TABLET ORAL 2 TIMES DAILY
Qty: 20 TABLET | Refills: 0 | Status: SHIPPED | OUTPATIENT
Start: 2021-08-23 | End: 2021-11-15

## 2021-08-23 NOTE — PROGRESS NOTES
Virtual Brief Visit    Verification of patient location:    Patient is located in the following state in which I hold an active license [place]      Assessment/Plan:    Problem List Items Addressed This Visit     None      Visit Diagnoses     Colitis        Relevant Medications    dicyclomine (BENTYL) 20 mg tablet    ondansetron (ZOFRAN-ODT) 4 mg disintegrating tablet      · GI believes symptoms related to residual infection of norovirus  · Treat symptoms with above orders  · Advised to eat bland diet and push fluids  · Scheduled to f/u with GI in October  Reason for visit is   Chief Complaint   Patient presents with    Virtual Brief Visit        Encounter provider AAMIR Forte    Provider located at [address] [address] [phone]    Recent Visits  Date Type Provider Dept   08/19/21 Telephone [facility] recent visits within past 7 days and meeting all other requirements  Today's Visits  Date Type Provider Dept   08/23/21 Telemedicine Tawanda Forte today's visits and meeting all other requirements  Future Appointments  No visits were found meeting these conditions  Showing future appointments within next 150 days and meeting all other requirements       After connecting through telephone, the patient was identified by name and date of birth  Ramone Pelaez was informed that this is a telemedicine visit and that the visit is being conducted through telephone  My office door was closed  No one else was in the room  Ramone Pelaez acknowledged consent and understanding of privacy and security of the platform  The patient has agreed to participate and understands Ramone Garciaa can discontinue the visit at any time  Patient is aware this is a billable service       Subjective    Ramone Pelaez is a 47 y o  adult Who presents for tele visit for ongoing nausea and stomach pain  One month ago patient was found to be positive for norovirus, C diff, and Salmonella  She completed full course of antibiotics  This was the 2nd infection of salmonella in a 2 month period, previous infection was in May  She contacted Gastroenterology where she established as a new patient, they suggested her ongoing nausea and stomach pain may be attributed to residual neuro virus infection  She is also scheduled for colonoscopy with them in October  She is currently working and tolerating food and fluids  She denies fever  Past Medical History:   Diagnosis Date    Acid reflux     Anxiety     Bursitis     Frequent headaches     Gastric ulcer     Gastroparesis     Liver cyst     Lumbar herniated disc     Migraine     Seasonal allergies     Torn ACL     Wears glasses        Past Surgical History:   Procedure Laterality Date    KNEE SURGERY Right     SC ESOPHAGOGASTRODUODENOSCOPY TRANSORAL DIAGNOSTIC N/A 5/1/2017    Procedure: ESOPHAGOGASTRODUODENOSCOPY (EGD); Surgeon: Gita Trammell MD;  Location: [facility];   Service: Gastroenterology    SC KNEE SCOPE,AID ANT CRUCIATE REPAIR Right 5/24/2018    Procedure: ARTHROSCOPIC RECONSTRUCTION ANTERIOR CRUCIATE LIGAMENT (ACL) WITH AUTOGRAFT AND ALLOGRAFT;  Surgeon: Jennifer Bustos MD;  Location: [facility] OR;  Service: Orthopedics    TUBAL LIGATION      WISDOM TOOTH EXTRACTION         Current Outpatient Medications   Medication Sig Dispense Refill    albuterol (PROVENTIL HFA,VENTOLIN HFA) 90 mcg/act inhaler Inhale 2 puffs every 6 (six) hours as needed for wheezing or shortness of breath 1 Inhaler 0    amitriptyline (ELAVIL) 10 mg tablet Take 1 tablet (10 mg total) by mouth daily at bedtime 30 tablet 5    ciprofloxacin (CIPRO) 500 mg tablet       Diclofenac Sodium (VOLTAREN) 1 % Apply 2 g topically 4 (four) times a day 2 g 2    dicyclomine (BENTYL) 10 mg capsule TAKE 1 CAPSULE BY MOUTH FOUR TIMES DAILY AS NEEDED FOR ABDOMINAL PAIN  dicyclomine (BENTYL) 20 mg tablet Take 1 tablet (20 mg total) by mouth 2 (two) times a day 20 tablet 0    famotidine (PEPCID) 20 mg tablet TAKE 1 TABLET(20 MG) BY MOUTH DAILY AT BEDTIME 90 tablet 0    fluticasone (FLONASE) 50 mcg/act nasal spray 2 sprays into each nostril daily (Patient not taking: Reported on 8/13/2021) 16 g 0    hydrOXYzine HCL (ATARAX) 25 mg tablet Take 2 tablets (50 mg total) by mouth every 6 (six) hours as needed for anxiety (Patient not taking: Reported on 2/11/2021) 25 tablet 0    ibuprofen (MOTRIN) 800 mg tablet Take 800 mg by mouth every 8 (eight) hours as needed      methocarbamol (ROBAXIN) 750 mg tablet Take 1 tablet (750 mg total) by mouth every 6 (six) hours as needed for muscle spasms 90 tablet 0    metroNIDAZOLE (METROGEL) 0 75 % vaginal gel INSERT 1 APPLICATORFUL VAGINALLY EVERY NIGHT FOR 5 DAYS      ondansetron (ZOFRAN-ODT) 4 mg disintegrating tablet Take 1 tablet (4 mg total) by mouth every 6 (six) hours as needed for nausea or vomiting 20 tablet 0    vancomycin (VANCOCIN) 125 MG capsule        No current facility-administered medications for this visit  Allergies   Allergen Reactions    Asa [Aspirin] Shortness Of Breath            Latex Hives    Meloxicam Other (See Comments)     bruising    Penicillin V Hives and Rash    Penicillins Hives and Rash       Review of Systems   Constitutional: Negative  HENT: Negative  Eyes: Negative  Respiratory: Negative  Cardiovascular: Negative  Gastrointestinal: Positive for abdominal pain and nausea  Negative for blood in stool, diarrhea and vomiting  Genitourinary: Negative  Musculoskeletal: Negative  Neurological: Negative  Psychiatric/Behavioral: Negative  There were no vitals filed for this visit  I spent 10 minutes directly with the patient during this visit    VIRTUAL VISIT DISCLAIMER      Bernarda Pickett verbally agrees to participate in [employer]   Pt is aware that Virtual Care Services could be limited without vital signs or the ability to perform a full hands-on physical Brianna Rodriguez understands Amanda Rogers or the provider may request at any time to terminate the video visit and request the patient to seek care or treatment in person

## 2021-10-14 ENCOUNTER — TELEPHONE (OUTPATIENT)
Dept: GASTROENTEROLOGY | Facility: MEDICAL CENTER | Age: 41
End: 2021-10-14

## 2021-10-22 ENCOUNTER — TELEPHONE (OUTPATIENT)
Dept: GASTROENTEROLOGY | Facility: MEDICAL CENTER | Age: 41
End: 2021-10-22

## 2021-10-25 ENCOUNTER — ANESTHESIA EVENT (OUTPATIENT)
Dept: GASTROENTEROLOGY | Facility: MEDICAL CENTER | Age: 41
End: 2021-10-25

## 2021-10-25 ENCOUNTER — ANESTHESIA (OUTPATIENT)
Dept: GASTROENTEROLOGY | Facility: MEDICAL CENTER | Age: 41
End: 2021-10-25

## 2021-10-25 ENCOUNTER — HOSPITAL ENCOUNTER (OUTPATIENT)
Dept: GASTROENTEROLOGY | Facility: MEDICAL CENTER | Age: 41
Setting detail: OUTPATIENT SURGERY
Discharge: HOME | End: 2021-10-25
Attending: INTERNAL MEDICINE
Payer: COMMERCIAL

## 2021-10-25 VITALS
HEART RATE: 75 BPM | SYSTOLIC BLOOD PRESSURE: 120 MMHG | TEMPERATURE: 98.5 F | RESPIRATION RATE: 16 BRPM | DIASTOLIC BLOOD PRESSURE: 80 MMHG | OXYGEN SATURATION: 100 % | BODY MASS INDEX: 27.48 KG/M2 | WEIGHT: 140 LBS | HEIGHT: 60 IN

## 2021-10-25 DIAGNOSIS — R19.7 DIARRHEA, UNSPECIFIED TYPE: ICD-10-CM

## 2021-10-25 LAB
EXT PREGNANCY TEST URINE: NEGATIVE
EXT. CONTROL: NORMAL

## 2021-10-25 PROCEDURE — 81025 URINE PREGNANCY TEST: CPT | Performed by: ANESTHESIOLOGY

## 2021-10-25 PROCEDURE — 88305 TISSUE EXAM BY PATHOLOGIST: CPT | Performed by: PATHOLOGY

## 2021-10-25 PROCEDURE — 45380 COLONOSCOPY AND BIOPSY: CPT | Performed by: INTERNAL MEDICINE

## 2021-10-25 RX ORDER — LIDOCAINE HYDROCHLORIDE 20 MG/ML
INJECTION, SOLUTION EPIDURAL; INFILTRATION; INTRACAUDAL; PERINEURAL AS NEEDED
Status: DISCONTINUED | OUTPATIENT
Start: 2021-10-25 | End: 2021-10-25

## 2021-10-25 RX ORDER — SODIUM CHLORIDE 9 MG/ML
125 INJECTION, SOLUTION INTRAVENOUS CONTINUOUS
Status: DISCONTINUED | OUTPATIENT
Start: 2021-10-25 | End: 2021-10-29 | Stop reason: HOSPADM

## 2021-10-25 RX ORDER — PROPOFOL 10 MG/ML
INJECTION, EMULSION INTRAVENOUS AS NEEDED
Status: DISCONTINUED | OUTPATIENT
Start: 2021-10-25 | End: 2021-10-25

## 2021-10-25 RX ORDER — PROPOFOL 10 MG/ML
INJECTION, EMULSION INTRAVENOUS CONTINUOUS PRN
Status: DISCONTINUED | OUTPATIENT
Start: 2021-10-25 | End: 2021-10-25

## 2021-10-25 RX ADMIN — Medication 40 MG: at 13:38

## 2021-10-25 RX ADMIN — PROPOFOL 120 MCG/KG/MIN: 10 INJECTION, EMULSION INTRAVENOUS at 13:34

## 2021-10-25 RX ADMIN — PROPOFOL 120 MG: 10 INJECTION, EMULSION INTRAVENOUS at 13:34

## 2021-10-25 RX ADMIN — SODIUM CHLORIDE 125 ML/HR: 0.9 INJECTION, SOLUTION INTRAVENOUS at 13:20

## 2021-10-25 RX ADMIN — LIDOCAINE HYDROCHLORIDE 100 MG: 20 INJECTION, SOLUTION EPIDURAL; INFILTRATION; INTRACAUDAL; PERINEURAL at 13:34

## 2021-11-01 DIAGNOSIS — K21.9 CHRONIC GERD: ICD-10-CM

## 2021-11-01 RX ORDER — FAMOTIDINE 20 MG/1
TABLET, FILM COATED ORAL
Qty: 90 TABLET | Refills: 0 | Status: SHIPPED | OUTPATIENT
Start: 2021-11-01 | End: 2021-11-15 | Stop reason: SDUPTHER

## 2021-11-15 ENCOUNTER — OFFICE VISIT (OUTPATIENT)
Dept: FAMILY MEDICINE CLINIC | Facility: CLINIC | Age: 41
End: 2021-11-15

## 2021-11-15 VITALS
HEART RATE: 105 BPM | WEIGHT: 148 LBS | TEMPERATURE: 97.6 F | SYSTOLIC BLOOD PRESSURE: 124 MMHG | DIASTOLIC BLOOD PRESSURE: 82 MMHG | RESPIRATION RATE: 18 BRPM | HEIGHT: 60 IN | OXYGEN SATURATION: 97 % | BODY MASS INDEX: 29.06 KG/M2

## 2021-11-15 DIAGNOSIS — Z23 ENCOUNTER FOR IMMUNIZATION: Primary | ICD-10-CM

## 2021-11-15 DIAGNOSIS — G43.109 CHRONIC MIGRAINE WITH AURA: ICD-10-CM

## 2021-11-15 DIAGNOSIS — J06.9 URI (UPPER RESPIRATORY INFECTION): ICD-10-CM

## 2021-11-15 DIAGNOSIS — K21.9 CHRONIC GERD: ICD-10-CM

## 2021-11-15 DIAGNOSIS — M54.2 CERVICAL SPINE PAIN: ICD-10-CM

## 2021-11-15 DIAGNOSIS — M25.521 RIGHT ELBOW PAIN: ICD-10-CM

## 2021-11-15 PROCEDURE — 99214 OFFICE O/P EST MOD 30 MIN: CPT | Performed by: NURSE PRACTITIONER

## 2021-11-15 PROCEDURE — 90471 IMMUNIZATION ADMIN: CPT | Performed by: NURSE PRACTITIONER

## 2021-11-15 PROCEDURE — 90715 TDAP VACCINE 7 YRS/> IM: CPT | Performed by: NURSE PRACTITIONER

## 2021-11-15 RX ORDER — METHOCARBAMOL 750 MG/1
750 TABLET, FILM COATED ORAL EVERY 6 HOURS PRN
Qty: 90 TABLET | Refills: 0 | Status: SHIPPED | OUTPATIENT
Start: 2021-11-15 | End: 2022-05-16

## 2021-11-15 RX ORDER — ALBUTEROL SULFATE 90 UG/1
2 AEROSOL, METERED RESPIRATORY (INHALATION) EVERY 6 HOURS PRN
Qty: 18 G | Refills: 2 | Status: SHIPPED | OUTPATIENT
Start: 2021-11-15

## 2021-11-15 RX ORDER — DICLOFENAC SODIUM 75 MG/1
75 TABLET, DELAYED RELEASE ORAL 2 TIMES DAILY
Qty: 60 TABLET | Refills: 2 | Status: SHIPPED | OUTPATIENT
Start: 2021-11-15 | End: 2022-02-15

## 2021-11-15 RX ORDER — AMITRIPTYLINE HYDROCHLORIDE 25 MG/1
25 TABLET, FILM COATED ORAL
Qty: 90 TABLET | Refills: 2 | Status: SHIPPED | OUTPATIENT
Start: 2021-11-15 | End: 2022-02-15

## 2021-11-15 RX ORDER — FAMOTIDINE 20 MG/1
20 TABLET, FILM COATED ORAL
Qty: 90 TABLET | Refills: 0 | Status: SHIPPED | OUTPATIENT
Start: 2021-11-15 | End: 2022-08-03 | Stop reason: SDUPTHER

## 2021-11-15 RX ORDER — AMITRIPTYLINE HYDROCHLORIDE 10 MG/1
10 TABLET, FILM COATED ORAL
Qty: 30 TABLET | Refills: 5 | Status: SHIPPED | OUTPATIENT
Start: 2021-11-15 | End: 2021-11-15

## 2021-11-15 RX ORDER — OMEPRAZOLE 20 MG/1
20 CAPSULE, DELAYED RELEASE ORAL
Qty: 90 CAPSULE | Refills: 3 | Status: SHIPPED | OUTPATIENT
Start: 2021-11-15

## 2022-02-15 ENCOUNTER — OFFICE VISIT (OUTPATIENT)
Dept: FAMILY MEDICINE CLINIC | Facility: CLINIC | Age: 42
End: 2022-02-15

## 2022-02-15 VITALS
WEIGHT: 156 LBS | OXYGEN SATURATION: 98 % | BODY MASS INDEX: 25.99 KG/M2 | HEIGHT: 65 IN | DIASTOLIC BLOOD PRESSURE: 86 MMHG | HEART RATE: 107 BPM | SYSTOLIC BLOOD PRESSURE: 118 MMHG | RESPIRATION RATE: 16 BRPM | TEMPERATURE: 97.4 F

## 2022-02-15 DIAGNOSIS — G43.109 CHRONIC MIGRAINE WITH AURA: ICD-10-CM

## 2022-02-15 DIAGNOSIS — Z00.00 ANNUAL PHYSICAL EXAM: Primary | ICD-10-CM

## 2022-02-15 DIAGNOSIS — M54.2 CERVICAL SPINE PAIN: ICD-10-CM

## 2022-02-15 PROCEDURE — 99395 PREV VISIT EST AGE 18-39: CPT | Performed by: NURSE PRACTITIONER

## 2022-02-15 RX ORDER — ACETAMINOPHEN AND CODEINE PHOSPHATE 300; 30 MG/1; MG/1
1 TABLET ORAL EVERY 4 HOURS PRN
Qty: 30 TABLET | Refills: 0 | Status: SHIPPED | OUTPATIENT
Start: 2022-02-15

## 2022-02-15 RX ORDER — AMITRIPTYLINE HYDROCHLORIDE 50 MG/1
50 TABLET, FILM COATED ORAL
Qty: 90 TABLET | Refills: 2 | Status: SHIPPED | OUTPATIENT
Start: 2022-02-15 | End: 2022-08-03 | Stop reason: DRUGHIGH

## 2022-02-15 NOTE — PATIENT INSTRUCTIONS
Wellness Visit for Adults   AMBULATORY CARE:   A wellness visit  is when you see your healthcare provider to get screened for health problems  Your healthcare provider will also give you advice on how to stay healthy  Write down your questions so you remember to ask them  Ask your healthcare provider how often you should have a wellness visit  What happens at a wellness visit:  Your healthcare provider will ask about your health, and your family history of health problems  This includes high blood pressure, heart disease, and cancer  He or she will ask if you have symptoms that concern you, if you smoke, and about your mood  You may also be asked about your intake of medicines, supplements, food, and alcohol  Any of the following may be done:  · Your weight  will be checked  Your height may also be checked so your body mass index (BMI) can be calculated  Your BMI shows if you are at a healthy weight  · Your blood pressure  and heart rate will be checked  Your temperature may also be checked  · Blood and urine tests  may be done  Blood tests may be done to check your cholesterol levels  Abnormal cholesterol levels increase your risk for heart disease and stroke  You may also need a blood or urine test to check for diabetes if you are at increased risk  Urine tests may be done to look for signs of an infection or kidney disease  · A physical exam  includes checking your heartbeat and lungs with a stethoscope  Your healthcare provider may also check your skin to look for sun damage  · Screening tests  may be recommended  A screening test is done to check for diseases that may not cause symptoms  The screening tests you may need depend on your age, gender, family history, and lifestyle habits  For example, colorectal screening may be recommended if you are 48years old or older  Screening tests you need if you are a woman:   · A Pap smear  is used to screen for cervical cancer   Pap smears are usually done every 3 to 5 years depending on your age  You may need them more often if you have had abnormal Pap smear test results in the past  Ask your healthcare provider how often you should have a Pap smear  · A mammogram  is an x-ray of your breasts to screen for breast cancer  Experts recommend mammograms every 2 years starting at age 48 years  You may need a mammogram at age 52 years or younger if you have an increased risk for breast cancer  Talk to your healthcare provider about when you should start having mammograms and how often you need them  Vaccines you may need:   · Get an influenza vaccine  every year  The influenza vaccine protects you from the flu  Several types of viruses cause the flu  The viruses change over time, so new vaccines are made each year  · Get a tetanus-diphtheria (Td) booster vaccine  every 10 years  This vaccine protects you against tetanus and diphtheria  Tetanus is a severe infection that may cause painful muscle spasms and lockjaw  Diphtheria is a severe bacterial infection that causes a thick covering in the back of your mouth and throat  · Get a human papillomavirus (HPV) vaccine  if you are female and aged 23 to 32 or male 23 to 24 and never received it  This vaccine protects you from HPV infection  HPV is the most common infection spread by sexual contact  HPV may also cause vaginal, penile, and anal cancers  · Get a pneumococcal vaccine  if you are aged 72 years or older  The pneumococcal vaccine is an injection given to protect you from pneumococcal disease  Pneumococcal disease is an infection caused by pneumococcal bacteria  The infection may cause pneumonia, meningitis, or an ear infection  · Get a shingles vaccine  if you are 60 or older, even if you have had shingles before  The shingles vaccine is an injection to protect you from the varicella-zoster virus  This is the same virus that causes chickenpox   Shingles is a painful rash that develops in people who had chickenpox or have been exposed to the virus  How to eat healthy:  My Plate is a model for planning healthy meals  It shows the types and amounts of foods that should go on your plate  Fruits and vegetables make up about half of your plate, and grains and protein make up the other half  A serving of dairy is included on the side of your plate  The amount of calories and serving sizes you need depends on your age, gender, weight, and height  Examples of healthy foods are listed below:  · Eat a variety of vegetables  such as dark green, red, and orange vegetables  You can also include canned vegetables low in sodium (salt) and frozen vegetables without added butter or sauces  · Eat a variety of fresh fruits , canned fruit in 100% juice, frozen fruit, and dried fruit  · Include whole grains  At least half of the grains you eat should be whole grains  Examples include whole-wheat bread, wheat pasta, brown rice, and whole-grain cereals such as oatmeal     · Eat a variety of protein foods such as seafood (fish and shellfish), lean meat, and poultry without skin (turkey and chicken)  Examples of lean meats include pork leg, shoulder, or tenderloin, and beef round, sirloin, tenderloin, and extra lean ground beef  Other protein foods include eggs and egg substitutes, beans, peas, soy products, nuts, and seeds  · Choose low-fat dairy products such as skim or 1% milk or low-fat yogurt, cheese, and cottage cheese  · Limit unhealthy fats  such as butter, hard margarine, and shortening  Exercise:  Exercise at least 30 minutes per day on most days of the week  Some examples of exercise include walking, biking, dancing, and swimming  You can also fit in more physical activity by taking the stairs instead of the elevator or parking farther away from stores  Include muscle strengthening activities 2 days each week  Regular exercise provides many health benefits   It helps you manage your weight, and decreases your risk for type 2 diabetes, heart disease, stroke, and high blood pressure  Exercise can also help improve your mood  Ask your healthcare provider about the best exercise plan for you  General health and safety guidelines:   · Do not smoke  Nicotine and other chemicals in cigarettes and cigars can cause lung damage  Ask your healthcare provider for information if you currently smoke and need help to quit  E-cigarettes or smokeless tobacco still contain nicotine  Talk to your healthcare provider before you use these products  · Limit alcohol  A drink of alcohol is 12 ounces of beer, 5 ounces of wine, or 1½ ounces of liquor  · Lose weight, if needed  Being overweight increases your risk of certain health conditions  These include heart disease, high blood pressure, type 2 diabetes, and certain types of cancer  · Protect your skin  Do not sunbathe or use tanning beds  Use sunscreen with a SPF 15 or higher  Apply sunscreen at least 15 minutes before you go outside  Reapply sunscreen every 2 hours  Wear protective clothing, hats, and sunglasses when you are outside  · Drive safely  Always wear your seatbelt  Make sure everyone in your car wears a seatbelt  A seatbelt can save your life if you are in an accident  Do not use your cell phone when you are driving  This could distract you and cause an accident  Pull over if you need to make a call or send a text message  · Practice safe sex  Use latex condoms if are sexually active and have more than one partner  Your healthcare provider may recommend screening tests for sexually transmitted infections (STIs)  · Wear helmets, lifejackets, and protective gear  Always wear a helmet when you ride a bike or motorcycle, go skiing, or play sports that could cause a head injury  Wear protective equipment when you play sports  Wear a lifejacket when you are on a boat or doing water sports      © Copyright [employer] 2021 Information is for End User's use only and may not be sold, redistributed or otherwise used for commercial purposes  All illustrations and images included in CareNotes® are the copyrighted property of [employer]  or [employer]  The above information is an  only  It is not intended as medical advice for individual conditions or treatments  Talk to your doctor, nurse or pharmacist before following any medical regimen to see if it is safe and effective for you  Cholesterol and Your Health   AMBULATORY CARE:   Cholesterol  is a waxy, fat-like substance  Your body uses cholesterol to make hormones and new cells, and to protect nerves  Cholesterol is made by your body  It also comes from certain foods you eat, such as meat and dairy products  Your healthcare provider can help you set goals for your cholesterol levels  He or she can help you create a plan to meet your goals  Cholesterol level goals: Your cholesterol level goals depend on your risk for heart disease, your age, and your other health conditions  The following are general guidelines:  · Total cholesterol  includes low-density lipoprotein (LDL), high-density lipoprotein (HDL), and triglyceride levels  The total cholesterol level should be lower than 200 mg/dL and is best at about 150 mg/dL  · LDL cholesterol  is called bad cholesterol  because it forms plaque in your arteries  As plaque builds up, your arteries become narrow, and less blood flows through  When plaque decreases blood flow to your heart, you may have chest pain  If plaque completely blocks an artery that brings blood to your heart, you may have a heart attack  Plaque can break off and form blood clots  Blood clots may block arteries in your brain and cause a stroke  The level should be less than 130 mg/dL and is best at about 100 mg/dL  · HDL cholesterol  is called good cholesterol  because it helps remove LDL cholesterol from your arteries   It does this by attaching to LDL cholesterol and carrying it to your liver  Your liver breaks down LDL cholesterol so your body can get rid of it  High levels of HDL cholesterol can help prevent a heart attack and stroke  Low levels of HDL cholesterol can increase your risk for heart disease, heart attack, and stroke  The level should be 60 mg/dL or higher  · Triglycerides  are a type of fat that store energy from foods you eat  High levels of triglycerides also cause plaque buildup  This can increase your risk for a heart attack or stroke  If your triglyceride level is high, your LDL cholesterol level may also be high  The level should be less than 150 mg/dL  Any of the following can increase your risk for high cholesterol:   · Smoking cigarettes    · Being overweight or obese, or not getting enough exercise    · Drinking large amounts of alcohol    · A medical condition such as hypertension (high blood pressure) or diabetes    · Certain genes passed from your parents to you    · Age older than 65 years    What you need to know about having your cholesterol levels checked: Adults 21to 39years of age should have their cholesterol levels checked every 4 to 6 years  Adults 45 years or older should have their cholesterol checked every 1 to 2 years  You may need your cholesterol checked more often, or at a younger age, if you have risk factors for heart disease  You may also need to have your cholesterol checked more often if you have other health conditions, such as diabetes  Blood tests are used to check cholesterol levels  Blood tests measure your levels of triglycerides, LDL cholesterol, and HDL cholesterol  How healthy fats affect your cholesterol levels:  Healthy fats, also called unsaturated fats, help lower LDL cholesterol and triglyceride levels  Healthy fats include the following:  · Monounsaturated fats  are found in foods such as olive oil, canola oil, avocado, nuts, and olives      · Polyunsaturated fats,  such as omega 3 fats, are found in fish, such as salmon, trout, and tuna  They can also be found in plant foods such as flaxseed, walnuts, and soybeans  How unhealthy fats affect your cholesterol levels:  Unhealthy fats increase LDL cholesterol and triglyceride levels  They are found in foods high in cholesterol, saturated fat, and trans fat:  · Cholesterol  is found in eggs, dairy, and meat  · Saturated fat  is found in butter, cheese, ice cream, whole milk, and coconut oil  Saturated fat is also found in meat, such as sausage, hot dogs, and bologna  · Trans fat  is found in liquid oils and is used in fried and baked foods  Foods that contain trans fats include chips, crackers, muffins, sweet rolls, microwave popcorn, and cookies  Treatment  for high cholesterol will also decrease your risk of heart disease, heart attack, and stroke  Treatment may include any of the following:  · Lifestyle changes  may include food, exercise, weight loss, and quitting smoking  You may also need to decrease the amount of alcohol you drink  Your healthcare provider will want you to start with lifestyle changes  Other treatment may be added if lifestyle changes are not enough  Your healthcare provider may recommend you work with a team to manage hyperlipidemia  The team may include medical experts such as a dietitian, an exercise or physical therapist, and a behavior therapist  Your family members may be included in helping you create lifestyle changes  · Medicines  may be given to lower your LDL cholesterol, triglyceride levels, or total cholesterol level  You may need medicines to lower your cholesterol if any of the following is true:    ? You have a history of stroke, TIA, unstable angina, or a heart attack  ? Your LDL cholesterol level is 190 mg/dL or higher  ? You are age 36 to 76 years, have diabetes or heart disease risk factors, and your LDL cholesterol is 70 mg/dL or higher      · Supplements  include fish oil, red yeast rice, and garlic  Fish oil may help lower your triglyceride and LDL cholesterol levels  It may also increase your HDL cholesterol level  Red yeast rice may help decrease your total cholesterol level and LDL cholesterol level  Garlic may help lower your total cholesterol level  Do not take any supplements without talking to your healthcare provider  Food changes you can make to lower your cholesterol levels:  A dietitian can help you create a healthy eating plan  He or she can show you how to read food labels and choose foods low in saturated fat, trans fats, and cholesterol  · Decrease the total amount of fat you eat  Choose lean meats, fat-free or 1% fat milk, and low-fat dairy products, such as yogurt and cheese  Try to limit or avoid red meats  Limit or do not eat fried foods or baked goods, such as cookies  · Replace unhealthy fats with healthy fats  Cook foods in olive oil or canola oil  Choose soft margarines that are low in saturated fat and trans fat  Seeds, nuts, and avocados are other examples of healthy fats  · Eat foods with omega-3 fats  Examples include salmon, tuna, mackerel, walnuts, and flaxseed  Eat fish 2 times per week  Pregnant women should not eat fish that have high levels of mercury, such as shark, swordfish, and gifty mackerel  · Increase the amount of high-fiber foods you eat  High-fiber foods can help lower your LDL cholesterol  Aim to get between 20 and 30 grams of fiber each day  Fruits and vegetables are high in fiber  Eat at least 5 servings each day  Other high-fiber foods are whole-grain or whole-wheat breads, pastas, or cereals, and brown rice  Eat 3 ounces of whole-grain foods each day  Increase fiber slowly  You may have abdominal discomfort, bloating, and gas if you add fiber to your diet too quickly  · Eat healthy protein foods  Examples include low-fat dairy products, skinless chicken and turkey, fish, and nuts      · Limit foods and drinks that are high in sugar  Your dietitian or healthcare provider can help you create daily limits for high-sugar foods and drinks  The limit may be lower if you have diabetes or another health condition  Limits can also help you lose weight if needed  Lifestyle changes you can make to lower your cholesterol levels:   · Maintain a healthy weight  Ask your healthcare provider what a healthy weight is for you  Ask him or her to help you create a weight loss plan if needed  Weight loss can decrease your total cholesterol and triglyceride levels  Weight loss may also help keep your blood pressure at a healthy level  · Be physically active throughout the day  Physical activity, such as exercise, can help lower your total cholesterol level and maintain a healthy weight  Physical activity can also help increase your HDL cholesterol level  Work with your healthcare provider to create an program that is right for you  Get at least 30 to 40 minutes of moderate physical activity most days of the week  Examples of exercise include brisk walking, swimming, or biking  Also include strength training at least 2 times each week  Your healthcare providers can help you create a physical activity plan  · Do not smoke  Nicotine and other chemicals in cigarettes and cigars can raise your cholesterol levels  Ask your healthcare provider for information if you currently smoke and need help to quit  E-cigarettes or smokeless tobacco still contain nicotine  Talk to your healthcare provider before you use these products  · Limit or do not drink alcohol  Alcohol can increase your triglyceride levels  Ask your healthcare provider before you drink alcohol  Ask how much is okay for you to drink in 24 hours or 1 week  Follow up with your doctor as directed:  Write down your questions so you remember to ask them during your visits    © Copyright [employer] 2021 Information is for End User's use only and may not be sold, redistributed or otherwise used for commercial purposes  All illustrations and images included in CareNotes® are the copyrighted property of [employer]  or [employer]  The above information is an  only  It is not intended as medical advice for individual conditions or treatments  Talk to your doctor, nurse or pharmacist before following any medical regimen to see if it is safe and effective for you

## 2022-02-15 NOTE — PROGRESS NOTES
[facility] [place]    NAME: Vijay Frazier  AGE: 46 y o  SEX: adult  : 1980     DATE: 2022     Assessment and Plan:     Problem List Items Addressed This Visit        Cardiovascular and Mediastinum    Chronic migraine with aura     Everyday occurrence  Advil migraine helps somewhat    -increase Amitriptyline to 50mg daily - advised strict compliance, don't skip doses, DO NOT stop without letting me know first as side effects from abrupt discontinuation can be dangerous  -Will cont w/Advil and f/u w/me if desires Imitrex  -Referral to Neuro         Relevant Medications    amitriptyline (ELAVIL) 50 mg tablet    acetaminophen-codeine (TYLENOL #3) 300-30 mg per tablet       Other    Cervical spine pain     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  Declines referral to Pain & Spine for further eval of possible injections or other treatment  Declines PT or referral to comp spine  - continue robaxin and tylenol, ibuprofen sparingly and w/food (has GERD)  - will prescribe one-time short course of Tylenol #3 for acute exacerbation of neck pain  Relevant Medications    acetaminophen-codeine (TYLENOL #3) 300-30 mg per tablet      Other Visit Diagnoses     Annual physical exam    -  Primary          Immunizations and preventive care screenings were discussed with patient today  Appropriate education was printed on patient's after visit summary  Counseling:  Alcohol/drug use: discussed moderation in alcohol intake, the recommendations for healthy alcohol use, and avoidance of illicit drug use  Dental Health: discussed importance of regular tooth brushing, flossing, and dental visits  Injury prevention: discussed safety/seat belts, safety helmets, smoke detectors, carbon dioxide detectors, and smoking near bedding or upholstery    Sexual health: discussed sexually transmitted diseases, partner selection, use of condoms, avoidance of unintended pregnancy, and contraceptive alternatives  · Exercise: the importance of regular exercise/physical activity was discussed  Recommend exercise 3-5 times per week for at least 30 minutes  BMI Counseling: Body mass index is 25 96 kg/m²  The BMI is above normal  Nutrition recommendations include decreasing portion sizes, encouraging healthy choices of fruits and vegetables, decreasing fast food intake, consuming healthier snacks, limiting drinks that contain sugar, moderation in carbohydrate intake, increasing intake of lean protein, reducing intake of saturated and trans fat and reducing intake of cholesterol  Exercise recommendations include moderate physical activity 150 minutes/week, exercising 3-5 times per week and obtaining a gym membership  No pharmacotherapy was ordered  Rationale for BMI follow-up plan is due to patient being overweight or obese  Depression Screening and Follow-up Plan: Patient was screened for depression during today's encounter  They screened negative with a PHQ-2 score of 0  Return in 3 months (on 5/15/2022) for Recheck migraines (med check elavil)  Chief Complaint:     Chief Complaint   Patient presents with    Physical Exam      History of Present Illness:     Adult Annual Physical   Patient here for a comprehensive physical exam   She reports no new problems  Her GERD is stable  States that her neck pain, which has been chronic for many years 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 for prolonged period of time, has recently been worse which is keeping her up at night  She asks for pain medication that may give her some relief in the short term  She has declined any further physical therapy or referral to Pain and Spine as she states she explore these options in the past without relief    She continues to have migraines, has been able to establish care with Neurology  She does take Advil migraine OTC which helps somewhat  At our last encounter amitriptyline was increased from 12 5 to 25 mg, states she does take this every day at bedtime   But she has not experienced any relief  She politely declines flu shot  She is vaccinated for COVID  She will be due for 1st mammogram next year  She is up-to-date on her Pap smear and follows with gyn  She does not have any family history of colon cancer or any other malignancy for which she needs early screening  Diet and Physical Activity  · Diet/Nutrition: poor diet  · Exercise: no formal exercise  Depression Screening  PHQ-2/9 Depression Screening    Little interest or pleasure in doing things: 0 - not at all  Feeling down, depressed, or hopeless: 0 - not at all  PHQ-2 Score: 0  PHQ-2 Interpretation: Negative depression screen       General Health  · Sleep: sleeps well  · Hearing: normal - bilateral   · Vision: goes for regular eye exams and wears glasses  · Dental: regular dental visits  /GYN Health  · Up to date with PAP     Review of Systems:     Review of Systems   Constitutional: Negative for activity change, chills, fatigue, fever and unexpected weight change  HENT: Negative for congestion, ear pain, hearing loss, rhinorrhea, sinus pressure and sore throat  Eyes: Negative for pain and visual disturbance  Respiratory: Negative for cough, shortness of breath and wheezing  Cardiovascular: Negative for chest pain, palpitations and leg swelling  Gastrointestinal: Negative for abdominal pain, nausea and vomiting  Genitourinary: Negative for dysuria and hematuria  Musculoskeletal: Positive for neck pain and neck stiffness  Negative for arthralgias, back pain, gait problem, joint swelling and myalgias  Skin: Negative for color change and rash  Neurological: Negative for dizziness, tremors, seizures, syncope, weakness, light-headedness and headaches  Psychiatric/Behavioral: Negative for agitation, behavioral problems, confusion, dysphoric mood, self-injury, sleep disturbance and suicidal ideas  The patient is not nervous/anxious  All other systems reviewed and are negative  Past Medical History:     Past Medical History:   Diagnosis Date    Anxiety     Asthma     Bursitis     Clostridioides difficile infection     8/2021-treatment received    Gastric ulcer     Gastroparesis     GERD (gastroesophageal reflux disease)     Liver cyst     Lumbar herniated disc     Migraine     Norovirus     8/2021    Polycystic ovarian syndrome     Salmonella infection 08/2021    Seasonal allergies     Torn ACL     Umbilical hernia     Wears glasses       Past Surgical History:     Past Surgical History:   Procedure Laterality Date    KNEE SURGERY Right     TN ESOPHAGOGASTRODUODENOSCOPY TRANSORAL DIAGNOSTIC N/A 5/1/2017    Procedure: ESOPHAGOGASTRODUODENOSCOPY (EGD); Surgeon: Taina Chang MD;  Location: [facility] GI LAB;   Service: Gastroenterology    TN KNEE SCOPE,AID ANT CRUCIATE REPAIR Right 5/24/2018    Procedure: ARTHROSCOPIC RECONSTRUCTION ANTERIOR CRUCIATE LIGAMENT (ACL) WITH AUTOGRAFT AND ALLOGRAFT;  Surgeon: Chaim Guzman MD;  Location: [facility] OR;  Service: Orthopedics    TUBAL LIGATION      WISDOM TOOTH EXTRACTION        Social History:     Social History     Socioeconomic History    Marital status: /Civil Union     Spouse name: None    Number of children: None    Years of education: None    Highest education level: None   Occupational History    None   Tobacco Use    Smoking status: Never Smoker    Smokeless tobacco: Never Used   Substance and Sexual Activity    Alcohol use: Not Currently     Comment: rare    Drug use: No    Sexual activity: None   Other Topics Concern    None   Social History Narrative    None     Social Determinants of Health     Financial Resource Strain: Low Risk     Difficulty of Paying Living Expenses: Not hard at all   Food Insecurity: No Food Insecurity    Worried About [employer] in the Last Year: Never true    Swathi of Food in the Last Year: Never true   Transportation Needs: No Transportation Needs    Lack of Transportation (Medical): No    Lack of Transportation (Non-Medical):  No   Physical Activity: Not on file   Stress: Not on file   Social Connections: Not on file   Intimate Partner Violence: Not on file   Housing Stability: Low Risk     Unable to Pay for Housing in the Last Year: No    Number of Places Lived in the Last Year: 1    Unstable Housing in the Last Year: No      Family History:     Family History   Problem Relation Age of Onset    Depression Mother     Anxiety disorder Mother     Sleep disorder Mother     Hypertension Mother     Hyperlipidemia Mother     Hyperlipidemia Father     Hypertension Father     Heart disease Father     Heart disease Paternal Aunt     Heart disease Paternal Uncle     Osteoporosis Maternal Grandmother     Alzheimer's disease Paternal Grandmother     Heart disease Paternal Grandmother     Heart disease Paternal Grandfather     Stroke Paternal Grandfather       Current Medications:     Current Outpatient Medications   Medication Sig Dispense Refill    acetaminophen-codeine (TYLENOL #3) 300-30 mg per tablet Take 1 tablet by mouth every 4 (four) hours as needed for moderate pain 30 tablet 0    albuterol (PROVENTIL HFA,VENTOLIN HFA) 90 mcg/act inhaler Inhale 2 puffs every 6 (six) hours as needed for wheezing or shortness of breath 18 g 2    amitriptyline (ELAVIL) 50 mg tablet Take 1 tablet (50 mg total) by mouth daily at bedtime 90 tablet 2    Diclofenac Sodium (VOLTAREN) 1 % Apply 2 g topically 4 (four) times a day 2 g 2    famotidine (PEPCID) 20 mg tablet Take 1 tablet (20 mg total) by mouth daily at bedtime 90 tablet 0    ibuprofen (MOTRIN) 800 mg tablet Take 800 mg by mouth every 8 (eight) hours as needed      methocarbamol (ROBAXIN) 750 mg tablet Take 1 tablet (750 mg total) by mouth every 6 (six) hours as needed for muscle spasms 90 tablet 0    metroNIDAZOLE (METROGEL) 0 75 % vaginal gel INSERT 1 APPLICATORFUL VAGINALLY EVERY NIGHT FOR 5 DAYS      omeprazole (PriLOSEC) 20 mg delayed release capsule Take 1 capsule (20 mg total) by mouth daily before breakfast 90 capsule 3     No current facility-administered medications for this visit  Allergies: Allergies   Allergen Reactions    Asa [Aspirin] Shortness Of Breath            Diclofenac Rash    Latex Hives    Meloxicam Other (See Comments)     bruising    Penicillin V Hives and Rash    Penicillins Hives and Rash      Physical Exam:     /86 (BP Location: Right arm, Patient Position: Sitting, Cuff Size: Standard)   Pulse (!) 107   Temp (!) 97 4 °F (36 3 °C) (Temporal)   Resp 16   Ht 5' 5" (1 651 m)   Wt 70 8 kg (156 lb)   LMP 02/07/2022 (Approximate)   SpO2 98%   Breastfeeding No   BMI 25 96 kg/m²     Physical Exam  Vitals and nursing note reviewed  Constitutional:       Appearance: Normal appearance  Rimma York is well-developed  HENT:      Head: Normocephalic and atraumatic  Right Ear: Tympanic membrane, ear canal and external ear normal  There is no impacted cerumen  Left Ear: Tympanic membrane, ear canal and external ear normal  There is no impacted cerumen  Nose: Nose normal       Mouth/Throat:      Mouth: Mucous membranes are moist    Eyes:      Extraocular Movements: Extraocular movements intact  Conjunctiva/sclera: Conjunctivae normal       Pupils: Pupils are equal, round, and reactive to light  Cardiovascular:      Rate and Rhythm: Normal rate and regular rhythm  Pulses: Normal pulses  Heart sounds: Normal heart sounds  No murmur heard  Pulmonary:      Effort: Pulmonary effort is normal  No respiratory distress  Breath sounds: Normal breath sounds     Abdominal:      Palpations: Abdomen is soft  Tenderness: There is no abdominal tenderness  Musculoskeletal:      Cervical back: Neck supple  Tenderness present  Pain with movement present  Decreased range of motion  Skin:     General: Skin is warm and dry  Capillary Refill: Capillary refill takes less than 2 seconds  Neurological:      General: No focal deficit present  Mental Status: Marie Milner is alert and oriented to person, place, and time     Psychiatric:         Mood and Affect: Mood normal          Behavior: Behavior normal           Cayla Romy Butler 36

## 2022-02-16 NOTE — ASSESSMENT & PLAN NOTE
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

## 2022-02-16 NOTE — ASSESSMENT & PLAN NOTE
Everyday occurrence  Advil migraine helps somewhat    -increase Amitriptyline to 50mg daily - advised strict compliance, don't skip doses, DO NOT stop without letting me know first as side effects from abrupt discontinuation can be dangerous  -Will cont w/Advil and f/u w/me if desires Imitrex     -Referral to Neuro

## 2022-05-16 ENCOUNTER — OFFICE VISIT (OUTPATIENT)
Dept: FAMILY MEDICINE CLINIC | Facility: CLINIC | Age: 42
End: 2022-05-16

## 2022-05-16 VITALS
OXYGEN SATURATION: 99 % | BODY MASS INDEX: 27.41 KG/M2 | HEIGHT: 65 IN | DIASTOLIC BLOOD PRESSURE: 86 MMHG | HEART RATE: 98 BPM | WEIGHT: 164.5 LBS | TEMPERATURE: 97.8 F | RESPIRATION RATE: 18 BRPM | SYSTOLIC BLOOD PRESSURE: 128 MMHG

## 2022-05-16 DIAGNOSIS — G25.2 RESTING TREMOR: ICD-10-CM

## 2022-05-16 DIAGNOSIS — G43.109 CHRONIC MIGRAINE WITH AURA: Primary | ICD-10-CM

## 2022-05-16 DIAGNOSIS — T78.40XA RASH DUE TO ALLERGY: ICD-10-CM

## 2022-05-16 DIAGNOSIS — R21 RASH DUE TO ALLERGY: ICD-10-CM

## 2022-05-16 DIAGNOSIS — M54.2 CERVICAL SPINE PAIN: ICD-10-CM

## 2022-05-16 PROCEDURE — 99214 OFFICE O/P EST MOD 30 MIN: CPT | Performed by: NURSE PRACTITIONER

## 2022-05-16 RX ORDER — PRENATAL VIT 91/IRON/FOLIC/DHA 28-975-200
COMBINATION PACKAGE (EA) ORAL 2 TIMES DAILY
Qty: 30 G | Refills: 0 | Status: SHIPPED | OUTPATIENT
Start: 2022-05-16

## 2022-05-16 RX ORDER — TRIAMCINOLONE ACETONIDE 1 MG/G
CREAM TOPICAL 2 TIMES DAILY
Qty: 30 G | Refills: 0 | Status: SHIPPED | OUTPATIENT
Start: 2022-05-16

## 2022-05-16 RX ORDER — BACLOFEN 10 MG/1
10 TABLET ORAL 3 TIMES DAILY
Qty: 60 TABLET | Refills: 1 | Status: SHIPPED | OUTPATIENT
Start: 2022-05-16 | End: 2022-08-03 | Stop reason: DRUGHIGH

## 2022-05-16 NOTE — PROGRESS NOTES
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  Declines referral to Pain & Spine for further eval of possible injections or other treatment  Declines PT or referral to comp spine  - continue tylenol, ibuprofen sparingly and w/food (has GERD)  - d/c robaxin, start baclofen (this may have added benefit of relieving migraines which I feel have a connection to her cervical spine pain )           Relevant Medications    baclofen 10 mg tablet      Other Visit Diagnoses     Rash due to allergy        Relevant Medications    triamcinolone (KENALOG) 0 1 % cream    terbinafine (LamISIL) 1 % cream    Resting tremor        Relevant Orders    Vitamin B12            Return in about 2 months (around 7/16/2022) for Recheck migraines  A chart review was performed and previous primary care visit notes were reviewed  All applicable imaging studies were reviewed and images were reviewed personally  All applicable laboratory studies were reviewed personally  Care everywhere review was performed if  available and all pertinent notes were reviewed       Subjective:     HPI: Abel Williamson is a 46 y o  adult who  has a past medical history of Anxiety, Asthma, Bursitis, Clostridioides difficile infection, Gastric ulcer, Gastroparesis, GERD (gastroesophageal reflux disease), Liver cyst, Lumbar herniated disc, Migraine, Norovirus, Polycystic ovarian syndrome, Salmonella infection, Seasonal allergies, Torn ACL, Umbilical hernia, and Wears glasses  who presented to the office today for migraine f/u  States migraines have improved with increase of amitriptyline from 25 mg to 50 mg  She still does endorse about 2-3 migraines weekly  She does acknowledge strong connection to her cervical spine pain which as previously noted is related to her work doing [employer] for 8-10 hours daily therefore strong postural component to this  She has tried Robaxin with little relief  She takes Tylenol with marginal relief  I did advise her to use NSAIDs sparingly given her chronic GERD  She did not follow-up with Neurology  She continues to decline Comprehensive Spine physical therapy for her neck      The following portions of the patient's history were reviewed and updated as appropriate: allergies, current medications, past family history, past medical history, past social history, past surgical history, and problem list     Current Outpatient Medications on File Prior to Visit   Medication Sig Dispense Refill    acetaminophen-codeine (TYLENOL #3) 300-30 mg per tablet Take 1 tablet by mouth every 4 (four) hours as needed for moderate pain 30 tablet 0    albuterol (PROVENTIL HFA,VENTOLIN HFA) 90 mcg/act inhaler Inhale 2 puffs every 6 (six) hours as needed for wheezing or shortness of breath 18 g 2    amitriptyline (ELAVIL) 50 mg tablet Take 1 tablet (50 mg total) by mouth daily at bedtime 90 tablet 2    Diclofenac Sodium (VOLTAREN) 1 % Apply 2 g topically 4 (four) times a day 2 g 2    famotidine (PEPCID) 20 mg tablet Take 1 tablet (20 mg total) by mouth daily at bedtime 90 tablet 0    ibuprofen (MOTRIN) 800 mg tablet Take 800 mg by mouth every 8 (eight) hours as needed      metroNIDAZOLE (METROGEL) 0 75 % vaginal gel INSERT 1 APPLICATORFUL VAGINALLY EVERY NIGHT FOR 5 DAYS      omeprazole (PriLOSEC) 20 mg delayed release capsule Take 1 capsule (20 mg total) by mouth daily before breakfast 90 capsule 3     No current facility-administered medications on file prior to visit  Review of Systems   Constitutional: Negative for activity change, chills, fatigue, fever and unexpected weight change  HENT: Negative for congestion, ear pain, hearing loss, rhinorrhea, sinus pressure and sore throat  Eyes: Negative for pain and visual disturbance  Respiratory: Negative for cough, shortness of breath and wheezing  Cardiovascular: Negative for chest pain, palpitations and leg swelling  Gastrointestinal: Negative for abdominal pain, nausea and vomiting  Genitourinary: Negative for dysuria and hematuria  Musculoskeletal: Positive for neck pain and neck stiffness  Negative for arthralgias, back pain, gait problem, joint swelling and myalgias  Skin: Negative for color change and rash  Neurological: Positive for headaches  Negative for dizziness, tremors, seizures, syncope, weakness and light-headedness  Psychiatric/Behavioral: Negative for agitation, behavioral problems, confusion, dysphoric mood, self-injury, sleep disturbance and suicidal ideas  The patient is not nervous/anxious  All other systems reviewed and are negative  Objective:    /86 (BP Location: Left arm, Patient Position: Sitting, Cuff Size: Standard)   Pulse 98   Temp 97 8 °F (36 6 °C) (Temporal)   Resp 18   Ht 5' 5" (1 651 m)   Wt 74 6 kg (164 lb 8 oz)   LMP 04/01/2022 (Approximate)   SpO2 99%   BMI 27 37 kg/m²     Physical Exam  Vitals reviewed  Constitutional:       General: Alejandro Bradford is not in acute distress  Appearance: Normal appearance  HENT:      Head: Normocephalic  Right Ear: External ear normal       Left Ear: External ear normal       Nose: Nose normal  No congestion  Mouth/Throat:      Mouth: Mucous membranes are moist    Eyes:      Extraocular Movements: Extraocular movements intact  Conjunctiva/sclera: Conjunctivae normal       Pupils: Pupils are equal, round, and reactive to light  Cardiovascular:      Rate and Rhythm: Normal rate and regular rhythm  Pulses: Normal pulses  Heart sounds: Normal heart sounds  No murmur heard  No friction rub  No gallop  Pulmonary:      Effort: Pulmonary effort is normal       Breath sounds: Normal breath sounds  No wheezing  Abdominal:      General: Bowel sounds are normal       Palpations: Abdomen is soft  Tenderness: There is no abdominal tenderness  Musculoskeletal:      Cervical back: Neck supple  Spasms present  Pain with movement present  No muscular tenderness  Decreased range of motion  Lumbar back: Spasms and tenderness present  Decreased range of motion  Right lower leg: No edema  Left lower leg: No edema  Skin:     General: Skin is warm and dry  Capillary Refill: Capillary refill takes less than 2 seconds  Neurological:      General: No focal deficit present  Mental Status: Cirilo Gandhi is alert and oriented to person, place, and time  Psychiatric:         Mood and Affect: Mood normal          Behavior: Behavior normal          AAMIR Koroma  05/18/22  11:10 AM    There are no Patient Instructions on file for this visit

## 2022-05-16 NOTE — ASSESSMENT & PLAN NOTE
Everyday occurrence   Advil migraine helps somewhat    -increase Amitriptyline from 50mg to 75mg daily - advised strict compliance, don't skip doses, DO NOT stop without letting me know first as side effects from abrupt discontinuation can be dangerous  -Will cont w/Advil  -Referral to Neuro

## 2022-05-18 NOTE — ASSESSMENT & PLAN NOTE
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

## 2022-07-15 ENCOUNTER — TELEPHONE (OUTPATIENT)
Dept: FAMILY MEDICINE CLINIC | Facility: CLINIC | Age: 42
End: 2022-07-15

## 2022-07-15 NOTE — TELEPHONE ENCOUNTER
called left message to call office and reschedule appointment   provider not in office, appointment will be cancelled

## 2022-07-18 NOTE — TELEPHONE ENCOUNTER
second attempt to contact patient  left message to return my call on answering machine  Letter sent

## 2022-07-21 ENCOUNTER — TELEPHONE (OUTPATIENT)
Dept: NEUROLOGY | Facility: CLINIC | Age: 42
End: 2022-07-21

## 2022-07-21 NOTE — TELEPHONE ENCOUNTER
Spoke to patient and confirmed her 7/26/2022 @ 2 pm appointment with Dr Thelma Elena at the [facility]

## 2022-07-23 ENCOUNTER — HOSPITAL ENCOUNTER (EMERGENCY)
Facility: HOSPITAL | Age: 42
Discharge: HOME | End: 2022-07-24
Attending: EMERGENCY MEDICINE
Payer: MEDICARE

## 2022-07-23 DIAGNOSIS — R10.9 ABDOMINAL PAIN: Primary | ICD-10-CM

## 2022-07-23 PROCEDURE — 81025 URINE PREGNANCY TEST: CPT

## 2022-07-23 PROCEDURE — 99284 EMERGENCY DEPT VISIT MOD MDM: CPT

## 2022-07-24 VITALS
HEART RATE: 86 BPM | SYSTOLIC BLOOD PRESSURE: 137 MMHG | RESPIRATION RATE: 18 BRPM | TEMPERATURE: 98.7 F | DIASTOLIC BLOOD PRESSURE: 82 MMHG | OXYGEN SATURATION: 98 %

## 2022-07-24 LAB
ALBUMIN SERPL BCP-MCNC: 3.7 G/DL (ref 3.5–5)
ALP SERPL-CCNC: 79 U/L (ref 46–116)
ALT SERPL W P-5'-P-CCNC: 23 U/L (ref 12–78)
ANION GAP SERPL CALCULATED.3IONS-SCNC: 3 MMOL/L (ref 4–13)
AST SERPL W P-5'-P-CCNC: 14 U/L (ref 5–45)
BACTERIA UR QL AUTO: NORMAL /HPF
BASOPHILS # BLD AUTO: 0.05 THOUSANDS/ΜL (ref 0–0.1)
BASOPHILS NFR BLD AUTO: 0 % (ref 0–1)
BILIRUB SERPL-MCNC: 0.21 MG/DL (ref 0.2–1)
BILIRUB UR QL STRIP: NEGATIVE
BUN SERPL-MCNC: 8 MG/DL (ref 5–25)
CALCIUM SERPL-MCNC: 9 MG/DL (ref 8.3–10.1)
CHLORIDE SERPL-SCNC: 108 MMOL/L (ref 96–108)
CLARITY UR: CLEAR
CO2 SERPL-SCNC: 25 MMOL/L (ref 21–32)
COLOR UR: YELLOW
CREAT SERPL-MCNC: 0.79 MG/DL (ref 0.6–1.3)
EOSINOPHIL # BLD AUTO: 0.06 THOUSAND/ΜL (ref 0–0.61)
EOSINOPHIL NFR BLD AUTO: 1 % (ref 0–6)
ERYTHROCYTE [DISTWIDTH] IN BLOOD BY AUTOMATED COUNT: 13.8 % (ref 11.6–15.1)
EXT PREG TEST URINE: NEGATIVE
EXT. CONTROL ED NAV: NORMAL
GLUCOSE SERPL-MCNC: 115 MG/DL (ref 65–140)
GLUCOSE UR STRIP-MCNC: NEGATIVE MG/DL
HCT VFR BLD AUTO: 42.3 % (ref 36.5–46.1)
HGB BLD-MCNC: 13.3 G/DL (ref 12–15.4)
HGB UR QL STRIP.AUTO: ABNORMAL
IMM GRANULOCYTES # BLD AUTO: 0.05 THOUSAND/UL (ref 0–0.2)
IMM GRANULOCYTES NFR BLD AUTO: 0 % (ref 0–2)
KETONES UR STRIP-MCNC: NEGATIVE MG/DL
LEUKOCYTE ESTERASE UR QL STRIP: ABNORMAL
LIPASE SERPL-CCNC: 137 U/L (ref 73–393)
LYMPHOCYTES # BLD AUTO: 1.46 THOUSANDS/ΜL (ref 0.6–4.47)
LYMPHOCYTES NFR BLD AUTO: 12 % (ref 14–44)
MCH RBC QN AUTO: 26.8 PG (ref 26.8–34.3)
MCHC RBC AUTO-ENTMCNC: 31.4 G/DL (ref 31.4–37.4)
MCV RBC AUTO: 85 FL (ref 82–98)
MONOCYTES # BLD AUTO: 0.42 THOUSAND/ΜL (ref 0.17–1.22)
MONOCYTES NFR BLD AUTO: 4 % (ref 4–12)
NEUTROPHILS # BLD AUTO: 10.04 THOUSANDS/ΜL (ref 1.85–7.62)
NEUTS SEG NFR BLD AUTO: 83 % (ref 43–75)
NITRITE UR QL STRIP: NEGATIVE
NON-SQ EPI CELLS URNS QL MICRO: NORMAL /HPF
NRBC BLD AUTO-RTO: 0 /100 WBCS
PH UR STRIP.AUTO: 6.5 [PH] (ref 4.5–8)
PLATELET # BLD AUTO: 294 THOUSANDS/UL (ref 149–390)
PMV BLD AUTO: 9.6 FL (ref 8.9–12.7)
POTASSIUM SERPL-SCNC: 4.3 MMOL/L (ref 3.5–5.3)
PROT SERPL-MCNC: 7.9 G/DL (ref 6.4–8.4)
PROT UR STRIP-MCNC: NEGATIVE MG/DL
RBC # BLD AUTO: 4.96 MILLION/UL (ref 3.88–5.12)
RBC #/AREA URNS AUTO: NORMAL /HPF
SODIUM SERPL-SCNC: 136 MMOL/L (ref 135–147)
SP GR UR STRIP.AUTO: 1.01 (ref 1–1.03)
UROBILINOGEN UR QL STRIP.AUTO: 0.2 E.U./DL
WBC # BLD AUTO: 12.08 THOUSAND/UL (ref 4.31–10.16)
WBC #/AREA URNS AUTO: NORMAL /HPF

## 2022-07-24 PROCEDURE — 96374 THER/PROPH/DIAG INJ IV PUSH: CPT

## 2022-07-24 PROCEDURE — 36415 COLL VENOUS BLD VENIPUNCTURE: CPT

## 2022-07-24 PROCEDURE — 85025 COMPLETE CBC W/AUTO DIFF WBC: CPT

## 2022-07-24 PROCEDURE — 81001 URINALYSIS AUTO W/SCOPE: CPT

## 2022-07-24 PROCEDURE — 83690 ASSAY OF LIPASE: CPT

## 2022-07-24 PROCEDURE — 99284 EMERGENCY DEPT VISIT MOD MDM: CPT | Performed by: EMERGENCY MEDICINE

## 2022-07-24 PROCEDURE — 80053 COMPREHEN METABOLIC PANEL: CPT

## 2022-07-24 PROCEDURE — 96375 TX/PRO/DX INJ NEW DRUG ADDON: CPT

## 2022-07-24 RX ORDER — ONDANSETRON 2 MG/ML
4 INJECTION INTRAMUSCULAR; INTRAVENOUS ONCE
Status: COMPLETED | OUTPATIENT
Start: 2022-07-24 | End: 2022-07-24

## 2022-07-24 RX ORDER — KETOROLAC TROMETHAMINE 30 MG/ML
15 INJECTION, SOLUTION INTRAMUSCULAR; INTRAVENOUS ONCE
Status: COMPLETED | OUTPATIENT
Start: 2022-07-24 | End: 2022-07-24

## 2022-07-24 RX ADMIN — ONDANSETRON 4 MG: 2 INJECTION INTRAMUSCULAR; INTRAVENOUS at 00:34

## 2022-07-24 RX ADMIN — KETOROLAC TROMETHAMINE 15 MG: 30 INJECTION, SOLUTION INTRAMUSCULAR; INTRAVENOUS at 00:34

## 2022-07-24 NOTE — ED ATTENDING ATTESTATION
Final Diagnosis:  1  Abdominal pain      ED Course as of 07/24/22 0653   Seema Jul 24, 2022   0056 WBC(!): 12 08   0056 Hemoglobin: 13 3   0056 Platelet Count: 978   0056 Leukocytes, UA(!): Trace   0056 Nitrite, UA: Negative   0056 Bacteria, UA: Occasional   0056 PREGNANCY TEST URINE: Negative   0138 Patient refusing pelvic exam; would prefer to follow-up with PCP given that her pain has improved  Chen Greer MD, saw and evaluated the patient  All available labs and X-rays were ordered by me or the resident and have been reviewed by myself  I discussed the patient with the resident / non-physician and agree with the resident's / non-physician practitioner's findings and plan as documented in the resident's / non-physician practicitioner's note, except where noted  At this point, I agree with the current assessment done in the ED  I was present during key portions of all procedures performed unless otherwise stated  Chief Complaint   Patient presents with    Abdominal Pain     Lower mid abdominal px started 30 minutes go feels like cramps and similar to previous cyst rupture  Denies N/V/D; no constipation; no SOB or chest px  Has hx of polycystic ovarian syndrome  This is a 46 y o  adult presenting for evaluation of lower belly pain  9 PM was having sexual activity  Then had acute suprapubpic pain feeling crampy, constant since then  Waxes/wanes to a degree  Early June was last menses  Feels similar to an ovarian cyst rupture  Saw OBGYN recently for routine appointment and had a prescription sent for yeast based on a swab done in the office       PMH:  PCOS  stomach ulcer   has a past medical history of Anxiety, Asthma, Bursitis, Clostridioides difficile infection, Gastric ulcer, Gastroparesis, GERD (gastroesophageal reflux disease), Liver cyst, Lumbar herniated disc, Migraine, Norovirus, Polycystic ovarian syndrome, Salmonella infection (08/2021), Seasonal allergies, Torn ACL, Umbilical hernia, and Wears glasses  PSH:   has a past surgical history that includes Tubal ligation; pr esophagogastroduodenoscopy transoral diagnostic (N/A, 5/1/2017); [place] tooth extraction; pr knee scope,aid ant cruciate repair (Right, 5/24/2018); and Knee surgery (Right)  Social:  Social History     Substance and Sexual Activity   Alcohol Use Not Currently    Comment: rare     Social History     Tobacco Use   Smoking Status Never Smoker   Smokeless Tobacco Never Used     Social History     Substance and Sexual Activity   Drug Use No     PE:  Vitals:    07/23/22 2151 07/24/22 0045   BP: 136/97 137/82   BP Location: Left arm    Pulse: 104 86   Resp: 18 18   Temp: 98 7 °F (37 1 °C)    TempSrc: Oral    SpO2: 98% 98%   General: VSS, NAD, awake, alert  Well-nourished, well-developed  Appears stated age  Head: Normocephalic, atraumatic, nontender  Eyes: PERRL, EOM-I  No diplopia  No hyphema  No subconjunctival hemorrhages  Symmetrical lids  ENTAtraumatic external nose and ears  MMM  No stridor  Normal phonation  No drooling  Base of mouth is soft  No mastoid tenderness  Neck: Symmetric, trachea midline  No JVD  CV: Peripheral pulses +2 throughout  No chest wall tenderness  Lungs:   Unlabored   No retractions  No crepitus  No tachypnea  No paradoxical motion  Abd: +BS, soft, NT/ND  Heel strike negative  No rebound  No guarding  MSK:   FROM   Back:   No CVAT  Skin: Dry, intact  Neuro: AAOx3, GCS 15, CN II-XII grossly intact  Motor grossly intact  Psychiatric/Behavioral: Appropriate mood and affect   Exam: per resident  A:  - Belly pain, suprapubpic   P:  - Urine for infection  - Torsion? She appears pretty comfortable currently  Unlikely torsion    - Cystic related? Unlikely given the acute onset during sexual activity  - suspect vaginal trauma more so  - Tylenol/Toradol  - Sexual activity related    - non-tender abdomen is reassuring    - 13 point ROS was performed and all are normal unless stated in the history above  - Nursing note reviewed  Vitals reviewed  - Orders placed by myself and/or advanced practitioner / resident     - Previous chart was reviewed  - No language barrier    - History obtained from patient  - There are no limitations to the history obtained  - Critical care time: Not applicable for this patient       Code Status: No Order  Advance Directive and Living Will:      Power of :    POLST:      Medications   ketorolac (TORADOL) injection 15 mg (15 mg Intravenous Given 7/24/22 0034)   ondansetron (ZOFRAN) injection 4 mg (4 mg Intravenous Given 7/24/22 0034)     No orders to display     Orders Placed This Encounter   Procedures    CBC and differential    Comprehensive metabolic panel    Lipase    Urine Microscopic    POCT pregnancy, urine     Labs Reviewed   CBC AND DIFFERENTIAL - Abnormal       Result Value Ref Range Status    WBC 12 08 (*) 4 31 - 10 16 Thousand/uL Final    RBC 4 96  3 88 - 5 12 Million/uL Final    Hemoglobin 13 3  12 0 - 15 4 g/dL Final    Hematocrit 42 3  36 5 - 46 1 % Final    MCV 85  82 - 98 fL Final    MCH 26 8  26 8 - 34 3 pg Final    MCHC 31 4  31 4 - 37 4 g/dL Final    RDW 13 8  11 6 - 15 1 % Final    MPV 9 6  8 9 - 12 7 fL Final    Platelets 639  950 - 390 Thousands/uL Final    nRBC 0  /100 WBCs Final    Neutrophils Relative 83 (*) 43 - 75 % Final    Immat GRANS % 0  0 - 2 % Final    Lymphocytes Relative 12 (*) 14 - 44 % Final    Monocytes Relative 4  4 - 12 % Final    Eosinophils Relative 1  0 - 6 % Final    Basophils Relative 0  0 - 1 % Final    Neutrophils Absolute 10 04 (*) 1 85 - 7 62 Thousands/µL Final    Immature Grans Absolute 0 05  0 00 - 0 20 Thousand/uL Final    Lymphocytes Absolute 1 46  0 60 - 4 47 Thousands/µL Final    Monocytes Absolute 0 42  0 17 - 1 22 Thousand/µL Final    Eosinophils Absolute 0 06  0 00 - 0 61 Thousand/µL Final    Basophils Absolute 0 05  0 00 - 0 10 Thousands/µL Final   COMPREHENSIVE METABOLIC PANEL - Abnormal    Sodium 136  135 - 147 mmol/L Final    Potassium 4 3  3 5 - 5 3 mmol/L Final    Chloride 108  96 - 108 mmol/L Final    CO2 25  21 - 32 mmol/L Final    ANION GAP 3 (*) 4 - 13 mmol/L Final    BUN 8  5 - 25 mg/dL Final    Creatinine 0 79  0 60 - 1 30 mg/dL Final    Comment: Standardized to IDMS reference method    Glucose 115  65 - 140 mg/dL Final    Comment: If the patient is fasting, the ADA then defines impaired fasting glucose as > 100 mg/dL and diabetes as > or equal to 123 mg/dL  Specimen collection should occur prior to Sulfasalazine administration due to the potential for falsely depressed results  Specimen collection should occur prior to Sulfapyridine administration due to the potential for falsely elevated results  Calcium 9 0  8 3 - 10 1 mg/dL Final    AST 14  5 - 45 U/L Final    Comment: Specimen collection should occur prior to Sulfasalazine administration due to the potential for falsely depressed results  ALT 23  12 - 78 U/L Final    Comment: Specimen collection should occur prior to Sulfasalazine and/or Sulfapyridine administration due to the potential for falsely depressed results  Alkaline Phosphatase 79  46 - 116 U/L Final    Total Protein 7 9  6 4 - 8 4 g/dL Final    Albumin 3 7  3 5 - 5 0 g/dL Final    Total Bilirubin 0 21  0 20 - 1 00 mg/dL Final    Comment: Use of this assay is not recommended for patients undergoing treatment with eltrombopag due to the potential for falsely elevated results  eGFR     Final    Narrative:     Notes:     1  eGFR calculation is only valid for adults 18 years and older  2  EGFR calculation cannot be performed for patients who are transgender, non-binary, or whose legal sex, sex at birth, and gender identity differ     URINE MACROSCOPIC, POC - Abnormal    Color, UA Yellow   Final    Clarity, UA Clear   Final    pH, UA 6 5  4 5 - 8 0 Final    Leukocytes, UA Trace (*) Negative Final    Nitrite, UA Negative  Negative Final    Protein, UA Negative  Negative mg/dl Final    Glucose, UA Negative  Negative mg/dl Final    Ketones, UA Negative  Negative mg/dl Final    Urobilinogen, UA 0 2  0 2, 1 0 E U /dl E U /dl Final    Bilirubin, UA Negative  Negative Final    Occult Blood, UA Trace (*) Negative Final    Specific [place], UA 1 010  1 003 - 1 030 Final    Narrative:     CLINITEK RESULT   LIPASE - Normal    Lipase 137  73 - 393 u/L Final   URINE MICROSCOPIC - Normal    RBC, UA None Seen  None Seen, 1-2 /hpf Final    WBC, UA None Seen  None Seen, 1-2 /hpf Final    Epithelial Cells Occasional  None Seen, Occasional /hpf Final    Bacteria, UA Occasional  None Seen, Occasional /hpf Final   POCT PREGNANCY, URINE - Normal    EXT PREG TEST UR (Ref: Negative) Negative   Final    Control Valid   Final     Time reflects when diagnosis was documented in both MDM as applicable and the Disposition within this note       Time User Action Codes Description Comment    7/24/2022  1:37 AM Marie Tran Add [R10 9] Abdominal pain           ED Disposition       ED Disposition   Discharge    Condition   Stable    Date/Time   Sun Jul 24, 2022  1:38 AM    Comment   Maci Beckwith discharge to home/self care                     Follow-up Information       Follow up With Specialties Details Why Contact Info    AAMIR Ghosh Nurse Practitioner Call in 1 day  [address] [phone]            Discharge Medication List as of 7/24/2022  1:38 AM        CONTINUE these medications which have NOT CHANGED    Details   acetaminophen-codeine (TYLENOL #3) 300-30 mg per tablet Take 1 tablet by mouth every 4 (four) hours as needed for moderate pain, Starting Tue 2/15/2022, Normal      albuterol (PROVENTIL HFA,VENTOLIN HFA) 90 mcg/act inhaler Inhale 2 puffs every 6 (six) hours as needed for wheezing or shortness of breath, Starting Mon 11/15/2021, Normal      amitriptyline (ELAVIL) 50 mg tablet Take 1 tablet (50 mg total) by mouth daily at bedtime, Starting Tue 2/15/2022, Normal      baclofen 10 mg tablet Take 1 tablet (10 mg total) by mouth in the morning and 1 tablet (10 mg total) in the evening and 1 tablet (10 mg total) before bedtime  , Starting Mon 5/16/2022, Normal      Diclofenac Sodium (VOLTAREN) 1 % Apply 2 g topically 4 (four) times a day, Starting Mon 11/15/2021, Normal      famotidine (PEPCID) 20 mg tablet Take 1 tablet (20 mg total) by mouth daily at bedtime, Starting Mon 11/15/2021, Normal      ibuprofen (MOTRIN) 800 mg tablet Take 800 mg by mouth every 8 (eight) hours as needed, Historical Med      metroNIDAZOLE (METROGEL) 0 75 % vaginal gel INSERT 1 APPLICATORFUL VAGINALLY EVERY NIGHT FOR 5 DAYS, Historical Med      omeprazole (PriLOSEC) 20 mg delayed release capsule Take 1 capsule (20 mg total) by mouth daily before breakfast, Starting Mon 11/15/2021, Normal      terbinafine (LamISIL) 1 % cream Apply topically 2 (two) times a day, Starting Mon 5/16/2022, Normal      triamcinolone (KENALOG) 0 1 % cream Apply topically 2 (two) times a day, Starting Mon 5/16/2022, Normal           No discharge procedures on file  Prior to Admission Medications   Prescriptions Last Dose Informant Patient Reported? Taking? Diclofenac Sodium (VOLTAREN) 1 %   No No   Sig: Apply 2 g topically 4 (four) times a day   acetaminophen-codeine (TYLENOL #3) 300-30 mg per tablet   No No   Sig: Take 1 tablet by mouth every 4 (four) hours as needed for moderate pain   albuterol (PROVENTIL HFA,VENTOLIN HFA) 90 mcg/act inhaler   No No   Sig: Inhale 2 puffs every 6 (six) hours as needed for wheezing or shortness of breath   amitriptyline (ELAVIL) 50 mg tablet   No No   Sig: Take 1 tablet (50 mg total) by mouth daily at bedtime   baclofen 10 mg tablet   No No   Sig: Take 1 tablet (10 mg total) by mouth in the morning and 1 tablet (10 mg total) in the evening and 1 tablet (10 mg total) before bedtime     famotidine (PEPCID) 20 mg tablet   No No   Sig: Take 1 tablet (20 mg total) by mouth daily at bedtime   ibuprofen (MOTRIN) 800 mg tablet   Yes No   Sig: Take 800 mg by mouth every 8 (eight) hours as needed   metroNIDAZOLE (METROGEL) 0 75 % vaginal gel   Yes No   Sig: INSERT 1 APPLICATORFUL VAGINALLY EVERY NIGHT FOR 5 DAYS   omeprazole (PriLOSEC) 20 mg delayed release capsule   No No   Sig: Take 1 capsule (20 mg total) by mouth daily before breakfast   terbinafine (LamISIL) 1 % cream   No No   Sig: Apply topically 2 (two) times a day   triamcinolone (KENALOG) 0 1 % cream   No No   Sig: Apply topically 2 (two) times a day      Facility-Administered Medications: None       Portions of the record may have been created with voice recognition software  Occasional wrong word or "sound a like" substitutions may have occurred due to the inherent limitations of voice recognition software  Read the chart carefully and recognize, using context, where substitutions have occurred      Electronically signed by:  Marah Marcelino

## 2022-07-24 NOTE — DISCHARGE INSTRUCTIONS
Call your primary care doctor tomorrow to schedule a follow-up appointment within the next few days  Return to the emergency department if symptoms worsen, increased pain, vomiting, fevers, feeling like you may pass out, inability to tolerate eating or drinking, or any other symptoms that concern you

## 2022-07-24 NOTE — ED PROVIDER NOTES
History  Chief Complaint   Patient presents with    Abdominal Pain     Lower mid abdominal px started 30 minutes go feels like cramps and similar to previous cyst rupture  Denies N/V/D; no constipation; no SOB or chest px  Has hx of polycystic ovarian syndrome  41F PMH PCOS presented to the emergency department with abdominal pain  She reports around 9:00 p m  she was having intercourse and had onset of a cramping pain in the suprapubic area  The pain has been constant since then but waxes and wanes  She denies vaginal pain, bleeding, or discharge  She has had nausea but denies vomiting, fevers, diarrhea, dysuria, or hematuria  She reports history of an ovarian cyst rupture and states this feels similar  She states she was diagnosed with a yeast infection based on a test by her OBGYN although she was not having any symptoms, she has a prescription but has not yet taken any medication for it  LMP was in early June  Prior to Admission Medications   Prescriptions Last Dose Informant Patient Reported? Taking? Diclofenac Sodium (VOLTAREN) 1 %   No No   Sig: Apply 2 g topically 4 (four) times a day   acetaminophen-codeine (TYLENOL #3) 300-30 mg per tablet   No No   Sig: Take 1 tablet by mouth every 4 (four) hours as needed for moderate pain   albuterol (PROVENTIL HFA,VENTOLIN HFA) 90 mcg/act inhaler   No No   Sig: Inhale 2 puffs every 6 (six) hours as needed for wheezing or shortness of breath   amitriptyline (ELAVIL) 50 mg tablet   No No   Sig: Take 1 tablet (50 mg total) by mouth daily at bedtime   baclofen 10 mg tablet   No No   Sig: Take 1 tablet (10 mg total) by mouth in the morning and 1 tablet (10 mg total) in the evening and 1 tablet (10 mg total) before bedtime     famotidine (PEPCID) 20 mg tablet   No No   Sig: Take 1 tablet (20 mg total) by mouth daily at bedtime   ibuprofen (MOTRIN) 800 mg tablet   Yes No   Sig: Take 800 mg by mouth every 8 (eight) hours as needed   metroNIDAZOLE (METROGEL) 0 75 % vaginal gel   Yes No   Sig: INSERT 1 APPLICATORFUL VAGINALLY EVERY NIGHT FOR 5 DAYS   omeprazole (PriLOSEC) 20 mg delayed release capsule   No No   Sig: Take 1 capsule (20 mg total) by mouth daily before breakfast   terbinafine (LamISIL) 1 % cream   No No   Sig: Apply topically 2 (two) times a day   triamcinolone (KENALOG) 0 1 % cream   No No   Sig: Apply topically 2 (two) times a day      Facility-Administered Medications: None       Past Medical History:   Diagnosis Date    Anxiety     Asthma     Bursitis     Clostridioides difficile infection     8/2021-treatment received    Gastric ulcer     Gastroparesis     GERD (gastroesophageal reflux disease)     Liver cyst     Lumbar herniated disc     Migraine     Norovirus     8/2021    Polycystic ovarian syndrome     Salmonella infection 08/2021    Seasonal allergies     Torn ACL     Umbilical hernia     Wears glasses        Past Surgical History:   Procedure Laterality Date    KNEE SURGERY Right     AK ESOPHAGOGASTRODUODENOSCOPY TRANSORAL DIAGNOSTIC N/A 5/1/2017    Procedure: ESOPHAGOGASTRODUODENOSCOPY (EGD); Surgeon: Dennis De Anda MD;  Location: [facility] GI LAB;   Service: Gastroenterology    AK KNEE SCOPE,AID ANT CRUCIATE REPAIR Right 5/24/2018    Procedure: ARTHROSCOPIC RECONSTRUCTION ANTERIOR CRUCIATE LIGAMENT (ACL) WITH AUTOGRAFT AND ALLOGRAFT;  Surgeon: Teresa Nava MD;  Location: [facility] OR;  Service: Orthopedics    TUBAL LIGATION      WISDOM TOOTH EXTRACTION         Family History   Problem Relation Age of Onset    Depression Mother     Anxiety disorder Mother     Sleep disorder Mother     Hypertension Mother     Hyperlipidemia Mother     Hyperlipidemia Father     Hypertension Father     Heart disease Father     Heart disease Paternal Aunt     Heart disease Paternal Uncle     Osteoporosis Maternal Grandmother     Alzheimer's disease Paternal Grandmother     Heart disease Paternal Grandmother     Heart disease Paternal Grandfather     Stroke Paternal Grandfather      I have reviewed and agree with the history as documented  E-Cigarette/Vaping     E-Cigarette/Vaping Substances     Social History     Tobacco Use    Smoking status: Never Smoker    Smokeless tobacco: Never Used   Substance Use Topics    Alcohol use: Not Currently     Comment: rare    Drug use: No        Review of Systems   Constitutional: Negative for fever  Respiratory: Negative for shortness of breath  Cardiovascular: Negative for chest pain  Gastrointestinal: Positive for abdominal pain and nausea  Negative for diarrhea and vomiting  Genitourinary: Negative for dysuria, hematuria, vaginal bleeding, vaginal discharge and vaginal pain  All other systems reviewed and are negative  Physical Exam  ED Triage Vitals [07/23/22 2151]   Temperature Pulse Respirations Blood Pressure SpO2   98 7 °F (37 1 °C) 104 18 136/97 98 %      Temp Source Heart Rate Source Patient Position - Orthostatic VS BP Location FiO2 (%)   Oral -- Sitting Left arm --      Pain Score       5             Orthostatic Vital Signs  Vitals:    07/23/22 2151 07/24/22 0045   BP: 136/97 137/82   Pulse: 104 86   Patient Position - Orthostatic VS: Sitting        Physical Exam  Vitals and nursing note reviewed  Constitutional:       General: Den Rivers is not in acute distress  Appearance: Den Rivers is not ill-appearing  HENT:      Head: Normocephalic  Cardiovascular:      Rate and Rhythm: Normal rate and regular rhythm  Heart sounds: Normal heart sounds  No murmur heard  Pulmonary:      Effort: Pulmonary effort is normal  No respiratory distress  Breath sounds: Normal breath sounds  No wheezing, rhonchi or rales  Abdominal:      General: Abdomen is flat  There is no distension  Palpations: Abdomen is soft  Tenderness: There is no abdominal tenderness  There is no right CVA tenderness, left CVA tenderness, guarding or rebound   Negative signs include Munoz's sign and McBurney's sign  Musculoskeletal:      Right lower leg: No edema  Left lower leg: No edema  Skin:     General: Skin is warm and dry  Neurological:      Mental Status: Malik Sweeney is alert  ED Medications  Medications   ketorolac (TORADOL) injection 15 mg (15 mg Intravenous Given 7/24/22 0034)   ondansetron (ZOFRAN) injection 4 mg (4 mg Intravenous Given 7/24/22 0034)       Diagnostic Studies  Results Reviewed     Procedure Component Value Units Date/Time    Comprehensive metabolic panel [[id number]]  (Abnormal) Collected: 07/24/22 0016    Lab Status: Final result Specimen: Blood from Arm, Right Updated: 07/24/22 0053     Sodium 136 mmol/L      Potassium 4 3 mmol/L      Chloride 108 mmol/L      CO2 25 mmol/L      ANION GAP 3 mmol/L      BUN 8 mg/dL      Creatinine 0 79 mg/dL      Glucose 115 mg/dL      Calcium 9 0 mg/dL      AST 14 U/L      ALT 23 U/L      Alkaline Phosphatase 79 U/L      Total Protein 7 9 g/dL      Albumin 3 7 g/dL      Total Bilirubin 0 21 mg/dL      eGFR --    Narrative:      Notes:     1  eGFR calculation is only valid for adults 18 years and older  2  EGFR calculation cannot be performed for patients who are transgender, non-binary, or whose legal sex, sex at birth, and gender identity differ      Lipase [[id number]]  (Normal) Collected: 07/24/22 0016    Lab Status: Final result Specimen: Blood from Arm, Right Updated: 07/24/22 0053     Lipase 137 u/L     Urine Microscopic [[id number]]  (Normal) Collected: 07/24/22 0031    Lab Status: Final result Specimen: Urine, Clean Catch Updated: 07/24/22 0047     RBC, UA None Seen /hpf      WBC, UA None Seen /hpf      Epithelial Cells Occasional /hpf      Bacteria, UA Occasional /hpf     POCT pregnancy, urine [[id number]]  (Normal) Resulted: 07/24/22 0042    Lab Status: Final result Updated: 07/24/22 0042     EXT PREG TEST UR (Ref: Negative) Negative     Control Valid    Urine Macroscopic, POC [[id number]] (Abnormal) Collected: 07/24/22 0031    Lab Status: Final result Specimen: Urine Updated: 07/24/22 0032     Color, UA Yellow     Clarity, UA Clear     pH, UA 6 5     Leukocytes, UA Trace     Nitrite, UA Negative     Protein, UA Negative mg/dl      Glucose, UA Negative mg/dl      Ketones, UA Negative mg/dl      Urobilinogen, UA 0 2 E U /dl      Bilirubin, UA Negative     Occult Blood, UA Trace     Specific [place], UA 1 010    Narrative:      CLINITEK RESULT    CBC and differential [[id number]]  (Abnormal) Collected: 07/24/22 0016    Lab Status: Final result Specimen: Blood from Arm, Right Updated: 07/24/22 0026     WBC 12 08 Thousand/uL      RBC 4 96 Million/uL      Hemoglobin 13 3 g/dL      Hematocrit 42 3 %      MCV 85 fL      MCH 26 8 pg      MCHC 31 4 g/dL      RDW 13 8 %      MPV 9 6 fL      Platelets 889 Thousands/uL      nRBC 0 /100 WBCs      Neutrophils Relative 83 %      Immat GRANS % 0 %      Lymphocytes Relative 12 %      Monocytes Relative 4 %      Eosinophils Relative 1 %      Basophils Relative 0 %      Neutrophils Absolute 10 04 Thousands/µL      Immature Grans Absolute 0 05 Thousand/uL      Lymphocytes Absolute 1 46 Thousands/µL      Monocytes Absolute 0 42 Thousand/µL      Eosinophils Absolute 0 06 Thousand/µL      Basophils Absolute 0 05 Thousands/µL                  No orders to display         Procedures  Procedures      ED Course                             SBIRT 22yo+    Flowsheet Row Most Recent Value   SBIRT (23 yo +)    In order to provide better care to our patients, we are screening all of our patients for alcohol and drug use  Would it be okay to ask you these screening questions? No Filed at: 07/24/2022 0041                [facility]  Number of Diagnoses or Management Options  Diagnosis management comments: 41F PMH PCOS presented to the emergency department with abdominal pain    Workup including vital signs, physical exam, labs, urinalysis, pregnancy test   Abdominal exam without any tenderness, imaging not currently indicated  Labs largely unremarkable, urinalysis without signs of infection, pregnancy test negative  Offered pelvic exam however patient declined, reports feeling improvement after Toradol and would prefer discharge home  Patient states that she has a primary care doctor that she can call to schedule a follow-up appointment within the next few days, strict return precautions given  Stable for discharge home with primary care follow up, discharge instructions and return precautions given  Disposition  Final diagnoses:   Abdominal pain     Time reflects when diagnosis was documented in both MDM as applicable and the Disposition within this note     Time User Action Codes Description Comment    7/24/2022  1:37 AM Abril Campos Add [R10 9] Abdominal pain       ED Disposition     ED Disposition   Discharge    Condition   Stable    Date/Time   Sun Jul 24, 2022  1:38 AM    Comment   Ronen Hazleton discharge to home/self care  Follow-up Information     Follow up With Specialties Details Why Contact Info    AAMIR Mcneill Nurse Practitioner Call in 1 day  [address]  [phone]            Discharge Medication List as of 7/24/2022  1:38 AM      CONTINUE these medications which have NOT CHANGED    Details   acetaminophen-codeine (TYLENOL #3) 300-30 mg per tablet Take 1 tablet by mouth every 4 (four) hours as needed for moderate pain, Starting Tue 2/15/2022, Normal      albuterol (PROVENTIL HFA,VENTOLIN HFA) 90 mcg/act inhaler Inhale 2 puffs every 6 (six) hours as needed for wheezing or shortness of breath, Starting Mon 11/15/2021, Normal      amitriptyline (ELAVIL) 50 mg tablet Take 1 tablet (50 mg total) by mouth daily at bedtime, Starting Tue 2/15/2022, Normal      baclofen 10 mg tablet Take 1 tablet (10 mg total) by mouth in the morning and 1 tablet (10 mg total) in the evening and 1 tablet (10 mg total) before bedtime  , Starting Mon 5/16/2022, Normal      Diclofenac Sodium (VOLTAREN) 1 % Apply 2 g topically 4 (four) times a day, Starting Mon 11/15/2021, Normal      famotidine (PEPCID) 20 mg tablet Take 1 tablet (20 mg total) by mouth daily at bedtime, Starting Mon 11/15/2021, Normal      ibuprofen (MOTRIN) 800 mg tablet Take 800 mg by mouth every 8 (eight) hours as needed, Historical Med      metroNIDAZOLE (METROGEL) 0 75 % vaginal gel INSERT 1 APPLICATORFUL VAGINALLY EVERY NIGHT FOR 5 DAYS, Historical Med      omeprazole (PriLOSEC) 20 mg delayed release capsule Take 1 capsule (20 mg total) by mouth daily before breakfast, Starting Mon 11/15/2021, Normal      terbinafine (LamISIL) 1 % cream Apply topically 2 (two) times a day, Starting Mon 5/16/2022, Normal      triamcinolone (KENALOG) 0 1 % cream Apply topically 2 (two) times a day, Starting Mon 5/16/2022, Normal           No discharge procedures on file  PDMP Review       Value Time User    PDMP Reviewed  Yes 2/15/2022  4:08 PM Nano Johnson           ED Provider  Attending physically available and evaluated Vargas Last  JESSI managed the patient along with the ED Attending      Electronically Signed by         Raffi Ruiz MD  07/24/22 2941

## 2022-07-26 ENCOUNTER — CONSULT (OUTPATIENT)
Dept: NEUROLOGY | Facility: CLINIC | Age: 42
End: 2022-07-26
Payer: MEDICARE

## 2022-07-26 VITALS
TEMPERATURE: 98.6 F | DIASTOLIC BLOOD PRESSURE: 80 MMHG | HEART RATE: 82 BPM | BODY MASS INDEX: 27.16 KG/M2 | HEIGHT: 65 IN | WEIGHT: 163 LBS | SYSTOLIC BLOOD PRESSURE: 122 MMHG

## 2022-07-26 DIAGNOSIS — G25.0 BENIGN ESSENTIAL TREMOR: ICD-10-CM

## 2022-07-26 DIAGNOSIS — G43.109 CHRONIC MIGRAINE WITH AURA: Primary | ICD-10-CM

## 2022-07-26 PROCEDURE — 99244 OFF/OP CNSLTJ NEW/EST MOD 40: CPT | Performed by: PSYCHIATRY & NEUROLOGY

## 2022-07-26 RX ORDER — PROPRANOLOL HYDROCHLORIDE 40 MG/1
40 TABLET ORAL 3 TIMES DAILY
Qty: 90 TABLET | Refills: 0 | Status: SHIPPED | OUTPATIENT
Start: 2022-07-26 | End: 2022-08-22

## 2022-07-26 NOTE — PROGRESS NOTES
Patient ID: Azucena Rajan is a 46 y o  adult  Assessment/Plan:    Chronic migraine with aura  Patient is here for consultation of migraine headache  Follows with PCP AAMIR Contreras for migraine  Currently on Amitriptyline 75mg daily and baclofen 10 mg t i d   P r n  Tylenol and ibuprofen    Currently patient has migraine headaches for about 1-2 times per week and feels that amitriptyline and baclofen have been controlling it well  Plan:   Continue amitriptyline 75 mg daily and baclofen 10 mg t i d  Continue to monitor and if migraine headache has been worsens, will adjust medications  Follow-up with neurology in 2 months for tremor medication adjustment    Benign essential tremor  Has tremors since 24years old  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 and notices tremors only when her hands resting on the table  Currently no intentional tremors  Degree of tremors depends on the day: some days are worse than others  No Pakinsonism features  Fam Hx: patient's mother and patient's 26 yo daughter have tremors    Impression: likely family essential tremor    Plan:   Although patient has very mild tremor, she would like to try medication as it is embarrassing as her co-worker notices it  Will initiate Propranolol 40mg three times a day  She recently had CBC and CMP done  Will check ceruloplasmin, copper level, and iron panel  Consult patient that propranolol can cause reduced energy and sluggishness  Recommend to contact Neurology office if the side effects interfere with her daily functions  Follow-up with neurology in 2 months to reassess and adjust her medication if needed  Diagnoses and all orders for this visit:    Chronic migraine with aura  -     Ambulatory Referral to Neurology    Benign essential tremor  -     propranolol (INDERAL) 40 mg tablet;  Take 1 tablet (40 mg total) by mouth 3 (three) times a day  -     Cancel: CBC and differential; Future  -     Cancel: Comprehensive metabolic panel; Future  -     Ceruloplasmin; Future  -     Copper Level; Future  -     Iron Panel (Includes Ferritin, Iron Sat%, Iron, and TIBC); Future         Subjective:    47 yo F w/ PMHx of migraine with aura is here for consultation of migraine headache with aura and tremor  Of note, she has been following with her PCP AAMIR Mitchell for migraine  Patient started having migraines [age]years old  Described as throbbing in her temporal region sometimes associated with photophobia, phonophobia, unilateral eye pain, unilateral facial pain, and nausea/vomiting  Recently increase amitriptyline from 50mg to 75mg QHS  Patient has about 2-3 migraines weekly  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  Declined Comprehensive Spine PT for her neck offered by her PCP  Current meds: Amitriptyline 75mg nightly, baclofen 10mg TID, PRN: Tylenol and ibuprofen     Today, patient reports that her migraines are well controlled by amitriptyline and baclofen  She has 1-2 migraines per week  She does not feel like to adjust any medication  For tremors, patient started having them since 24years old  She notices when her hands are at rest on the table  She does not have any issues with eating from her spoon, drinking from a cup, brushing her teeth, and buttoning her shirts  She denies any lightheadedness, denies gait issues, denies acting of her dreams  No parkinsonism features on examination  She does not drink alcohol  Fam Hx:  Patient's mother and patient's 40-year-old daughter have tremors  following portions of the patient's history were reviewed and updated as appropriate:    Dolores Bui  has a past medical history of Anxiety, Asthma, Bursitis, Clostridioides difficile infection, Gastric ulcer, Gastroparesis, GERD (gastroesophageal reflux disease), Liver cyst, Lumbar herniated disc, Migraine, Norovirus, Polycystic ovarian syndrome, Salmonella infection (08/2021), Seasonal allergies, Torn ACL, Umbilical hernia, and Wears glasses  Nimesh Acosta   Patient Active Problem List    Diagnosis Date Noted    Benign essential tremor 07/26/2022    Anxiety     Salmonella infection 06/16/2021    Abdominal wall hernia 02/11/2021    Family history of early CAD 02/11/2021    Chronic GERD 02/11/2021    Chronic migraine with aura 02/11/2021    Cervical spine pain 02/11/2021    Rupture of anterior cruciate ligament of right knee 04/10/2018    Elevated blood pressure reading in office without diagnosis of hypertension 09/01/2017    Headache 03/15/2013     Nimesh Acosta  has a past surgical history that includes Tubal ligation; pr esophagogastroduodenoscopy transoral diagnostic (N/A, 5/1/2017); [place] tooth extraction; pr knee scope,aid ant cruciate repair (Right, 5/24/2018); and Knee surgery (Right)  Devyn Man family history includes Alzheimer's disease in Devyn Sanchez's paternal grandmother; Anxiety disorder in Devyn Sanchez's mother; Depression in Devyn Sanchez's mother; Heart disease in Devyn Sanchez's father, paternal aunt, paternal grandfather, paternal grandmother, and paternal uncle; Hyperlipidemia in Devyn Sanchez's father and mother; Hypertension in Devyn Sanchez's father and mother; Osteoporosis in Devyn Sanchez's maternal grandmother; Sleep disorder in Devyn Sanchez's mother; Stroke in Devyn Sanchez's paternal grandfather  Nimesh Acosta  reports that Nimesh Acosta has never smoked  Nimesh Acosta has never used smokeless tobacco  Nimesh Acosta reports previous alcohol use  Nimesh Acosta reports that Nimesh Acosta does not use drugs    Current Outpatient Medications   Medication Sig Dispense Refill    acetaminophen-codeine (TYLENOL #3) 300-30 mg per tablet Take 1 tablet by mouth every 4 (four) hours as needed for moderate pain 30 tablet 0    albuterol (PROVENTIL HFA,VENTOLIN HFA) 90 mcg/act inhaler Inhale 2 puffs every 6 (six) hours as needed for wheezing or shortness of breath 18 g 2    amitriptyline (ELAVIL) 50 mg tablet Take 1 tablet (50 mg total) by mouth daily at bedtime 90 tablet 2    baclofen 10 mg tablet Take 1 tablet (10 mg total) by mouth in the morning and 1 tablet (10 mg total) in the evening and 1 tablet (10 mg total) before bedtime  (Patient taking differently: Take 10 mg by mouth if needed) 60 tablet 1    Diclofenac Sodium (VOLTAREN) 1 % Apply 2 g topically 4 (four) times a day 2 g 2    famotidine (PEPCID) 20 mg tablet Take 1 tablet (20 mg total) by mouth daily at bedtime 90 tablet 0    ibuprofen (MOTRIN) 800 mg tablet Take 800 mg by mouth every 8 (eight) hours as needed      omeprazole (PriLOSEC) 20 mg delayed release capsule Take 1 capsule (20 mg total) by mouth daily before breakfast 90 capsule 3    propranolol (INDERAL) 40 mg tablet Take 1 tablet (40 mg total) by mouth 3 (three) times a day 90 tablet 0    terbinafine (LamISIL) 1 % cream Apply topically 2 (two) times a day 30 g 0    triamcinolone (KENALOG) 0 1 % cream Apply topically 2 (two) times a day 30 g 0    metroNIDAZOLE (METROGEL) 0 75 % vaginal gel INSERT 1 APPLICATORFUL VAGINALLY EVERY NIGHT FOR 5 DAYS       No current facility-administered medications for this visit  Current Outpatient Medications on File Prior to Visit   Medication Sig    acetaminophen-codeine (TYLENOL #3) 300-30 mg per tablet Take 1 tablet by mouth every 4 (four) hours as needed for moderate pain    albuterol (PROVENTIL HFA,VENTOLIN HFA) 90 mcg/act inhaler Inhale 2 puffs every 6 (six) hours as needed for wheezing or shortness of breath    amitriptyline (ELAVIL) 50 mg tablet Take 1 tablet (50 mg total) by mouth daily at bedtime    baclofen 10 mg tablet Take 1 tablet (10 mg total) by mouth in the morning and 1 tablet (10 mg total) in the evening and 1 tablet (10 mg total) before bedtime   (Patient taking differently: Take 10 mg by mouth if needed)    Diclofenac Sodium (VOLTAREN) 1 % Apply 2 g topically 4 (four) times a day    famotidine (PEPCID) 20 mg tablet Take 1 tablet (20 mg total) by mouth daily at bedtime    ibuprofen (MOTRIN) 800 mg tablet Take 800 mg by mouth every 8 (eight) hours as needed    omeprazole (PriLOSEC) 20 mg delayed release capsule Take 1 capsule (20 mg total) by mouth daily before breakfast    terbinafine (LamISIL) 1 % cream Apply topically 2 (two) times a day    triamcinolone (KENALOG) 0 1 % cream Apply topically 2 (two) times a day    metroNIDAZOLE (METROGEL) 0 75 % vaginal gel INSERT 1 APPLICATORFUL VAGINALLY EVERY NIGHT FOR 5 DAYS     No current facility-administered medications on file prior to visit  Rosi Owen is allergic to asa [aspirin], diclofenac, latex, meloxicam, penicillin v, and penicillins            Objective:    Blood pressure 122/80, pulse 82, temperature 98 6 °F (37 °C), height 5' 5" (1 651 m), weight 73 9 kg (163 lb), not currently breastfeeding  Physical Exam  Constitutional:       General: Rosi Owen is not in acute distress  Appearance: Rosi Owen is normal weight  HENT:      Head: Normocephalic and atraumatic  Nose: Nose normal       Mouth/Throat:      Mouth: Mucous membranes are moist       Pharynx: Oropharynx is clear  No oropharyngeal exudate or posterior oropharyngeal erythema  Eyes:      General: Lids are normal       Extraocular Movements: Extraocular movements intact  Pupils: Pupils are equal, round, and reactive to light  Cardiovascular:      Rate and Rhythm: Normal rate  Pulses: Normal pulses  Pulmonary:      Effort: Pulmonary effort is normal  No respiratory distress  Musculoskeletal:      Cervical back: Normal range of motion  Right lower leg: No edema  Left lower leg: No edema  Skin:     General: Skin is warm and dry  Neurological:      General: No focal deficit present  Mental Status: Rosi Owen is alert and oriented to person, place, and time  Coordination: Romberg sign negative        Deep Tendon Reflexes: Strength normal and reflexes are normal and symmetric  Psychiatric:         Mood and Affect: Mood normal          Speech: Speech normal          Thought Content: Thought content normal          Judgment: Judgment normal          Neurological Exam  Mental Status  Alert  Oriented to person, place and time  Oriented to person, place, and time  Speech is normal  Language is fluent with no aphasia  Cranial Nerves  CN II: Visual acuity is normal  Visual fields full to confrontation  Right funduscopic exam: not visualized  Left funduscopic exam: not visualized  CN III, IV, VI: Extraocular movements intact bilaterally  Normal lids and orbits bilaterally  Pupils equal round and reactive to light bilaterally  CN V: Facial sensation is normal   CN VII: Full and symmetric facial movement  CN VIII: Hearing is normal   CN IX, X: Palate elevates symmetrically  CN XI: Shoulder shrug strength is normal   CN XII: Tongue midline without atrophy or fasciculations  Motor  Normal muscle bulk throughout  No fasciculations present  Normal muscle tone  No abnormal involuntary movements  Strength is 5/5 throughout all four extremities  Sensory  Sensation is intact to light touch, pinprick, vibration and proprioception in all four extremities  Reflexes  Deep tendon reflexes are 2+ and symmetric in all four extremities  Coordination  Right: Finger-to-nose normal Left: Finger-to-nose normal     Gait  Casual gait is normal including stance, stride, and arm swing  Romberg is absent  ROS:  Review of Systems   Constitutional: Negative  Negative for appetite change and fever  HENT: Negative  Negative for hearing loss, tinnitus, trouble swallowing and voice change  Eyes: Positive for photophobia and pain  With severe migraine gets bad eye pain   Respiratory: Negative  Negative for shortness of breath  Cardiovascular: Negative  Negative for palpitations  Gastrointestinal: Positive for nausea and vomiting          With migraines will get nauseous and vomit   Endocrine: Negative  Negative for cold intolerance  Genitourinary: Negative  Negative for dysuria, frequency and urgency  Musculoskeletal: Negative  Negative for myalgias and neck pain  Skin: Negative  Negative for rash  Allergic/Immunologic: Negative  Neurological: Positive for tremors, numbness and headaches  Negative for dizziness, seizures, syncope, facial asymmetry, speech difficulty, weakness and light-headedness  Has had HA since she was 18, PCP and ED DX;d her w/migraines   Hematological: Bruises/bleeds easily  Psychiatric/Behavioral: Negative  Negative for confusion, hallucinations and sleep disturbance  All other systems reviewed and are negative      Personally reviewed ROS

## 2022-07-26 NOTE — ASSESSMENT & PLAN NOTE
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 and notices tremors only when her hands resting on the table  Currently no intentional tremors  Degree of tremors depends on the day: some days are worse than others  No Pakinsonism features  Fam Hx: patient's mother and patient's 24 yo daughter have tremors    Impression: likely family essential tremor    Plan:   Although patient has very mild tremor, she would like to try medication as it is embarrassing as her co-worker notices it  Will initiate Propranolol 40mg three times a day  She recently had CBC and CMP done  Will check ceruloplasmin, copper level, and iron panel  Consult patient that propranolol can cause reduced energy and sluggishness  Recommend to contact Neurology office if the side effects interfere with her daily functions  Follow-up with neurology in 2 months to reassess and adjust her medication if needed

## 2022-07-26 NOTE — ASSESSMENT & PLAN NOTE
Patient is here for consultation of migraine headache  Follows with PCP AAMIR Clarke for migraine  Currently on Amitriptyline 75mg daily and baclofen 10 mg t i d   P r n  Tylenol and ibuprofen    Currently patient has migraine headaches for about 1-2 times per week and feels that amitriptyline and baclofen have been controlling it well  Plan:   Continue amitriptyline 75 mg daily and baclofen 10 mg t i d    Continue to monitor and if migraine headache has been worsens, will adjust medications  Follow-up with neurology in 2 months for tremor medication adjustment

## 2022-07-26 NOTE — PROGRESS NOTES
Review of Systems   Constitutional: Negative  Negative for appetite change and fever  HENT: Negative  Negative for hearing loss, tinnitus, trouble swallowing and voice change  Eyes: Positive for photophobia and pain  With severe migraine gets bad eye pain   Respiratory: Negative  Negative for shortness of breath  Cardiovascular: Negative  Negative for palpitations  Gastrointestinal: Positive for nausea and vomiting  With migraines will get nauseous and vomit   Endocrine: Negative  Negative for cold intolerance  Genitourinary: Negative  Negative for dysuria, frequency and urgency  Musculoskeletal: Negative  Negative for myalgias and neck pain  Skin: Negative  Negative for rash  Allergic/Immunologic: Negative  Neurological: Positive for tremors, numbness and headaches  Negative for dizziness, seizures, syncope, facial asymmetry, speech difficulty, weakness and light-headedness  Has had HA since she was 18, PCP and ED DX;d her w/migraines   Hematological: Bruises/bleeds easily  Psychiatric/Behavioral: Negative  Negative for confusion, hallucinations and sleep disturbance  All other systems reviewed and are negative

## 2022-08-03 ENCOUNTER — OFFICE VISIT (OUTPATIENT)
Dept: FAMILY MEDICINE CLINIC | Facility: CLINIC | Age: 42
End: 2022-08-03

## 2022-08-03 VITALS
RESPIRATION RATE: 18 BRPM | DIASTOLIC BLOOD PRESSURE: 82 MMHG | SYSTOLIC BLOOD PRESSURE: 120 MMHG | HEIGHT: 65 IN | TEMPERATURE: 97.8 F | HEART RATE: 86 BPM | WEIGHT: 163.8 LBS | BODY MASS INDEX: 27.29 KG/M2 | OXYGEN SATURATION: 99 %

## 2022-08-03 DIAGNOSIS — G25.0 BENIGN ESSENTIAL TREMOR: Primary | ICD-10-CM

## 2022-08-03 DIAGNOSIS — G43.109 CHRONIC MIGRAINE WITH AURA: ICD-10-CM

## 2022-08-03 DIAGNOSIS — K21.9 CHRONIC GERD: ICD-10-CM

## 2022-08-03 PROCEDURE — 99214 OFFICE O/P EST MOD 30 MIN: CPT

## 2022-08-03 RX ORDER — FAMOTIDINE 20 MG/1
20 TABLET, FILM COATED ORAL
Qty: 90 TABLET | Refills: 0 | Status: SHIPPED | OUTPATIENT
Start: 2022-08-03

## 2022-08-03 RX ORDER — BACLOFEN 10 MG/1
10 TABLET ORAL
Qty: 30 TABLET | Refills: 2 | Status: SHIPPED | OUTPATIENT
Start: 2022-08-03

## 2022-08-03 RX ORDER — AMITRIPTYLINE HYDROCHLORIDE 75 MG/1
75 TABLET, FILM COATED ORAL
Qty: 30 TABLET | Refills: 2 | Status: SHIPPED | OUTPATIENT
Start: 2022-08-03

## 2022-08-03 NOTE — PROGRESS NOTES
[id number] [id number] [facility] [place]    NAME: Nory Munson  AGE: 46 y o  SEX: adult  : 1980     DATE: 2022     Assessment and Plan:     Problem List Items Addressed This Visit        Digestive    Chronic GERD     Follows with GI, symptoms typically controlled with use of both famotidine 20 mg daily HS and omeprazole daily AM  Sometimes takes two omeprazole if increase in symptoms    - Continue plan and follow up as determined by GI   - Avoid trigger foods such as tomato sauce, spicy foods  Relevant Medications    famotidine (PEPCID) 20 mg tablet       Cardiovascular and Mediastinum    Chronic migraine with aura     Follows with Neurology  Last headache 2-3 weeks ago and again currently x3 days with improvement today  Not taking baclofen as prescribed TID due to somnolence side effect, works as a , so only taking HS   - Continue plan as determined by neurology with exception of baclofen nightly due to side effects   - Judicious use of PRN ibuprofen due to GERD  Relevant Medications    amitriptyline (ELAVIL) 75 mg tablet    baclofen 10 mg tablet       Nervous and Auditory    Benign essential tremor - Primary     Started trial of propranolol TID last week  Stopped taking after a few days due to stomach pain  - Advised restarting at once daily, taken with food, and notify neurology of problem with medication  Relevant Medications    baclofen 10 mg tablet          Return in about 3 months (around 11/3/2022) for Recheck migraines, GERD  Chief Complaint:     Chief Complaint   Patient presents with    Follow-up     Migraines  Pt has medication concerns       History of Present Illness: Nory Munson presented to the office today for routine follow up for chronic conditions: GERD, migraines  Follows with both GI and neurology         Review of Systems:     Review of Systems   Constitutional: Negative for fatigue, fever and unexpected weight change  Eyes: Positive for photophobia  Negative for visual disturbance  Respiratory: Negative for cough, shortness of breath and wheezing  Cardiovascular: Negative for chest pain, palpitations and leg swelling  Gastrointestinal: Positive for abdominal pain, constipation, diarrhea, nausea and vomiting  Negative for blood in stool  Genitourinary: Positive for menstrual problem (cramping)  Negative for difficulty urinating  Neurological: Positive for tremors and headaches  Negative for weakness and numbness  Psychiatric/Behavioral: Positive for sleep disturbance (interrupted, typically sleeps well)  All other systems reviewed and are negative  Past Medical History:     Past Medical History:   Diagnosis Date    Anxiety     Asthma     Bursitis     Clostridioides difficile infection     8/2021-treatment received    Gastric ulcer     Gastroparesis     GERD (gastroesophageal reflux disease)     Liver cyst     Lumbar herniated disc     Migraine     Norovirus     8/2021    Polycystic ovarian syndrome     Salmonella infection 08/2021    Seasonal allergies     Torn ACL     Umbilical hernia     Wears glasses       Past Surgical History:     Past Surgical History:   Procedure Laterality Date    KNEE SURGERY Right     GA ESOPHAGOGASTRODUODENOSCOPY TRANSORAL DIAGNOSTIC N/A 5/1/2017    Procedure: ESOPHAGOGASTRODUODENOSCOPY (EGD); Surgeon: Waleska Tanner MD;  Location: [facility];   Service: Gastroenterology    GA KNEE SCOPE,AID ANT CRUCIATE REPAIR Right 5/24/2018    Procedure: ARTHROSCOPIC RECONSTRUCTION ANTERIOR CRUCIATE LIGAMENT (ACL) WITH AUTOGRAFT AND ALLOGRAFT;  Surgeon: Kacie Castillo MD;  Location: [facility] OR;  Service: Orthopedics    TUBAL LIGATION      WISDOM TOOTH EXTRACTION        Social History:     Social History     Socioeconomic History    Marital status: /Civil Union     Spouse name: None    Number of children: None    Years of education: None    Highest education level: None   Occupational History    None   Tobacco Use    Smoking status: Never Smoker    Smokeless tobacco: Never Used   Substance and Sexual Activity    Alcohol use: Not Currently     Comment: rare    Drug use: No    Sexual activity: None   Other Topics Concern    None   Social History Narrative    None     Social Determinants of Health     Financial Resource Strain: Low Risk     Difficulty of Paying Living Expenses: Not hard at all   Food Insecurity: No Food Insecurity    Worried About Running Out of Food in the Last Year: Never true    Swathi of Food in the Last Year: Never true   Transportation Needs: No Transportation Needs    Lack of Transportation (Medical): No    Lack of Transportation (Non-Medical):  No   Physical Activity: Not on file   Stress: Not on file   Social Connections: Not on file   Intimate Partner Violence: Not on file   Housing Stability: Low Risk     Unable to Pay for Housing in the Last Year: No    Number of Places Lived in the Last Year: 1    Unstable Housing in the Last Year: No      Family History:     Family History   Problem Relation Age of Onset    Depression Mother     Anxiety disorder Mother     Sleep disorder Mother     Hypertension Mother     Hyperlipidemia Mother     Hyperlipidemia Father     Hypertension Father     Heart disease Father     Heart disease Paternal Aunt     Heart disease Paternal Uncle     Osteoporosis Maternal Grandmother     Alzheimer's disease Paternal Grandmother     Heart disease Paternal Grandmother     Heart disease Paternal Grandfather     Stroke Paternal Grandfather       Current Medications:     Current Outpatient Medications   Medication Sig Dispense Refill    amitriptyline (ELAVIL) 75 mg tablet Take 1 tablet (75 mg total) by mouth daily at bedtime 30 tablet 2    baclofen 10 mg tablet Take 1 tablet (10 mg total) by mouth daily at bedtime 30 tablet 2    famotidine (PEPCID) 20 mg tablet Take 1 tablet (20 mg total) by mouth daily at bedtime 90 tablet 0    acetaminophen-codeine (TYLENOL #3) 300-30 mg per tablet Take 1 tablet by mouth every 4 (four) hours as needed for moderate pain 30 tablet 0    albuterol (PROVENTIL HFA,VENTOLIN HFA) 90 mcg/act inhaler Inhale 2 puffs every 6 (six) hours as needed for wheezing or shortness of breath 18 g 2    Diclofenac Sodium (VOLTAREN) 1 % Apply 2 g topically 4 (four) times a day 2 g 2    fluconazole (DIFLUCAN) 150 mg tablet       ibuprofen (MOTRIN) 800 mg tablet Take 800 mg by mouth every 8 (eight) hours as needed      metroNIDAZOLE (METROGEL) 0 75 % vaginal gel INSERT 1 APPLICATORFUL VAGINALLY EVERY NIGHT FOR 5 DAYS      omeprazole (PriLOSEC) 20 mg delayed release capsule Take 1 capsule (20 mg total) by mouth daily before breakfast 90 capsule 3    propranolol (INDERAL) 40 mg tablet Take 1 tablet (40 mg total) by mouth 3 (three) times a day 90 tablet 0    terbinafine (LamISIL) 1 % cream Apply topically 2 (two) times a day 30 g 0    triamcinolone (KENALOG) 0 1 % cream Apply topically 2 (two) times a day 30 g 0     No current facility-administered medications for this visit  Allergies: Allergies   Allergen Reactions    Asa [Aspirin] Shortness Of Breath            Diclofenac Rash    Latex Hives    Meloxicam Other (See Comments)     bruising    Penicillin V Hives and Rash    Penicillins Hives and Rash      Physical Exam:     /82 (BP Location: Left arm, Patient Position: Sitting, Cuff Size: Standard)   Pulse 86   Temp 97 8 °F (36 6 °C) (Temporal)   Resp 18   Ht 5' 5" (1 651 m)   Wt 74 3 kg (163 lb 12 8 oz)   SpO2 99%   BMI 27 26 kg/m²     Physical Exam  Vitals reviewed  Constitutional:       General: Jonny Greenwood is not in acute distress  Appearance: Jonny Greenwood is overweight  Jonny Greenwood is not ill-appearing or diaphoretic  HENT:      Head: Normocephalic and atraumatic     Cardiovascular:      Rate and Rhythm: Normal rate and regular rhythm  Heart sounds: Normal heart sounds  No murmur heard  Pulmonary:      Effort: Pulmonary effort is normal  No tachypnea  Breath sounds: Normal breath sounds  No decreased breath sounds or wheezing  Abdominal:      General: Bowel sounds are normal  There is no distension  Palpations: Abdomen is soft  Tenderness: There is no abdominal tenderness  Musculoskeletal:      Right lower leg: No edema  Left lower leg: No edema  Skin:     General: Skin is warm and dry  Neurological:      Mental Status: Maci Tang is alert and oriented to person, place, and time     Psychiatric:         Mood and Affect: Mood and affect normal           Romy Connors 36

## 2022-08-07 PROBLEM — M48.061 LUMBAR SPINAL STENOSIS: Status: ACTIVE | Noted: 2017-03-29

## 2022-08-07 PROBLEM — N92.6 IRREGULAR MENSES: Status: ACTIVE | Noted: 2019-07-11

## 2022-08-07 PROBLEM — E28.2 PCOS (POLYCYSTIC OVARIAN SYNDROME): Status: ACTIVE | Noted: 2019-09-30

## 2022-08-07 PROBLEM — R87.610 ASCUS OF CERVIX WITH NEGATIVE HIGH RISK HPV: Status: ACTIVE | Noted: 2019-07-02

## 2022-08-07 RX ORDER — FLUCONAZOLE 150 MG/1
TABLET ORAL
COMMUNITY
Start: 2022-07-20

## 2022-08-07 NOTE — ASSESSMENT & PLAN NOTE
Follows with Neurology  Last headache 2-3 weeks ago and again currently x3 days with improvement today  Not taking baclofen as prescribed TID due to somnolence side effect, works as a , so only taking HS   - Continue plan as determined by neurology with exception of baclofen nightly due to side effects   - Judicious use of PRN ibuprofen due to GERD

## 2022-08-07 NOTE — ASSESSMENT & PLAN NOTE
Follows with GI, symptoms typically controlled with use of both famotidine 20 mg daily HS and omeprazole daily AM  Sometimes takes two omeprazole if increase in symptoms    - Continue plan and follow up as determined by GI   - Avoid trigger foods such as tomato sauce, spicy foods

## 2022-08-07 NOTE — ASSESSMENT & PLAN NOTE
Started trial of propranolol TID last week  Stopped taking after a few days due to stomach pain  - Advised restarting at once daily, taken with food, and notify neurology of problem with medication

## 2022-08-22 DIAGNOSIS — G25.0 BENIGN ESSENTIAL TREMOR: ICD-10-CM

## 2022-08-22 RX ORDER — PROPRANOLOL HYDROCHLORIDE 40 MG/1
TABLET ORAL
Qty: 90 TABLET | Refills: 0 | Status: SHIPPED | OUTPATIENT
Start: 2022-08-22 | End: 2022-09-27

## 2022-09-27 DIAGNOSIS — G25.0 BENIGN ESSENTIAL TREMOR: ICD-10-CM

## 2022-09-27 RX ORDER — PROPRANOLOL HYDROCHLORIDE 40 MG/1
TABLET ORAL
Qty: 90 TABLET | Refills: 0 | Status: SHIPPED | OUTPATIENT
Start: 2022-09-27

## 2022-10-31 DIAGNOSIS — K21.9 CHRONIC GERD: ICD-10-CM

## 2022-10-31 RX ORDER — FAMOTIDINE 20 MG/1
TABLET, FILM COATED ORAL
Qty: 90 TABLET | Refills: 0 | Status: SHIPPED | OUTPATIENT
Start: 2022-10-31

## 2022-11-04 ENCOUNTER — OFFICE VISIT (OUTPATIENT)
Dept: FAMILY MEDICINE CLINIC | Facility: CLINIC | Age: 42
End: 2022-11-04

## 2022-11-04 VITALS
TEMPERATURE: 96.9 F | DIASTOLIC BLOOD PRESSURE: 84 MMHG | HEART RATE: 100 BPM | HEIGHT: 65 IN | BODY MASS INDEX: 27.82 KG/M2 | WEIGHT: 167 LBS | OXYGEN SATURATION: 99 % | SYSTOLIC BLOOD PRESSURE: 126 MMHG | RESPIRATION RATE: 18 BRPM

## 2022-11-04 DIAGNOSIS — Z28.21 IMMUNIZATION DECLINED: ICD-10-CM

## 2022-11-04 DIAGNOSIS — K21.9 CHRONIC GERD: ICD-10-CM

## 2022-11-04 DIAGNOSIS — M79.672 LEFT FOOT PAIN: ICD-10-CM

## 2022-11-04 DIAGNOSIS — G43.109 CHRONIC MIGRAINE WITH AURA: Primary | ICD-10-CM

## 2022-11-04 DIAGNOSIS — R19.4 FREQUENT BOWEL MOVEMENTS: ICD-10-CM

## 2022-11-04 RX ORDER — AMITRIPTYLINE HYDROCHLORIDE 75 MG/1
75 TABLET, FILM COATED ORAL
Qty: 30 TABLET | Refills: 2 | Status: SHIPPED | OUTPATIENT
Start: 2022-11-04

## 2022-11-04 NOTE — PROGRESS NOTES
Name: Rolando Zayas      : 1980      MRN: [id number]  Encounter Provider: AAMIR Elliott  Encounter Date: 2022   Encounter department: [facility]    Assessment & Plan     1  Chronic migraine with aura  Assessment & Plan:  Headache frequency has decreased since pt no longer in close proximity to loud machinery at work  Occasional headache lasting for up to 3 days, some weeks now no headache at all    - Continue medications and plan as per Neurology  Orders:  -     amitriptyline (ELAVIL) 75 mg tablet; Take 1 tablet (75 mg total) by mouth daily at bedtime    2  Chronic GERD  Assessment & Plan:  Controlled  - Avoid trigger foods, large meals, and lying down within 2 hours after eating    - Continue medications and plan as per GI        3  Frequent bowel movements  Assessment & Plan:  Pt reporting up to 7 BMs per day, typically soft, formed, no diarrhea or constipation  No red flag symptoms  Possibly IBS  - Avoid FODMAPs  - Continue following with GI        4  Left foot pain  Assessment & Plan:  Recent onset pain in plantar surface left foot, occasional mild  swelling of lateral aspect left ankle  Pain worse with weight bearing  No injury  Pt stands all day at work  - Trial exercises for plantar fasciitis  - Voltaren gel PRN  Orders:  -     Diclofenac Sodium (VOLTAREN) 1 %; Apply 2 g topically 4 (four) times a day    5  Immunization declined    BMI Counseling: Body mass index is 27 79 kg/m²  The BMI is above normal  Nutrition recommendations include decreasing portion sizes, encouraging healthy choices of fruits and vegetables, limiting drinks that contain sugar and moderation in carbohydrate intake  Exercise recommendations include moderate physical activity 150 minutes/week  Rationale for BMI follow-up plan is due to patient being overweight or obese           Subjective     HPI   Marva Kim presented to the office for routine follow up for chronic conditions  Pt declines flu vaccination  Review of Systems   Constitutional: Negative for fatigue, fever and unexpected weight change  Respiratory: Negative for cough, shortness of breath and wheezing  Cardiovascular: Negative for chest pain, palpitations and leg swelling  Gastrointestinal: Positive for diarrhea  Negative for abdominal pain, blood in stool, constipation, nausea and vomiting  Genitourinary: Negative for difficulty urinating  Musculoskeletal: Positive for arthralgias (left foot/ankle)  Neurological: Positive for tremors and headaches  Negative for dizziness, weakness and numbness  All other systems reviewed and are negative  Past Medical History:   Diagnosis Date   • Anxiety    • Asthma    • Bursitis    • Clostridioides difficile infection     8/2021-treatment received   • Gastric ulcer    • Gastroparesis    • GERD (gastroesophageal reflux disease)    • Liver cyst    • Lumbar herniated disc    • Migraine    • Norovirus     8/2021   • Polycystic ovarian syndrome    • Salmonella infection 08/2021   • Seasonal allergies    • Torn ACL    • Umbilical hernia    • Wears glasses      Past Surgical History:   Procedure Laterality Date   • KNEE SURGERY Right    • NC ESOPHAGOGASTRODUODENOSCOPY TRANSORAL DIAGNOSTIC N/A 5/1/2017    Procedure: ESOPHAGOGASTRODUODENOSCOPY (EGD); Surgeon: Vishnu Carrillo MD;  Location: [facility] GI LAB;   Service: Gastroenterology   • NC KNEE SCOPE,AID ANT CRUCIATE REPAIR Right 5/24/2018    Procedure: ARTHROSCOPIC RECONSTRUCTION ANTERIOR CRUCIATE LIGAMENT (ACL) WITH AUTOGRAFT AND ALLOGRAFT;  Surgeon: Kymberly Araiza MD;  Location: [facility] OR;  Service: Orthopedics   • TUBAL LIGATION     • WISDOM TOOTH EXTRACTION       Family History   Problem Relation Age of Onset   • Depression Mother    • Anxiety disorder Mother    • Sleep disorder Mother    • Hypertension Mother    • Hyperlipidemia Mother    • Hyperlipidemia Father    • Hypertension Father    • Heart disease Father    • Heart disease Paternal Aunt    • Heart disease Paternal Uncle    • Osteoporosis Maternal Grandmother    • Alzheimer's disease Paternal Grandmother    • Heart disease Paternal Grandmother    • Heart disease Paternal Grandfather    • Stroke Paternal Grandfather      Social History     Socioeconomic History   • Marital status: /Civil Union     Spouse name: None   • Number of children: None   • Years of education: None   • Highest education level: None   Occupational History   • None   Tobacco Use   • Smoking status: Never Smoker   • Smokeless tobacco: Never Used   Substance and Sexual Activity   • Alcohol use: Not Currently     Comment: rare   • Drug use: No   • Sexual activity: None   Other Topics Concern   • None   Social History Narrative   • None     Social Determinants of Health     Financial Resource Strain: Low Risk    • Difficulty of Paying Living Expenses: Not hard at all   Food Insecurity: No Food Insecurity   • Worried About Running Out of Food in the Last Year: Never true   • Ran Out of Food in the Last Year: Never true   Transportation Needs: No Transportation Needs   • Lack of Transportation (Medical): No   • Lack of Transportation (Non-Medical):  No   Physical Activity: Not on file   Stress: Not on file   Social Connections: Not on file   Intimate Partner Violence: Not on file   Housing Stability: Low Risk    • Unable to Pay for Housing in the Last Year: No   • Number of Places Lived in the Last Year: 1   • Unstable Housing in the Last Year: No     Current Outpatient Medications on File Prior to Visit   Medication Sig   • acetaminophen-codeine (TYLENOL #3) 300-30 mg per tablet Take 1 tablet by mouth every 4 (four) hours as needed for moderate pain   • albuterol (PROVENTIL HFA,VENTOLIN HFA) 90 mcg/act inhaler Inhale 2 puffs every 6 (six) hours as needed for wheezing or shortness of breath   • baclofen 10 mg tablet Take 1 tablet (10 mg total) by mouth daily at bedtime   • famotidine (PEPCID) 20 mg tablet TAKE 1 TABLET(20 MG) BY MOUTH DAILY AT BEDTIME   • fluconazole (DIFLUCAN) 150 mg tablet    • ibuprofen (MOTRIN) 800 mg tablet Take 800 mg by mouth every 8 (eight) hours as needed   • metroNIDAZOLE (METROGEL) 0 75 % vaginal gel INSERT 1 APPLICATORFUL VAGINALLY EVERY NIGHT FOR 5 DAYS   • omeprazole (PriLOSEC) 20 mg delayed release capsule Take 1 capsule (20 mg total) by mouth daily before breakfast   • propranolol (INDERAL) 40 mg tablet TAKE 1 TABLET(40 MG) BY MOUTH THREE TIMES DAILY   • terbinafine (LamISIL) 1 % cream Apply topically 2 (two) times a day   • triamcinolone (KENALOG) 0 1 % cream Apply topically 2 (two) times a day     Allergies   Allergen Reactions   • Asa [Aspirin] Shortness Of Breath           • Diclofenac Rash   • Latex Hives   • Meloxicam Other (See Comments)     bruising   • Penicillin V Hives and Rash   • Penicillins Hives and Rash     Immunization History   Administered Date(s) Administered   • COVID-19 MODERNA VACC 0 5 ML IM 05/12/2021, 06/22/2021   • Tdap 11/15/2021       Objective     /84 (BP Location: Left arm, Patient Position: Sitting, Cuff Size: Adult)   Pulse 100   Temp (!) 96 9 °F (36 1 °C) (Temporal)   Resp 18   Ht 5' 5" (1 651 m)   Wt 75 8 kg (167 lb)   SpO2 99%   BMI 27 79 kg/m²     Physical Exam  Vitals reviewed  Constitutional:       General: Jessica Nina is not in acute distress  Appearance: Jessica Nina is overweight  Jessica Nina is not ill-appearing or diaphoretic  HENT:      Head: Normocephalic and atraumatic  Cardiovascular:      Rate and Rhythm: Normal rate and regular rhythm  Heart sounds: Normal heart sounds  No murmur heard  Pulmonary:      Effort: Pulmonary effort is normal  No tachypnea  Breath sounds: Normal breath sounds  No decreased breath sounds or wheezing  Abdominal:      General: Bowel sounds are increased  There is no distension  Palpations: Abdomen is soft  Tenderness:  There is no abdominal tenderness  There is no guarding  Musculoskeletal:      Right lower leg: No edema  Left lower leg: No edema  Right ankle: Normal       Left ankle: Swelling (trace) present  No deformity  No tenderness  Normal range of motion  Right foot: Normal       Left foot: Normal range of motion  No swelling, deformity, tenderness or crepitus  Skin:     General: Skin is warm and dry  Neurological:      Mental Status: Krysten Cartwright is alert and oriented to person, place, and time  Cranial Nerves: No cranial nerve deficit  Motor: Motor function is intact  No weakness  Gait: Gait is intact     Psychiatric:         Mood and Affect: Mood and affect normal        Soundra Lacks, CRNP

## 2022-11-05 PROBLEM — M79.672 LEFT FOOT PAIN: Status: ACTIVE | Noted: 2022-11-05

## 2022-11-05 PROBLEM — R19.4 FREQUENT BOWEL MOVEMENTS: Status: ACTIVE | Noted: 2022-11-05

## 2022-11-05 NOTE — ASSESSMENT & PLAN NOTE
Pt reporting up to 7 BMs per day, typically soft, formed, no diarrhea or constipation  No red flag symptoms  Possibly IBS  - Avoid FODMAPs    - Continue following with GI

## 2022-11-05 NOTE — ASSESSMENT & PLAN NOTE
Headache frequency has decreased since pt no longer in close proximity to loud machinery at work  Occasional headache lasting for up to 3 days, some weeks now no headache at all    - Continue medications and plan as per Neurology

## 2022-11-05 NOTE — ASSESSMENT & PLAN NOTE
Recent onset pain in plantar surface left foot, occasional mild  swelling of lateral aspect left ankle  Pain worse with weight bearing  No injury  Pt stands all day at work  - Trial exercises for plantar fasciitis  - Voltaren gel PRN

## 2022-11-05 NOTE — ASSESSMENT & PLAN NOTE
Controlled  - Avoid trigger foods, large meals, and lying down within 2 hours after eating    - Continue medications and plan as per GI

## 2022-11-25 ENCOUNTER — TELEPHONE (OUTPATIENT)
Dept: NEUROLOGY | Facility: CLINIC | Age: 42
End: 2022-11-25

## 2022-11-25 NOTE — TELEPHONE ENCOUNTER
Spoke to patient and confirmed her 11/29/2022 appointment with Dr Janifer Sandifer at the [facility]

## 2022-11-29 ENCOUNTER — OFFICE VISIT (OUTPATIENT)
Dept: NEUROLOGY | Facility: CLINIC | Age: 42
End: 2022-11-29

## 2022-11-29 VITALS
BODY MASS INDEX: 27.82 KG/M2 | HEART RATE: 112 BPM | HEIGHT: 65 IN | SYSTOLIC BLOOD PRESSURE: 110 MMHG | DIASTOLIC BLOOD PRESSURE: 80 MMHG | WEIGHT: 167 LBS

## 2022-11-29 DIAGNOSIS — G25.0 BENIGN ESSENTIAL TREMOR: Primary | ICD-10-CM

## 2022-11-29 DIAGNOSIS — G43.109 CHRONIC MIGRAINE WITH AURA: ICD-10-CM

## 2022-11-29 RX ORDER — AMITRIPTYLINE HYDROCHLORIDE 75 MG/1
75 TABLET, FILM COATED ORAL
Qty: 30 TABLET | Refills: 2 | Status: SHIPPED | OUTPATIENT
Start: 2022-11-29

## 2022-11-29 RX ORDER — RIZATRIPTAN BENZOATE 10 MG/1
10 TABLET ORAL AS NEEDED
Qty: 9 TABLET | Refills: 3 | Status: SHIPPED | OUTPATIENT
Start: 2022-11-29

## 2022-11-29 RX ORDER — BACLOFEN 10 MG/1
10 TABLET ORAL
Qty: 30 TABLET | Refills: 2 | Status: SHIPPED | OUTPATIENT
Start: 2022-11-29

## 2022-11-29 NOTE — ASSESSMENT & PLAN NOTE
She cannot tolerate Propranolol 40mg TID due to acid reflex  Patient is currently taking Propranolol 40mg daily

## 2022-11-29 NOTE — ASSESSMENT & PLAN NOTE
Continue Amitriptyline 75mg nightly and baclofen 10mg nightly  Prescribe Rizatriptan (Maxalt) 10mg PRN   patient to hydrate, get enough sleep, stress reduction  Patient has a swollen left lateral foot with sharp pain started 2 months ago  Recommend patient to contact PCP for xray       Follow up in 4-5 months

## 2022-11-29 NOTE — PROGRESS NOTES
Patient ID: Nani Stern is a 46 y o  adult  Assessment/Plan:    Chronic migraine with aura  Continue Amitriptyline 75mg nightly and baclofen 10mg nightly  Prescribe Rizatriptan (Maxalt) 10mg PRN   patient to hydrate, get enough sleep, stress reduction  Patient has a swollen left lateral foot with sharp pain started 2 months ago  Recommend patient to contact PCP for xray  Follow up in 4-5 months    Benign essential tremor  She cannot tolerate Propranolol 40mg TID due to acid reflex  Patient is currently taking Propranolol 40mg daily  Diagnoses and all orders for this visit:    Benign essential tremor    Chronic migraine with aura  -     baclofen 10 mg tablet; Take 1 tablet (10 mg total) by mouth daily at bedtime  -     amitriptyline (ELAVIL) 75 mg tablet; Take 1 tablet (75 mg total) by mouth daily at bedtime  -     rizatriptan (Maxalt) 10 mg tablet; Take 1 tablet (10 mg total) by mouth as needed for migraine Take at the onset of migraine; if symptoms continue or return, may take another dose at least 2 hours after first dose  Take no more than 2 doses in a day  Subjective:  47 yo F with Pmhx of migraine with aura is here for follow up  Last seen by myself and Dr Jeremias Horowitz on 7/26/2022  She is on Amitriptyline 75mg nightly and baclofen 10mg nightly  She is overall doing well  Migraine frequency is improved  Now she gets 6 migraines per month and no migraines in other months  She used to get about 8 migraines per months  Migraine describes as throbbing in her temporal region associated with nausea, vomiting, photosensitivity  She does embroidery work with loud machine; however, she is moved to a new place far from loud machine and it helps  She sometimes take ibuprofen with Amitriptyline and baclofen  The following portions of the patient's history were reviewed and updated as appropriate:  Nani Stern  has a past medical history of Anxiety, Asthma, Bursitis, Clostridioides difficile infection, Gastric ulcer, Gastroparesis, GERD (gastroesophageal reflux disease), Liver cyst, Lumbar herniated disc, Migraine, Norovirus, Polycystic ovarian syndrome, Salmonella infection (08/2021), Seasonal allergies, Torn ACL, Umbilical hernia, and Wears glasses  Adelfo Amaro   Patient Active Problem List    Diagnosis Date Noted   • Frequent bowel movements 11/05/2022   • Left foot pain 11/05/2022   • Benign essential tremor 07/26/2022   • Anxiety    • Salmonella infection 06/16/2021   • Abdominal wall hernia 02/11/2021   • Family history of early CAD 02/11/2021   • Chronic GERD 02/11/2021   • Chronic migraine with aura 02/11/2021   • Cervical spine pain 02/11/2021   • PCOS (polycystic ovarian syndrome) 09/30/2019   • Irregular menses 07/11/2019   • ASCUS of cervix with negative high risk HPV 07/02/2019   • Rupture of anterior cruciate ligament of right knee 04/10/2018   • Elevated blood pressure reading in office without diagnosis of hypertension 09/01/2017   • Lumbar spinal stenosis 03/29/2017   • Lumbar disc herniation 10/20/2016   • Liver cyst 09/19/2016   • Headache 03/15/2013     Adelfo Amaro  has a past surgical history that includes Tubal ligation; pr esophagogastroduodenoscopy transoral diagnostic (N/A, 5/1/2017); [place] tooth extraction; pr knee scope,aid ant cruciate repair (Right, 5/24/2018); and Knee surgery (Right)  Sammye Schlatter Cubero's family history includes Alzheimer's disease in Sammye Schlatter Cubero's paternal grandmother; Anxiety disorder in Sammye Schlatter Cubero's mother; Depression in Sammye Schlatter Cubero's mother; Heart disease in Sammye Schlatter Cubero's father, paternal aunt, paternal grandfather, paternal grandmother, and paternal uncle; Hyperlipidemia in Sammye Schlatter Cubero's father and mother; Hypertension in Sammye Schlatter Cubero's father and mother; Osteoporosis in Sammye Schlatter Cubero's maternal grandmother; Sleep disorder in Sammye Schlatter Cubero's mother; Stroke in Sammye Schlatter Cubero's paternal grandfather    Adelfo Prem reports that Katelynn Ball has never smoked  Katelynn Ball has never used smokeless tobacco  Katelynn Ball reports that Katelynn Ball does not currently use alcohol  Katelynn Ball reports that Katelynn Ball does not use drugs  Current Outpatient Medications   Medication Sig Dispense Refill   • acetaminophen-codeine (TYLENOL #3) 300-30 mg per tablet Take 1 tablet by mouth every 4 (four) hours as needed for moderate pain 30 tablet 0   • albuterol (PROVENTIL HFA,VENTOLIN HFA) 90 mcg/act inhaler Inhale 2 puffs every 6 (six) hours as needed for wheezing or shortness of breath 18 g 2   • amitriptyline (ELAVIL) 75 mg tablet Take 1 tablet (75 mg total) by mouth daily at bedtime 30 tablet 2   • baclofen 10 mg tablet Take 1 tablet (10 mg total) by mouth daily at bedtime 30 tablet 2   • Diclofenac Sodium (VOLTAREN) 1 % Apply 2 g topically 4 (four) times a day 2 g 2   • famotidine (PEPCID) 20 mg tablet TAKE 1 TABLET(20 MG) BY MOUTH DAILY AT BEDTIME 90 tablet 0   • fluconazole (DIFLUCAN) 150 mg tablet      • ibuprofen (MOTRIN) 800 mg tablet Take 800 mg by mouth every 8 (eight) hours as needed     • metroNIDAZOLE (METROGEL) 0 75 % vaginal gel INSERT 1 APPLICATORFUL VAGINALLY EVERY NIGHT FOR 5 DAYS     • omeprazole (PriLOSEC) 20 mg delayed release capsule Take 1 capsule (20 mg total) by mouth daily before breakfast 90 capsule 3   • propranolol (INDERAL) 40 mg tablet TAKE 1 TABLET(40 MG) BY MOUTH THREE TIMES DAILY 90 tablet 0   • rizatriptan (Maxalt) 10 mg tablet Take 1 tablet (10 mg total) by mouth as needed for migraine Take at the onset of migraine; if symptoms continue or return, may take another dose at least 2 hours after first dose  Take no more than 2 doses in a day  9 tablet 3   • terbinafine (LamISIL) 1 % cream Apply topically 2 (two) times a day 30 g 0   • triamcinolone (KENALOG) 0 1 % cream Apply topically 2 (two) times a day 30 g 0     No current facility-administered medications for this visit       Current Outpatient Medications on File Prior to Visit   Medication Sig   • acetaminophen-codeine (TYLENOL #3) 300-30 mg per tablet Take 1 tablet by mouth every 4 (four) hours as needed for moderate pain   • albuterol (PROVENTIL HFA,VENTOLIN HFA) 90 mcg/act inhaler Inhale 2 puffs every 6 (six) hours as needed for wheezing or shortness of breath   • Diclofenac Sodium (VOLTAREN) 1 % Apply 2 g topically 4 (four) times a day   • famotidine (PEPCID) 20 mg tablet TAKE 1 TABLET(20 MG) BY MOUTH DAILY AT BEDTIME   • fluconazole (DIFLUCAN) 150 mg tablet    • ibuprofen (MOTRIN) 800 mg tablet Take 800 mg by mouth every 8 (eight) hours as needed   • metroNIDAZOLE (METROGEL) 0 75 % vaginal gel INSERT 1 APPLICATORFUL VAGINALLY EVERY NIGHT FOR 5 DAYS   • omeprazole (PriLOSEC) 20 mg delayed release capsule Take 1 capsule (20 mg total) by mouth daily before breakfast   • propranolol (INDERAL) 40 mg tablet TAKE 1 TABLET(40 MG) BY MOUTH THREE TIMES DAILY   • terbinafine (LamISIL) 1 % cream Apply topically 2 (two) times a day   • triamcinolone (KENALOG) 0 1 % cream Apply topically 2 (two) times a day   • [DISCONTINUED] amitriptyline (ELAVIL) 75 mg tablet Take 1 tablet (75 mg total) by mouth daily at bedtime   • [DISCONTINUED] baclofen 10 mg tablet Take 1 tablet (10 mg total) by mouth daily at bedtime     No current facility-administered medications on file prior to visit  Yoselin Thomas is allergic to asa [aspirin], diclofenac, latex, meloxicam, penicillin v, and penicillins            Objective:    Blood pressure 110/80, pulse (!) 112, height 5' 5" (1 651 m), weight 75 8 kg (167 lb), not currently breastfeeding  Physical Exam  Constitutional:       General: Yoselin Thomas is not in acute distress  Appearance: Yoselin Thomas is normal weight  HENT:      Head: Normocephalic and atraumatic  Nose: Nose normal       Mouth/Throat:      Mouth: Mucous membranes are moist       Pharynx: Oropharynx is clear   No oropharyngeal exudate or posterior oropharyngeal erythema  Eyes:      General: Lids are normal       Extraocular Movements: Extraocular movements intact  Pupils: Pupils are equal, round, and reactive to light  Cardiovascular:      Rate and Rhythm: Normal rate  Pulses: Normal pulses  Pulmonary:      Effort: Pulmonary effort is normal  No respiratory distress  Musculoskeletal:         General: Swelling and tenderness present  Normal range of motion  Cervical back: Normal range of motion  Comments: Left lateral food swelling   Skin:     General: Skin is warm and dry  Neurological:      General: No focal deficit present  Mental Status: Juan Gomez is alert and oriented to person, place, and time  Motor: Motor strength is normal       Coordination: Romberg sign negative  Deep Tendon Reflexes:      Reflex Scores:       Tricep reflexes are 2+ on the right side and 2+ on the left side  Bicep reflexes are 2+ on the right side and 2+ on the left side  Brachioradialis reflexes are 2+ on the right side and 2+ on the left side  Patellar reflexes are 2+ on the right side and 2+ on the left side  Achilles reflexes are 2+ on the right side and 2+ on the left side  Psychiatric:         Mood and Affect: Mood normal          Speech: Speech normal          Thought Content: Thought content normal          Judgment: Judgment normal          Neurological Exam  Mental Status  Alert  Oriented to person, place and time  Oriented to person, place, and time  Speech is normal  Language is fluent with no aphasia  Cranial Nerves  CN II: Visual acuity is normal  Visual fields full to confrontation  Right funduscopic exam: not visualized  Left funduscopic exam: not visualized  CN III, IV, VI: Extraocular movements intact bilaterally  Normal lids and orbits bilaterally  Pupils equal round and reactive to light bilaterally    CN V: Facial sensation is normal   CN VII: Full and symmetric facial movement  CN VIII: Hearing is normal   CN IX, X: Palate elevates symmetrically  CN XI: Shoulder shrug strength is normal   CN XII: Tongue midline without atrophy or fasciculations  Motor  Normal muscle bulk throughout  No fasciculations present  Normal muscle tone  No abnormal involuntary movements  Strength is 5/5 throughout all four extremities  Sensory  Sensation is intact to light touch, pinprick, vibration and proprioception in all four extremities  Reflexes                                            Right                      Left  Brachioradialis                    2+                         2+  Biceps                                 2+                         2+  Triceps                                2+                         2+  Patellar                                2+                         2+  Achilles                                2+                         2+    Coordination  Right: Finger-to-nose normal Left: Finger-to-nose normal     Gait  Casual gait is normal including stance, stride, and arm swing  Romberg is absent  ROS:    Review of Systems   Constitutional: Negative for chills and fever  HENT: Negative for ear pain and sore throat  Eyes: Negative for pain and visual disturbance  Respiratory: Negative for cough and shortness of breath  Cardiovascular: Negative for chest pain and palpitations  Gastrointestinal: Negative for abdominal pain and vomiting  Genitourinary: Negative for dysuria and hematuria  Musculoskeletal: Negative for arthralgias and back pain  Left lateral foot pain and swelling   Skin: Negative for color change and rash  Neurological: Negative for seizures and syncope  All other systems reviewed and are negative

## 2022-12-15 DIAGNOSIS — K21.9 CHRONIC GERD: ICD-10-CM

## 2022-12-15 RX ORDER — OMEPRAZOLE 20 MG/1
20 CAPSULE, DELAYED RELEASE ORAL
Qty: 90 CAPSULE | Refills: 1 | Status: SHIPPED | OUTPATIENT
Start: 2022-12-15

## 2023-02-02 DIAGNOSIS — K21.9 CHRONIC GERD: ICD-10-CM

## 2023-02-02 RX ORDER — FAMOTIDINE 20 MG/1
TABLET, FILM COATED ORAL
Qty: 90 TABLET | Refills: 0 | Status: SHIPPED | OUTPATIENT
Start: 2023-02-02

## 2023-03-09 ENCOUNTER — TELEPHONE (OUTPATIENT)
Dept: NEUROLOGY | Facility: CLINIC | Age: 43
End: 2023-03-09

## 2023-03-09 NOTE — TELEPHONE ENCOUNTER
Called patient preferred number and it was out of service, called the patient mother phone and left a voicemail offering the patient a sooner appt on 3-14-23 at 330 pm with Dr Joshua Randall with the approval of Fritz Reyes and to call back to confirm if she can take the sooner appt  En[place]rel Counseling:  I discussed with the patient the risks of etanercept including but not limited to myelosuppression, immunosuppression, autoimmune hepatitis, demyelinating diseases, lymphoma, and infections.  The patient understands that monitoring is required including a PPD at baseline and must alert us or the primary physician if symptoms of infection or other concerning signs are noted.

## 2023-03-28 DIAGNOSIS — G43.109 CHRONIC MIGRAINE WITH AURA: ICD-10-CM

## 2023-03-28 DIAGNOSIS — G25.0 BENIGN ESSENTIAL TREMOR: ICD-10-CM

## 2023-03-28 RX ORDER — PROPRANOLOL HYDROCHLORIDE 40 MG/1
TABLET ORAL
Qty: 90 TABLET | Refills: 0 | Status: SHIPPED | OUTPATIENT
Start: 2023-03-28

## 2023-03-28 RX ORDER — AMITRIPTYLINE HYDROCHLORIDE 75 MG/1
TABLET, FILM COATED ORAL
Qty: 30 TABLET | Refills: 2 | Status: SHIPPED | OUTPATIENT
Start: 2023-03-28

## 2023-04-03 ENCOUNTER — TELEPHONE (OUTPATIENT)
Dept: NEUROLOGY | Facility: CLINIC | Age: 43
End: 2023-04-03

## 2023-04-03 NOTE — TELEPHONE ENCOUNTER
I have been unable to reach this patient by phone  Interference to appointment for tomorrow at the [facility]

## 2023-05-04 ENCOUNTER — OFFICE VISIT (OUTPATIENT)
Dept: FAMILY MEDICINE CLINIC | Facility: CLINIC | Age: 43
End: 2023-05-04

## 2023-05-04 VITALS
TEMPERATURE: 98 F | WEIGHT: 158.1 LBS | HEART RATE: 109 BPM | DIASTOLIC BLOOD PRESSURE: 74 MMHG | OXYGEN SATURATION: 99 % | SYSTOLIC BLOOD PRESSURE: 100 MMHG | RESPIRATION RATE: 18 BRPM | HEIGHT: 65 IN | BODY MASS INDEX: 26.34 KG/M2

## 2023-05-04 DIAGNOSIS — M25.532 ACUTE PAIN OF LEFT WRIST: ICD-10-CM

## 2023-05-04 DIAGNOSIS — G43.109 CHRONIC MIGRAINE WITH AURA: ICD-10-CM

## 2023-05-04 DIAGNOSIS — G25.0 BENIGN ESSENTIAL TREMOR: ICD-10-CM

## 2023-05-04 DIAGNOSIS — M79.672 FOOT PAIN, LEFT: Primary | ICD-10-CM

## 2023-05-04 RX ORDER — PROPRANOLOL HYDROCHLORIDE 40 MG/1
TABLET ORAL
Qty: 90 TABLET | Refills: 0 | Status: SHIPPED | OUTPATIENT
Start: 2023-05-04

## 2023-05-04 RX ORDER — CAPSAICIN 0.025 %
1 CREAM (GRAM) TOPICAL 2 TIMES DAILY PRN
Qty: 60 G | Refills: 2 | Status: SHIPPED | OUTPATIENT
Start: 2023-05-04

## 2023-05-04 RX ORDER — BACLOFEN 10 MG/1
10 TABLET ORAL
Qty: 30 TABLET | Refills: 0 | Status: SHIPPED | OUTPATIENT
Start: 2023-05-04

## 2023-05-04 RX ORDER — RIZATRIPTAN BENZOATE 10 MG/1
10 TABLET ORAL AS NEEDED
Qty: 9 TABLET | Refills: 0 | Status: SHIPPED | OUTPATIENT
Start: 2023-05-04

## 2023-05-04 RX ORDER — AMITRIPTYLINE HYDROCHLORIDE 75 MG/1
75 TABLET, FILM COATED ORAL
Qty: 30 TABLET | Refills: 0 | Status: SHIPPED | OUTPATIENT
Start: 2023-05-04

## 2023-05-04 NOTE — PROGRESS NOTES
Name: Carol Lehman      : 1980      MRN: [id number]  Encounter Provider: Rexanne Oppenheim, CRNP  Encounter Date: 2023   Encounter department: [facility]    Assessment & Plan     1  Foot pain, left  Assessment & Plan:  No improvement in left foot pain  Exacerbated by standing/walking entire work shift  Pain worst with first steps in AM  Declined to try ice and exercises for plantar fasciitis recommended last appt  - XR left foot  - Can consider referral to Podiatry and injections though pt reluctant  Continue diclofenac gel, supportive shoe, avoid prolonged standing  Orders:  -     XR foot 3+ vw left; Future; Expected date: 2023    2  Acute pain of left wrist  Assessment & Plan:  Left handed pt with left wrist pain x3 weeks  Likely due to repetitive motion activities at work  No evidence of nerve compression  Allergies to ASA and NSAIDs     - Conservative management with RICE plus capsaicin cream PRN  Wrist brace/elastic support sleeve at work and while sleeping  Consider XR, PT if no improvement with conservative measures  Orders:  -     capsaicin (ZOSTRIX) 0 025 % cream; Apply 1 application  topically 2 (two) times a day as needed for mild pain    3  Chronic migraine with aura  -     baclofen 10 mg tablet; Take 1 tablet (10 mg total) by mouth daily at bedtime  -     amitriptyline (ELAVIL) 75 mg tablet; Take 1 tablet (75 mg total) by mouth daily at bedtime  -     rizatriptan (Maxalt) 10 mg tablet; Take 1 tablet (10 mg total) by mouth as needed for migraine Take at the onset of migraine; if symptoms continue or return, may take another dose at least 2 hours after first dose  Take no more than 2 doses in a day  Subjective     HPI     Jose Angelica presents to the office for left wrist pain and left foot pain  Also requests refills for migraine medications due to missed Neurology appt     Left foot pain has continued unchanged for several months, tender with pressure to bottom and side  Pt is on her feet the majority of the day working in an [employer]  Pt has tried wrapping foot, applying diclofenac gel, and avoiding standing when possible with no improvement  Unwilling to try ice states cannot tolerate  Pain is worst after standing all day and with first steps in AM    Left wrist pain is new, starting about 3 weeks ago with no specific injury  Pt's position changed at work, 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  No treatments tried  Review of Systems   Constitutional: Positive for activity change  Negative for fatigue, fever and unexpected weight change  Respiratory: Negative  Cardiovascular: Negative  Gastrointestinal: Negative  Musculoskeletal: Positive for arthralgias and myalgias  Neurological: Positive for headaches  Negative for dizziness, weakness and numbness  All other systems reviewed and are negative  Past Medical History:   Diagnosis Date   • Anxiety    • Asthma    • Bursitis    • Clostridioides difficile infection     8/2021-treatment received   • Gastric ulcer    • Gastroparesis    • GERD (gastroesophageal reflux disease)    • Liver cyst    • Lumbar herniated disc    • Migraine    • Norovirus     8/2021   • Polycystic ovarian syndrome    • Salmonella infection 08/2021   • Seasonal allergies    • Torn ACL    • Umbilical hernia    • Wears glasses      Past Surgical History:   Procedure Laterality Date   • KNEE SURGERY Right    • TN ARTHRS AIDED ANT CRUCIATE LIGM RPR/AGMNTJ/RCNSTJ Right 5/24/2018    Procedure: ARTHROSCOPIC RECONSTRUCTION ANTERIOR CRUCIATE LIGAMENT (ACL) WITH AUTOGRAFT AND ALLOGRAFT;  Surgeon: Alexandru Rivera MD;  Location: [facility] OR;  Service: Orthopedics   • TN ESOPHAGOGASTRODUODENOSCOPY TRANSORAL DIAGNOSTIC N/A 5/1/2017    Procedure: ESOPHAGOGASTRODUODENOSCOPY (EGD); Surgeon: Loulou Castañeda MD;  Location: [facility] GI LAB;   Service: Gastroenterology   • TUBAL LIGATION • WISDOM TOOTH EXTRACTION       Family History   Problem Relation Age of Onset   • Depression Mother    • Anxiety disorder Mother    • Sleep disorder Mother    • Hypertension Mother    • Hyperlipidemia Mother    • Hyperlipidemia Father    • Hypertension Father    • Heart disease Father    • Heart disease Paternal Aunt    • Heart disease Paternal Uncle    • Osteoporosis Maternal Grandmother    • Alzheimer's disease Paternal Grandmother    • Heart disease Paternal Grandmother    • Heart disease Paternal Grandfather    • Stroke Paternal Grandfather      Social History     Socioeconomic History   • Marital status: /Civil Union     Spouse name: None   • Number of children: None   • Years of education: None   • Highest education level: None   Occupational History   • None   Tobacco Use   • Smoking status: Never   • Smokeless tobacco: Never   Substance and Sexual Activity   • Alcohol use: Not Currently     Comment: rare   • Drug use: No   • Sexual activity: None   Other Topics Concern   • None   Social History Narrative   • None     Social Determinants of Health     Financial Resource Strain: Not on file   Food Insecurity: Not on file   Transportation Needs: Not on file   Physical Activity: Not on file   Stress: Not on file   Social Connections: Not on file   Intimate Partner Violence: Not on file   Housing Stability: Not on file     Current Outpatient Medications on File Prior to Visit   Medication Sig   • albuterol (PROVENTIL HFA,VENTOLIN HFA) 90 mcg/act inhaler Inhale 2 puffs every 6 (six) hours as needed for wheezing or shortness of breath   • Diclofenac Sodium (VOLTAREN) 1 % Apply 2 g topically 4 (four) times a day   • famotidine (PEPCID) 20 mg tablet TAKE 1 TABLET(20 MG) BY MOUTH DAILY AT BEDTIME   • ibuprofen (MOTRIN) 800 mg tablet Take 800 mg by mouth every 8 (eight) hours as needed   • omeprazole (PriLOSEC) 20 mg delayed release capsule Take 1 capsule (20 mg total) by mouth daily before breakfast   • "terbinafine (LamISIL) 1 % cream Apply topically 2 (two) times a day   • triamcinolone (KENALOG) 0 1 % cream Apply topically 2 (two) times a day     Allergies   Allergen Reactions   • Asa [Aspirin] Shortness Of Breath           • Diclofenac Rash   • Latex Hives   • Meloxicam Other (See Comments)     bruising   • Penicillin V Hives and Rash   • Penicillins Hives and Rash     Immunization History   Administered Date(s) Administered   • COVID-19 MODERNA VACC 0 5 ML IM 05/12/2021, 06/22/2021   • Tdap 11/15/2021       Objective     /74 (BP Location: Right arm, Patient Position: Sitting, Cuff Size: Standard)   Pulse (!) 109   Temp 98 °F (36 7 °C) (Temporal)   Resp 18   Ht 5' 5\" (1 651 m)   Wt 71 7 kg (158 lb 1 6 oz)   LMP 04/03/2023 (Approximate)   SpO2 99%   BMI 26 31 kg/m²     Physical Exam  Vitals reviewed  Constitutional:       General: Tyshawn Estimable is not in acute distress  Appearance: Tyshawn Estimable is overweight  Tyshawn Estimable is not ill-appearing or diaphoretic  HENT:      Head: Normocephalic and atraumatic  Cardiovascular:      Rate and Rhythm: Normal rate and regular rhythm  Pulses: Normal pulses  Dorsalis pedis pulses are 2+ on the left side  Heart sounds: Normal heart sounds  No murmur heard  Pulmonary:      Effort: Pulmonary effort is normal  No tachypnea  Breath sounds: Normal breath sounds  No decreased breath sounds or wheezing  Musculoskeletal:      Right wrist: Normal       Left wrist: Tenderness present  No swelling, deformity or crepitus  Normal range of motion  Normal pulse  Right hand: Normal       Left hand: No swelling or deformity  Normal range of motion  Normal strength  Normal sensation  Normal capillary refill  Normal pulse  Right lower leg: No edema  Left lower leg: No edema  Right foot: Normal       Left foot: Normal range of motion and normal capillary refill  Tenderness present  No swelling, deformity or crepitus   " Normal pulse  Comments: Phalen's negative  Feet:      Left foot:      Skin integrity: Skin integrity normal       Toenail Condition: Left toenails are normal    Skin:     General: Skin is warm and dry  Neurological:      Mental Status: Tyshawn Estimable is alert and oriented to person, place, and time  Psychiatric:         Attention and Perception: Attention normal          Mood and Affect: Mood and affect normal          Speech: Speech normal          Behavior: Behavior normal          Thought Content:  Thought content normal        AAMIR Perez

## 2023-05-17 PROBLEM — M25.532 ACUTE PAIN OF LEFT WRIST: Status: ACTIVE | Noted: 2023-05-17

## 2023-05-17 NOTE — ASSESSMENT & PLAN NOTE
No improvement in left foot pain  Exacerbated by standing/walking entire work shift  Pain worst with first steps in AM  Declined to try ice and exercises for plantar fasciitis recommended last appt  - XR left foot  - Can consider referral to Podiatry and injections though pt reluctant  Continue diclofenac gel, supportive shoe, avoid prolonged standing

## 2023-05-17 NOTE — ASSESSMENT & PLAN NOTE
Left handed pt with left wrist pain x3 weeks  Likely due to repetitive motion activities at work  No evidence of nerve compression  Allergies to ASA and NSAIDs     - Conservative management with RICE plus capsaicin cream PRN  Wrist brace/elastic support sleeve at work and while sleeping  Consider XR, PT if no improvement with conservative measures

## 2023-05-30 DIAGNOSIS — K21.9 CHRONIC GERD: ICD-10-CM

## 2023-05-31 RX ORDER — FAMOTIDINE 20 MG/1
TABLET, FILM COATED ORAL
Qty: 90 TABLET | Refills: 0 | Status: SHIPPED | OUTPATIENT
Start: 2023-05-31

## 2023-07-16 ENCOUNTER — APPOINTMENT (EMERGENCY)
Dept: RADIOLOGY | Facility: HOSPITAL | Age: 43
End: 2023-07-16
Payer: MEDICARE

## 2023-07-16 ENCOUNTER — HOSPITAL ENCOUNTER (EMERGENCY)
Facility: HOSPITAL | Age: 43
Discharge: HOME | End: 2023-07-16
Attending: EMERGENCY MEDICINE | Admitting: EMERGENCY MEDICINE
Payer: MEDICARE

## 2023-07-16 VITALS
OXYGEN SATURATION: 98 % | RESPIRATION RATE: 18 BRPM | BODY MASS INDEX: 26.41 KG/M2 | TEMPERATURE: 98.6 F | HEART RATE: 82 BPM | WEIGHT: 158.73 LBS | DIASTOLIC BLOOD PRESSURE: 92 MMHG | SYSTOLIC BLOOD PRESSURE: 124 MMHG

## 2023-07-16 DIAGNOSIS — G56.00 CARPAL TUNNEL SYNDROME: Primary | ICD-10-CM

## 2023-07-16 PROCEDURE — 73110 X-RAY EXAM OF WRIST: CPT

## 2023-07-16 PROCEDURE — 99283 EMERGENCY DEPT VISIT LOW MDM: CPT

## 2023-07-16 RX ORDER — PREDNISONE 20 MG/1
40 TABLET ORAL DAILY
Qty: 8 TABLET | Refills: 0 | Status: SHIPPED | OUTPATIENT
Start: 2023-07-16 | End: 2023-07-20

## 2023-07-16 NOTE — ED PROVIDER NOTES
History  Chief Complaint   Patient presents with   • Wrist Pain     Patient reports left wrist pain for the past month, but reports today it is a little bit swollen. Denies trauma/injury. Tried OTC meds without relief. Pulse/motor/sensation intact. [address] presents with left wrist pain x1 month. Pt reports she woke up with swelling this morning. Has been taking ibuprofen at home with not much relief. Pt states that she works at an [employer] and has repetitive wrist movements but was recently put on light duty at work due to the wrist pain. Admits to some tingling in her thumb and 1st finger occasionally. Prior to Admission Medications   Prescriptions Last Dose Informant Patient Reported? Taking? Diclofenac Sodium (VOLTAREN) 1 %   No No   Sig: Apply 2 g topically 4 (four) times a day   albuterol (PROVENTIL HFA,VENTOLIN HFA) 90 mcg/act inhaler   No No   Sig: Inhale 2 puffs every 6 (six) hours as needed for wheezing or shortness of breath   amitriptyline (ELAVIL) 75 mg tablet   No No   Sig: Take 1 tablet (75 mg total) by mouth daily at bedtime   baclofen 10 mg tablet   No No   Sig: Take 1 tablet (10 mg total) by mouth daily at bedtime   capsaicin (ZOSTRIX) 0.025 % cream   No No   Sig: Apply 1 application.  topically 2 (two) times a day as needed for mild pain   famotidine (PEPCID) 20 mg tablet   No No   Sig: TAKE 1 TABLET(20 MG) BY MOUTH DAILY AT BEDTIME   ibuprofen (MOTRIN) 800 mg tablet   Yes No   Sig: Take 800 mg by mouth every 8 (eight) hours as needed   omeprazole (PriLOSEC) 20 mg delayed release capsule   No No   Sig: Take 1 capsule (20 mg total) by mouth daily before breakfast   propranolol (INDERAL) 40 mg tablet   No No   Sig: TAKE 1 TABLET(40 MG) BY MOUTH THREE TIMES DAILY   rizatriptan (Maxalt) 10 mg tablet   No No   Sig: Take 1 tablet (10 mg total) by mouth as needed for migraine Take at the onset of migraine; if symptoms continue or return, may take another dose at least 2 hours after first dose. Take no more than 2 doses in a day. terbinafine (LamISIL) 1 % cream   No No   Sig: Apply topically 2 (two) times a day   triamcinolone (KENALOG) 0.1 % cream   No No   Sig: Apply topically 2 (two) times a day      Facility-Administered Medications: None       Past Medical History:   Diagnosis Date   • Anxiety    • Asthma    • Bursitis    • Clostridioides difficile infection     8/2021-treatment received   • Gastric ulcer    • Gastroparesis    • GERD (gastroesophageal reflux disease)    • Liver cyst    • Lumbar herniated disc    • Migraine    • Norovirus     8/2021   • Polycystic ovarian syndrome    • Salmonella infection 08/2021   • Seasonal allergies    • Torn ACL    • Umbilical hernia    • Wears glasses        Past Surgical History:   Procedure Laterality Date   • KNEE SURGERY Right    • [place] ARTHRS AIDED ANT CRUCIATE LIGM RPR/AGMNTJ/RCNSTJ Right 5/24/2018    Procedure: ARTHROSCOPIC RECONSTRUCTION ANTERIOR CRUCIATE LIGAMENT (ACL) WITH AUTOGRAFT AND ALLOGRAFT;  Surgeon: Rigo Garner MD;  Location: [facility] OR;  Service: Orthopedics   • NM ESOPHAGOGASTRODUODENOSCOPY TRANSORAL DIAGNOSTIC N/A 5/1/2017    Procedure: ESOPHAGOGASTRODUODENOSCOPY (EGD); Surgeon: Nita Mckeon MD;  Location: [facility] GI LAB; Service: Gastroenterology   • TUBAL LIGATION     • WISDOM TOOTH EXTRACTION         Family History   Problem Relation Age of Onset   • Depression Mother    • Anxiety disorder Mother    • Sleep disorder Mother    • Hypertension Mother    • Hyperlipidemia Mother    • Hyperlipidemia Father    • Hypertension Father    • Heart disease Father    • Heart disease Paternal Aunt    • Heart disease Paternal Uncle    • Osteoporosis Maternal Grandmother    • Alzheimer's disease Paternal Grandmother    • Heart disease Paternal Grandmother    • Heart disease Paternal Grandfather    • Stroke Paternal Grandfather      I have reviewed and agree with the history as documented.     E-Cigarette/Vaping     E-Cigarette/Vaping Substances     Social History     Tobacco Use   • Smoking status: Never   • Smokeless tobacco: Never   Substance Use Topics   • Alcohol use: Not Currently     Comment: rare   • Drug use: No       Review of Systems   Musculoskeletal: Positive for arthralgias (left wrist). All other systems reviewed and are negative. Physical Exam  Physical Exam  Vitals and nursing note reviewed. Constitutional:       General: Jameel Petersen is not in acute distress. Appearance: Normal appearance. Elnor Hard is well-developed. Elnor Hard is not ill-appearing. HENT:      Head: Normocephalic and atraumatic. Eyes:      Conjunctiva/sclera: Conjunctivae normal.   Cardiovascular:      Rate and Rhythm: Normal rate. Pulmonary:      Effort: Pulmonary effort is normal.   Musculoskeletal:         General: Swelling and tenderness present. Normal range of motion. Left wrist: Swelling present. Cervical back: Normal range of motion and neck supple. Comments: Minor swelling to the generalized wrist. + Tinel's sign + phalen's test   Skin:     General: Skin is warm and dry. Capillary Refill: Capillary refill takes less than 2 seconds. Neurological:      Mental Status: Jameel Petersen is alert.    Psychiatric:         Mood and Affect: Mood normal.         Behavior: Behavior normal.         Vital Signs  ED Triage Vitals   Temperature Pulse Respirations Blood Pressure SpO2   07/16/23 1053 07/16/23 1054 07/16/23 1053 07/16/23 1054 07/16/23 1053   98.6 °F (37 °C) 82 18 124/92 98 %      Temp Source Heart Rate Source Patient Position - Orthostatic VS BP Location FiO2 (%)   07/16/23 1053 07/16/23 1053 07/16/23 1053 07/16/23 1053 --   Oral Monitor Sitting Right arm       Pain Score       --                  Vitals:    07/16/23 1053 07/16/23 1054   BP:  124/92   Pulse:  82   Patient Position - Orthostatic VS: Sitting          Visual Acuity      ED Medications  Medications - No data to display    Diagnostic Studies  Results Reviewed     None                 XR wrist 3+ views LEFT   Final Result by Zoe Celis MD (07/16 1141)      No acute osseous abnormality. Workstation performed: [id number]                    Procedures  Procedures         ED Course  ED Course as of 07/16/23 1233   Sun Jul 16, 2023   1149 XR wrist 3+ views LEFT  No acute osseous abnormality.                                                Medical Decision Making  38 YOF presents with left wrist pain x1 month. Generalized swelling starting today. Work requires repetitive wrist movements, recently put on lighter duty. Has been taking ibuprofen at home with not mch relief. Physical exam as noted above. Symptoms and physical exam consistent with carpal tunnel syndrome. Discussed general recommendations, will refer to hand surgery. I have discussed the plan to discharge pt from ED. The patient was discharged in stable condition.  Patient ambulated off the department.  Extensive return to emergency department precautions were discussed.  Follow up with appropriate providers including primary care physician was discussed.  Patient and/or their  primary decision maker expressed understanding. Anna Members remained stable during entire emergency department stay. Carpal tunnel syndrome: undiagnosed new problem with uncertain prognosis  Amount and/or Complexity of Data Reviewed  Radiology: ordered. Decision-making details documented in ED Course. Details: No acute osseous abnormalities       Risk  Prescription drug management.           Disposition  Final diagnoses:   Carpal tunnel syndrome     Time reflects when diagnosis was documented in both MDM as applicable and the Disposition within this note     Time User Action Codes Description Comment    7/16/2023 11:54 AM Vianney Bailey Add [G56.00] Carpal tunnel syndrome       ED Disposition     ED Disposition   Discharge    Condition   Stable    Date/Time   Sun Jul 16, 2023 11:54 AM Comment   Marina Canales discharge to home/self care. Follow-up Information     Follow up With Specialties Details Why Contact Info Additional [address] Specialists [facility] Orthopedic Surgery Schedule an appointment as soon as possible for a visit   [address]  [address] [address]  [address] [place] [address], [address], [place], [id number] [phone]          Discharge Medication List as of 7/16/2023 11:58 AM      START taking these medications    Details   predniSONE 20 mg tablet Take 2 tablets (40 mg total) by mouth daily for 4 days, Starting Sun 7/16/2023, Until u 7/20/2023, Normal         CONTINUE these medications which have NOT CHANGED    Details   albuterol (PROVENTIL HFA,VENTOLIN HFA) 90 mcg/act inhaler Inhale 2 puffs every 6 (six) hours as needed for wheezing or shortness of breath, Starting Mon 11/15/2021, Normal      amitriptyline (ELAVIL) 75 mg tablet Take 1 tablet (75 mg total) by mouth daily at bedtime, Starting Thu 5/4/2023, Normal      baclofen 10 mg tablet Take 1 tablet (10 mg total) by mouth daily at bedtime, Starting u 5/4/2023, Normal      capsaicin (ZOSTRIX) 0.025 % cream Apply 1 application.  topically 2 (two) times a day as needed for mild pain, Starting Thu 5/4/2023, Normal      Diclofenac Sodium (VOLTAREN) 1 % Apply 2 g topically 4 (four) times a day, Starting Fri 11/4/2022, Normal      famotidine (PEPCID) 20 mg tablet TAKE 1 TABLET(20 MG) BY MOUTH DAILY AT BEDTIME, Normal      ibuprofen (MOTRIN) 800 mg tablet Take 800 mg by mouth every 8 (eight) hours as needed, Historical Med      omeprazole (PriLOSEC) 20 mg delayed release capsule Take 1 capsule (20 mg total) by mouth daily before breakfast, Starting Thu 12/15/2022, Normal      propranolol (INDERAL) 40 mg tablet TAKE 1 TABLET(40 MG) BY MOUTH THREE TIMES DAILY, Normal      rizatriptan (Maxalt) 10 mg tablet Take 1 tablet (10 mg total) by mouth as needed for migraine Take at the onset of migraine; if symptoms continue or return, may take another dose at least 2 hours after first dose.  Take no more than 2 doses in a day., Starting Thu 5/4/2023, Normal      terbinafine (LamISIL) 1 % cream Apply topically 2 (two) times a day, Starting Mon 5/16/2022, Normal      triamcinolone (KENALOG) 0.1 % cream Apply topically 2 (two) times a day, Starting Mon 5/16/2022, Normal                 PDMP Review       Value Time User    PDMP Reviewed  Yes 2/15/2022  4:08 PM Meenu Dubose, [address] [facility] Provider  Electronically Signed by           Edy Wood PA-C  07/16/23 7163

## 2023-07-17 ENCOUNTER — TELEPHONE (OUTPATIENT)
Dept: OBGYN CLINIC | Facility: HOSPITAL | Age: 43
End: 2023-07-17

## 2023-07-17 NOTE — TELEPHONE ENCOUNTER
Patient is being referred to a orthopedics. Please schedule accordingly.     [address]   [phone]

## 2023-08-25 ENCOUNTER — DOCUMENTATION (OUTPATIENT)
Dept: OBGYN CLINIC | Facility: MEDICAL CENTER | Age: 43
End: 2023-08-25

## 2023-08-25 ENCOUNTER — OFFICE VISIT (OUTPATIENT)
Dept: OBGYN CLINIC | Facility: MEDICAL CENTER | Age: 43
End: 2023-08-25
Payer: MEDICARE

## 2023-08-25 VITALS — BODY MASS INDEX: 26.33 KG/M2 | HEIGHT: 65 IN | WEIGHT: 158 LBS

## 2023-08-25 DIAGNOSIS — M25.532 LEFT WRIST PAIN: ICD-10-CM

## 2023-08-25 DIAGNOSIS — M65.4 DE QUERVAIN'S TENOSYNOVITIS, LEFT: Primary | ICD-10-CM

## 2023-08-25 PROCEDURE — 20550 NJX 1 TENDON SHEATH/LIGAMENT: CPT | Performed by: EMERGENCY MEDICINE

## 2023-08-25 PROCEDURE — 99203 OFFICE O/P NEW LOW 30 MIN: CPT | Performed by: EMERGENCY MEDICINE

## 2023-08-25 RX ORDER — LIDOCAINE HYDROCHLORIDE 10 MG/ML
1 INJECTION, SOLUTION INFILTRATION; PERINEURAL
Status: COMPLETED | OUTPATIENT
Start: 2023-08-25 | End: 2023-08-25

## 2023-08-25 RX ORDER — METHYLPREDNISOLONE ACETATE 40 MG/ML
0.5 INJECTION, SUSPENSION INTRA-ARTICULAR; INTRALESIONAL; INTRAMUSCULAR; SOFT TISSUE
Status: COMPLETED | OUTPATIENT
Start: 2023-08-25 | End: 2023-08-25

## 2023-08-25 RX ADMIN — METHYLPREDNISOLONE ACETATE 0.5 ML: 40 INJECTION, SUSPENSION INTRA-ARTICULAR; INTRALESIONAL; INTRAMUSCULAR; SOFT TISSUE at 10:00

## 2023-08-25 RX ADMIN — LIDOCAINE HYDROCHLORIDE 1 ML: 10 INJECTION, SOLUTION INFILTRATION; PERINEURAL at 10:00

## 2023-08-25 NOTE — PROGRESS NOTES
ANTICOAGULATION  MANAGEMENT     Interacting Medication Review    Interacting medication(s): Sulfamethoxazole-Trimethoprim (Bactrim) with warfarin.    Duration: 2 tablets for 1 day and then 1 tablet BID for 4 days.    Indication: Acute cystitis without hematuria    New medication?: Yes, interaction may increase INR and risk of bleeding       PLAN     Consulted with ELIDIA Sahu , recommend to adjust dose to 1.5 TuTh and 1mg W. Recheck INR on Friday 11/12    Spoke with Layne    Anticoagulation Calendar updated     ANTICOAGULATION MANAGEMENT     Layneandres Guadarrama 81 year old female is on warfarin with therapeutic INR result. (Goal INR 1.8-2.3)    Recent labs: (last 7 days)     11/08/21  1425   INR 2.0*       ASSESSMENT     Source(s): Patient/Caregiver Call     Warfarin doses taken: Warfarin taken as instructed  Diet: No new diet changes identified  New illness, injury, or hospitalization: Yes: See above BPA  Medication/supplement changes: Bactrim will start today  Signs or symptoms of bleeding or clotting: No  Previous INR: Subtherapeutic  Additional findings: None     PLAN     Recommended plan for temporary change(s) affecting INR     Dosing Instructions: 1.5mg TuTh and 1mg W with next INR in 3 days       Summary  As of 11/9/2021    Full warfarin instructions:  11/9: 1.5 mg; 11/10: 1 mg; 11/11: 1.5 mg; Otherwise 1.5 mg every Sun, Wed; 2 mg all other days   Next INR check:  11/12/2021             Telephone call with Layne who verbalizes understanding and agrees to plan and who agrees to plan and repeated back plan correctly    Lab visit scheduled    Education provided: Potential interaction between warfarin and Bactrim    Plan made with [facility] Pharmacist Adelina Johnston and per LVAD protocol    Blanca Han, RN  Anticoagulation Clinic  11/9/2021    _______________________________________________________________________     Anticoagulation Episode Summary     Current INR goal:     TTR:  60.5 % (11.9 mo)   Target end date:   Assessment/Plan:    Diagnoses and all orders for this visit:    Deirdre Mcclure tenosynovitis, left  -     Hand/upper extremity injection: L extensor compartment 1  -     Cock Up Wrist Splint    Left wrist pain  -     Ambulatory Referral to Hand Surgery  -     Hand/upper extremity injection: L extensor compartment 1  -     Cock Up Wrist Splint    Thumb spica brace provided for night splinting she may also use the brace during certain activities of the day however we did discuss not wearing the brace 24/7 to prevent stiffness. We did discuss also a soft thumb spica brace for the daytime. We provided a corticosteroid injection of the left dorsal first compartment for which the patient tolerated well  You may use Advil (ibuprofen) 600mg every 6 hours or at least twice per day OR Aleve (naproxen) 250-500mg every 12 hours as needed for pain and inflammation. You may also take Tylenol 500mg every 4-6 hours as needed OR max 1,000mg per dose up to 3 times per day for a total of 3,000mg per day  Check with your primary care physician to see if these medications are safe to take and to make sure they do not interfere with your other medications and medical issues. Return in about 6 weeks (around 10/6/2023). Subjective:   Patient ID: Viky Aguilar is a 38 y.o. adult. New patient presents for left radial wrist pain typically worse with random movements she was evaluated in the ER x-rays of the wrist were obtained and reviewed today. Review of Systems    The following portions of the patient's chart were reviewed and updated as appropriate:    Allergy:    Allergies   Allergen Reactions   • Asa [Aspirin] Shortness Of Breath           • Diclofenac Rash   • Latex Hives   • Meloxicam Other (See Comments)     bruising   • Penicillin V Hives and Rash   • Penicillins Hives and Rash       Medications:    Current Outpatient Medications:   •  albuterol (PROVENTIL HFA,VENTOLIN HFA) 90 mcg/act inhaler, Inhale 2 Indefinite   Send INR reminders to:  [facility]    Indications    Long-term (current) use of anticoagulants [Z79.01] [Z79.01]  Pulmonary embolism with infarction (HCC) [I26.99] (Resolved) [I26.99]  LVAD (left ventricular assist device) present (H) [Z95.811]  Chronic systolic congestive heart failure (H) [I50.22]           Comments:  No Bridging Age>75  HM3 LVAD Implanted 11/22/17 8/14/20++New goal range 1.8-2.3++  ASA is on Hold)         Anticoagulation Care Providers     Provider Role Specialty Phone number    Rita Muhammad MD Referring Advanced Heart Failure and Transplant Cardiology [phone]            Blanca Han RN   puffs every 6 (six) hours as needed for wheezing or shortness of breath, Disp: 18 g, Rfl: 2  •  amitriptyline (ELAVIL) 75 mg tablet, Take 1 tablet (75 mg total) by mouth daily at bedtime, Disp: 30 tablet, Rfl: 0  •  baclofen 10 mg tablet, Take 1 tablet (10 mg total) by mouth daily at bedtime, Disp: 30 tablet, Rfl: 0  •  capsaicin (ZOSTRIX) 0.025 % cream, Apply 1 application. topically 2 (two) times a day as needed for mild pain, Disp: 60 g, Rfl: 2  •  Diclofenac Sodium (VOLTAREN) 1 %, Apply 2 g topically 4 (four) times a day, Disp: 2 g, Rfl: 2  •  famotidine (PEPCID) 20 mg tablet, TAKE 1 TABLET(20 MG) BY MOUTH DAILY AT BEDTIME, Disp: 90 tablet, Rfl: 0  •  ibuprofen (MOTRIN) 800 mg tablet, Take 800 mg by mouth every 8 (eight) hours as needed, Disp: , Rfl:   •  omeprazole (PriLOSEC) 20 mg delayed release capsule, Take 1 capsule (20 mg total) by mouth daily before breakfast, Disp: 90 capsule, Rfl: 1  •  propranolol (INDERAL) 40 mg tablet, TAKE 1 TABLET(40 MG) BY MOUTH THREE TIMES DAILY, Disp: 90 tablet, Rfl: 2  •  rizatriptan (Maxalt) 10 mg tablet, Take 1 tablet (10 mg total) by mouth as needed for migraine Take at the onset of migraine; if symptoms continue or return, may take another dose at least 2 hours after first dose.  Take no more than 2 doses in a day., Disp: 9 tablet, Rfl: 0  •  terbinafine (LamISIL) 1 % cream, Apply topically 2 (two) times a day, Disp: 30 g, Rfl: 0  •  triamcinolone (KENALOG) 0.1 % cream, Apply topically 2 (two) times a day, Disp: 30 g, Rfl: 0    Patient Active Problem List   Diagnosis   • Rupture of anterior cruciate ligament of right knee   • Abdominal wall hernia   • Headache   • Elevated blood pressure reading in office without diagnosis of hypertension   • Family history of early CAD   • Chronic GERD   • Chronic migraine with aura   • Cervical spine pain   • Salmonella infection   • Anxiety   • Benign essential tremor   • PCOS (polycystic ovarian syndrome)   • Lumbar spinal stenosis   • Lumbar disc herniation   • Liver cyst   • Irregular menses   • ASCUS of cervix with negative high risk HPV   • Frequent bowel movements   • Foot pain, left   • Acute pain of left wrist   • Left wrist pain   • De Quervain's tenosynovitis, left       Objective:  Ht 5' 5" (1.651 m)   Wt 71.7 kg (158 lb)   BMI 26.29 kg/m²     Left Hand Exam     Tenderness   The patient is experiencing tenderness in the radial area. Range of Motion   The patient has normal left wrist ROM. Tests   Finkelstein's test: positive    Other   Erythema: absent  Sensation: normal  Pulse: present    Comments:  Scaphoid NTTP          Tests     Left Wrist/Hand   Positive Finkelstein's. Physical Exam      Neurologic Exam    Hand/upper extremity injection: L extensor compartment 1  Universal Protocol:  Consent: Verbal consent obtained. Risks and benefits: risks, benefits and alternatives were discussed  Consent given by: patient  Timeout called at: 8/25/2023 10:27 AM.  Site marked: the operative site was marked  Patient identity confirmed: verbally with patient    Supporting Documentation  Indications: pain and tendon swelling   Procedure Details  Condition:de Quervain's tenosynovitis Site: L extensor compartment 1   Preparation: Patient was prepped and draped in the usual sterile fashion  Needle size: 25 G  Ultrasound guidance: no  Approach: radial  Medications administered: 1 mL lidocaine 1 %; 0.5 mL methylPREDNISolone acetate 40 mg/mL    Patient tolerance: patient tolerated the procedure well with no immediate complications  Dressing:  Sterile dressing applied           I have personally reviewed pertinent films in PACS. and I have personally reviewed the written report of the pertinent studies.              Past Medical History:   Diagnosis Date   • Anxiety    • Asthma    • Bursitis    • Clostridioides difficile infection     8/2021-treatment received   • Gastric ulcer    • Gastroparesis    • GERD (gastroesophageal reflux disease) • Liver cyst    • Lumbar herniated disc    • Migraine    • Norovirus     8/2021   • Polycystic ovarian syndrome    • Salmonella infection 08/2021   • Seasonal allergies    • Torn ACL    • Umbilical hernia    • Wears glasses        Past Surgical History:   Procedure Laterality Date   • KNEE SURGERY Right    • [place] ARTHRS AIDED ANT CRUCIATE LIGM RPR/AGMNTJ/RCNSTJ Right 5/24/2018    Procedure: ARTHROSCOPIC RECONSTRUCTION ANTERIOR CRUCIATE LIGAMENT (ACL) WITH AUTOGRAFT AND ALLOGRAFT;  Surgeon: Raysa Carter MD;  Location: [facility] OR;  Service: Orthopedics   • [place] ESOPHAGOGASTRODUODENOSCOPY TRANSORAL DIAGNOSTIC N/A 5/1/2017    Procedure: ESOPHAGOGASTRODUODENOSCOPY (EGD); Surgeon: Angela Grant MD;  Location: [facility] GI LAB; Service: Gastroenterology   • TUBAL LIGATION     • WISDOM TOOTH EXTRACTION         Social History     Socioeconomic History   • Marital status: /Civil Union     Spouse name: Not on file   • Number of children: Not on file   • Years of education: Not on file   • Highest education level: Not on file   Occupational History   • Not on file   Tobacco Use   • Smoking status: Never   • Smokeless tobacco: Never   Substance and Sexual Activity   • Alcohol use: Not Currently     Comment: rare   • Drug use: No   • Sexual activity: Not on file   Other Topics Concern   • Not on file   Social History Narrative   • Not on file     Social Determinants of Health     Financial Resource Strain: Low Risk  (2/15/2022)    Overall Financial Resource Strain (CARDIA)    • Difficulty of Paying Living Expenses: Not hard at all   Food Insecurity: No Food Insecurity (11/15/2021)    Hunger Vital Sign    • Worried About Running Out of Food in the Last Year: Never true    • Ran Out of Food in the Last Year: Never true   Transportation Needs: No Transportation Needs (11/15/2021)    PRAPARE - Transportation    • Lack of Transportation (Medical): No    • Lack of Transportation (Non-Medical):  No   Physical Activity: Not on file Stress: Not on file   Social Connections: Not on file   Intimate Partner Violence: Not on file   Housing Stability: Low Risk  (11/15/2021)    Housing Stability Vital Sign    • Unable to Pay for Housing in the Last Year: No    • Number of Places Lived in the Last Year: 1    • Unstable Housing in the Last Year: No       Family History   Problem Relation Age of Onset   • Depression Mother    • Anxiety disorder Mother    • Sleep disorder Mother    • Hypertension Mother    • Hyperlipidemia Mother    • Hyperlipidemia Father    • Hypertension Father    • Heart disease Father    • Heart disease Paternal Aunt    • Heart disease Paternal Uncle    • Osteoporosis Maternal Grandmother    • Alzheimer's disease Paternal Grandmother    • Heart disease Paternal Grandmother    • Heart disease Paternal Grandfather    • Stroke Paternal Grandfather

## 2023-08-25 NOTE — PATIENT INSTRUCTIONS
You may use Advil (ibuprofen) 600mg every 6 hours or at least twice per day OR Aleve (naproxen) 250-500mg every 12 hours as needed for pain and inflammation. You may also take Tylenol 500mg every 4-6 hours as needed OR max 1,000mg per dose up to 3 times per day for a total of 3,000mg per day  Check with your primary care physician to see if these medications are safe to take and to make sure they do not interfere with your other medications and medical issues. Quentin Guess Disease   WHAT YOU NEED TO KNOW:   Adonna Porteous disease is inflammation of the tendons on the thumb side of your wrist. Tendons are thick strands of tissue that connect muscles to bones. DISCHARGE INSTRUCTIONS:   Splint or brace: These devices will help decrease pain, limit movement, and protect your wrist so that it can heal. Make sure your device is comfortable. If it is too tight, your fingers may feel numb or tingly. Do not push or lean on your device because it can break. Physical or occupational therapy:  You may need to see a physical or occupational therapist to teach you special exercises. These exercises help improve movement and decrease pain. They also help improve strength and decrease your risk for loss of function. Therapists will help you make changes to your daily activities to decrease stress and pressure on the tendons. Rest:  Rest your injured thumb or wrist. Avoid twisting, grasping, or gripping movements. Ask when you can return to your normal activities. Medicines:   NSAIDs:  These medicines decrease swelling, pain, and fever. They are available without a doctor's order. Ask which medicine is right for you, and how much to take. Take as directed. NSAIDs can cause stomach bleeding or kidney problems if not taken correctly. Take your medicine as directed. Contact your healthcare provider if you think your medicine is not helping or if you have side effects.  Tell your provider if you are allergic to any medicine. Keep a list of the medicines, vitamins, and herbs you take. Include the amounts, and when and why you take them. Bring the list or the pill bottles to follow-up visits. Carry your medicine list with you in case of an emergency. Follow up with your doctor as directed:  Write down your questions so you remember to ask them during your visits. Contact your healthcare provider if:   Your splint or brace is too tight and you cannot loosen it. You have a fever. Your pain and swelling get worse or do not go away. Your cannot grasp objects because of the pain and swelling. You have questions or concerns about your condition or care. Return to the emergency department if:   You cannot move your thumb or wrist.    Your fingers feel numb, tingly, cool to the touch, or look blue or pale. © Copyright Shukri Snider 2022 Information is for End User's use only and may not be sold, redistributed or otherwise used for commercial purposes. The above information is an  only. It is not intended as medical advice for individual conditions or treatments. Talk to your doctor, nurse or pharmacist before following any medical regimen to see if it is safe and effective for you.

## 2023-08-28 ENCOUNTER — APPOINTMENT (OUTPATIENT)
Dept: LAB | Facility: HOSPITAL | Age: 43
End: 2023-08-28
Payer: MEDICARE

## 2023-08-28 DIAGNOSIS — Z87.59 HISTORY OF PRE-ECLAMPSIA: ICD-10-CM

## 2023-08-28 LAB
CHOLEST SERPL-MCNC: 190 MG/DL
HDLC SERPL-MCNC: 41 MG/DL
LDLC SERPL CALC-MCNC: 123 MG/DL (ref 0–100)
NONHDLC SERPL-MCNC: 149 MG/DL
TRIGL SERPL-MCNC: 132 MG/DL

## 2023-08-28 PROCEDURE — 80061 LIPID PANEL: CPT

## 2023-08-28 PROCEDURE — 36415 COLL VENOUS BLD VENIPUNCTURE: CPT

## 2023-10-13 ENCOUNTER — OFFICE VISIT (OUTPATIENT)
Dept: OBGYN CLINIC | Facility: MEDICAL CENTER | Age: 43
End: 2023-10-13
Payer: MEDICARE

## 2023-10-13 VITALS
SYSTOLIC BLOOD PRESSURE: 124 MMHG | HEIGHT: 65 IN | WEIGHT: 158 LBS | DIASTOLIC BLOOD PRESSURE: 90 MMHG | HEART RATE: 70 BPM | BODY MASS INDEX: 26.33 KG/M2

## 2023-10-13 DIAGNOSIS — M65.4 DE QUERVAIN'S TENOSYNOVITIS, LEFT: ICD-10-CM

## 2023-10-13 DIAGNOSIS — M25.532 LEFT WRIST PAIN: Primary | ICD-10-CM

## 2023-10-13 PROCEDURE — 99212 OFFICE O/P EST SF 10 MIN: CPT | Performed by: EMERGENCY MEDICINE

## 2023-10-13 NOTE — PROGRESS NOTES
Assessment/Plan:    Diagnoses and all orders for this visit:    Left wrist pain    De Quervain's tenosynovitis, left        Return if symptoms worsen or fail to improve. Subjective:   Patient ID: Francisco Javier Sosa is a 38 y.o. adult. Patient returns s/p CSI 1st dorsal compartment also provided wrist brace and notes significant improvement    Initial note 8/25: New patient presents for left radial wrist pain typically worse with random movements she was evaluated in the ER x-rays of the wrist were obtained and reviewed today. Review of Systems    The following portions of the patient's chart were reviewed and updated as appropriate: Allergy:    Allergies   Allergen Reactions    Asa [Aspirin] Shortness Of Breath            Diclofenac Rash    Latex Hives    Meloxicam Other (See Comments)     bruising    Penicillin V Hives and Rash    Penicillins Hives and Rash       Medications:    Current Outpatient Medications:     albuterol (PROVENTIL HFA,VENTOLIN HFA) 90 mcg/act inhaler, Inhale 2 puffs every 6 (six) hours as needed for wheezing or shortness of breath, Disp: 18 g, Rfl: 2    amitriptyline (ELAVIL) 75 mg tablet, Take 1 tablet (75 mg total) by mouth daily at bedtime, Disp: 30 tablet, Rfl: 0    baclofen 10 mg tablet, Take 1 tablet (10 mg total) by mouth daily at bedtime, Disp: 30 tablet, Rfl: 0    capsaicin (ZOSTRIX) 0.025 % cream, Apply 1 application.  topically 2 (two) times a day as needed for mild pain, Disp: 60 g, Rfl: 2    Diclofenac Sodium (VOLTAREN) 1 %, Apply 2 g topically 4 (four) times a day, Disp: 2 g, Rfl: 2    famotidine (PEPCID) 20 mg tablet, TAKE 1 TABLET(20 MG) BY MOUTH DAILY AT BEDTIME, Disp: 90 tablet, Rfl: 0    ibuprofen (MOTRIN) 800 mg tablet, Take 800 mg by mouth every 8 (eight) hours as needed, Disp: , Rfl:     omeprazole (PriLOSEC) 20 mg delayed release capsule, Take 1 capsule (20 mg total) by mouth daily before breakfast, Disp: 90 capsule, Rfl: 1    propranolol (INDERAL) 40 mg tablet, TAKE 1 TABLET(40 MG) BY MOUTH THREE TIMES DAILY, Disp: 90 tablet, Rfl: 2    rizatriptan (Maxalt) 10 mg tablet, Take 1 tablet (10 mg total) by mouth as needed for migraine Take at the onset of migraine; if symptoms continue or return, may take another dose at least 2 hours after first dose. Take no more than 2 doses in a day., Disp: 9 tablet, Rfl: 0    terbinafine (LamISIL) 1 % cream, Apply topically 2 (two) times a day, Disp: 30 g, Rfl: 0    triamcinolone (KENALOG) 0.1 % cream, Apply topically 2 (two) times a day, Disp: 30 g, Rfl: 0    Patient Active Problem List   Diagnosis    Rupture of anterior cruciate ligament of right knee    Abdominal wall hernia    Headache    Elevated blood pressure reading in office without diagnosis of hypertension    Family history of early CAD    Chronic GERD    Chronic migraine with aura    Cervical spine pain    Salmonella infection    Anxiety    Benign essential tremor    PCOS (polycystic ovarian syndrome)    Lumbar spinal stenosis    Lumbar disc herniation    Liver cyst    Irregular menses    ASCUS of cervix with negative high risk HPV    Frequent bowel movements    Foot pain, left    Acute pain of left wrist    Left wrist pain    De Quervain's tenosynovitis, left       Objective:  /90   Pulse 70   Ht 5' 5" (1.651 m)   Wt 71.7 kg (158 lb)   BMI 26.29 kg/m²     Left Hand Exam     Other   Erythema: absent    Comments:  No depigmentation or adipose necrosis. Wrist is nontender to palpation            Physical Exam      Neurologic Exam    Procedures    I have personally reviewed the written report of the pertinent studies.              Past Medical History:   Diagnosis Date    Anxiety     Asthma     Bursitis     Clostridioides difficile infection     8/2021-treatment received    Gastric ulcer     Gastroparesis     GERD (gastroesophageal reflux disease)     Liver cyst     Lumbar herniated disc     Migraine     Norovirus     8/2021    Polycystic ovarian syndrome Salmonella infection 08/2021    Seasonal allergies     Torn ACL     Umbilical hernia     Wears glasses        Past Surgical History:   Procedure Laterality Date    KNEE SURGERY Right     [place] AIDED ANT CRUCIATE LIGM RPR/AGMNTJ/RCNSTJ Right 5/24/2018    Procedure: ARTHROSCOPIC RECONSTRUCTION ANTERIOR CRUCIATE LIGAMENT (ACL) WITH AUTOGRAFT AND ALLOGRAFT;  Surgeon: Raysa Carter MD;  Location: [facility] OR;  Service: Orthopedics    [place] ESOPHAGOGASTRODUODENOSCOPY TRANSORAL DIAGNOSTIC N/A 5/1/2017    Procedure: ESOPHAGOGASTRODUODENOSCOPY (EGD); Surgeon: Angela Grant MD;  Location: [facility] GI LAB; Service: Gastroenterology    TUBAL LIGATION      WISDOM TOOTH EXTRACTION         Social History     Socioeconomic History    Marital status: /Civil Union     Spouse name: Not on file    Number of children: Not on file    Years of education: Not on file    Highest education level: Not on file   Occupational History    Not on file   Tobacco Use    Smoking status: Never    Smokeless tobacco: Never   Substance and Sexual Activity    Alcohol use: Not Currently     Comment: rare    Drug use: No    Sexual activity: Not on file   Other Topics Concern    Not on file   Social History Narrative    Not on file     Social Determinants of Health     Financial Resource Strain: Low Risk  (2/15/2022)    Overall Financial Resource Strain (CARDIA)     Difficulty of Paying Living Expenses: Not hard at all   Food Insecurity: No Food Insecurity (11/15/2021)    Hunger Vital Sign     Worried About Running Out of Food in the Last Year: Never true     [address] in the Last Year: Never true   Transportation Needs: No Transportation Needs (11/15/2021)    PRAPARE - Transportation     Lack of Transportation (Medical): No     Lack of Transportation (Non-Medical):  No   Physical Activity: Not on file   Stress: Not on file   Social Connections: Not on file   Intimate Partner Violence: Not on file   Housing Stability: Low Risk  (11/15/2021) Housing Stability Vital Sign     Unable to Pay for Housing in the Last Year: No     Number of Places Lived in the Last Year: 1     Unstable Housing in the Last Year: No       Family History   Problem Relation Age of Onset    Depression Mother     Anxiety disorder Mother     Sleep disorder Mother     Hypertension Mother     Hyperlipidemia Mother     Hyperlipidemia Father     Hypertension Father     Heart disease Father     Heart disease Paternal Aunt     Heart disease Paternal Uncle     Osteoporosis Maternal Grandmother     Alzheimer's disease Paternal Grandmother     Heart disease Paternal Grandmother     Heart disease Paternal Grandfather     Stroke Paternal Grandfather

## 2024-01-01 NOTE — ED PROVIDER NOTES
History  Chief Complaint   Patient presents with    Cold Like Symptoms     Patient being seen along with son - Pt c/o cold symptoms since Monday - moderate laryngitis noted in triage  c/o nasal congestion, sore throat, dry cough, and "clear discharge from my ears" but no pain to ears  Patient taking OTC meds and zyrtec w/o relief  Also c/o bug bites (similar to son's bites) that pt is unsure if they are mosquito bites because "they are to swollen"  Bug bites consistent w/ mosquito bites noted to arm and leg     72-year-old female presents with five-day history of sore throat cough runny nose  Patient also complaining of insect bites that occurred yesterday  No fevers chills nausea vomiting diarrhea  Patient also reports she feels like she is having wetness in her ears  No chest pain or abdominal pain  History provided by:  Patient   used: No    URI   Presenting symptoms: congestion, cough and rhinorrhea    Presenting symptoms: no ear pain and no fever    Congestion:     Location:  Nasal and chest    Interferes with sleep: yes      Interferes with eating/drinking: no    Cough:     Cough characteristics:  Non-productive and dry    Severity:  Moderate    Onset quality:  Sudden    Timing:  Constant    Progression:  Waxing and waning    Chronicity:  New  Rhinorrhea:     Quality:  Clear    Severity:  Moderate    Timing:  Constant    Progression:  Waxing and waning  Severity:  Moderate  Onset quality:  Sudden  Timing:  Intermittent  Progression:  Waxing and waning  Chronicity:  New  Relieved by:  Nothing  Worsened by:  Nothing  Ineffective treatments: Zyrtec    Associated symptoms: no neck pain, no sinus pain and no sneezing    Insect Bite   Contact animal:  Insect  Location:  Leg  Leg injury location:  L lower leg  Time since incident:  1 day  Pain details:     Quality:  Itching    Severity:  Mild    Timing:  Constant    Progression:  Improving  Incident location:  Home  Provoked: unprovoked    Relieved by:  Nothing  Worsened by:  Nothing  Ineffective treatments:  None tried  Associated symptoms: rash    Associated symptoms: no fever        Prior to Admission Medications   Prescriptions Last Dose Informant Patient Reported? Taking? HYDROcodone-acetaminophen (NORCO) 5-325 mg per tablet   No No   Sig: Take 1 tablet by mouth every 6 (six) hours as needed for pain Max Daily Amount: 4 tablets   cetirizine (ZyrTEC) 10 mg tablet   No No   Sig: Take 1 tablet (10 mg total) by mouth 2 (two) times a day for 7 days   famotidine (PEPCID) 40 MG tablet   Yes No   Sig: TK 1 T PO HS   hydrOXYzine HCL (ATARAX) 25 mg tablet   No No   Sig: Take 1-2 tablets (25-50 mg total) by mouth daily at bedtime as needed for itching   naproxen (NAPROSYN) 500 mg tablet   No No   Sig: Take 1 tablet (500 mg total) by mouth 2 (two) times a day with meals   omeprazole (PriLOSEC) 40 MG capsule  Self Yes No   Sig: Take 40 mg by mouth daily     promethazine (PHENERGAN) 12 5 MG tablet   No No   Sig: Take 1 tablet (12 5 mg total) by mouth every 6 (six) hours as needed for nausea or vomiting      Facility-Administered Medications: None       Past Medical History:   Diagnosis Date    Acid reflux     Anxiety     Bursitis     Frequent headaches     Gastroparesis     Liver cyst     Lumbar herniated disc     Migraine     Seasonal allergies     Torn ACL     Wears glasses        Past Surgical History:   Procedure Laterality Date    KNEE SURGERY Right     DC ESOPHAGOGASTRODUODENOSCOPY TRANSORAL DIAGNOSTIC N/A 5/1/2017    Procedure: ESOPHAGOGASTRODUODENOSCOPY (EGD); Surgeon: Evelena Phalen, MD;  Location: [facility];   Service: Gastroenterology    DC KNEE SCOPE,AID ANT CRUCIATE REPAIR Right 5/24/2018    Procedure: ARTHROSCOPIC RECONSTRUCTION ANTERIOR CRUCIATE LIGAMENT (ACL) WITH AUTOGRAFT AND ALLOGRAFT;  Surgeon: Flor Hatfield MD;  Location: [facility] OR;  Service: Orthopedics    TUBAL LIGATION      WISDOM TOOTH EXTRACTION Family History   Problem Relation Age of Onset    Depression Mother     Anxiety disorder Mother     Sleep disorder Mother     Hyperlipidemia Father     Hypertension Father     Heart disease Father     Heart disease Paternal Aunt     Heart disease Paternal Uncle     Osteoporosis Maternal Grandmother     Alzheimer's disease Paternal Grandmother     Heart disease Paternal Grandmother     Heart disease Paternal Grandfather      I have reviewed and agree with the history as documented  Social History   Substance Use Topics    Smoking status: Never Smoker    Smokeless tobacco: Never Used    Alcohol use No        Review of Systems   Constitutional: Negative  Negative for fever  HENT: Positive for congestion and rhinorrhea  Negative for ear pain, sinus pain and sneezing  Eyes: Negative  Respiratory: Positive for cough  Cardiovascular: Negative  Gastrointestinal: Negative  Musculoskeletal: Negative  Negative for neck pain  Skin: Positive for rash  Neurological: Negative  Physical Exam  Physical Exam   Constitutional: She is oriented to person, place, and time  She appears well-developed and well-nourished  No distress  HENT:   Head: Normocephalic and atraumatic  Right Ear: External ear normal    Left Ear: External ear normal    Nose: Nose normal    Mouth/Throat: Oropharynx is clear and moist  No oropharyngeal exudate  Eyes: Conjunctivae and EOM are normal  Right eye exhibits no discharge  Left eye exhibits no discharge  Neck: Normal range of motion  Neck supple  Cardiovascular: Normal rate, regular rhythm, normal heart sounds and intact distal pulses  No murmur heard  Pulmonary/Chest: Effort normal  No respiratory distress  She has decreased breath sounds (Mild throughout)  She has no wheezes  Abdominal: Soft  Bowel sounds are normal  There is no tenderness  Musculoskeletal: Normal range of motion  Lymphadenopathy:     She has no cervical adenopathy  Neurological: She is alert and oriented to person, place, and time  Skin: Skin is warm  Capillary refill takes less than 2 seconds  Rash noted  Psychiatric: She has a normal mood and affect  Nursing note and vitals reviewed  Vital Signs  ED Triage Vitals [08/24/18 2243]   Temperature Pulse Respirations Blood Pressure SpO2   (!) 97 2 °F (36 2 °C) 76 21 123/83 99 %      Temp Source Heart Rate Source Patient Position - Orthostatic VS BP Location FiO2 (%)   Temporal Monitor Sitting Left arm --      Pain Score       --           Vitals:    08/24/18 2243   BP: 123/83   Pulse: 76   Patient Position - Orthostatic VS: Sitting       Visual Acuity      ED Medications  Medications - No data to display    Diagnostic Studies  Results Reviewed     None                 No orders to display              Procedures  Procedures       Phone Contacts  ED Phone Contact    ED Course                               MDM  Number of Diagnoses or Management Options  Risk of Complications, Morbidity, and/or Mortality  Presenting problems: minimal  Diagnostic procedures: minimal  Management options: minimal      CritCare Time    Disposition  Final diagnoses:   Upper respiratory tract infection, unspecified type   Insect bite, initial encounter     Time reflects when diagnosis was documented in both MDM as applicable and the Disposition within this note     Time User Action Codes Description Comment    8/24/2018 11:23 PM Artur Levine [J06 9] Upper respiratory tract infection, unspecified type     8/24/2018 11:23 PM Artur Levine [O41  XXXA] Insect bite, initial encounter       ED Disposition     ED Disposition Condition Comment    Discharge  Sarai Machado discharge to home/self care      Condition at discharge: Good        Follow-up Information     Follow up With Specialties Details Why Contact Info    Yadira Horvath PA-C Family Medicine, Physician Assistant In 3 days Follow up if not feeling better [facility] [address] [facility]  [phone]            Patient's Medications   Discharge Prescriptions    ALBUTEROL (PROVENTIL HFA,VENTOLIN HFA) 90 MCG/ACT INHALER    Inhale 2 puffs every 6 (six) hours as needed for wheezing       Start Date: 8/24/2018 End Date: --       Order Dose: 2 puffs       Quantity: 1 Inhaler    Refills: 0    BENZONATATE (TESSALON PERLES) 100 MG CAPSULE    Take 1 capsule (100 mg total) by mouth 3 (three) times a day as needed for cough       Start Date: 8/24/2018 End Date: --       Order Dose: 100 mg       Quantity: 15 capsule    Refills: 0    FLUTICASONE (FLONASE) 50 MCG/ACT NASAL SPRAY    2 sprays into each nostril daily       Start Date: 8/24/2018 End Date: --       Order Dose: 2 sprays       Quantity: 16 g    Refills: 0    HYDROCORTISONE 2 5 % CREAM    Apply topically 2 (two) times a day       Start Date: 8/24/2018 End Date: --       Order Dose: --       Quantity: 30 g    Refills: 0    PREDNISONE 50 MG TABLET    Take 1 tablet (50 mg total) by mouth daily for 5 days       Start Date: 8/24/2018 End Date: 8/29/2018       Order Dose: 50 mg       Quantity: 5 tablet    Refills: 0     No discharge procedures on file      ED Provider  Electronically Signed by           Emy Roberts PA-C  08/24/18 0001 Plan:  - Routine care, strict I and O  - Weights and bilirubin per protocol   - Hearing screen before discharge  - CCHD and  screen before discharge  - Parental education and anticipatory guidance

## 2024-04-13 ENCOUNTER — HOSPITAL ENCOUNTER (EMERGENCY)
Facility: HOSPITAL | Age: 44
Discharge: HOME | End: 2024-04-13
Attending: EMERGENCY MEDICINE | Admitting: EMERGENCY MEDICINE
Payer: MEDICARE

## 2024-04-13 ENCOUNTER — APPOINTMENT (EMERGENCY)
Dept: CT IMAGING | Facility: HOSPITAL | Age: 44
End: 2024-04-13
Payer: MEDICARE

## 2024-04-13 VITALS
SYSTOLIC BLOOD PRESSURE: 137 MMHG | RESPIRATION RATE: 20 BRPM | BODY MASS INDEX: 25.57 KG/M2 | TEMPERATURE: 98 F | DIASTOLIC BLOOD PRESSURE: 88 MMHG | HEART RATE: 88 BPM | WEIGHT: 153.66 LBS | OXYGEN SATURATION: 99 %

## 2024-04-13 DIAGNOSIS — R10.9 ABDOMINAL PAIN: Primary | ICD-10-CM

## 2024-04-13 DIAGNOSIS — R07.81 RIB PAIN ON RIGHT SIDE: ICD-10-CM

## 2024-04-13 LAB
ALBUMIN SERPL BCP-MCNC: 4.2 G/DL (ref 3.5–5)
ALP SERPL-CCNC: 59 U/L (ref 34–104)
ALT SERPL W P-5'-P-CCNC: 12 U/L (ref 7–52)
ANION GAP SERPL CALCULATED.3IONS-SCNC: 7 MMOL/L (ref 4–13)
AST SERPL W P-5'-P-CCNC: 12 U/L (ref 5–45)
BASOPHILS # BLD AUTO: 0.06 THOUSANDS/ÂΜL (ref 0–0.1)
BASOPHILS NFR BLD AUTO: 1 % (ref 0–1)
BILIRUB SERPL-MCNC: 0.29 MG/DL (ref 0.2–1)
BILIRUB UR QL STRIP: NEGATIVE
BUN SERPL-MCNC: 10 MG/DL (ref 5–25)
CALCIUM SERPL-MCNC: 9.1 MG/DL (ref 8.4–10.2)
CHLORIDE SERPL-SCNC: 104 MMOL/L (ref 96–108)
CLARITY UR: ABNORMAL
CO2 SERPL-SCNC: 27 MMOL/L (ref 21–32)
COLOR UR: ABNORMAL
CREAT SERPL-MCNC: 0.73 MG/DL (ref 0.6–1.3)
EOSINOPHIL # BLD AUTO: 0.1 THOUSAND/ÂΜL (ref 0–0.61)
EOSINOPHIL NFR BLD AUTO: 1 % (ref 0–6)
ERYTHROCYTE [DISTWIDTH] IN BLOOD BY AUTOMATED COUNT: 13 % (ref 11.6–15.1)
EXT PREGNANCY TEST URINE: NEGATIVE
EXT. CONTROL: NORMAL
GLUCOSE SERPL-MCNC: 94 MG/DL (ref 65–140)
GLUCOSE UR STRIP-MCNC: NEGATIVE MG/DL
HCT VFR BLD AUTO: 38.6 % (ref 36.5–46.1)
HGB BLD-MCNC: 13.2 G/DL (ref 12–15.4)
HGB UR QL STRIP.AUTO: NEGATIVE
IMM GRANULOCYTES # BLD AUTO: 0.02 THOUSAND/UL (ref 0–0.2)
IMM GRANULOCYTES NFR BLD AUTO: 0 % (ref 0–2)
KETONES UR STRIP-MCNC: NEGATIVE MG/DL
LEUKOCYTE ESTERASE UR QL STRIP: NEGATIVE
LIPASE SERPL-CCNC: 33 U/L (ref 11–82)
LYMPHOCYTES # BLD AUTO: 1.7 THOUSANDS/ÂΜL (ref 0.6–4.47)
LYMPHOCYTES NFR BLD AUTO: 22 % (ref 14–44)
MCH RBC QN AUTO: 28.9 PG (ref 26.8–34.3)
MCHC RBC AUTO-ENTMCNC: 34.2 G/DL (ref 31.4–37.4)
MCV RBC AUTO: 85 FL (ref 82–98)
MONOCYTES # BLD AUTO: 0.51 THOUSAND/ÂΜL (ref 0.17–1.22)
MONOCYTES NFR BLD AUTO: 7 % (ref 4–12)
NEUTROPHILS # BLD AUTO: 5.37 THOUSANDS/ÂΜL (ref 1.85–7.62)
NEUTS SEG NFR BLD AUTO: 69 % (ref 43–75)
NITRITE UR QL STRIP: NEGATIVE
NRBC BLD AUTO-RTO: 0 /100 WBCS
PH UR STRIP.AUTO: 6.5 [PH]
PLATELET # BLD AUTO: 279 THOUSANDS/UL (ref 149–390)
PMV BLD AUTO: 10.5 FL (ref 8.9–12.7)
POTASSIUM SERPL-SCNC: 3.9 MMOL/L (ref 3.5–5.3)
PROT SERPL-MCNC: 7 G/DL (ref 6.4–8.4)
PROT UR STRIP-MCNC: NEGATIVE MG/DL
RBC # BLD AUTO: 4.56 MILLION/UL (ref 3.88–5.12)
SODIUM SERPL-SCNC: 138 MMOL/L (ref 135–147)
SP GR UR STRIP.AUTO: 1.01 (ref 1–1.04)
UROBILINOGEN UA: NEGATIVE MG/DL
WBC # BLD AUTO: 7.76 THOUSAND/UL (ref 4.31–10.16)

## 2024-04-13 PROCEDURE — 36415 COLL VENOUS BLD VENIPUNCTURE: CPT | Performed by: EMERGENCY MEDICINE

## 2024-04-13 PROCEDURE — 80053 COMPREHEN METABOLIC PANEL: CPT | Performed by: EMERGENCY MEDICINE

## 2024-04-13 PROCEDURE — 99284 EMERGENCY DEPT VISIT MOD MDM: CPT

## 2024-04-13 PROCEDURE — 99285 EMERGENCY DEPT VISIT HI MDM: CPT | Performed by: EMERGENCY MEDICINE

## 2024-04-13 PROCEDURE — 81003 URINALYSIS AUTO W/O SCOPE: CPT | Performed by: EMERGENCY MEDICINE

## 2024-04-13 PROCEDURE — 81025 URINE PREGNANCY TEST: CPT | Performed by: EMERGENCY MEDICINE

## 2024-04-13 PROCEDURE — 85025 COMPLETE CBC W/AUTO DIFF WBC: CPT | Performed by: EMERGENCY MEDICINE

## 2024-04-13 PROCEDURE — 83690 ASSAY OF LIPASE: CPT | Performed by: EMERGENCY MEDICINE

## 2024-04-13 PROCEDURE — 74177 CT ABD & PELVIS W/CONTRAST: CPT

## 2024-04-13 RX ADMIN — IOHEXOL 100 ML: 350 INJECTION, SOLUTION INTRAVENOUS at 17:24

## 2024-04-13 NOTE — ED PROVIDER NOTES
"History  Chief Complaint   Patient presents with    Abdominal Pain     Pt c/o sharp/cramping RUQ pain radiating through back x1 week. \"It feels like air pain\" Rpts changes in severity throughout day     HPI  Patient is a 43-year-old female presenting with right upper quadrant abdominal pain and right lower rib pain.  States that the symptom has been ongoing for the past 3 days.  At the end of yesterday she started to feel better however waking up this morning started to experience worsening pain.  Patient did have 3 days of vomiting prior to the abdominal pain.  Reports no urinary symptoms, vaginal discharge, vaginal bleed.  Patient had history of tubal ligation but otherwise no other abdominal surgery.      Prior to Admission Medications   Prescriptions Last Dose Informant Patient Reported? Taking?   Diclofenac Sodium (VOLTAREN) 1 %   No No   Sig: Apply 2 g topically 4 (four) times a day   albuterol (PROVENTIL HFA,VENTOLIN HFA) 90 mcg/act inhaler   No No   Sig: Inhale 2 puffs every 6 (six) hours as needed for wheezing or shortness of breath   amitriptyline (ELAVIL) 75 mg tablet   No No   Sig: Take 1 tablet (75 mg total) by mouth daily at bedtime   baclofen 10 mg tablet   No No   Sig: Take 1 tablet (10 mg total) by mouth daily at bedtime   capsaicin (ZOSTRIX) 0.025 % cream   No No   Sig: Apply 1 application. topically 2 (two) times a day as needed for mild pain   famotidine (PEPCID) 20 mg tablet   No No   Sig: TAKE 1 TABLET(20 MG) BY MOUTH DAILY AT BEDTIME   ibuprofen (MOTRIN) 800 mg tablet   Yes No   Sig: Take 800 mg by mouth every 8 (eight) hours as needed   omeprazole (PriLOSEC) 20 mg delayed release capsule   No No   Sig: Take 1 capsule (20 mg total) by mouth daily before breakfast   propranolol (INDERAL) 40 mg tablet   No No   Sig: TAKE 1 TABLET(40 MG) BY MOUTH THREE TIMES DAILY   rizatriptan (Maxalt) 10 mg tablet   No No   Sig: Take 1 tablet (10 mg total) by mouth as needed for migraine Take at the onset of " migraine; if symptoms continue or return, may take another dose at least 2 hours after first dose. Take no more than 2 doses in a day.   terbinafine (LamISIL) 1 % cream   No No   Sig: Apply topically 2 (two) times a day   triamcinolone (KENALOG) 0.1 % cream   No No   Sig: Apply topically 2 (two) times a day      Facility-Administered Medications: None       Past Medical History:   Diagnosis Date    Anxiety     Asthma     Bursitis     Clostridioides difficile infection     8/2021-treatment received    Gastric ulcer     Gastroparesis     GERD (gastroesophageal reflux disease)     Liver cyst     Lumbar herniated disc     Migraine     Norovirus     8/2021    Polycystic ovarian syndrome     Salmonella infection 08/2021    Seasonal allergies     Torn ACL     Umbilical hernia     Wears glasses        Past Surgical History:   Procedure Laterality Date    KNEE SURGERY Right     LA ARTHRS AIDED ANT CRUCIATE LIGM RPR/AGMNTJ/RCNSTJ Right 5/24/2018    Procedure: ARTHROSCOPIC RECONSTRUCTION ANTERIOR CRUCIATE LIGAMENT (ACL) WITH AUTOGRAFT AND ALLOGRAFT;  Surgeon: Dann Gonzalez MD;  Location: [facility];  Service: Orthopedics    LA ESOPHAGOGASTRODUODENOSCOPY TRANSORAL DIAGNOSTIC N/A 5/1/2017    Procedure: ESOPHAGOGASTRODUODENOSCOPY (EGD);  Surgeon: Jim Patterson MD;  Location: [facility] GI LAB;  Service: Gastroenterology    TUBAL LIGATION      WISDOM TOOTH EXTRACTION         Family History   Problem Relation Age of Onset    Depression Mother     Anxiety disorder Mother     Sleep disorder Mother     Hypertension Mother     Hyperlipidemia Mother     Hyperlipidemia Father     Hypertension Father     Heart disease Father     Heart disease Paternal Aunt     Heart disease Paternal Uncle     Osteoporosis Maternal Grandmother     Alzheimer's disease Paternal Grandmother     Heart disease Paternal Grandmother     Heart disease Paternal Grandfather     Stroke Paternal Grandfather      I have reviewed and agree with the history as  documented.    E-Cigarette/Vaping     E-Cigarette/Vaping Substances     Social History     Tobacco Use    Smoking status: Never    Smokeless tobacco: Never   Substance Use Topics    Alcohol use: Not Currently     Comment: rare    Drug use: No       Review of Systems   Constitutional:  Negative for chills, diaphoresis, fever and unexpected weight change.   HENT:  Negative for ear pain and sore throat.    Eyes:  Negative for visual disturbance.   Respiratory:  Negative for cough, chest tightness and shortness of breath.    Cardiovascular:  Negative for chest pain and leg swelling.   Gastrointestinal:  Positive for abdominal pain and vomiting. Negative for abdominal distention, constipation, diarrhea and nausea.   Endocrine: Negative.    Genitourinary:  Negative for difficulty urinating and dysuria.   Musculoskeletal: Negative.    Skin: Negative.    Allergic/Immunologic: Negative.    Neurological: Negative.    Hematological: Negative.    Psychiatric/Behavioral: Negative.     All other systems reviewed and are negative.      Physical Exam  Physical Exam  Vitals and nursing note reviewed.   Constitutional:       General: Jovana is not in acute distress.     Appearance: Normal appearance. Jovana is not ill-appearing.   HENT:      Head: Normocephalic and atraumatic.      Right Ear: External ear normal.      Left Ear: External ear normal.      Nose: Nose normal.      Mouth/Throat:      Mouth: Mucous membranes are moist.      Pharynx: Oropharynx is clear.   Eyes:      General: No scleral icterus.        Right eye: No discharge.         Left eye: No discharge.      Extraocular Movements: Extraocular movements intact.      Conjunctiva/sclera: Conjunctivae normal.      Pupils: Pupils are equal, round, and reactive to light.   Cardiovascular:      Rate and Rhythm: Normal rate and regular rhythm.      Pulses: Normal pulses.      Heart sounds: Normal heart sounds.   Pulmonary:      Effort: Pulmonary effort is normal.      Breath  sounds: Normal breath sounds.   Abdominal:      General: Abdomen is flat. Bowel sounds are normal. There is no distension.      Palpations: Abdomen is soft.      Tenderness: There is abdominal tenderness (Right upper quadrant). There is no guarding or rebound.   Musculoskeletal:         General: Tenderness (Right lower ribs) present. Normal range of motion.      Cervical back: Normal range of motion and neck supple.   Skin:     General: Skin is warm and dry.      Capillary Refill: Capillary refill takes less than 2 seconds.   Neurological:      General: No focal deficit present.      Mental Status: Jovana is alert and oriented to person, place, and time. Mental status is at baseline.   Psychiatric:         Mood and Affect: Mood normal.         Behavior: Behavior normal.         Thought Content: Thought content normal.         Judgment: Judgment normal.         Vital Signs  ED Triage Vitals [04/13/24 1522]   Temperature Pulse Respirations Blood Pressure SpO2   98 °F (36.7 °C) 83 20 (!) 138/104 99 %      Temp Source Heart Rate Source Patient Position - Orthostatic VS BP Location FiO2 (%)   Oral Monitor Lying Left arm --      Pain Score       5           Vitals:    04/13/24 1522 04/13/24 1711   BP: (!) 138/104 137/88   Pulse: 83 88   Patient Position - Orthostatic VS: Lying Lying         Visual Acuity      ED Medications  Medications   iohexol (OMNIPAQUE) 350 MG/ML injection (MULTI-DOSE) 100 mL (100 mL Intravenous Given 4/13/24 1724)       Diagnostic Studies  Results Reviewed       Procedure Component Value Units Date/Time    Comprehensive metabolic panel [[id number]] Collected: 04/13/24 1626    Lab Status: Final result Specimen: Blood from Arm, Right Updated: 04/13/24 1650     Sodium 138 mmol/L      Potassium 3.9 mmol/L      Chloride 104 mmol/L      CO2 27 mmol/L      ANION GAP 7 mmol/L      BUN 10 mg/dL      Creatinine 0.73 mg/dL      Glucose 94 mg/dL      Calcium 9.1 mg/dL      AST 12 U/L      ALT 12 U/L       Alkaline Phosphatase 59 U/L      Total Protein 7.0 g/dL      Albumin 4.2 g/dL      Total Bilirubin 0.29 mg/dL      eGFR --    Narrative:      Notes:     1. eGFR calculation is only valid for adults 18 years and older.  2. EGFR calculation cannot be performed for patients who are transgender, non-binary, or whose legal sex, sex at birth, and gender identity differ.    Lipase [[id number]]  (Normal) Collected: 04/13/24 1626    Lab Status: Final result Specimen: Blood from Arm, Right Updated: 04/13/24 1650     Lipase 33 u/L     UA w Reflex to Microscopic w Reflex to Culture [[id number]]  (Abnormal) Collected: 04/13/24 1543    Lab Status: Final result Specimen: Urine, Clean Catch Updated: 04/13/24 1600     Color, UA Straw     Clarity, UA Slightly Cloudy     Specific Gravity, UA 1.010     pH, UA 6.5     Leukocytes, UA Negative     Nitrite, UA Negative     Protein, UA Negative mg/dl      Glucose, UA Negative mg/dl      Ketones, UA Negative mg/dl      Bilirubin, UA Negative     Occult Blood, UA Negative     UROBILINOGEN UA Negative mg/dL     CBC and differential [[id number]] Collected: 04/13/24 1543    Lab Status: Final result Specimen: Blood from Arm, Right Updated: 04/13/24 1551     WBC 7.76 Thousand/uL      RBC 4.56 Million/uL      Hemoglobin 13.2 g/dL      Hematocrit 38.6 %      MCV 85 fL      MCH 28.9 pg      MCHC 34.2 g/dL      RDW 13.0 %      MPV 10.5 fL      Platelets 279 Thousands/uL      nRBC 0 /100 WBCs      Segmented % 69 %      Immature Grans % 0 %      Lymphocytes % 22 %      Monocytes % 7 %      Eosinophils Relative 1 %      Basophils Relative 1 %      Absolute Neutrophils 5.37 Thousands/µL      Absolute Immature Grans 0.02 Thousand/uL      Absolute Lymphocytes 1.70 Thousands/µL      Absolute Monocytes 0.51 Thousand/µL      Eosinophils Absolute 0.10 Thousand/µL      Basophils Absolute 0.06 Thousands/µL     POCT pregnancy, urine [[id number]]  (Normal) Resulted: 04/13/24 1539    Lab Status: Final result Updated:  04/13/24 1539     EXT Preg Test, Ur Negative     Control Valid                   CT abdomen pelvis with contrast   Final Result by Harshil Yen MD (04/13 1858)      No acute CT findings in the abdomen or pelvis. Additional findings as above.         Workstation performed: [id number]                    Procedures  Procedures         ED Course                               SBIRT 22yo+      Flowsheet Row Most Recent Value   Initial Alcohol Screen: US AUDIT-C     1. How often do you have a drink containing alcohol? 0 Filed at: 04/13/2024 1524   2. How many drinks containing alcohol do you have on a typical day you are drinking?  0 Filed at: 04/13/2024 1524   3b. FEMALE Any Age, or MALE 65+: How often do you have 4 or more drinks on one occassion? 0 Filed at: 04/13/2024 1524   Audit-C Score 0 Filed at: 04/13/2024 1524   RAFFI: How many times in the past year have you...    Used an illegal drug or used a prescription medication for non-medical reasons? Never Filed at: 04/13/2024 1524                      Medical Decision Making  43-year-old female presenting with right lower rib, right upper quadrant abdominal pain.  Patient otherwise has no other complaints  Will assess for possible cholecystitis, hepatitis, pancreatitis, appendicitis, small bowel suction, diverticulitis, cystitis, kidney stone  Abdominal labs as well as urinalysis obtained  CT abdomen pelvis obtained  Abdominal labs as well as CT abdomen pelvis and urinalysis was normal  Patient endorses symptom relief  Patient discharged with return precautions provided    Problems Addressed:  Abdominal pain: acute illness or injury  Rib pain on right side: acute illness or injury    Amount and/or Complexity of Data Reviewed  Labs: ordered.  Radiology: ordered.             Disposition  Final diagnoses:   Abdominal pain   Rib pain on right side     Time reflects when diagnosis was documented in both MDM as applicable and the Disposition within this note       Time User  Action Codes Description Comment    4/13/2024  7:13 PM Florian Coe [R10.9] Abdominal pain     4/13/2024  7:13 PM Florian Coe [R07.81] Rib pain on right side           ED Disposition       ED Disposition   Discharge    Condition   Stable    Date/Time   Sat Apr 13, 2024 1913    Comment   Jovana Sanchez discharge to home/self care.                   Follow-up Information       Follow up With Specialties Details Why Contact Info Additional Information    AAMIR Denis [facility], Nurse Practitioner Schedule an appointment as soon as possible for a visit   [address] [address]  [phone]       [facility] Emergency Department Emergency Medicine Go to  If symptoms worsen [address] [facility] [id number] [phone] [facility] Emergency Department            Discharge Medication List as of 4/13/2024  7:13 PM        CONTINUE these medications which have NOT CHANGED    Details   albuterol (PROVENTIL HFA,VENTOLIN HFA) 90 mcg/act inhaler Inhale 2 puffs every 6 (six) hours as needed for wheezing or shortness of breath, Starting Mon 11/15/2021, Normal      amitriptyline (ELAVIL) 75 mg tablet Take 1 tablet (75 mg total) by mouth daily at bedtime, Starting Thu 5/4/2023, Normal      baclofen 10 mg tablet Take 1 tablet (10 mg total) by mouth daily at bedtime, Starting Thu 5/4/2023, Normal      capsaicin (ZOSTRIX) 0.025 % cream Apply 1 application. topically 2 (two) times a day as needed for mild pain, Starting Thu 5/4/2023, Normal      Diclofenac Sodium (VOLTAREN) 1 % Apply 2 g topically 4 (four) times a day, Starting Fri 11/4/2022, Normal      famotidine (PEPCID) 20 mg tablet TAKE 1 TABLET(20 MG) BY MOUTH DAILY AT BEDTIME, Normal      ibuprofen (MOTRIN) 800 mg tablet Take 800 mg by mouth every 8 (eight) hours as needed, Historical Med      omeprazole (PriLOSEC) 20 mg delayed release capsule Take 1 capsule (20 mg total) by  mouth daily before breakfast, Starting Thu 12/15/2022, Normal      propranolol (INDERAL) 40 mg tablet TAKE 1 TABLET(40 MG) BY MOUTH THREE TIMES DAILY, Normal      rizatriptan (Maxalt) 10 mg tablet Take 1 tablet (10 mg total) by mouth as needed for migraine Take at the onset of migraine; if symptoms continue or return, may take another dose at least 2 hours after first dose. Take no more than 2 doses in a day., Starting Thu 5/4/2023, Normal      terbinafine (LamISIL) 1 % cream Apply topically 2 (two) times a day, Starting Mon 5/16/2022, Normal      triamcinolone (KENALOG) 0.1 % cream Apply topically 2 (two) times a day, Starting Mon 5/16/2022, Normal             No discharge procedures on file.    PDMP Review         Value Time User    PDMP Reviewed  Yes 2/15/2022  4:08 PM AAMIR Middleton            ED Provider  Electronically Signed by             Florian Coe MD  04/13/24 0648

## 2024-06-06 NOTE — ASSESSMENT & PLAN NOTE
Amitriptyline effective for patient  Will refill at current dose  Follow-up if symptoms recur  [FreeTextEntry1] : 76 yo male with bilateral leg pain after walking for 5 minutes. Right popliteal artery stenosis on duplex. No significant internal carotid artery stenosis on duplex.   Pt counseled on results of duplex and above diagnosis. Pt counseled to continue to walk daily for exercise  Plan for RLE angiogram with possible SFA intervention. The risks and benefits of the surgical procedure were discussed with patient, all questions were answered.  A total of 30 minutes was spent with patient and coordinating care

## 2024-08-29 ENCOUNTER — HOSPITAL ENCOUNTER (EMERGENCY)
Facility: HOSPITAL | Age: 44
Discharge: HOME | End: 2024-08-29
Attending: EMERGENCY MEDICINE | Admitting: EMERGENCY MEDICINE
Payer: MEDICARE

## 2024-08-29 VITALS
RESPIRATION RATE: 16 BRPM | DIASTOLIC BLOOD PRESSURE: 71 MMHG | HEART RATE: 76 BPM | TEMPERATURE: 98.3 F | SYSTOLIC BLOOD PRESSURE: 140 MMHG | WEIGHT: 151.9 LBS | OXYGEN SATURATION: 98 % | BODY MASS INDEX: 25.28 KG/M2

## 2024-08-29 DIAGNOSIS — B34.9 VIRAL SYNDROME: Primary | ICD-10-CM

## 2024-08-29 LAB
FLUAV RNA RESP QL NAA+PROBE: NEGATIVE
FLUBV RNA RESP QL NAA+PROBE: NEGATIVE
RSV RNA RESP QL NAA+PROBE: NEGATIVE
SARS-COV-2 RNA RESP QL NAA+PROBE: NEGATIVE

## 2024-08-29 PROCEDURE — 0241U HB NFCT DS VIR RESP RNA 4 TRGT: CPT | Performed by: EMERGENCY MEDICINE

## 2024-08-29 PROCEDURE — 99284 EMERGENCY DEPT VISIT MOD MDM: CPT | Performed by: EMERGENCY MEDICINE

## 2024-08-29 PROCEDURE — 99283 EMERGENCY DEPT VISIT LOW MDM: CPT

## 2024-08-29 RX ORDER — ACETAMINOPHEN 325 MG/1
975 TABLET ORAL ONCE
Status: COMPLETED | OUTPATIENT
Start: 2024-08-29 | End: 2024-08-29

## 2024-08-29 RX ADMIN — ACETAMINOPHEN 975 MG: 325 TABLET ORAL at 13:27

## 2024-08-29 RX ADMIN — DEXAMETHASONE SODIUM PHOSPHATE 10 MG: 10 INJECTION, SOLUTION INTRAMUSCULAR; INTRAVENOUS at 13:28

## 2024-08-29 NOTE — ED PROVIDER NOTES
History  Chief Complaint   Patient presents with    Sore Throat     C/o headache and sore throat since this morning. Pt son presenting with flu like symptoms .      HPI    42 yo F presents to ed for eval of cold symptoms. Ongoing symptoms for the past one day. Cough non productive. Meds taken PTA: none. No fevers. Does have congestion. No n/v/cp/sob.  No trouble handling oral secretions. No change in voice. Is not boosted for COVID, not vaccinated for flu. Sick contacts: son, also in ER for similar symptoms.    Prior to Admission Medications   Prescriptions Last Dose Informant Patient Reported? Taking?   Diclofenac Sodium (VOLTAREN) 1 %   No No   Sig: Apply 2 g topically 4 (four) times a day   albuterol (PROVENTIL HFA,VENTOLIN HFA) 90 mcg/act inhaler   No No   Sig: Inhale 2 puffs every 6 (six) hours as needed for wheezing or shortness of breath   amitriptyline (ELAVIL) 75 mg tablet   No No   Sig: Take 1 tablet (75 mg total) by mouth daily at bedtime   baclofen 10 mg tablet   No No   Sig: Take 1 tablet (10 mg total) by mouth daily at bedtime   capsaicin (ZOSTRIX) 0.025 % cream   No No   Sig: Apply 1 application. topically 2 (two) times a day as needed for mild pain   famotidine (PEPCID) 20 mg tablet   No No   Sig: TAKE 1 TABLET(20 MG) BY MOUTH DAILY AT BEDTIME   ibuprofen (MOTRIN) 800 mg tablet   Yes No   Sig: Take 800 mg by mouth every 8 (eight) hours as needed   omeprazole (PriLOSEC) 20 mg delayed release capsule   No No   Sig: Take 1 capsule (20 mg total) by mouth daily before breakfast   propranolol (INDERAL) 40 mg tablet   No No   Sig: TAKE 1 TABLET(40 MG) BY MOUTH THREE TIMES DAILY   rizatriptan (Maxalt) 10 mg tablet   No No   Sig: Take 1 tablet (10 mg total) by mouth as needed for migraine Take at the onset of migraine; if symptoms continue or return, may take another dose at least 2 hours after first dose. Take no more than 2 doses in a day.   terbinafine (LamISIL) 1 % cream   No No   Sig: Apply topically 2  (two) times a day   triamcinolone (KENALOG) 0.1 % cream   No No   Sig: Apply topically 2 (two) times a day      Facility-Administered Medications: None       Past Medical History:   Diagnosis Date    Anxiety     Asthma     Bursitis     Clostridioides difficile infection     8/2021-treatment received    Gastric ulcer     Gastroparesis     GERD (gastroesophageal reflux disease)     Liver cyst     Lumbar herniated disc     Migraine     Norovirus     8/2021    Polycystic ovarian syndrome     Salmonella infection 08/2021    Seasonal allergies     Torn ACL     Umbilical hernia     Wears glasses        Past Surgical History:   Procedure Laterality Date    KNEE SURGERY Right     OH ARTHRS AIDED ANT CRUCIATE LIGM RPR/AGMNTJ/RCNSTJ Right 5/24/2018    Procedure: ARTHROSCOPIC RECONSTRUCTION ANTERIOR CRUCIATE LIGAMENT (ACL) WITH AUTOGRAFT AND ALLOGRAFT;  Surgeon: Dann Gonzalez MD;  Location: [facility] OR;  Service: Orthopedics    OH ESOPHAGOGASTRODUODENOSCOPY TRANSORAL DIAGNOSTIC N/A 5/1/2017    Procedure: ESOPHAGOGASTRODUODENOSCOPY (EGD);  Surgeon: Jim Patterson MD;  Location: [facility] GI LAB;  Service: Gastroenterology    TUBAL LIGATION      WISDOM TOOTH EXTRACTION         Family History   Problem Relation Age of Onset    Depression Mother     Anxiety disorder Mother     Sleep disorder Mother     Hypertension Mother     Hyperlipidemia Mother     Hyperlipidemia Father     Hypertension Father     Heart disease Father     Heart disease Paternal Aunt     Heart disease Paternal Uncle     Osteoporosis Maternal Grandmother     Alzheimer's disease Paternal Grandmother     Heart disease Paternal Grandmother     Heart disease Paternal Grandfather     Stroke Paternal Grandfather      I have reviewed and agree with the history as documented.    E-Cigarette/Vaping    E-Cigarette Use Never User      E-Cigarette/Vaping Substances     Social History     Tobacco Use    Smoking status: Never    Smokeless tobacco: Never   Vaping Use    Vaping  status: Never Used   Substance Use Topics    Alcohol use: Not Currently     Comment: rare    Drug use: No       Review of Systems   Constitutional:  Positive for chills. Negative for fatigue and fever.   HENT:  Positive for congestion and sore throat.    Eyes:  Negative for redness and visual disturbance.   Respiratory:  Positive for cough. Negative for choking and shortness of breath.    Cardiovascular:  Negative for chest pain.   Gastrointestinal:  Negative for abdominal pain, diarrhea and nausea.   Genitourinary:  Negative for difficulty urinating, dysuria and pelvic pain.   Musculoskeletal:  Negative for back pain.   Skin:  Negative for rash.   Neurological:  Negative for syncope, weakness and headaches.   All other systems reviewed and are negative.      Physical Exam  Physical Exam  Vitals and nursing note reviewed.   Constitutional:       General: Jovana is not in acute distress.  HENT:      Head: Normocephalic and atraumatic.      Right Ear: External ear normal.      Left Ear: External ear normal.      Mouth/Throat:      Pharynx: Uvula midline. No oropharyngeal exudate or uvula swelling.   Eyes:      Extraocular Movements: Extraocular movements intact.      Conjunctiva/sclera: Conjunctivae normal.   Cardiovascular:      Rate and Rhythm: Normal rate and regular rhythm.      Heart sounds: Normal heart sounds.   Pulmonary:      Effort: Pulmonary effort is normal. No respiratory distress.      Breath sounds: Normal breath sounds.   Abdominal:      General: Abdomen is flat.      Tenderness: There is no abdominal tenderness.   Musculoskeletal:         General: Normal range of motion.      Cervical back: Normal range of motion.   Skin:     General: Skin is warm and dry.   Neurological:      Mental Status: Jovana is alert and oriented to person, place, and time.      Cranial Nerves: No cranial nerve deficit.      Motor: No abnormal muscle tone.      Coordination: Coordination normal.         Vital Signs  ED Triage  Vitals [08/29/24 1248]   Temperature Pulse Respirations Blood Pressure SpO2   98.3 °F (36.8 °C) 76 16 140/71 98 %      Temp Source Heart Rate Source Patient Position - Orthostatic VS BP Location FiO2 (%)   Oral Monitor Standing Left arm --      Pain Score       --           Vitals:    08/29/24 1248   BP: 140/71   Pulse: 76   Patient Position - Orthostatic VS: Standing         Visual Acuity      ED Medications  Medications   acetaminophen (TYLENOL) tablet 975 mg (975 mg Oral Given 8/29/24 1327)   dexamethasone oral liquid 10 mg 1 mL (10 mg Oral Given 8/29/24 1328)       Diagnostic Studies  Results Reviewed       Procedure Component Value Units Date/Time    FLU/RSV/COVID - if FLU/RSV clinically relevant [[id number]] Collected: 08/29/24 1306    Lab Status: In process Specimen: Nares from Nose Updated: 08/29/24 1313                   No orders to display              Procedures  Procedures         ED Course                                 SBIRT 22yo+      Flowsheet Row Most Recent Value   Initial Alcohol Screen: US AUDIT-C     1. How often do you have a drink containing alcohol? 0 Filed at: 08/29/2024 1251   2. How many drinks containing alcohol do you have on a typical day you are drinking?  0 Filed at: 08/29/2024 1251   3b. FEMALE Any Age, or MALE 65+: How often do you have 4 or more drinks on one occassion? 0 Filed at: 08/29/2024 1251   Audit-C Score 0 Filed at: 08/29/2024 1251   RAFFI: How many times in the past year have you...    Used an illegal drug or used a prescription medication for non-medical reasons? Never Filed at: 08/29/2024 1251                      Medical Decision Making  Risk  OTC drugs.      Amount and/or Complexity of Data Reviewed  Clinical lab tests: ordered covid flu rsv   Reviewed past medical records: yes     History Provided by patient    Differential considered covid flu rsv viral uri    Testing sent, symptomatic treatments offered. Well appearing otherwise.  PCP follow up.    The patient was  instructed to follow up as documented. Return precautions were provided with the patient and the patient was instructed to return to the emergency department immediately if symptoms worsen. The patient and/or family member acknowledged and was in agreement with the plan.               Disposition  Final diagnoses:   Viral syndrome     Time reflects when diagnosis was documented in both MDM as applicable and the Disposition within this note       Time User Action Codes Description Comment    8/29/2024  1:19 PM Anay Lyle Mcconnell [B34.9] Viral syndrome           ED Disposition       ED Disposition   Discharge    Condition   Stable    Date/Time   Thu Aug 29, 2024 1319    Comment   Jovana Sanchez discharge to home/self care.                   Follow-up Information       Follow up With Specialties Details Why Contact Info    [facility], Nurse Practitioner Schedule an appointment as soon as possible for a visit in 3 days For follow up regarding your symptoms and recheck, As needed [address] [place] [id number]  [phone]              Discharge Medication List as of 8/29/2024  1:20 PM        CONTINUE these medications which have NOT CHANGED    Details   albuterol (PROVENTIL HFA,VENTOLIN HFA) 90 mcg/act inhaler Inhale 2 puffs every 6 (six) hours as needed for wheezing or shortness of breath, Starting Mon 11/15/2021, Normal      amitriptyline (ELAVIL) 75 mg tablet Take 1 tablet (75 mg total) by mouth daily at bedtime, Starting Thu 5/4/2023, Normal      baclofen 10 mg tablet Take 1 tablet (10 mg total) by mouth daily at bedtime, Starting Thu 5/4/2023, Normal      capsaicin (ZOSTRIX) 0.025 % cream Apply 1 application. topically 2 (two) times a day as needed for mild pain, Starting Thu 5/4/2023, Normal      Diclofenac Sodium (VOLTAREN) 1 % Apply 2 g topically 4 (four) times a day, Starting Fri 11/4/2022, Normal      famotidine (PEPCID) 20 mg tablet TAKE 1 TABLET(20 MG) BY MOUTH  DAILY AT BEDTIME, Normal      ibuprofen (MOTRIN) 800 mg tablet Take 800 mg by mouth every 8 (eight) hours as needed, Historical Med      omeprazole (PriLOSEC) 20 mg delayed release capsule Take 1 capsule (20 mg total) by mouth daily before breakfast, Starting u 12/15/2022, Normal      propranolol (INDERAL) 40 mg tablet TAKE 1 TABLET(40 MG) BY MOUTH THREE TIMES DAILY, Normal      rizatriptan (Maxalt) 10 mg tablet Take 1 tablet (10 mg total) by mouth as needed for migraine Take at the onset of migraine; if symptoms continue or return, may take another dose at least 2 hours after first dose. Take no more than 2 doses in a day., Starting Thu 5/4/2023, Normal      terbinafine (LamISIL) 1 % cream Apply topically 2 (two) times a day, Starting Mon 5/16/2022, Normal      triamcinolone (KENALOG) 0.1 % cream Apply topically 2 (two) times a day, Starting Mon 5/16/2022, Normal             No discharge procedures on file.    PDMP Review         Value Time User    PDMP Reviewed  Yes 2/15/2022  4:08 PM AAMIR Middleton            ED Provider  Electronically Signed by             Lyle Cueva MD  08/29/24 7963

## 2024-08-29 NOTE — Clinical Note
Jovana Sanchez was seen and treated in our emergency department on 8/29/2024.    No restrictions            Diagnosis: confidential    Jovana  may return to work on return date.    Jovana may return on this date: 09/02/2024         If you have any questions or concerns, please don't hesitate to call.      Lyle Cueva MD    ______________________________           _______________          _______________  Hospital Representative                              Date                                Time

## 2024-11-14 ENCOUNTER — HOSPITAL ENCOUNTER (EMERGENCY)
Facility: HOSPITAL | Age: 44
Discharge: HOME | End: 2024-11-14
Attending: EMERGENCY MEDICINE
Payer: MEDICARE

## 2024-11-14 VITALS
WEIGHT: 158.29 LBS | RESPIRATION RATE: 16 BRPM | BODY MASS INDEX: 26.34 KG/M2 | HEART RATE: 77 BPM | SYSTOLIC BLOOD PRESSURE: 133 MMHG | DIASTOLIC BLOOD PRESSURE: 85 MMHG | OXYGEN SATURATION: 98 % | TEMPERATURE: 98.7 F

## 2024-11-14 DIAGNOSIS — M54.9 BACK PAIN: Primary | ICD-10-CM

## 2024-11-14 PROCEDURE — 96372 THER/PROPH/DIAG INJ SC/IM: CPT

## 2024-11-14 PROCEDURE — 99283 EMERGENCY DEPT VISIT LOW MDM: CPT

## 2024-11-14 PROCEDURE — 99284 EMERGENCY DEPT VISIT MOD MDM: CPT | Performed by: EMERGENCY MEDICINE

## 2024-11-14 RX ORDER — METHOCARBAMOL 500 MG/1
500 TABLET, FILM COATED ORAL ONCE
Status: COMPLETED | OUTPATIENT
Start: 2024-11-14 | End: 2024-11-14

## 2024-11-14 RX ORDER — METHOCARBAMOL 500 MG/1
500 TABLET, FILM COATED ORAL 2 TIMES DAILY
Qty: 10 TABLET | Refills: 0 | Status: SHIPPED | OUTPATIENT
Start: 2024-11-14

## 2024-11-14 RX ORDER — KETOROLAC TROMETHAMINE 30 MG/ML
30 INJECTION, SOLUTION INTRAMUSCULAR; INTRAVENOUS ONCE
Status: COMPLETED | OUTPATIENT
Start: 2024-11-14 | End: 2024-11-14

## 2024-11-14 RX ORDER — ACETAMINOPHEN 325 MG/1
975 TABLET ORAL ONCE
Status: COMPLETED | OUTPATIENT
Start: 2024-11-14 | End: 2024-11-14

## 2024-11-14 RX ORDER — LIDOCAINE 50 MG/G
1 PATCH TOPICAL ONCE
Status: DISCONTINUED | OUTPATIENT
Start: 2024-11-14 | End: 2024-11-14 | Stop reason: HOSPADM

## 2024-11-14 RX ORDER — DIAZEPAM 10 MG/2ML
5 INJECTION, SOLUTION INTRAMUSCULAR; INTRAVENOUS ONCE
Status: DISCONTINUED | OUTPATIENT
Start: 2024-11-14 | End: 2024-11-14

## 2024-11-14 RX ADMIN — LIDOCAINE 1 PATCH: 50 PATCH TOPICAL at 14:21

## 2024-11-14 RX ADMIN — METHOCARBAMOL 500 MG: 500 TABLET ORAL at 14:17

## 2024-11-14 RX ADMIN — KETOROLAC TROMETHAMINE 30 MG: 30 INJECTION, SOLUTION INTRAMUSCULAR; INTRAVENOUS at 14:18

## 2024-11-14 RX ADMIN — ACETAMINOPHEN 975 MG: 325 TABLET, FILM COATED ORAL at 14:17

## 2024-11-14 NOTE — ED PROVIDER NOTES
Time reflects when diagnosis was documented in both MDM as applicable and the Disposition within this note       Time User Action Codes Description Comment    11/14/2024  3:22 PM Louis Nation Add [M54.9] Back pain           ED Disposition       ED Disposition   Discharge    Condition   Stable    Date/Time   Thu Nov 14, 2024  3:22 PM    Comment   Jovana Sanchez discharge to home/self care.                   Assessment & Plan       Medical Decision Making  Patient is a 43-year-old female, history of disc disease, comes in with complaints of midthoracic back pain, patient said that she was at work, leaned back and immediately felt pain in her mid back, felt like a strain, nonradiating, no bowel bladder incontinence, no chest pain or dyspnea no abdominal pain.  On exam, patient is well-appearing, no acute distress, no increased work of breathing, stable vital signs, mild mid thoracic paraspinal muscular tenderness, no midline T or L-spine tenderness, motor/sensory exam intact in bilateral lower extremities.  Impression: Thoracic/mid back strain, no red flags for cauda equina syndrome, will treat with pain, muscle relaxant, advised follow-up with comprehensive spine program.    Problems Addressed:  Back pain: acute illness or injury    Amount and/or Complexity of Data Reviewed  Labs: ordered.    Risk  OTC drugs.  Prescription drug management.        ED Course as of 11/14/24 1708   Thu Nov 14, 2024   1520 Patient reevaluated, pain improved, will discharge on muscle relaxant prescription, over-the-counter pain meds as tolerated, follow-up with comprehensive spine program.       Medications   lidocaine (LIDODERM) 5 % patch 1 patch (1 patch Topical Medication Applied 11/14/24 1421)   acetaminophen (TYLENOL) tablet 975 mg (975 mg Oral Given 11/14/24 1417)   ketorolac (TORADOL) injection 30 mg (30 mg Intramuscular Given 11/14/24 1418)   methocarbamol (ROBAXIN) tablet 500 mg (500 mg Oral Given 11/14/24 1417)       ED Risk  Strat Scores                           SBIRT 20yo+      Flowsheet Row Most Recent Value   Initial Alcohol Screen: US AUDIT-C     1. How often do you have a drink containing alcohol? 0 Filed at: 11/14/2024 6149   2. How many drinks containing alcohol do you have on a typical day you are drinking?  0 Filed at: 11/14/2024 4323   3b. FEMALE Any Age, or MALE 65+: How often do you have 4 or more drinks on one occassion? 0 Filed at: 11/14/2024 4731   Audit-C Score 0 Filed at: 11/14/2024 6895   RAFFI: How many times in the past year have you...    Used an illegal drug or used a prescription medication for non-medical reasons? Never Filed at: 11/14/2024 4309                            History of Present Illness       Chief Complaint   Patient presents with    Back Pain     Pt states having mid L back spasm since this morning at 8am. Tried to work through pain but became intolerable. 10/10 pain. No meds pta. Denies GI/ symptoms. Denies injury. States hx of herniated discs       Past Medical History:   Diagnosis Date    Anxiety     Asthma     Bursitis     Clostridioides difficile infection     8/2021-treatment received    Gastric ulcer     Gastroparesis     GERD (gastroesophageal reflux disease)     Liver cyst     Lumbar herniated disc     Migraine     Norovirus     8/2021    Polycystic ovarian syndrome     Salmonella infection 08/2021    Seasonal allergies     Torn ACL     Umbilical hernia     Wears glasses       Past Surgical History:   Procedure Laterality Date    KNEE SURGERY Right     AK ARTHRS AIDED ANT CRUCIATE LIGM RPR/AGMNTJ/RCNSTJ Right 5/24/2018    Procedure: ARTHROSCOPIC RECONSTRUCTION ANTERIOR CRUCIATE LIGAMENT (ACL) WITH AUTOGRAFT AND ALLOGRAFT;  Surgeon: Dann Gonzalez MD;  Location: [facility];  Service: Orthopedics    AK ESOPHAGOGASTRODUODENOSCOPY TRANSORAL DIAGNOSTIC N/A 5/1/2017    Procedure: ESOPHAGOGASTRODUODENOSCOPY (EGD);  Surgeon: Jim Patterson MD;  Location: [facility];  Service: Gastroenterology     TUBAL LIGATION      WISDOM TOOTH EXTRACTION        Family History   Problem Relation Age of Onset    Depression Mother     Anxiety disorder Mother     Sleep disorder Mother     Hypertension Mother     Hyperlipidemia Mother     Hyperlipidemia Father     Hypertension Father     Heart disease Father     Heart disease Paternal Aunt     Heart disease Paternal Uncle     Osteoporosis Maternal Grandmother     Alzheimer's disease Paternal Grandmother     Heart disease Paternal Grandmother     Heart disease Paternal Grandfather     Stroke Paternal Grandfather       Social History     Tobacco Use    Smoking status: Never    Smokeless tobacco: Never   Vaping Use    Vaping status: Never Used   Substance Use Topics    Alcohol use: Not Currently     Comment: rare    Drug use: No      E-Cigarette/Vaping    E-Cigarette Use Never User       E-Cigarette/Vaping Substances      I have reviewed and agree with the history as documented.       History provided by:  Patient   used: No    Back Pain  Location:  Thoracic spine  Quality:  Aching  Radiates to:  Does not radiate  Pain severity:  Moderate  Pain is:  Same all the time  Onset quality:  Gradual  Duration:  5 hours  Timing:  Constant  Progression:  Unchanged  Chronicity:  New  Context: physical stress    Context comment:  Patient was at work, states she stretched back and developed severe mid thoracic back pain, has history of similar pains in the past no radiation, no bowel bladder incontinence  Relieved by:  Nothing  Worsened by:  Nothing  Ineffective treatments:  None tried  Associated symptoms: no abdominal pain, no chest pain, no dysuria, no fever and no headaches    Risk factors comment:  History of Disc disease      Review of Systems   Constitutional:  Negative for chills and fever.   HENT:  Negative for facial swelling, sore throat and trouble swallowing.    Eyes:  Negative for pain and visual disturbance.   Respiratory:  Negative for cough, chest tightness  and shortness of breath.    Cardiovascular:  Negative for chest pain and leg swelling.   Gastrointestinal:  Positive for blood in stool. Negative for abdominal pain, diarrhea, nausea and vomiting.   Genitourinary:  Negative for dysuria and flank pain.   Musculoskeletal:  Positive for back pain. Negative for neck pain and neck stiffness.   Skin:  Negative for pallor and rash.   Allergic/Immunologic: Negative for environmental allergies and immunocompromised state.   Neurological:  Negative for dizziness and headaches.   Hematological:  Negative for adenopathy. Does not bruise/bleed easily.   Psychiatric/Behavioral:  Negative for agitation and behavioral problems.    All other systems reviewed and are negative.          Objective       ED Triage Vitals   Temperature Pulse Blood Pressure Respirations SpO2 Patient Position - Orthostatic VS   11/14/24 1352 11/14/24 1352 11/14/24 1352 11/14/24 1352 11/14/24 1352 11/14/24 1352   98.7 °F (37.1 °C) 77 133/85 16 98 % Sitting      Temp Source Heart Rate Source BP Location FiO2 (%) Pain Score    11/14/24 1352 11/14/24 1352 11/14/24 1352 -- 11/14/24 1417    Oral Monitor Right arm  10 - Worst Possible Pain      Vitals      Date and Time Temp Pulse SpO2 Resp BP Pain Score FACES Pain Rating User   11/14/24 1417 -- -- -- -- -- 10 - Worst Possible Pain -- DR   11/14/24 1352 98.7 °F (37.1 °C) 77 98 % 16 133/85 -- --             Physical Exam  Vitals and nursing note reviewed.   Constitutional:       General: Jovana is not in acute distress.     Appearance: Jovana is well-developed.   HENT:      Head: Normocephalic and atraumatic.   Eyes:      Extraocular Movements: Extraocular movements intact.   Cardiovascular:      Rate and Rhythm: Normal rate and regular rhythm.   Pulmonary:      Effort: Pulmonary effort is normal. No respiratory distress.   Abdominal:      Palpations: Abdomen is soft.      Tenderness: There is no abdominal tenderness. There is no guarding or rebound.    Musculoskeletal:         General: Normal range of motion.      Cervical back: Normal range of motion and neck supple.      Comments: Mild tenderness to the paraspinal mid thoracic muscles, no midline thoracic or lumbar spine tenderness, motor/sensory exam intact in bilateral lower extremities   Skin:     General: Skin is warm and dry.   Neurological:      General: No focal deficit present.      Mental Status: Jovana is alert and oriented to person, place, and time.   Psychiatric:         Mood and Affect: Mood normal.         Behavior: Behavior normal.         Results Reviewed       Procedure Component Value Units Date/Time    POCT pregnancy, urine [[id number]]     Lab Status: No result             No orders to display       Procedures    ED Medication and Procedure Management   Prior to Admission Medications   Prescriptions Last Dose Informant Patient Reported? Taking?   Diclofenac Sodium (VOLTAREN) 1 %   No No   Sig: Apply 2 g topically 4 (four) times a day   albuterol (PROVENTIL HFA,VENTOLIN HFA) 90 mcg/act inhaler   No No   Sig: Inhale 2 puffs every 6 (six) hours as needed for wheezing or shortness of breath   amitriptyline (ELAVIL) 75 mg tablet   No No   Sig: Take 1 tablet (75 mg total) by mouth daily at bedtime   baclofen 10 mg tablet   No No   Sig: Take 1 tablet (10 mg total) by mouth daily at bedtime   capsaicin (ZOSTRIX) 0.025 % cream   No No   Sig: Apply 1 application. topically 2 (two) times a day as needed for mild pain   famotidine (PEPCID) 20 mg tablet   No No   Sig: TAKE 1 TABLET(20 MG) BY MOUTH DAILY AT BEDTIME   ibuprofen (MOTRIN) 800 mg tablet   Yes No   Sig: Take 800 mg by mouth every 8 (eight) hours as needed   omeprazole (PriLOSEC) 20 mg delayed release capsule   No No   Sig: Take 1 capsule (20 mg total) by mouth daily before breakfast   propranolol (INDERAL) 40 mg tablet   No No   Sig: TAKE 1 TABLET(40 MG) BY MOUTH THREE TIMES DAILY   rizatriptan (Maxalt) 10 mg tablet   No No   Sig: Take 1  tablet (10 mg total) by mouth as needed for migraine Take at the onset of migraine; if symptoms continue or return, may take another dose at least 2 hours after first dose. Take no more than 2 doses in a day.   terbinafine (LamISIL) 1 % cream   No No   Sig: Apply topically 2 (two) times a day   triamcinolone (KENALOG) 0.1 % cream   No No   Sig: Apply topically 2 (two) times a day      Facility-Administered Medications: None     Discharge Medication List as of 11/14/2024  3:23 PM        START taking these medications    Details   methocarbamol (ROBAXIN) 500 mg tablet Take 1 tablet (500 mg total) by mouth 2 (two) times a day, Starting u 11/14/2024, Normal           CONTINUE these medications which have NOT CHANGED    Details   albuterol (PROVENTIL HFA,VENTOLIN HFA) 90 mcg/act inhaler Inhale 2 puffs every 6 (six) hours as needed for wheezing or shortness of breath, Starting Mon 11/15/2021, Normal      amitriptyline (ELAVIL) 75 mg tablet Take 1 tablet (75 mg total) by mouth daily at bedtime, Starting u 5/4/2023, Normal      baclofen 10 mg tablet Take 1 tablet (10 mg total) by mouth daily at bedtime, Starting Thu 5/4/2023, Normal      capsaicin (ZOSTRIX) 0.025 % cream Apply 1 application. topically 2 (two) times a day as needed for mild pain, Starting u 5/4/2023, Normal      Diclofenac Sodium (VOLTAREN) 1 % Apply 2 g topically 4 (four) times a day, Starting Fri 11/4/2022, Normal      famotidine (PEPCID) 20 mg tablet TAKE 1 TABLET(20 MG) BY MOUTH DAILY AT BEDTIME, Normal      ibuprofen (MOTRIN) 800 mg tablet Take 800 mg by mouth every 8 (eight) hours as needed, Historical Med      omeprazole (PriLOSEC) 20 mg delayed release capsule Take 1 capsule (20 mg total) by mouth daily before breakfast, Starting u 12/15/2022, Normal      propranolol (INDERAL) 40 mg tablet TAKE 1 TABLET(40 MG) BY MOUTH THREE TIMES DAILY, Normal      rizatriptan (Maxalt) 10 mg tablet Take 1 tablet (10 mg total) by mouth as needed for migraine  Take at the onset of migraine; if symptoms continue or return, may take another dose at least 2 hours after first dose. Take no more than 2 doses in a day., Starting Thu 5/4/2023, Normal      terbinafine (LamISIL) 1 % cream Apply topically 2 (two) times a day, Starting Mon 5/16/2022, Normal      triamcinolone (KENALOG) 0.1 % cream Apply topically 2 (two) times a day, Starting Mon 5/16/2022, Normal           No discharge procedures on file.  ED SEPSIS DOCUMENTATION   Time reflects when diagnosis was documented in both MDM as applicable and the Disposition within this note       Time User Action Codes Description Comment    11/14/2024  3:22 PM Louis Nation Add [M54.9] Back pain                  Louis Nation MD  11/14/24 1705

## 2024-11-14 NOTE — ED NOTES
Pt denied preg test stating no chance of pregnancy. Made aware of risks with toradol     Jose Daniel Clark, RN  11/14/24 9032

## 2025-05-03 ENCOUNTER — HOSPITAL ENCOUNTER (EMERGENCY)
Facility: HOSPITAL | Age: 45
Discharge: HOME | End: 2025-05-03
Attending: EMERGENCY MEDICINE | Admitting: EMERGENCY MEDICINE
Payer: MEDICARE

## 2025-05-03 VITALS
OXYGEN SATURATION: 98 % | TEMPERATURE: 98.4 F | RESPIRATION RATE: 18 BRPM | SYSTOLIC BLOOD PRESSURE: 145 MMHG | HEART RATE: 85 BPM | DIASTOLIC BLOOD PRESSURE: 84 MMHG

## 2025-05-03 DIAGNOSIS — B96.89 BACTERIAL VAGINOSIS: ICD-10-CM

## 2025-05-03 DIAGNOSIS — N89.8 VAGINAL ITCHING: Primary | ICD-10-CM

## 2025-05-03 DIAGNOSIS — N76.0 BACTERIAL VAGINOSIS: ICD-10-CM

## 2025-05-03 LAB
BILIRUB UR QL STRIP: NEGATIVE
CLARITY UR: CLEAR
COLOR UR: YELLOW
EXT PREGNANCY TEST URINE: NEGATIVE
EXT. CONTROL: NORMAL
GLUCOSE UR STRIP-MCNC: NEGATIVE MG/DL
HGB UR QL STRIP.AUTO: NEGATIVE
KETONES UR STRIP-MCNC: NEGATIVE MG/DL
LEUKOCYTE ESTERASE UR QL STRIP: NEGATIVE
NITRITE UR QL STRIP: NEGATIVE
PH UR STRIP.AUTO: 7.5 [PH] (ref 4.5–8)
PROT UR STRIP-MCNC: NEGATIVE MG/DL
SP GR UR STRIP.AUTO: 1.02 (ref 1–1.03)
UROBILINOGEN UR QL STRIP.AUTO: 0.2 E.U./DL

## 2025-05-03 PROCEDURE — 81514 NFCT DS BV&VAGINITIS DNA ALG: CPT

## 2025-05-03 PROCEDURE — 87491 CHLMYD TRACH DNA AMP PROBE: CPT

## 2025-05-03 PROCEDURE — 99284 EMERGENCY DEPT VISIT MOD MDM: CPT

## 2025-05-03 PROCEDURE — 81003 URINALYSIS AUTO W/O SCOPE: CPT

## 2025-05-03 PROCEDURE — 99283 EMERGENCY DEPT VISIT LOW MDM: CPT

## 2025-05-03 PROCEDURE — 81025 URINE PREGNANCY TEST: CPT

## 2025-05-03 PROCEDURE — 87591 N.GONORRHOEAE DNA AMP PROB: CPT

## 2025-05-03 RX ORDER — FLUCONAZOLE 100 MG/1
200 TABLET ORAL ONCE
Status: COMPLETED | OUTPATIENT
Start: 2025-05-03 | End: 2025-05-03

## 2025-05-03 RX ADMIN — FLUCONAZOLE 200 MG: 100 TABLET ORAL at 08:18

## 2025-05-03 NOTE — DISCHARGE INSTRUCTIONS
We treated you for a yeast infection today.  We will call with positive vaginal swab results and send remaining appropriate treatment at that time.  Follow-up with OB/GYN if symptoms persist.

## 2025-05-03 NOTE — ED PROVIDER NOTES
Time reflects when diagnosis was documented in both MDM as applicable and the Disposition within this note       Time User Action Codes Description Comment    5/3/2025  8:13 AM Hollie Olguin [N89.8] Vaginal itching           ED Disposition       ED Disposition   Discharge    Condition   Stable    Date/Time   Sat May 3, 2025  8:13 AM    Comment   Jovana Sanchez discharge to home/self care.                   Assessment & Plan       Medical Decision Making  Patient is a 44-year-old female presenting with vaginal itching and discharge for 1 week.  Vitals within normal limits on arrival and she is in no acute distress.  DDx: UTI, pregnancy, yeast/BV, STI.  UA without evidence of infection and urine pregnancy is negative in the ED.  Sent molecular vaginal panel and gonorrhea/committee a testing-discussed with patient that we will call with positive results and initiate any appropriate treatment at that time.  Will give first dose of Diflucan in the ED for possible yeast infection.  Advised to follow-up with OB/GYN if symptoms persist.    I have discussed findings and plan for discharge with the patient/caregiver. Follow up with the appropriate providers including primary care physician was discussed. Return precautions discussed with patient/caregiver as outlined in AVS. Patient/caregiver verbally expressed understanding. Patient stable at time of discharge and ambulated out of the emergency department.     Amount and/or Complexity of Data Reviewed  Labs: ordered.    Risk  Prescription drug management.             Medications   fluconazole (DIFLUCAN) tablet 200 mg (200 mg Oral Given 5/3/25 0818)       ED Risk Strat Scores                    No data recorded                            History of Present Illness       Chief Complaint   Patient presents with    Vaginal Itching     C/o vaginal itching ongoing x1 week.        Past Medical History:   Diagnosis Date    Anxiety     Asthma     Bursitis     Clostridioides  difficile infection     8/2021-treatment received    Gastric ulcer     Gastroparesis     GERD (gastroesophageal reflux disease)     Liver cyst     Lumbar herniated disc     Migraine     Norovirus     8/2021    Polycystic ovarian syndrome     Salmonella infection 08/2021    Seasonal allergies     Torn ACL     Umbilical hernia     Wears glasses       Past Surgical History:   Procedure Laterality Date    KNEE SURGERY Right     UT ARTHRS AIDED ANT CRUCIATE LIGM RPR/AGMNTJ/RCNSTJ Right 5/24/2018    Procedure: ARTHROSCOPIC RECONSTRUCTION ANTERIOR CRUCIATE LIGAMENT (ACL) WITH AUTOGRAFT AND ALLOGRAFT;  Surgeon: Dann Gonzalez MD;  Location: [facility];  Service: Orthopedics    UT ESOPHAGOGASTRODUODENOSCOPY TRANSORAL DIAGNOSTIC N/A 5/1/2017    Procedure: ESOPHAGOGASTRODUODENOSCOPY (EGD);  Surgeon: Jim Patterson MD;  Location: [facility];  Service: Gastroenterology    TUBAL LIGATION      WISDOM TOOTH EXTRACTION        Family History   Problem Relation Age of Onset    Depression Mother     Anxiety disorder Mother     Sleep disorder Mother     Hypertension Mother     Hyperlipidemia Mother     Hyperlipidemia Father     Hypertension Father     Heart disease Father     Heart disease Paternal Aunt     Heart disease Paternal Uncle     Osteoporosis Maternal Grandmother     Alzheimer's disease Paternal Grandmother     Heart disease Paternal Grandmother     Heart disease Paternal Grandfather     Stroke Paternal Grandfather       Social History     Tobacco Use    Smoking status: Never    Smokeless tobacco: Never   Vaping Use    Vaping status: Never Used   Substance Use Topics    Alcohol use: Not Currently     Comment: rare    Drug use: No      E-Cigarette/Vaping    E-Cigarette Use Never User       E-Cigarette/Vaping Substances      I have reviewed and agree with the history as documented.     Patient is a 44-year-old female presenting with vaginal itching for 1 week.  She also describes a white creamy discharge.  No dysuria,  hematuria, abdominal pain, flank pain, fevers, chills, nausea, vomiting, diarrhea, constipation.  Denies concern for STIs but is open to testing today.  States his full similar to prior BV or yeast infections.  No medications prior to arrival.      Vaginal Itching  Associated symptoms: no abdominal pain, no chest pain, no cough, no diarrhea, no ear pain, no fever, no headaches, no nausea, no rash, no shortness of breath, no sore throat and no vomiting        Review of Systems   Constitutional:  Negative for chills and fever.   HENT:  Negative for ear pain and sore throat.    Eyes:  Negative for pain and visual disturbance.   Respiratory:  Negative for cough and shortness of breath.    Cardiovascular:  Negative for chest pain and leg swelling.   Gastrointestinal:  Negative for abdominal pain, diarrhea, nausea and vomiting.   Genitourinary:  Positive for vaginal discharge. Negative for dysuria, flank pain and hematuria.   Musculoskeletal:  Negative for back pain and neck pain.   Skin:  Negative for rash.   Neurological:  Negative for dizziness and headaches.   Psychiatric/Behavioral:  Negative for confusion.            Objective       ED Triage Vitals [05/03/25 0803]   Temperature Pulse Blood Pressure Respirations SpO2 Patient Position - Orthostatic VS   98.4 °F (36.9 °C) 85 145/84 18 98 % --      Temp Source Heart Rate Source BP Location FiO2 (%) Pain Score    Oral Monitor -- -- --      Vitals      Date and Time Temp Pulse SpO2 Resp BP Pain Score FACES Pain Rating User   05/03/25 0803 98.4 °F (36.9 °C) 85 98 % 18 145/84 -- -- DIC            Physical Exam  Vitals and nursing note reviewed.   Constitutional:       General: Jovana is awake. Jovana is not in acute distress.     Appearance: Jovana is well-developed. Jovana is not ill-appearing or toxic-appearing.   HENT:      Head: Normocephalic and atraumatic.      Right Ear: Hearing and external ear normal.      Left Ear: Hearing and external ear normal.      Nose:  Nose normal.      Mouth/Throat:      Lips: Pink.      Mouth: Mucous membranes are moist.      Pharynx: Oropharynx is clear. Uvula midline.   Eyes:      General: Vision grossly intact.      Extraocular Movements: Extraocular movements intact.      Conjunctiva/sclera: Conjunctivae normal.   Cardiovascular:      Rate and Rhythm: Normal rate and regular rhythm.      Heart sounds: No murmur heard.  Pulmonary:      Effort: Pulmonary effort is normal. No respiratory distress.      Breath sounds: Normal breath sounds.   Abdominal:      General: Abdomen is flat.      Palpations: Abdomen is soft.      Tenderness: There is no abdominal tenderness.   Musculoskeletal:         General: No swelling.      Cervical back: Normal range of motion and neck supple.   Skin:     General: Skin is warm and dry.      Capillary Refill: Capillary refill takes less than 2 seconds.      Findings: No rash.   Neurological:      General: No focal deficit present.      Mental Status: Jovana is alert.   Psychiatric:         Mood and Affect: Mood normal.         Speech: Speech normal.         Results Reviewed       Procedure Component Value Units Date/Time    Molecular Vaginal Panel [[id number]] Collected: 05/03/25 0815    Lab Status: In process Specimen: Genital from Vaginal Updated: 05/03/25 0818    Chlamydia/GC amplified DNA by PCR [[id number]] Collected: 05/03/25 0815    Lab Status: In process Specimen: Urine, Other Updated: 05/03/25 0818    POCT pregnancy, urine [[id number]]  (Normal) Collected: 05/03/25 0813    Lab Status: Final result Updated: 05/03/25 0813     EXT Preg Test, Ur Negative     Control Valid    Urine Macroscopic, POC [[id number]] Collected: 05/03/25 0810    Lab Status: Final result Specimen: Urine Updated: 05/03/25 0811     Color, UA Yellow     Clarity, UA Clear     pH, UA 7.5     Leukocytes, UA Negative     Nitrite, UA Negative     Protein, UA Negative mg/dl      Glucose, UA Negative mg/dl      Ketones, UA Negative mg/dl       Urobilinogen, UA 0.2 E.U./dl      Bilirubin, UA Negative     Occult Blood, UA Negative     Specific Gravity, UA 1.020    Narrative:      CLINITEK RESULT            No orders to display       Procedures    ED Medication and Procedure Management   Prior to Admission Medications   Prescriptions Last Dose Informant Patient Reported? Taking?   Diclofenac Sodium (VOLTAREN) 1 %   No No   Sig: Apply 2 g topically 4 (four) times a day   albuterol (PROVENTIL HFA,VENTOLIN HFA) 90 mcg/act inhaler   No No   Sig: Inhale 2 puffs every 6 (six) hours as needed for wheezing or shortness of breath   amitriptyline (ELAVIL) 75 mg tablet   No No   Sig: Take 1 tablet (75 mg total) by mouth daily at bedtime   baclofen 10 mg tablet   No No   Sig: Take 1 tablet (10 mg total) by mouth daily at bedtime   capsaicin (ZOSTRIX) 0.025 % cream   No No   Sig: Apply 1 application. topically 2 (two) times a day as needed for mild pain   famotidine (PEPCID) 20 mg tablet   No No   Sig: TAKE 1 TABLET(20 MG) BY MOUTH DAILY AT BEDTIME   ibuprofen (MOTRIN) 800 mg tablet   Yes No   Sig: Take 800 mg by mouth every 8 (eight) hours as needed   methocarbamol (ROBAXIN) 500 mg tablet   No No   Sig: Take 1 tablet (500 mg total) by mouth 2 (two) times a day   omeprazole (PriLOSEC) 20 mg delayed release capsule   No No   Sig: Take 1 capsule (20 mg total) by mouth daily before breakfast   propranolol (INDERAL) 40 mg tablet   No No   Sig: TAKE 1 TABLET(40 MG) BY MOUTH THREE TIMES DAILY   rizatriptan (Maxalt) 10 mg tablet   No No   Sig: Take 1 tablet (10 mg total) by mouth as needed for migraine Take at the onset of migraine; if symptoms continue or return, may take another dose at least 2 hours after first dose. Take no more than 2 doses in a day.   terbinafine (LamISIL) 1 % cream   No No   Sig: Apply topically 2 (two) times a day   triamcinolone (KENALOG) 0.1 % cream   No No   Sig: Apply topically 2 (two) times a day      Facility-Administered Medications: None      Discharge Medication List as of 5/3/2025  8:14 AM        CONTINUE these medications which have NOT CHANGED    Details   albuterol (PROVENTIL HFA,VENTOLIN HFA) 90 mcg/act inhaler Inhale 2 puffs every 6 (six) hours as needed for wheezing or shortness of breath, Starting Mon 11/15/2021, Normal      amitriptyline (ELAVIL) 75 mg tablet Take 1 tablet (75 mg total) by mouth daily at bedtime, Starting u 5/4/2023, Normal      baclofen 10 mg tablet Take 1 tablet (10 mg total) by mouth daily at bedtime, Starting Thu 5/4/2023, Normal      capsaicin (ZOSTRIX) 0.025 % cream Apply 1 application. topically 2 (two) times a day as needed for mild pain, Starting Thu 5/4/2023, Normal      Diclofenac Sodium (VOLTAREN) 1 % Apply 2 g topically 4 (four) times a day, Starting Fri 11/4/2022, Normal      famotidine (PEPCID) 20 mg tablet TAKE 1 TABLET(20 MG) BY MOUTH DAILY AT BEDTIME, Normal      ibuprofen (MOTRIN) 800 mg tablet Take 800 mg by mouth every 8 (eight) hours as needed, Historical Med      methocarbamol (ROBAXIN) 500 mg tablet Take 1 tablet (500 mg total) by mouth 2 (two) times a day, Starting Thu 11/14/2024, Normal      omeprazole (PriLOSEC) 20 mg delayed release capsule Take 1 capsule (20 mg total) by mouth daily before breakfast, Starting Th 12/15/2022, Normal      propranolol (INDERAL) 40 mg tablet TAKE 1 TABLET(40 MG) BY MOUTH THREE TIMES DAILY, Normal      rizatriptan (Maxalt) 10 mg tablet Take 1 tablet (10 mg total) by mouth as needed for migraine Take at the onset of migraine; if symptoms continue or return, may take another dose at least 2 hours after first dose. Take no more than 2 doses in a day., Starting Thu 5/4/2023, Normal      terbinafine (LamISIL) 1 % cream Apply topically 2 (two) times a day, Starting Mon 5/16/2022, Normal      triamcinolone (KENALOG) 0.1 % cream Apply topically 2 (two) times a day, Starting Mon 5/16/2022, Normal           No discharge procedures on file.  ED SEPSIS DOCUMENTATION   Time  reflects when diagnosis was documented in both MDM as applicable and the Disposition within this note       Time User Action Codes Description Comment    5/3/2025  8:13 AM Hollie Olguin Add [N89.8] Vaginal itching                  Hollie Olguin PA-C  05/03/25 1045

## 2025-05-04 LAB
C GLABRATA DNA VAG QL NAA+PROBE: NEGATIVE
C KRUSEI DNA VAG QL NAA+PROBE: NEGATIVE
CANDIDA SP 6 PNL VAG NAA+PROBE: POSITIVE
T VAGINALIS DNA VAG QL NAA+PROBE: NEGATIVE
VAGINOSIS/ITIS DNA PNL VAG PROBE+SIG AMP: POSITIVE

## 2025-05-05 LAB
C TRACH DNA SPEC QL NAA+PROBE: NEGATIVE
N GONORRHOEA DNA SPEC QL NAA+PROBE: NEGATIVE

## 2025-05-06 ENCOUNTER — RESULTS FOLLOW-UP (OUTPATIENT)
Dept: EMERGENCY DEPT | Facility: HOSPITAL | Age: 45
End: 2025-05-06

## 2025-05-06 RX ORDER — METRONIDAZOLE 500 MG/1
500 TABLET ORAL 2 TIMES DAILY
Qty: 14 TABLET | Refills: 0 | Status: SHIPPED | OUTPATIENT
Start: 2025-05-06 | End: 2025-05-13

## (undated) DEVICE — CHLORAPREP HI-LITE 26ML ORANGE

## (undated) DEVICE — TUBING SUCTION 5MM X 12 FT

## (undated) DEVICE — COBAN 6 IN STERILE

## (undated) DEVICE — SUT VICRYL 2-0 CT-2 27 IN J269H

## (undated) DEVICE — BLADE SHAVER DISSECTOR 5MM 13CM COOLCUT

## (undated) DEVICE — MAYO STAND COVER: Brand: CONVERTORS

## (undated) DEVICE — CYSTO TUBING TUR Y IRRIGATION

## (undated) DEVICE — 3M™ STERI-STRIP™ REINFORCED ADHESIVE SKIN CLOSURES, R1547, 1/2 IN X 4 IN (12 MM X 100 MM), 6 STRIPS/ENVELOPE: Brand: 3M™ STERI-STRIP™

## (undated) DEVICE — GAUZE SPONGES,USP TYPE VII GAUZE, 12 PLY: Brand: CURITY

## (undated) DEVICE — SUT FIBERSTICK #2 50IN BLUE

## (undated) DEVICE — 3M™ STERI-DRAPE™ U-DRAPE 1015: Brand: STERI-DRAPE™

## (undated) DEVICE — SCD SEQUENTIAL COMPRESSION COMFORT SLEEVE MEDIUM KNEE LENGTH: Brand: KENDALL SCD

## (undated) DEVICE — HEAVY DUTY TABLE COVER: Brand: CONVERTORS

## (undated) DEVICE — ACE WRAP 6 IN UNSTERILE

## (undated) DEVICE — PADDING CAST 6IN COTTON STRL

## (undated) DEVICE — IMPERVIOUS STOCKINETTE: Brand: DEROYAL

## (undated) DEVICE — SUT TIGERSTICK TIGERWIRE 50IN WHITE/BLACK

## (undated) DEVICE — ABDOMINAL PAD: Brand: DERMACEA

## (undated) DEVICE — SUT MONOCRYL 2-0 SH 27 IN Y417H

## (undated) DEVICE — BUTTON SWITCH PENCIL HOLSTER: Brand: VALLEYLAB

## (undated) DEVICE — PUDDLE VAC

## (undated) DEVICE — 3000CC GUARDIAN II: Brand: GUARDIAN

## (undated) DEVICE — NEEDLE HYPO 22G X 1-1/2 IN

## (undated) DEVICE — INTENDED FOR TISSUE SEPARATION, AND OTHER PROCEDURES THAT REQUIRE A SHARP SURGICAL BLADE TO PUNCTURE OR CUT.: Brand: BARD-PARKER ® CARBON RIB-BACK BLADES

## (undated) DEVICE — TUBING EXTENSION 6 FT CONTIN WAVE

## (undated) DEVICE — TIBURON SPLIT SHEET: Brand: CONVERTORS

## (undated) DEVICE — SPONGE LAP 18 X 18 IN

## (undated) DEVICE — UNDYED BRAIDED (POLYGLACTIN 910), SYNTHETIC ABSORBABLE SUTURE: Brand: COATED VICRYL

## (undated) DEVICE — PACK UNIVERSAL ARTHRSCOPY PBDS

## (undated) DEVICE — SUT 2 FIBERLOOP AR-7234

## (undated) DEVICE — GLOVE INDICATOR PI UNDERGLOVE SZ 7.5 BLUE

## (undated) DEVICE — BLADE SHAVER DISSECTOR 4MM 13CM COOLCUT

## (undated) DEVICE — GLOVE INDICATOR PI UNDERGLOVE SZ 8.5 BLUE

## (undated) DEVICE — VAPR COOLPULSE 90 ELECTRODE 90 DEGREES SUCTION WITH INTEGRATED HANDPIECE: Brand: VAPR COOLPULSE

## (undated) DEVICE — SYRINGE 20ML LL

## (undated) DEVICE — MEDI-VAC YANKAUER SUCTION HANDLE W/BULBOUS AND CONTROL VENT: Brand: CARDINAL HEALTH

## (undated) DEVICE — CUTTER FLIPCUTTER II SHORT 9.5MM

## (undated) DEVICE — LIGHT GLOVE GREEN